# Patient Record
Sex: FEMALE | Race: WHITE | NOT HISPANIC OR LATINO | Employment: OTHER | URBAN - METROPOLITAN AREA
[De-identification: names, ages, dates, MRNs, and addresses within clinical notes are randomized per-mention and may not be internally consistent; named-entity substitution may affect disease eponyms.]

---

## 2018-08-24 ENCOUNTER — TRANSCRIBE ORDERS (OUTPATIENT)
Dept: ADMINISTRATIVE | Facility: HOSPITAL | Age: 83
End: 2018-08-24

## 2018-08-24 DIAGNOSIS — Z13.9 ENCOUNTER FOR SCREENING: Primary | ICD-10-CM

## 2018-08-30 ENCOUNTER — HOSPITAL ENCOUNTER (OUTPATIENT)
Dept: CARDIOLOGY | Facility: HOSPITAL | Age: 83
Discharge: HOME | End: 2018-08-30
Attending: INTERNAL MEDICINE | Admitting: INTERNAL MEDICINE

## 2018-08-30 VITALS
DIASTOLIC BLOOD PRESSURE: 68 MMHG | HEART RATE: 70 BPM | SYSTOLIC BLOOD PRESSURE: 137 MMHG | WEIGHT: 178 LBS | BODY MASS INDEX: 31.54 KG/M2 | HEIGHT: 63 IN

## 2018-08-30 DIAGNOSIS — Z13.9 ENCOUNTER FOR SCREENING: ICD-10-CM

## 2018-08-30 PROCEDURE — 93799 UNLISTED CV SVC/PROCEDURE: CPT

## 2021-03-16 ENCOUNTER — OFFICE VISIT (OUTPATIENT)
Dept: FAMILY MEDICINE CLINIC | Facility: CLINIC | Age: 86
End: 2021-03-16

## 2021-03-16 VITALS
TEMPERATURE: 97.8 F | SYSTOLIC BLOOD PRESSURE: 130 MMHG | WEIGHT: 181.6 LBS | HEART RATE: 74 BPM | DIASTOLIC BLOOD PRESSURE: 64 MMHG | OXYGEN SATURATION: 97 % | BODY MASS INDEX: 31 KG/M2 | HEIGHT: 64 IN

## 2021-03-16 DIAGNOSIS — Z00.00 MEDICARE ANNUAL WELLNESS VISIT, SUBSEQUENT: Primary | ICD-10-CM

## 2021-03-16 DIAGNOSIS — M48.061 SPINAL STENOSIS OF LUMBAR REGION, UNSPECIFIED WHETHER NEUROGENIC CLAUDICATION PRESENT: ICD-10-CM

## 2021-03-16 DIAGNOSIS — I82.5Z3 CHRONIC DEEP VEIN THROMBOSIS (DVT) OF DISTAL VEIN OF BOTH LOWER EXTREMITIES (HCC): ICD-10-CM

## 2021-03-16 DIAGNOSIS — B37.2 CANDIDAL INTERTRIGO: ICD-10-CM

## 2021-03-16 PROBLEM — Z72.0 TOBACCO ABUSE: Status: ACTIVE | Noted: 2021-03-16

## 2021-03-16 PROBLEM — I82.4Z2 ACUTE DEEP VEIN THROMBOSIS (DVT) OF DISTAL VEIN OF LEFT LOWER EXTREMITY: Status: ACTIVE | Noted: 2021-02-14

## 2021-03-16 PROBLEM — I87.2 VENOUS STASIS DERMATITIS OF BOTH LOWER EXTREMITIES: Status: ACTIVE | Noted: 2021-02-16

## 2021-03-16 PROBLEM — I89.0 LYMPHEDEMA: Status: ACTIVE | Noted: 2018-10-04

## 2021-03-16 PROBLEM — I35.0 MILD AORTIC STENOSIS: Status: ACTIVE | Noted: 2018-10-04

## 2021-03-16 PROCEDURE — 1170F FXNL STATUS ASSESSED: CPT | Performed by: NURSE PRACTITIONER

## 2021-03-16 PROCEDURE — 1125F AMNT PAIN NOTED PAIN PRSNT: CPT | Performed by: NURSE PRACTITIONER

## 2021-03-16 PROCEDURE — 1159F MED LIST DOCD IN RCRD: CPT | Performed by: NURSE PRACTITIONER

## 2021-03-16 PROCEDURE — G0439 PPPS, SUBSEQ VISIT: HCPCS | Performed by: NURSE PRACTITIONER

## 2021-03-16 RX ORDER — POTASSIUM CHLORIDE 750 MG/1
10 TABLET, FILM COATED, EXTENDED RELEASE ORAL DAILY
COMMUNITY
Start: 2020-12-08 | End: 2021-03-25 | Stop reason: SDUPTHER

## 2021-03-16 RX ORDER — APIXABAN 5 MG/1
5 TABLET, FILM COATED ORAL 2 TIMES DAILY
COMMUNITY
Start: 2021-03-15 | End: 2021-07-06 | Stop reason: SDUPTHER

## 2021-03-16 RX ORDER — FUROSEMIDE 20 MG/1
20 TABLET ORAL DAILY
COMMUNITY
Start: 2021-02-21 | End: 2021-03-25 | Stop reason: SDUPTHER

## 2021-03-16 RX ORDER — CLOTRIMAZOLE AND BETAMETHASONE DIPROPIONATE 10; .64 MG/G; MG/G
1 CREAM TOPICAL AS NEEDED
COMMUNITY
End: 2021-03-16 | Stop reason: SDUPTHER

## 2021-03-16 RX ORDER — TRAMADOL HYDROCHLORIDE 50 MG/1
50 TABLET ORAL EVERY 6 HOURS PRN
COMMUNITY
Start: 2021-02-16 | End: 2021-10-21 | Stop reason: ALTCHOICE

## 2021-03-16 RX ORDER — LOPERAMIDE HYDROCHLORIDE 2 MG/1
2 CAPSULE ORAL AS NEEDED
COMMUNITY
Start: 2020-11-12 | End: 2021-03-16 | Stop reason: SDUPTHER

## 2021-03-16 RX ORDER — LOPERAMIDE HYDROCHLORIDE 2 MG/1
2 CAPSULE ORAL DAILY PRN
Qty: 90 CAPSULE | Refills: 1 | Status: SHIPPED | OUTPATIENT
Start: 2021-03-16 | End: 2021-09-08 | Stop reason: SDUPTHER

## 2021-03-16 RX ORDER — CLOTRIMAZOLE AND BETAMETHASONE DIPROPIONATE 10; .64 MG/G; MG/G
1 CREAM TOPICAL 2 TIMES DAILY PRN
Qty: 45 G | Refills: 1 | Status: SHIPPED | OUTPATIENT
Start: 2021-03-16 | End: 2021-06-15

## 2021-03-16 NOTE — PROGRESS NOTES
The ABCs of the Annual Wellness Visit  Subsequent Medicare Wellness Visit    Chief Complaint   Patient presents with   • Medicare Wellness-subsequent       Subjective   History of Present Illness:  Noemi Forbes is a 85 y.o. female who presents for a Subsequent Medicare Wellness Visit.    HEALTH RISK ASSESSMENT    Recent Hospitalizations:  Recently treated at the following:  Other: [facility]     Current Medical Providers:  Patient Care Team:  Charity Calhoun APRN as PCP - General (Family Medicine)    Smoking Status:  Social History     Tobacco Use   Smoking Status Current Every Day Smoker   • Packs/day: 0.50   • Years: 68.00   • Pack years: 34.00   • Start date: 1950   Smokeless Tobacco Never Used       Alcohol Consumption:  Social History     Substance and Sexual Activity   Alcohol Use Yes    Comment: socailly       Depression Screen:   PHQ-2/PHQ-9 Depression Screening 3/16/2021   Little interest or pleasure in doing things 0   Feeling down, depressed, or hopeless 0   Total Score 0       Fall Risk Screen:  ANGELINE Fall Risk Assessment was completed, and patient is at HIGH risk for falls. Assessment completed on:3/16/2021    Health Habits and Functional and Cognitive Screening:  Functional & Cognitive Status 3/16/2021   Do you have difficulty preparing food and eating? No   Do you have difficulty bathing yourself, getting dressed or grooming yourself? No   Do you have difficulty using the toilet? No   Do you have difficulty moving around from place to place? Yes   Do you have trouble with steps or getting out of a bed or a chair? Yes   Current Diet Unhealthy Diet   Dental Exam Up to date   Eye Exam Up to date   Exercise (times per week) 0 times per week   Current Exercises Include No Regular Exercise   Do you need help using the phone?  No   Are you deaf or do you have serious difficulty hearing?  No   Do you need help with transportation? No   Do you need help shopping? Yes   Do you need help preparing meals?  No    Do you need help with housework?  No   Do you need help with laundry? No   Do you need help taking your medications? No   Do you need help managing money? No   Do you ever drive or ride in a car without wearing a seat belt? No   Have you felt unusual stress, anger or loneliness in the last month? No   Who do you live with? Alone   If you need help, do you have trouble finding someone available to you? No   Have you been bothered in the last four weeks by sexual problems? No   Do you have difficulty concentrating, remembering or making decisions? Yes         Does the patient have evidence of cognitive impairment? No    Asprin use counseling:Does not need ASA (and currently is not on it)    Age-appropriate Screening Schedule:  Refer to the list below for future screening recommendations based on patient's age, sex and/or medical conditions. Orders for these recommended tests are listed in the plan section. The patient has been provided with a written plan.    Health Maintenance   Topic Date Due   • DXA SCAN  Never done   • TDAP/TD VACCINES (4 - Td) 05/07/2030   • INFLUENZA VACCINE  Completed   • ZOSTER VACCINE  Completed          The following portions of the patient's history were reviewed and updated as appropriate: allergies, current medications, past family history, past medical history, past social history, past surgical history and problem list.    Outpatient Medications Prior to Visit   Medication Sig Dispense Refill   • Calcium Carb-Cholecalciferol (Calcium-Vitamin D3) 250-125 MG-UNIT tablet tablet Take 1 tablet by mouth Daily.     • Diclofenac Sodium (VOLTAREN) 1 % gel gel diclofenac 1 % topical gel   APPLY 2 GRAM TO THE AFFECTED AREA(S) BY TOPICAL ROUTE 4 TIMES PER DAY     • Eliquis 5 MG tablet tablet Take 5 mg by mouth 2 (two) times a day.     • furosemide (LASIX) 20 MG tablet Take 20 mg by mouth Daily.     • potassium chloride 10 MEQ CR tablet Take 10 mEq by mouth Daily.     • traMADol (ULTRAM) 50 MG  tablet Take 50 mg by mouth Every 6 (Six) Hours As Needed.     • clotrimazole-betamethasone (LOTRISONE) 1-0.05 % cream Apply 1 application topically to the appropriate area as directed As Needed.     • loperamide (IMODIUM) 2 MG capsule Take 2 mg by mouth As Needed.       No facility-administered medications prior to visit.       Patient Active Problem List   Diagnosis   • Acute deep vein thrombosis (DVT) of distal vein of left lower extremity (CMS/HCC)   • Back pain, chronic   • Arthritis   • Chronic deep vein thrombosis (DVT) of distal vein of both lower extremities (CMS/HCC)   • Chronic urinary tract infection   • Chronic venous insufficiency   • Facet arthritis of lumbar region   • Lymphedema   • Mild aortic stenosis   • Osteopenia   • Spinal stenosis of lumbar region   • Spondylolisthesis of lumbar region   • Tobacco abuse   • Venous stasis dermatitis of both lower extremities       Advanced Care Planning:  ACP discussion was held with the patient during this visit. Patient has an advance directive (not in EMR), copy requested.    Review of Systems   Constitutional: Negative for fever.   HENT: Negative for ear pain, rhinorrhea and sore throat.    Eyes: Negative for visual disturbance.   Respiratory: Negative for cough and shortness of breath.    Cardiovascular: Negative for chest pain.   Gastrointestinal: Negative for abdominal pain, diarrhea, nausea and vomiting.   Genitourinary: Negative.    Musculoskeletal: Positive for back pain.   Skin: Positive for rash.   Neurological: Negative for dizziness and headaches.   Psychiatric/Behavioral: Negative for confusion.       Compared to one year ago, the patient feels her physical health is worse.  Compared to one year ago, the patient feels her mental health is the same.    Reviewed chart for potential of high risk medication in the elderly: yes  Reviewed chart for potential of harmful drug interactions in the elderly:yes    Objective         Vitals:    03/16/21 1515  "  BP: 130/64   Pulse: 74   Temp: 97.8 °F (36.6 °C)   TempSrc: Temporal   SpO2: 97%   Weight: 82.4 kg (181 lb 9.6 oz)   Height: 162.6 cm (64\")       Body mass index is 31.17 kg/m².  Discussed the patient's BMI with her. The BMI is above average; BMI management plan is completed.    Physical Exam  Vitals and nursing note reviewed.   Constitutional:       Appearance: Normal appearance.   HENT:      Head: Normocephalic and atraumatic.      Right Ear: Tympanic membrane and ear canal normal.      Left Ear: Tympanic membrane and ear canal normal.   Eyes:      Conjunctiva/sclera: Conjunctivae normal.      Pupils: Pupils are equal, round, and reactive to light.   Cardiovascular:      Rate and Rhythm: Normal rate and regular rhythm.      Heart sounds: Normal heart sounds.   Pulmonary:      Effort: Pulmonary effort is normal.      Breath sounds: Normal breath sounds.   Abdominal:      General: Bowel sounds are normal.      Palpations: Abdomen is soft.   Genitourinary:     Comments: Declined   Musculoskeletal:         General: Normal range of motion.      Cervical back: Neck supple.   Skin:     General: Skin is warm and dry.   Neurological:      Mental Status: She is alert and oriented to person, place, and time.   Psychiatric:         Mood and Affect: Mood normal.               Assessment/Plan   Medicare Risks and Personalized Health Plan  CMS Preventative Services Quick Reference  Advance Directive Discussion  Immunizations Discussed/Encouraged (specific immunizations; Pneumococcal 23 )  Obesity/Overweight   Osteoprorosis Risk    The above risks/problems have been discussed with the patient.  Pertinent information has been shared with the patient in the After Visit Summary.  Follow up plans and orders are seen below in the Assessment/Plan Section.    Diagnoses and all orders for this visit:    1. Medicare annual wellness visit, subsequent (Primary)    2. Chronic deep vein thrombosis (DVT) of distal vein of both lower " extremities (CMS/HCC)    3. Spinal stenosis of lumbar region, unspecified whether neurogenic claudication present  -     Ambulatory Referral to Orthopedic Surgery    4. Candidal intertrigo  -     clotrimazole-betamethasone (LOTRISONE) 1-0.05 % cream; Apply 1 application topically to the appropriate area as directed 2 (Two) Times a Day As Needed (rash).  Dispense: 45 g; Refill: 1    Other orders  -     loperamide (IMODIUM) 2 MG capsule; Take 1 capsule by mouth Daily As Needed for Diarrhea.  Dispense: 90 capsule; Refill: 1      Follow Up:  Return in about 6 months (around 9/16/2021) for Recheck.     An After Visit Summary and PPPS were given to the patient.

## 2021-03-25 RX ORDER — POTASSIUM CHLORIDE 750 MG/1
10 TABLET, FILM COATED, EXTENDED RELEASE ORAL DAILY
Qty: 90 TABLET | Refills: 1 | Status: SHIPPED | OUTPATIENT
Start: 2021-03-25 | End: 2021-11-10 | Stop reason: SDUPTHER

## 2021-03-25 RX ORDER — FUROSEMIDE 20 MG/1
20 TABLET ORAL DAILY
Qty: 90 TABLET | Refills: 1 | Status: SHIPPED | OUTPATIENT
Start: 2021-03-25 | End: 2021-11-10 | Stop reason: SDUPTHER

## 2021-03-25 NOTE — TELEPHONE ENCOUNTER
LOV - 03/16/21    Last filled - potassium 12/08/20    Last filled - furosemide - 02/21/21    Last filled calcium - 02/17/21

## 2021-04-21 ENCOUNTER — OFFICE VISIT (OUTPATIENT)
Dept: FAMILY MEDICINE CLINIC | Facility: CLINIC | Age: 86
End: 2021-04-21

## 2021-04-21 VITALS
BODY MASS INDEX: 30.83 KG/M2 | WEIGHT: 180.6 LBS | OXYGEN SATURATION: 95 % | TEMPERATURE: 97.7 F | HEART RATE: 82 BPM | HEIGHT: 64 IN | DIASTOLIC BLOOD PRESSURE: 79 MMHG | SYSTOLIC BLOOD PRESSURE: 127 MMHG

## 2021-04-21 DIAGNOSIS — S16.1XXA ACUTE STRAIN OF NECK MUSCLE, INITIAL ENCOUNTER: Primary | ICD-10-CM

## 2021-04-21 PROCEDURE — 99213 OFFICE O/P EST LOW 20 MIN: CPT | Performed by: NURSE PRACTITIONER

## 2021-04-21 RX ORDER — METHYLPREDNISOLONE 4 MG/1
TABLET ORAL
Qty: 21 TABLET | Refills: 0 | Status: SHIPPED | OUTPATIENT
Start: 2021-04-21 | End: 2021-04-28 | Stop reason: SDUPTHER

## 2021-04-21 NOTE — PROGRESS NOTES
"Chief Complaint  Pain (pain in neck for past week after a fall at home- seen by ER 04/20/21)    Subjective          Noemi Forbes presents to [facility]  Neck Pain   This is a new problem. The current episode started in the past 7 days. The pain is associated with a fall. The pain is present in the midline. The quality of the pain is described as aching. The pain is at a severity of 8/10. The symptoms are aggravated by bending and twisting. Pertinent negatives include no headaches, numbness or weakness. Treatments tried: Tramadol.  The treatment provided no relief.       Objective   Vital Signs:   /79   Pulse 82   Temp 97.7 °F (36.5 °C) (Temporal)   Ht 162.6 cm (64\")   Wt 81.9 kg (180 lb 9.6 oz)   SpO2 95%   BMI 31.00 kg/m²     Physical Exam  Vitals and nursing note reviewed.   Constitutional:       Appearance: Normal appearance.   Cardiovascular:      Rate and Rhythm: Normal rate and regular rhythm.   Pulmonary:      Effort: Pulmonary effort is normal.      Breath sounds: Normal breath sounds.   Musculoskeletal:      Cervical back: Pain with movement present. Decreased range of motion.   Neurological:      Mental Status: She is alert and oriented to person, place, and time.   Psychiatric:         Mood and Affect: Mood normal.        Result Review :   The following data was reviewed by: TERRELL Miller on 04/21/2021:    Data reviewed: ED notes from 4/21/2021          Assessment and Plan    Diagnoses and all orders for this visit:    1. Acute strain of neck muscle, initial encounter (Primary)  -     methylPREDNISolone (MEDROL) 4 MG dose pack; Take as directed on package instructions.  Dispense: 21 tablet; Refill: 0      I spent 20 minutes caring for Noemi on this date of service. This time includes time spent by me in the following activities:reviewing tests, performing a medically appropriate examination and/or evaluation , counseling and educating the " patient/family/caregiver, ordering medications, tests, or procedures and documenting information in the medical record  Follow Up   Return if symptoms worsen or fail to improve.  Patient was given instructions and counseling regarding her condition or for health maintenance advice. Please see specific information pulled into the AVS if appropriate.

## 2021-04-26 ENCOUNTER — TELEPHONE (OUTPATIENT)
Dept: FAMILY MEDICINE CLINIC | Facility: CLINIC | Age: 86
End: 2021-04-26

## 2021-04-26 DIAGNOSIS — S16.1XXA ACUTE STRAIN OF NECK MUSCLE, INITIAL ENCOUNTER: ICD-10-CM

## 2021-04-26 NOTE — TELEPHONE ENCOUNTER
Caller: Leidy Noemi    Relationship: Self    Best call back number: [phone]    What medication are you requesting: STEROID    What are your current symptoms: PAIN IN HER NECK    Have you had these symptoms before:    [x] Yes  [] No    Have you been treated for these symptoms before:   [x] Yes  [] No    If a prescription is needed, what is your preferred pharmacy and phone number: [facility]/PHARMACY #[id number] - [place], [place] - [address] AT [facility] [phone] [facility] [phone]      Additional notes: PATIENT STATES THAT THE STEROID SHE HAS BEEN ON HAS HELPED ABOUT 40% AND SHE WANTS TO KNOW IF SHE COULD GET ANOTHER ONE SINCE SHE IS STILL HAVING PAIN.     PLEASE CALL AND ADVISE

## 2021-04-27 ENCOUNTER — OFFICE VISIT (OUTPATIENT)
Dept: ORTHOPEDIC SURGERY | Facility: CLINIC | Age: 86
End: 2021-04-27

## 2021-04-27 VITALS — BODY MASS INDEX: 29.02 KG/M2 | WEIGHT: 170 LBS | HEIGHT: 64 IN | TEMPERATURE: 97.1 F

## 2021-04-27 DIAGNOSIS — M54.50 CHRONIC LOW BACK PAIN, UNSPECIFIED BACK PAIN LATERALITY, UNSPECIFIED WHETHER SCIATICA PRESENT: Primary | ICD-10-CM

## 2021-04-27 DIAGNOSIS — G89.29 CHRONIC LOW BACK PAIN, UNSPECIFIED BACK PAIN LATERALITY, UNSPECIFIED WHETHER SCIATICA PRESENT: Primary | ICD-10-CM

## 2021-04-27 DIAGNOSIS — M48.061 SPINAL STENOSIS OF LUMBAR REGION, UNSPECIFIED WHETHER NEUROGENIC CLAUDICATION PRESENT: ICD-10-CM

## 2021-04-27 DIAGNOSIS — M54.50 LUMBAR BACK PAIN: ICD-10-CM

## 2021-04-27 PROCEDURE — 99204 OFFICE O/P NEW MOD 45 MIN: CPT | Performed by: ORTHOPAEDIC SURGERY

## 2021-04-27 PROCEDURE — 72100 X-RAY EXAM L-S SPINE 2/3 VWS: CPT | Performed by: ORTHOPAEDIC SURGERY

## 2021-04-27 NOTE — TELEPHONE ENCOUNTER
Please ask patient to discuss this with Dr. Combs (ortho) today.          Documentation    ** HUB MAY RELAY MESSAGE TO PATIENT **    TCB

## 2021-04-27 NOTE — TELEPHONE ENCOUNTER
PATIENT CALLING IN I RELAYED MESSAGE ABOUT ASKING DOCTOR WERENER ABOUT THIS AND SHE STATES SHE WENT TO [facility] AND WAS TOLD HE IS THE WRONG DOCTOR.     SHE IS ASKING TO HAVE [facility] SEND SOMETHING IN FOR HER NECK.      PLEASE ADVISE    CALLBACK NUMBER  [phone]

## 2021-04-28 RX ORDER — METHYLPREDNISOLONE 4 MG/1
TABLET ORAL
Qty: 21 TABLET | Refills: 0 | Status: SHIPPED | OUTPATIENT
Start: 2021-04-28 | End: 2021-07-21

## 2021-04-28 NOTE — TELEPHONE ENCOUNTER
Please let patient know I will refill Medrol Dosepak one more time but she will need follow-up if symptoms persist or worsen.

## 2021-04-28 NOTE — TELEPHONE ENCOUNTER
Caller: Noemi Forbes    Relationship to patient: Self    Best call back number: [phone]    Patient is needing: PATIENT STATES THAT SHE HAS NECK PAIN AND WOULD LIKE A PRESCRIPTION FOR PAIN. PLEASE CALL PATIENT AND ADVISE. POSSIBLY WOULD LIKE THIS MEDICATION:    methylPREDNISolone (MEDROL) 4 MG dose pack    [facility]/pharmacy #[id number] - [place], [place] - [address] AT [facility] - [phone]  - [phone]   [phone]

## 2021-04-29 NOTE — PROGRESS NOTES
New patient or new problem visit    CC: She complains of neck pain and arm pain which is chronic as well as low back pain.      HPI: Her history is of chronic neck and back pain.  In 2010 Dr. Willis Storm performed lumbar procedure.  Her hip pain is 9 out of 10 and she is considering a spinal cord stimulator.  She was disappointed to find out that I do not implant spinal cord stimulators.  No bowel or bladder complaints fever chills or weight loss    PFSH: See attached    ROS: As above    PE: On exam positive patellar reflexes negative Achilles reflexes good strength in the legs bilaterally.  Mild tenderness to palpation of the lumbar spine.    XRAY: Plain film x-rays lumbar spine show multilevel spondylosis and loss of lordosis.  Probable congenital if not developmental narrowing of the spinal canal.  AP view shows a slight well-balanced scoliosis and hip joints are fairly well-preserved.  No comparisons are available.  She is apparently had other studies done but none available today.    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  Probably has some asymptomatic spinal stenosis as well.  She does not want major surgical intervention.  She cannot undergo MRI scanning as I believe she told me she has a pacemaker    Plan: We will get a CT scan of the lumbar spine to get a general idea of what is going on in the spinal canal.  I will refer her to Dr. Hill for evaluation for pain management and possible placement of a pain pump and/or stimulator if he thinks that is practical.  She apologized for wasting my time and I told her I do not think it is a waste as Dr. Hill will appreciate the confirmation that she may indeed be a candidate for major, but minimally invasive pain management procedure

## 2021-05-27 ENCOUNTER — TELEPHONE (OUTPATIENT)
Dept: FAMILY MEDICINE CLINIC | Facility: CLINIC | Age: 86
End: 2021-05-27

## 2021-05-27 NOTE — TELEPHONE ENCOUNTER
MAYTEI:  Pt called and stated the she believes she has blood clots in her legs, because she has had theM before. Pt was instructed to go directly to the ER for high level of care.

## 2021-06-04 ENCOUNTER — HOSPITAL ENCOUNTER (OUTPATIENT)
Dept: CT IMAGING | Facility: HOSPITAL | Age: 86
Discharge: HOME | End: 2021-06-04
Admitting: ORTHOPAEDIC SURGERY

## 2021-06-04 DIAGNOSIS — M54.50 CHRONIC LOW BACK PAIN, UNSPECIFIED BACK PAIN LATERALITY, UNSPECIFIED WHETHER SCIATICA PRESENT: ICD-10-CM

## 2021-06-04 DIAGNOSIS — G89.29 CHRONIC LOW BACK PAIN, UNSPECIFIED BACK PAIN LATERALITY, UNSPECIFIED WHETHER SCIATICA PRESENT: ICD-10-CM

## 2021-06-04 PROCEDURE — 72131 CT LUMBAR SPINE W/O DYE: CPT

## 2021-06-12 DIAGNOSIS — B37.2 CANDIDAL INTERTRIGO: ICD-10-CM

## 2021-06-15 RX ORDER — CLOTRIMAZOLE AND BETAMETHASONE DIPROPIONATE 10; .64 MG/G; MG/G
CREAM TOPICAL
Qty: 45 G | Refills: 0 | Status: SHIPPED | OUTPATIENT
Start: 2021-06-15 | End: 2021-06-16 | Stop reason: SDUPTHER

## 2021-06-16 DIAGNOSIS — B37.2 CANDIDAL INTERTRIGO: ICD-10-CM

## 2021-06-16 RX ORDER — CLOTRIMAZOLE AND BETAMETHASONE DIPROPIONATE 10; .64 MG/G; MG/G
CREAM TOPICAL 2 TIMES DAILY
Qty: 45 G | Refills: 0 | Status: SHIPPED | OUTPATIENT
Start: 2021-06-16 | End: 2021-10-24

## 2021-07-06 ENCOUNTER — TELEPHONE (OUTPATIENT)
Dept: FAMILY MEDICINE CLINIC | Facility: CLINIC | Age: 86
End: 2021-07-06

## 2021-07-06 RX ORDER — APIXABAN 5 MG/1
5 TABLET, FILM COATED ORAL 2 TIMES DAILY
Qty: 180 TABLET | Refills: 0 | Status: SHIPPED | OUTPATIENT
Start: 2021-07-06 | End: 2021-10-18

## 2021-07-06 NOTE — TELEPHONE ENCOUNTER
Caller: Noemi Forbes    Relationship: Self    Best call back number: [phone]    Medication needed:   Requested Prescriptions     Pending Prescriptions Disp Refills   • Eliquis 5 MG tablet tablet 60 tablet      Sig: Take 1 tablet by mouth 2 (two) times a day.       When do you need the refill by:ASAP     What additional details did the patient provide when requesting the medication: OUT OF MEDICATION. PATIENT IS ASKING FOR A 90 DAY SUPPLY AND IF THE DOSAGE CAN BE 10 MG SO SHE WILL ONLY HAVE TO TAKE 1 PILL PER DAY     Does the patient have less than a 3 day supply:  [x] Yes  [] No    What is the patient's preferred pharmacy: [facility]/PHARMACY #[id number] - [place], [place] - [address] AT [facility] [phone] [facility] [phone] FX

## 2021-07-21 ENCOUNTER — OFFICE VISIT (OUTPATIENT)
Dept: FAMILY MEDICINE CLINIC | Facility: CLINIC | Age: 86
End: 2021-07-21

## 2021-07-21 VITALS
HEIGHT: 64 IN | TEMPERATURE: 97.9 F | OXYGEN SATURATION: 94 % | SYSTOLIC BLOOD PRESSURE: 128 MMHG | DIASTOLIC BLOOD PRESSURE: 74 MMHG | BODY MASS INDEX: 30.77 KG/M2 | HEART RATE: 79 BPM | WEIGHT: 180.2 LBS

## 2021-07-21 DIAGNOSIS — H00.015 HORDEOLUM EXTERNUM OF LEFT LOWER EYELID: Primary | ICD-10-CM

## 2021-07-21 PROCEDURE — 99213 OFFICE O/P EST LOW 20 MIN: CPT | Performed by: NURSE PRACTITIONER

## 2021-07-21 NOTE — PROGRESS NOTES
"Chief Complaint  painful left eye with itching and discuss her medications    Subjective          Noemi Forbes presents to [facility]  Eye Problem   The left eye is affected. This is a new problem. The current episode started in the past 7 days. There was no injury mechanism. The pain is moderate. There is no known exposure to pink eye. She does not wear contacts. Pertinent negatives include no blurred vision, eye discharge or fever. She has tried nothing for the symptoms.     Objective   Vital Signs:   /74   Pulse 79   Temp 97.9 °F (36.6 °C) (Temporal)   Ht 162.6 cm (64\")   Wt 81.7 kg (180 lb 3.2 oz)   SpO2 94%   BMI 30.93 kg/m²     Physical Exam  Vitals and nursing note reviewed.   Eyes:      General:         Left eye: Hordeolum present.  Cardiovascular:      Rate and Rhythm: Normal rate and regular rhythm.   Pulmonary:      Effort: Pulmonary effort is normal.      Breath sounds: Normal breath sounds.   Neurological:      Mental Status: She is oriented to person, place, and time.   Psychiatric:         Mood and Affect: Mood normal.        Result Review :            Assessment and Plan    Diagnoses and all orders for this visit:    1. Hordeolum externum of left lower eyelid (Primary)  Comments:  - Patient to use warm compresses or purchase Misa mask.   Orders:  -     Ambulatory Referral to Ophthalmology      I spent 20 minutes caring for Noemi on this date of service. This time includes time spent by me in the following activities:performing a medically appropriate examination and/or evaluation , counseling and educating the patient/family/caregiver, referring and communicating with other health care professionals  and documenting information in the medical record  Follow Up   Return if symptoms worsen or fail to improve.  Patient was given instructions and counseling regarding her condition or for health maintenance advice. Please see specific information pulled " into the AVS if appropriate.

## 2021-08-09 ENCOUNTER — OFFICE VISIT (OUTPATIENT)
Dept: PAIN MEDICINE | Facility: CLINIC | Age: 86
End: 2021-08-09

## 2021-08-09 VITALS
TEMPERATURE: 96.2 F | WEIGHT: 175 LBS | DIASTOLIC BLOOD PRESSURE: 83 MMHG | RESPIRATION RATE: 18 BRPM | SYSTOLIC BLOOD PRESSURE: 124 MMHG | OXYGEN SATURATION: 94 % | BODY MASS INDEX: 29.88 KG/M2 | HEART RATE: 62 BPM | HEIGHT: 64 IN

## 2021-08-09 DIAGNOSIS — M96.1 POSTLAMINECTOMY SYNDROME: ICD-10-CM

## 2021-08-09 DIAGNOSIS — G89.4 CHRONIC PAIN SYNDROME: Primary | ICD-10-CM

## 2021-08-09 PROCEDURE — 99204 OFFICE O/P NEW MOD 45 MIN: CPT | Performed by: ANESTHESIOLOGY

## 2021-08-09 NOTE — PATIENT INSTRUCTIONS
----------  Education about SCS therapy:    -  This was an extended office visit in which we entered into discussion about advanced pain relieving techniques, and discussed implantable pain therapies.  We discussed advanced neuromodulation in the form of Spinal Cord Stimulation.  This is a reasonable therapy for patients who have exhausted basic nonnarcotic options, basic modalities and physical therapies, and do not have any other reasonable surgical options.  This therapy as an alternative to long term high dose opioid therapy.    -  Risks include but are not limited to bleeding, infection, injury, paralysis, nerve injury, dural puncture, and risk for postprocedural pain.  Implanted equipment risks include but are not limited to lead migration, lead fracture, risk of loss of pain relieving stimulation, risk of electrical shock, and risk of system failure.    - We discussed the theory and basic science behind SCS therapy including but not limited to energy delivery and relevant anatomy, in terms that are easy to understand and also with use of illustrative devices.  Spinal Cord Stimulation therapies apply an electromagnetic field to a specific area on the spinal cord (Dorsal Column) to attempt to block transmission of painful signals from the peripheral nerves to the brain.    -  We discussed that prior to trialing, I request that patients review relevant materials and perform some research, and also have a follow up education session with a device specialist from the .  Also, insurance requires a presurgical psychological evaluation.  When these have been completed, and all the patient's questions have been answered to their satisfaction, then we will plan to request authorization for trialing.   - We discussed the trialing process (aka Phase 1)  that usually lasts a week, and the temporary nature of this trial.  Trial success will determine whether or not we proceed to implant.  We discussed  reasonable expectations, and that I feel that consistent 50% pain relief is medically successful and is a reasonable expectation to justify moving forward to permanent implant.    -  Additional risks of Phase 1 include but are not limited to bleeding on insertion, bleeding on lead removal, and procedural site soreness.  - We discussed the percutaneous surgical implant, including postsurgical restrictions, risks, and alternatives.   For spinal cord stimulation implanted device (aka SCS Phase 2) there is usually a midline vertical incision for the spinally implanted leads, and also a horizontal incision in the posterior lumbar flank for implantation of the battery & computer (aka IPG).  The leads are tunneled from the midline incision to the medial aspect of the battery pocket incision.    -  Postoperative restrictions include limiting the following activity as much as possible for 90 days:  Lifting >10 lbs, bending at the waist, stretching/reaching overhead, and twisting.  ----------      Spinal Cord Stimulation Trial Information  A spinal cord stimulation trial is a test to see whether a spinal cord stimulator reduces your pain. A spinal cord stimulator is a small device that is inserted (implanted) in your back. The stimulator has small wires (leads) that connect it to your spinal cord. The stimulator sends electrical pulses through the leads to the spinal cord. This can relieve pain. Settings for the stimulator can be adjusted with a remote device to find the best pain control.  Your health care provider may suggest a spinal cord stimulation trial if other treatments for long-lasting (chronic) pain have not worked for you. Spinal cord stimulation may be used to manage pain that is caused by:  · Coronary artery disease or peripheral vascular disease.  · Failed back surgery.  · Phantom limb sensation.  · Peripheral neuropathy.  · Complex regional pain syndrome.  · Other syndromes that involve chronic pain.  For the  trial, the stimulator is attached to your back instead of inserted under the skin. A trial period is usually 3-5 days, but this can vary among health care providers. After your trial period, you and your health care provider will discuss whether a permanent spinal cord stimulator is an option for you. The permanent stimulator may be an option depending on:  · Whether the stimulator reduces your pain during the trial.  · Whether the stimulator fits into your lifestyle.  · Whether the cost of the stimulator is covered by your insurance.  What are the risks?  Generally, a spinal cord stimulation trial is safe. However, problems can occur, including:  · Bleeding or pain at the insertion site of the leads.  · Infection at the insertion site or around the leads.  · Allergic reactions to medicines, devices, or dyes.  · Damage to the skin, nerves, back muscles, or spinal cord where the leads are placed.  · Inability to move the legs (paralysis).  · Numbness in the legs.  · Inability to control when you urinate or have a bowel movement (incontinence).  · Spinal fluid leakage.  How is a spinal cord stimulator placed for a trial?    For a trial period, the stimulator is placed on your skin, not under it. Only the leads that connect the stimulator to the spinal cord are implanted under your skin. The exact location of the stimulator depends on where you have pain.  There are two types of surgery for implanting the leads:  · Noninvasive surgery. In this type of surgery, a small incision is made and needles are used to place the leads under your skin.  · Open surgery. In this type of surgery, a larger incision is made, and the leads are implanted directly into your back.  How should I care for myself during the trial period?  Activity  · Return to your normal activities as told by your health care provider. Ask your health care provider what activities are safe for you.  · Do not lift anything that is heavier than 10 lb (4.5 kg),  or the limit that you are told.  General instructions  · Follow your health care provider's specific instructions about how to take care of your spinal cord stimulator and your incision.  · Make sure you write down the following information so that you can share this information with your health care provider:  ? Your responses to the stimulator. Describe these as told by your health care provider.  ? Your pain level throughout the day.  ? The amount and kind of pain medicine that you take.  · Take over-the-counter and prescription medicines only as told by your health care provider.  · Do not take baths, swim, or use a hot tub until your health care provider approves.  · Tell all health care providers who provide care for you that you have a spinal cord stimulator. This is important information that could affect the medical treatment that you receive.  · Keep all follow-up visits as told by your health care provider. This is important.  When should I seek medical care?  During the trial, seek medical care if:  · You have redness, swelling, or pain around your incision.  · You have fluid or blood coming from your incision.  · Your incision feels warm to the touch.  · You have pus or a bad smell coming from your incision.  · The bandage (dressing) that covers your incision comes off.  Get help right away if:  · Your pain gets worse.  · The stimulator leads come out.  · You develop numbness or weakness in your legs, or you have difficulty walking.  · You have problems urinating or having a bowel movement.  · You have a fever.  · You have symptoms that last for more than 2-3 days.  · Your symptoms suddenly get worse.  Summary  · A spinal cord stimulator is a small device that sends electrical pulses to your spinal cord. This can relieve pain caused by many different health conditions.  · Before a permanent stimulator is placed, you will have a trial using a temporary stimulator that is not implanted under your skin.  This helps determine if a stimulator will reduce your pain.  · For the trial, only the leads that connect the stimulator to the spinal cord are implanted under your skin.  · During the trial period, make sure you write down information about your pain and your responses to the stimulator so that you can share this information with your health care provider.  · Keep all follow-up visits as told by your health care provider. This is important. Contact your health care provider if you have symptoms that indicate a problem.  This information is not intended to replace advice given to you by your health care provider. Make sure you discuss any questions you have with your health care provider.  Document Revised: 09/11/2020 Document Reviewed: 01/22/2020  Elsevier Patient Education © 2021 [employer]

## 2021-08-09 NOTE — PROGRESS NOTES
CHIEF COMPLAINT  Ms. Forbes states that she has had low back pain for the last 10 years. She states that she has tried PT previously for her back pain on 3 different occasions. She states that she was previously in pain management at [facility] a few years ago.    Subjective   Noemi Forbes is a 86 y.o. female.   She presents to the office for evaluation of back pain. She was referred here by Charity TEJADA and she also follows with my colleague Dr. Combs.         Back Pain  This is a chronic problem. The current episode started more than 1 year ago. The problem occurs intermittently (Significant incident pain). The problem has been gradually worsening since onset. The pain is present in the lumbar spine. The quality of the pain is described as aching and shooting. The pain radiates to the left thigh and right thigh. The pain is severe. The symptoms are aggravated by standing and bending (Walking). Associated symptoms include numbness (bilateral feet). Pertinent negatives include no weakness.        PEG Assessment   What number best describes your pain on average in the past week?8  What number best describes how, during the past week, pain has interfered with your enjoyment of life?8  What number best describes how, during the past week, pain has interfered with your general activity?  8    --  The aforementioned information the Chief Complaint section and above subjective data including any HPI data, and also the Review of Systems data, has been personally reviewed and affirmed.  --        Current Outpatient Medications:   •  Calcium Carb-Cholecalciferol (Calcium-Vitamin D3) 250-125 MG-UNIT tablet tablet, Take 1 tablet by mouth Daily., Disp: 90 tablet, Rfl: 1  •  clotrimazole-betamethasone (LOTRISONE) 1-0.05 % cream, Apply  topically to the appropriate area as directed 2 (Two) Times a Day., Disp: 45 g, Rfl: 0  •  Eliquis 5 MG tablet tablet, Take 1 tablet by mouth 2 (two) times a day., Disp: 180 tablet,  "Rfl: 0  •  furosemide (LASIX) 20 MG tablet, Take 1 tablet by mouth Daily., Disp: 90 tablet, Rfl: 1  •  loperamide (IMODIUM) 2 MG capsule, Take 1 capsule by mouth Daily As Needed for Diarrhea., Disp: 90 capsule, Rfl: 1  •  potassium chloride 10 MEQ CR tablet, Take 1 tablet by mouth Daily., Disp: 90 tablet, Rfl: 1  •  traMADol (ULTRAM) 50 MG tablet, Take 50 mg by mouth Every 6 (Six) Hours As Needed., Disp: , Rfl:     The following portions of the patient's history were reviewed and updated as appropriate: allergies, current medications, past family history, past medical history, past social history, past surgical history and problem list.      REVIEW OF PERTINENT MEDICAL DATA    E-darin report is reviewed:  I reviewed the document in the electronic form under the PDMP tab in the Epic EMR...  - In this function, the report is a current report in as close to real-time as possible.  - The report was available for immediate review.    - I did sampson the report as reviewed.  - There is not concern for aberrant behavior based on this ekasper review.      Review of Systems   Constitutional: Positive for fatigue.   HENT: Negative for congestion.    Eyes: Negative for visual disturbance.   Respiratory: Negative for shortness of breath.    Cardiovascular: Negative.    Gastrointestinal: Negative for constipation.   Genitourinary: Negative for difficulty urinating.   Musculoskeletal: Positive for back pain.   Neurological: Positive for numbness (bilateral feet). Negative for weakness.   Psychiatric/Behavioral: Negative for sleep disturbance and suicidal ideas. The patient is not nervous/anxious.          Vitals:    08/09/21 1444   BP: 124/83   Pulse: 62   Resp: 18   Temp: 96.2 °F (35.7 °C)   SpO2: 94%   Weight: 79.4 kg (175 lb)   Height: 162.6 cm (64\")   PainSc: 0-No pain         Objective   Physical Exam    Assessment/Plan   Diagnoses and all orders for this visit:    1. Chronic pain syndrome (Primary)    2. Postlaminectomy " syndrome  -     Ambulatory Referral to Psychology    This 86-year-old woman has little to no pain at rest.  She has severe painful flareups which cause her to limit activity severely.  Previous history of back surgery.  She presents today asking specific question, that is whether or not she would be a candidate to have a spinal stimulator device.  She expresses concern about further open decompressive or fusion procedures and recovery time from those and is interested if she can have some other pain relieving option.    We talked about anticoagulant management.  We talked about that she would need to hold her Eliquis for 3 days prior to initiation of the spinal cord stimulator trial.  She would need to be off Eliquis during the trial.  She will need to discuss with the prescribing physician whether or not that would be allowed.    I initiated discussion that was an introduction to spinal cord stimulation therapy that included discussions some about the theory and the sides of the therapy as well as an introduction to trialing and also a brief introduction to implantation.  She had not studied about the therapy only knowing that she had heard about it and she knows people who have succeeded with it.  She was given some information to review and also was set up for more formalized education, as a session with the device representative.      --- Follow-up for education with Denia from [employer]  -- presurgical SCS evaluation    --The plan is to proceed with a spinal cord stimulator phase 1 if there are no contraindications                Dictated utilizing Dragon dictation.     ---    Vitals:    08/09/21 1444   PainSc: 0-No pain          Noemi Forbes reports a pain score of 0.  Given her pain assessment as noted, treatment options were discussed and the following options were decided upon as a follow-up plan to address the patient's pain: educational materials on pain management.

## 2021-08-12 ENCOUNTER — TELEPHONE (OUTPATIENT)
Dept: PAIN MEDICINE | Facility: CLINIC | Age: 86
End: 2021-08-12

## 2021-08-12 NOTE — TELEPHONE ENCOUNTER
Provider: DR SWAIN  Caller: LEVON CALERO  Relationship to Patient: SELF    Phone Number: [phone]  Reason for Call: PATIENT REQ CALL BACK - PER ATIENT SHE NEVER ANSWERES CALL ANYWAY - AND DR SWAIN TOLD HER [employer] WOULD BE CALLING HER AND SHE'S STAYED BY HER PHONE FOR 2 DAYS AND STILL HASN'T RCVD THE CALL.  REQUESTS PRISCILA TO CALL HER - HAS QUESTIONS.

## 2021-09-02 ENCOUNTER — OFFICE VISIT (OUTPATIENT)
Dept: FAMILY MEDICINE CLINIC | Facility: CLINIC | Age: 86
End: 2021-09-02

## 2021-09-02 ENCOUNTER — HOSPITAL ENCOUNTER (OUTPATIENT)
Dept: CARDIOLOGY | Facility: HOSPITAL | Age: 86
Discharge: HOME | End: 2021-09-02
Admitting: NURSE PRACTITIONER

## 2021-09-02 VITALS
BODY MASS INDEX: 30.04 KG/M2 | HEART RATE: 76 BPM | TEMPERATURE: 97.8 F | OXYGEN SATURATION: 96 % | DIASTOLIC BLOOD PRESSURE: 75 MMHG | HEIGHT: 64 IN | SYSTOLIC BLOOD PRESSURE: 144 MMHG

## 2021-09-02 DIAGNOSIS — I82.5Z3 CHRONIC DEEP VEIN THROMBOSIS (DVT) OF DISTAL VEIN OF BOTH LOWER EXTREMITIES (HCC): ICD-10-CM

## 2021-09-02 DIAGNOSIS — L03.116 BILATERAL CELLULITIS OF LOWER LEG: Primary | ICD-10-CM

## 2021-09-02 DIAGNOSIS — L03.115 BILATERAL CELLULITIS OF LOWER LEG: Primary | ICD-10-CM

## 2021-09-02 LAB
BH CV LOW VAS LEFT POPLITEAL SPONT: 1
BH CV LOWER VASCULAR LEFT COMMON FEMORAL AUGMENT: NORMAL
BH CV LOWER VASCULAR LEFT COMMON FEMORAL COMPETENT: NORMAL
BH CV LOWER VASCULAR LEFT COMMON FEMORAL COMPRESS: NORMAL
BH CV LOWER VASCULAR LEFT COMMON FEMORAL PHASIC: NORMAL
BH CV LOWER VASCULAR LEFT COMMON FEMORAL SPONT: NORMAL
BH CV LOWER VASCULAR LEFT DISTAL FEMORAL COMPRESS: NORMAL
BH CV LOWER VASCULAR LEFT GASTRONEMIUS COMPRESS: NORMAL
BH CV LOWER VASCULAR LEFT GREATER SAPH AK COMPRESS: NORMAL
BH CV LOWER VASCULAR LEFT GREATER SAPH BK COMPRESS: NORMAL
BH CV LOWER VASCULAR LEFT LESSER SAPH COMPRESS: NORMAL
BH CV LOWER VASCULAR LEFT MID FEMORAL AUGMENT: NORMAL
BH CV LOWER VASCULAR LEFT MID FEMORAL COMPETENT: NORMAL
BH CV LOWER VASCULAR LEFT MID FEMORAL COMPRESS: NORMAL
BH CV LOWER VASCULAR LEFT MID FEMORAL PHASIC: NORMAL
BH CV LOWER VASCULAR LEFT MID FEMORAL SPONT: NORMAL
BH CV LOWER VASCULAR LEFT PERONEAL COMPRESS: NORMAL
BH CV LOWER VASCULAR LEFT POPLITEAL AUGMENT: NORMAL
BH CV LOWER VASCULAR LEFT POPLITEAL COMPETENT: NORMAL
BH CV LOWER VASCULAR LEFT POPLITEAL COMPRESS: NORMAL
BH CV LOWER VASCULAR LEFT POPLITEAL PHASIC: NORMAL
BH CV LOWER VASCULAR LEFT POPLITEAL SPONT: NORMAL
BH CV LOWER VASCULAR LEFT POPLITEAL THROMBUS: NORMAL
BH CV LOWER VASCULAR LEFT POSTERIOR TIBIAL COMPRESS: NORMAL
BH CV LOWER VASCULAR LEFT PROFUNDA FEMORAL COMPRESS: NORMAL
BH CV LOWER VASCULAR LEFT PROXIMAL FEMORAL COMPRESS: NORMAL
BH CV LOWER VASCULAR LEFT SAPHENOFEMORAL JUNCTION COMPRESS: NORMAL
BH CV LOWER VASCULAR RIGHT COMMON FEMORAL AUGMENT: NORMAL
BH CV LOWER VASCULAR RIGHT COMMON FEMORAL COMPETENT: NORMAL
BH CV LOWER VASCULAR RIGHT COMMON FEMORAL COMPRESS: NORMAL
BH CV LOWER VASCULAR RIGHT COMMON FEMORAL PHASIC: NORMAL
BH CV LOWER VASCULAR RIGHT COMMON FEMORAL SPONT: NORMAL
BH CV LOWER VASCULAR RIGHT DISTAL FEMORAL COMPRESS: NORMAL
BH CV LOWER VASCULAR RIGHT GASTRONEMIUS COMPRESS: NORMAL
BH CV LOWER VASCULAR RIGHT GREATER SAPH AK COMPRESS: NORMAL
BH CV LOWER VASCULAR RIGHT GREATER SAPH BK COMPRESS: NORMAL
BH CV LOWER VASCULAR RIGHT LESSER SAPH COMPRESS: NORMAL
BH CV LOWER VASCULAR RIGHT MID FEMORAL AUGMENT: NORMAL
BH CV LOWER VASCULAR RIGHT MID FEMORAL COMPETENT: NORMAL
BH CV LOWER VASCULAR RIGHT MID FEMORAL COMPRESS: NORMAL
BH CV LOWER VASCULAR RIGHT MID FEMORAL PHASIC: NORMAL
BH CV LOWER VASCULAR RIGHT MID FEMORAL SPONT: NORMAL
BH CV LOWER VASCULAR RIGHT PERONEAL COMPRESS: NORMAL
BH CV LOWER VASCULAR RIGHT POPLITEAL AUGMENT: NORMAL
BH CV LOWER VASCULAR RIGHT POPLITEAL COMPETENT: NORMAL
BH CV LOWER VASCULAR RIGHT POPLITEAL COMPRESS: NORMAL
BH CV LOWER VASCULAR RIGHT POPLITEAL PHASIC: NORMAL
BH CV LOWER VASCULAR RIGHT POPLITEAL SPONT: NORMAL
BH CV LOWER VASCULAR RIGHT POSTERIOR TIBIAL COMPRESS: NORMAL
BH CV LOWER VASCULAR RIGHT PROFUNDA FEMORAL COMPRESS: NORMAL
BH CV LOWER VASCULAR RIGHT PROXIMAL FEMORAL COMPRESS: NORMAL
BH CV LOWER VASCULAR RIGHT SAPHENOFEMORAL JUNCTION COMPRESS: NORMAL

## 2021-09-02 PROCEDURE — 93970 EXTREMITY STUDY: CPT

## 2021-09-02 PROCEDURE — 99214 OFFICE O/P EST MOD 30 MIN: CPT | Performed by: NURSE PRACTITIONER

## 2021-09-02 RX ORDER — CEPHALEXIN 500 MG/1
500 CAPSULE ORAL 3 TIMES DAILY
Qty: 30 CAPSULE | Refills: 0 | Status: SHIPPED | OUTPATIENT
Start: 2021-09-02 | End: 2021-09-12

## 2021-09-02 NOTE — PROGRESS NOTES
Vascular lab preliminary    Bilateral lower extremity venous duplex exam is complete     Negative for acute DVT/SVT bilaterally      Spoke with surya Wilde to send patient home    Final report to follow

## 2021-09-02 NOTE — PROGRESS NOTES
"Chief Complaint  Leg Pain (pain both lower legs)    Subjective          Noemi Forbes presents to [facility]  Leg Pain   There was no injury mechanism. The pain is present in the left leg and right leg. The pain is mild. Pertinent negatives include no inability to bear weight, muscle weakness or numbness. The symptoms are aggravated by palpation. She has tried nothing for the symptoms.   Past medical history is significant for cellulitis and chronic DVT.  Patient has been noncompliant with Eliquis.    Objective   Vital Signs:   /75 (BP Location: Right arm, Patient Position: Sitting, Cuff Size: Adult)   Pulse 76   Temp 97.8 °F (36.6 °C) (Temporal)   Ht 162.6 cm (64\")   SpO2 96%   BMI 30.04 kg/m²     Physical Exam  Vitals and nursing note reviewed.   Cardiovascular:      Rate and Rhythm: Normal rate and regular rhythm.      Heart sounds: Normal heart sounds.   Pulmonary:      Effort: Pulmonary effort is normal.      Breath sounds: Normal breath sounds.   Musculoskeletal:      Right lower leg: No edema.      Left lower leg: No edema.      Comments: No calf tenderness.   Skin:     Comments: Erythema and warmth to bilateral lower extremities.   Neurological:      Mental Status: She is oriented to person, place, and time.   Psychiatric:         Mood and Affect: Mood normal.        Result Review :   The following data was reviewed by: TERRELL Miller on 09/02/2021:      Data reviewed: ED note on 2/14/2021.           Assessment and Plan    Diagnoses and all orders for this visit:    1. Bilateral cellulitis of lower leg (Primary)  -     cephalexin (Keflex) 500 MG capsule; Take 1 capsule by mouth 3 (Three) Times a Day for 10 days.  Dispense: 30 capsule; Refill: 0    2. 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  Orders:  -     Duplex Venous Lower " Extremity - Bilateral CAR; Future      I spent 30 minutes caring for Noemi on this date of service. This time includes time spent by me in the following activities:reviewing tests, performing a medically appropriate examination and/or evaluation , counseling and educating the patient/family/caregiver, ordering medications, tests, or procedures and documenting information in the medical record  Follow Up   Return if symptoms worsen or fail to improve.  Patient was given instructions and counseling regarding her condition or for health maintenance advice. Please see specific information pulled into the AVS if appropriate.

## 2021-09-03 ENCOUNTER — TELEPHONE (OUTPATIENT)
Dept: FAMILY MEDICINE CLINIC | Facility: CLINIC | Age: 86
End: 2021-09-03

## 2021-09-03 NOTE — TELEPHONE ENCOUNTER
Venous ultrasound shows chronic left lower extremity DVT. Will need to be more compliant with Eliquis and take antibiotic for cellulitis as directed.    Patient aware of results and recommendations.

## 2021-09-08 RX ORDER — LOPERAMIDE HYDROCHLORIDE 2 MG/1
2 CAPSULE ORAL DAILY PRN
Qty: 90 CAPSULE | Refills: 1 | Status: ON HOLD | OUTPATIENT
Start: 2021-09-08 | End: 2022-06-18

## 2021-10-07 ENCOUNTER — PREP FOR SURGERY (OUTPATIENT)
Dept: SURGERY | Facility: SURGERY CENTER | Age: 86
End: 2021-10-07

## 2021-10-07 DIAGNOSIS — M96.1 POSTLAMINECTOMY SYNDROME: Primary | ICD-10-CM

## 2021-10-07 RX ORDER — SODIUM CHLORIDE 0.9 % (FLUSH) 0.9 %
10 SYRINGE (ML) INJECTION EVERY 12 HOURS SCHEDULED
Status: CANCELLED | OUTPATIENT
Start: 2021-10-07

## 2021-10-07 RX ORDER — SODIUM CHLORIDE, SODIUM LACTATE, POTASSIUM CHLORIDE, CALCIUM CHLORIDE 600; 310; 30; 20 MG/100ML; MG/100ML; MG/100ML; MG/100ML
9 INJECTION, SOLUTION INTRAVENOUS CONTINUOUS PRN
Status: CANCELLED | OUTPATIENT
Start: 2021-10-07

## 2021-10-07 RX ORDER — SODIUM CHLORIDE 0.9 % (FLUSH) 0.9 %
10 SYRINGE (ML) INJECTION AS NEEDED
Status: CANCELLED | OUTPATIENT
Start: 2021-10-07

## 2021-10-17 RX ORDER — APIXABAN 5 MG/1
TABLET, FILM COATED ORAL
Qty: 180 TABLET | Refills: 0 | Status: CANCELLED | OUTPATIENT
Start: 2021-10-17

## 2021-10-18 RX ORDER — APIXABAN 5 MG/1
TABLET, FILM COATED ORAL
Qty: 60 TABLET | Refills: 2 | Status: SHIPPED | OUTPATIENT
Start: 2021-10-18 | End: 2021-10-20

## 2021-10-18 NOTE — TELEPHONE ENCOUNTER
Rx Refill Note  Requested Prescriptions     Pending Prescriptions Disp Refills   • Eliquis 5 MG tablet tablet [Pharmacy Med Name: ELIQUIS 5 MG TABLET] 60 tablet 2     Sig: TAKE 1 TABLET BY MOUTH TWICE A DAY      Last office visit with prescribing clinician: 9/2/2021      Next office visit with prescribing clinician: Visit date not found            Taty Lawrence MA  10/18/21, 18:03 EDT

## 2021-10-18 NOTE — TELEPHONE ENCOUNTER
Caller: Noemi Forbes    Relationship: Self      Medication requested (name and dosage):   Eliquis 5 MG tablet tablet       Pharmacy where request should be sent: [facility]/pharmacy #[id number] - [place], [place] - [address] - [phone] PH - [phone] FX  [phone]    Additional details provided by patient: PATIENT HAS ONE TABLET LEFT, WILL BE COMPLETELY OUT AND NOT HAVE ENOUGH TO HAVE FOR TODAY.    Best call back number:[phone]    Does the patient have less than a 3 day supply:  [x] Yes  [] No    Cat Rodriguez Rep   10/18/21 10:46 EDT

## 2021-10-20 ENCOUNTER — TELEPHONE (OUTPATIENT)
Dept: FAMILY MEDICINE CLINIC | Facility: CLINIC | Age: 86
End: 2021-10-20

## 2021-10-20 RX ORDER — APIXABAN 5 MG/1
TABLET, FILM COATED ORAL
Qty: 180 TABLET | Refills: 0 | Status: SHIPPED | OUTPATIENT
Start: 2021-10-20 | End: 2021-11-09 | Stop reason: SDUPTHER

## 2021-10-20 NOTE — TELEPHONE ENCOUNTER
-Pt has called and is stating pharmacy has not received Eliquis 5 MG tablet tablet    -Advised pt it has been sent over several times    -Confirmed Pharmacy [facility] address and phone from previous encounter    -[id number] - [place], [place] - [address] [facility] [address] - [phone]  - [phone] FX    -Pt is OUT OF MEDICATION completely

## 2021-10-20 NOTE — TELEPHONE ENCOUNTER
PATIENT CALLED STATING THAT SHE STILL HAS NOT RECEIVED THIS MEDICATION AT THE PHARMACY. PATIENT IS OUT OF MEDICATION, PLEASE SEND REFILL IN AGAIN ASA.     PLEASE CALL PATIENT TO LET HER KNOW WHEN IT HAS BEEN SENT, [facility] IS TELLING HER THEY HAVE NEVER RECEIVED ANYTHING FROM US FOR THIS MEDICATION.     [phone]

## 2021-10-21 ENCOUNTER — OFFICE VISIT (OUTPATIENT)
Dept: PAIN MEDICINE | Facility: CLINIC | Age: 86
End: 2021-10-21

## 2021-10-21 VITALS
HEIGHT: 64 IN | WEIGHT: 169.8 LBS | TEMPERATURE: 96 F | OXYGEN SATURATION: 98 % | HEART RATE: 73 BPM | DIASTOLIC BLOOD PRESSURE: 73 MMHG | BODY MASS INDEX: 28.99 KG/M2 | RESPIRATION RATE: 16 BRPM | SYSTOLIC BLOOD PRESSURE: 119 MMHG

## 2021-10-21 DIAGNOSIS — M96.1 POSTLAMINECTOMY SYNDROME: ICD-10-CM

## 2021-10-21 DIAGNOSIS — G89.4 CHRONIC PAIN SYNDROME: Primary | ICD-10-CM

## 2021-10-21 PROCEDURE — 99213 OFFICE O/P EST LOW 20 MIN: CPT | Performed by: NURSE PRACTITIONER

## 2021-10-21 NOTE — PROGRESS NOTES
CHIEF COMPLAINT  F/U for back pain. Pt states he rpain level has gotten worse.    Subjective   Noemi Forbes is a 86 y.o. female  who presents for follow-up.  She has a history of chronic back pain, which has gotten worsen since last evaluation..    Has been having issues with getting her Eliquis filled. Her PCP has sent this in multiple times, but she states the pharmacy keeps saying there aren't any refills.     Complains of pain in  Back. Today her pain is 8/10VAS (non-tearful, normal respiratory rate, easily distractable).  Describes her pain as continuous throbbing. Pain increases with walking, standing, activity, household chores; pain decreases with rest, sitting. Reports she cannot lay flat for sleeping, only sleeps in recliner.  ADL's by self.     Patient was initially evaluated as a new patient by Dr. Ishmael Hill on 8/9/2021.  Referred by Dr. Combs for chronic low back pain.  Has previously been in PT and pain management with Dr. Abdoulaye Arroyo few years ago.  Patient has severe pain with activity which severely limits her activity.  Has a history of back surgery previously.  Is interested in spinal cord stimulator.  Expresses concern about further decompressive or fusion surgery.  She is also on Eliquis.  Discussed if we proceed with SCS trial she would need to be off Eliquis for 3 days prior to initiation of the trial and need to remain off it during the trial.  Plan to refer for psychological evaluation as required for stimulator.  Also set up education session with Denia from [employer].  Plan to proceed with stem spinal cord stimulator phase 1 if there are no contraindications.    Completed psychological evaluation with Dr Olsen 10-4-21-- had a positive a result  Has completed SCS education with Denia.     Patient remained masked during entire encounter. No cough present. I donned a mask and eye protection throughout entire visit. Prior to donning mask and eye protection, hand hygiene was performed,  as well as when it was doffed.  I was closer than 6 feet, but not for an extended period of time. No obvious exposure to any bodily fluids.    Back Pain  This is a chronic problem. The current episode started more than 1 year ago. The problem occurs constantly. The problem has been gradually worsening since onset. The pain is present in the lumbar spine. The quality of the pain is described as aching. The pain is at a severity of 8/10. The pain is moderate. The pain is worse during the day. The symptoms are aggravated by bending, position, standing and twisting. Pertinent negatives include no abdominal pain, chest pain, dysuria, fever, headaches, numbness or weakness.      Narrative & Impression   CT LUMBAR SPINE WITHOUT CONTRAST     CLINICAL HISTORY: Spinal stenosis.     TECHNIQUE: CT scan of the lumbar spine was obtained with 3 mm axial soft  tissue algorithm and 1 mm axial bone algorithm images. Sagittal and  coronal reconstructed images were obtained.     FINDINGS:     There are advanced degenerative disc changes noted throughout the lumbar  spine. Vacuum disc phenomena are seen from L1-L2 down to L5-S1. There is  marked loss of disc space height at the L3-L4, L4-L5, L5-S1 levels.  Degenerative endplate sclerotic changes are appreciated from L3-L4 down  to L5-S1. There is degenerative retrolisthesis of L5 on S1 by  approximately 4-5 mm, degenerative anterior spondylolisthesis of L4 on  L5 by approximately 3 mm and degenerative anterior spondylolisthesis of  L3 on L4 by approximately 2-3 mm. There is a mild degree of dextroconvex  scoliotic curvature of the lumbar spine with its apex centered at L3.     At L1-L2, there is a minimal disc bulge minimally indenting the ventral  subarachnoid space. There is mild left foraminal narrowing secondary to  a disc osteophyte complex.     At L2-L3, there is a disc bulge resulting in a mild degree of canal  narrowing. There is mild-to-moderate left foraminal narrowing  secondary  to bulging disc material and facet hypertrophic change.     At L3-L4, the patient is status post a left laminectomy procedure. There  is a mild-to-moderate degree of left foraminal narrowing secondary to a  disc osteophyte complex. There is a minimal degree of right foraminal  narrowing at the L3-L4 level.     At L4-L5, there is a mild-to-moderate degree of left and a moderate  degree of right foraminal narrowing secondary to a disc osteophyte  complex. There is some vacuum disc phenomena which extends into the  right L4-L5 neural foramen implying the presence of disc material at  this site. There is mild left foraminal narrowing secondary to a disc  osteophyte complex. Minimal canal narrowing is seen. Again, the patient  is status post a left laminectomy procedure at the L4-L5 level.     At L5-S1, there is a disc osteophyte complex eccentric to the left and  there is a moderate degree of bilateral foraminal narrowing secondary to  the disc osteophyte complex. Additionally, there is a small amount of  vacuum disc phenomenon noted within the left L5-S1 neural foramen  implying the presence of disc material at this site. Again, the patient  is status post a left laminectomy procedure at L5-S1.     Incidental note is made of a small central disc protrusion at the  T11-T12 level minimally indenting the ventral subarachnoid space.  Prominent vacuum disc phenomena and degenerative disc changes are noted  at the T9-T10, T10-T11, and T11-T12 levels. Also, there is moderate left  T9-T10 and right T10-T11 foraminal stenosis primarily secondary to loss  of foraminal height.     IMPRESSION:     The patient is status post left laminectomy procedures from L3-L4 down  to L5-S1.     There are advanced degenerative disc changes noted throughout the lumbar  spine with vacuum disc phenomena seen from L1-L2 down to L5-S1. Marked  loss of disc space height is seen from L3-L4 down to L5-S1. Degenerative  endplate sclerotic  changes are also seen from L3-L4 down to L5-S1.     There is mild dextroconvex scoliotic curvature of the lumbar spine with  its apex centered at L3-L4. There is grade 1 degenerative retrolisthesis  of L5 on S1, as well as grade 1 degenerative anterior spondylolisthesis  of L4 on L5 and L3 on L4.     Multilevel foraminal compromise is appreciated and this includes a  mild-to-moderate degree of left L2-3, mild-to-moderate degree of left  L3-L4, moderate degree of right L4-L5, mild-to-moderate degree of left  L4-L5, and a moderate degree of bilateral L5-S1 foraminal stenosis.     Relatively mild multilevel canal narrowing, as well as remaining  degenerative phenomena within the lumbar spine are as discussed in  detail above.     Additional degenerative phenomena within the visualized lower thoracic  spine are incidentally noted and also discussed in detail above.        Radiation dose reduction techniques were utilized, including automated  exposure control and exposure modulation based on body size.     This report was finalized on 6/7/2021 1:11 PM by Dr. Sánchez Bustillos M.D.            PEG Assessment   What number best describes your pain on average in the past week?8  What number best describes how, during the past week, pain has interfered with your enjoyment of life?8  What number best describes how, during the past week, pain has interfered with your general activity?  8    The following portions of the patient's history were reviewed and updated as appropriate: allergies, current medications, past family history, past medical history, past social history, past surgical history and problem list.    Review of Systems   Constitutional: Negative for activity change, fatigue and fever.   HENT: Negative for congestion.    Eyes: Negative for visual disturbance.   Respiratory: Negative for cough and chest tightness.    Cardiovascular: Negative for chest pain.   Gastrointestinal: Negative for abdominal pain, constipation  "and diarrhea.   Genitourinary: Negative for difficulty urinating and dysuria.   Musculoskeletal: Positive for back pain.   Neurological: Negative for dizziness, weakness, light-headedness, numbness and headaches.   Psychiatric/Behavioral: Positive for agitation. Negative for sleep disturbance and suicidal ideas. The patient is nervous/anxious.      I have reviewed and confirmed the accuracy of the ROS as documented by the MA/LPN/RN TERRELL Ferrer      Vitals:    10/21/21 0830   BP: 119/73   Pulse: 73   Resp: 16   Temp: 96 °F (35.6 °C)   SpO2: 98%   Weight: 77 kg (169 lb 12.8 oz)   Height: 162.6 cm (64\")   PainSc:   8   PainLoc: Back     Objective   Physical Exam  Vitals and nursing note reviewed.   Constitutional:       Appearance: She is well-developed.   HENT:      Head: Normocephalic and atraumatic.   Musculoskeletal:      Lumbar back: Tenderness present. Decreased range of motion.      Comments: Difficulty arising from a seated position   Neurological:      Mental Status: She is alert.      Gait: Gait abnormal.   Psychiatric:         Speech: Speech normal.         Behavior: Behavior normal.         Thought Content: Thought content normal.         Judgment: Judgment normal.         Assessment/Plan   Diagnoses and all orders for this visit:    1. Chronic pain syndrome (Primary)    2. Postlaminectomy syndrome      --- proceed with Abbott SCS trial. Reviewed she would need to temporarily hold Eliquis for 3 days prior to procedure and throughout trial. Patient verbalized understanding and wishes to proceed  --- Follow-up for SCS trial phase 1    The patient has a history of progressively worsening BACK pain.  She has been evaluated by ortho spine and is felt surgery was not indicated that this time and who also supported the potential for spinal cord stimulator.   She has previously been given information about ABBOTT spinal cord stimulator.  The patient has completed a psychological evaluation and also a " ABBOTT spinal cord stimulator education session.  She has also failed previous interventions, including but not limited to physical therapy, epidurals, as well as medial branch blocks and/or Radiofrequency ablation.  She continues to present to the office with constant complaints of increased pain in her LOW BACK.  The pain is affecting quality of life.  She has been on medication, which has had to be increased over the past due to her increased complaints of pain.   As it stands, the patient continues to deteriorate. There are no contraindications for proceeding with the trial. It is of more benefit to look for another option for pain control, rather than relying on pain medication alone. There are multiple risks that we contend with when continuing with chronic opioid therapy and the patient expresses a desire to stop taking oral pain medication and is interested in going forward with spinal cord stimulator trial and possible implant rather than being reliant on pain medication chronically.     Education about SCS therapy:   - This was an extended office visit in which we entered into discussion about advanced pain relieving techniques, and discussed implantable pain therapies. We discussed advanced neuromodulation in the form of Spinal Cord Stimulation. This is a reasonable therapy for patients who have exhausted basic nonnarcotic options, basic modalities and physical therapies, and do not have any other reasonable surgical options. This therapy as an alternative to long term high dose opioid therapy.   - Risks include but are not limited to bleeding, infection, injury, paralysis, nerve injury, dural puncture, and risk for postprocedural pain. Implanted equipment risks include but are not limited to lead migration, lead fracture, risk of loss of pain relieving stimulation, risk of electrical shock, and risk of system failure.   - We discussed the theory and basic science behind SCS therapy including but not  limited to energy delivery and relevant anatomy, in terms that are easy to understand and also with use of illustrative devices. Spinal Cord Stimulation therapies apply an electromagnetic field to a specific area on the spinal cord (Dorsal Column) to attempt to block transmission of painful signals from the peripheral nerves to the brain.   - We discussed that prior to trialing, I request that patients review relevant materials and perform some research, and also have a follow up education session with a device specialist from the . Also, insurance requires a presurgical psychological evaluation. When these have been completed, and all the patient's questions have been answered to their satisfaction, then we will plan to request authorization for trialing.   - We discussed the trialing process (aka Phase 1) that usually lasts a week, and the temporary nature of this trial. Trial success will determine whether or not we proceed to implant. We discussed reasonable expectations, and that I feel that consistent 50% pain relief if medically successful and is a reasonable expectation to justify moving forward to permanent implant.   - Additional risks of Phase 1 include but are not limited to bleeding on insertion, bleeding on lead removal, and procedural site soreness.  - We discussed the percutaneous surgical implant, including postsurgical restrictions, risks, and alternatives. For spinal cord stimulation implanted device (aka SCS Phase 2) there is usually a midline vertical incision for the spinally implanted leads, and also a horizontal incision in the posterior lumbar flank for implantation of the battery & computer (aka IPG). The leads are tunneled from the midline incision to the medial aspect of the battery pocket incision.   - Postoperative restrictions include limiting the following activity as much as possible for 90 days: Lifting >10 lbs, bending at the waist, stretching/reaching overhead, and  twisting       --------    Anticoagulation risks for procedures....   - there are risks to discontinuation of anticoagulants for neuraxial procedures and surgery.  Risks include but are not limited to deep vein thromboses, pulmonary emboli, myocardial infarction, and stroke    - Neuraxial access with a needle is relatively contraindicated while anticoagulated because of the risk of hemorrhage and/or epidural hematoma, which can be a neurosurgical emergency to evacuate bleeding.  Left unchecked, bleeding can cause nerve damage leading to paralysis and/or death.  --------      I spent 23 minutes caring for patient on this date of service. This time includes time spent by me in the following activities:reviewing tests, obtaining and/or reviewing a separately obtained history, performing a medically appropriate examination and/or evaluation , counseling and educating the patient/family/caregiver, ordering medications, tests, or procedures and documenting information in the medical record      NANCY REPORT  As the clinician, I personally reviewed the NANCY from 10-21-21 while the patient was in the office today.           Dictated utilizing Dragon dictation.

## 2021-10-23 DIAGNOSIS — B37.2 CANDIDAL INTERTRIGO: ICD-10-CM

## 2021-10-24 RX ORDER — CLOTRIMAZOLE AND BETAMETHASONE DIPROPIONATE 10; .64 MG/G; MG/G
CREAM TOPICAL 2 TIMES DAILY
Qty: 45 G | Refills: 0 | Status: SHIPPED | OUTPATIENT
Start: 2021-10-24 | End: 2022-06-29 | Stop reason: HOSPADM

## 2021-10-24 NOTE — TELEPHONE ENCOUNTER
Rx Refill Note  Requested Prescriptions     Pending Prescriptions Disp Refills   • clotrimazole-betamethasone (LOTRISONE) 1-0.05 % cream [Pharmacy Med Name: CLOTRIMAZOLE/BETAMETH CREAM] 45 g 0     Sig: APPLY TOPICALLY TO THE APPROPRIATE AREA AS DIRECTED 2 (TWO) TIMES A DAY.      Last office visit with prescribing clinician: 9/2/2021      Next office visit with prescribing clinician: due 3/2022          Last filled 6/16/2021           Adeline Valencia MA  10/24/21, 15:43 EDT

## 2021-10-26 ENCOUNTER — TELEPHONE (OUTPATIENT)
Dept: FAMILY MEDICINE CLINIC | Facility: CLINIC | Age: 86
End: 2021-10-26

## 2021-10-26 NOTE — TELEPHONE ENCOUNTER
Patient informed to call around different pharmacies to locate the covid booster she needs or if she can wait, to call us back in November to check to see if the office has the boosters for us to give to patients.

## 2021-10-26 NOTE — TELEPHONE ENCOUNTER
Caller: Noemi Forbes    Relationship: Self    Best call back number:  [phone]     What is the best time to reach you: ANY     Who are you requesting to speak with (clinical staff, provider,  specific staff member): CLINICAL STAFF     Do you know the name of the person who called: N/A     What was the call regarding: PATIENT CALLED [facility] IN ATTEMPT TO GET THE COVID BOOSTER . PATIENT PFIZER AND WANTED TO KNOW WERE SHE COULD GO TO GET ONE . PATIENT WANTS TO KNOW IS IT OKAY TO GET A [facility]  SHOT OR [facility] .  HUB GAVE PATIENT NUMBER FOR COVID VACCINE     Do you require a callback: YES

## 2021-10-27 ENCOUNTER — TELEPHONE (OUTPATIENT)
Dept: PAIN MEDICINE | Facility: CLINIC | Age: 86
End: 2021-10-27

## 2021-10-27 NOTE — TELEPHONE ENCOUNTER
Caller: LEVON CALERO    Relationship to patient: SELF    Best call back number: [phone]    Patient is needing: PATIENT HAS A FEW QUESTIONS ABOUT HER CARE.  PLEASE CONTACT HER.

## 2021-10-27 NOTE — TELEPHONE ENCOUNTER
"The part about \"1 year\" says \"GREATER THAN ONE YEAR.\"  I asked her specifically what time of day her pain was WORST. Not specifically that it doesn't hurt all the time but what time of day is WORST. It's not saying it is not bad at other times, just worst. I don't know how this is relevant to her needing the SCS. What is it she wants done? Thanks. BB"

## 2021-10-27 NOTE — TELEPHONE ENCOUNTER
Patient states that she reviewed her my chart notes and she stated that it says she had this pain for one year but she has had the pain for 11 years. She states that her pain is not worse in the day time it is bad all the time.

## 2021-11-09 RX ORDER — APIXABAN 5 MG/1
5 TABLET, FILM COATED ORAL 2 TIMES DAILY
Qty: 180 TABLET | Refills: 0 | Status: SHIPPED | OUTPATIENT
Start: 2021-11-09 | End: 2021-11-11 | Stop reason: SDUPTHER

## 2021-11-10 DIAGNOSIS — I89.0 LYMPHEDEMA: Primary | ICD-10-CM

## 2021-11-10 RX ORDER — POTASSIUM CHLORIDE 750 MG/1
10 TABLET, FILM COATED, EXTENDED RELEASE ORAL DAILY
Qty: 90 TABLET | Refills: 1 | Status: SHIPPED | OUTPATIENT
Start: 2021-11-10 | End: 2021-11-11 | Stop reason: SDUPTHER

## 2021-11-10 RX ORDER — FUROSEMIDE 20 MG/1
20 TABLET ORAL DAILY
Qty: 90 TABLET | Refills: 1 | Status: SHIPPED | OUTPATIENT
Start: 2021-11-10 | End: 2021-11-11 | Stop reason: SDUPTHER

## 2021-11-10 NOTE — TELEPHONE ENCOUNTER
Caller: Noemi Forbes    Relationship: Self    Best call back number: [phone]    Requested Prescriptions:   Requested Prescriptions     Pending Prescriptions Disp Refills   • furosemide (LASIX) 20 MG tablet 90 tablet 1     Sig: Take 1 tablet by mouth Daily.   • potassium chloride 10 MEQ CR tablet 90 tablet 1     Sig: Take 1 tablet by mouth Daily.        Pharmacy where request should be sent: [facility] - [address], [address] - [phone]  - [phone]     Additional details provided by patient: PATIENT IS OUT OF FUROSEMIDE    Does the patient have less than a 3 day supply:  [x] Yes  [] No

## 2021-11-11 ENCOUNTER — TELEPHONE (OUTPATIENT)
Dept: FAMILY MEDICINE CLINIC | Facility: CLINIC | Age: 86
End: 2021-11-11

## 2021-11-11 DIAGNOSIS — I89.0 LYMPHEDEMA: ICD-10-CM

## 2021-11-11 RX ORDER — APIXABAN 5 MG/1
5 TABLET, FILM COATED ORAL 2 TIMES DAILY
Qty: 180 TABLET | Refills: 0 | Status: SHIPPED | OUTPATIENT
Start: 2021-11-11 | End: 2022-01-17

## 2021-11-11 RX ORDER — POTASSIUM CHLORIDE 750 MG/1
10 TABLET, FILM COATED, EXTENDED RELEASE ORAL DAILY
Qty: 90 TABLET | Refills: 1 | Status: ON HOLD | OUTPATIENT
Start: 2021-11-11 | End: 2022-06-18

## 2021-11-11 RX ORDER — FUROSEMIDE 20 MG/1
20 TABLET ORAL DAILY
Qty: 90 TABLET | Refills: 1 | Status: SHIPPED | OUTPATIENT
Start: 2021-11-11 | End: 2022-02-16

## 2021-12-02 ENCOUNTER — TELEPHONE (OUTPATIENT)
Dept: PAIN MEDICINE | Facility: CLINIC | Age: 86
End: 2021-12-02

## 2021-12-02 NOTE — TELEPHONE ENCOUNTER
Caller: DANN     Relationship to patient: [facility] PRIOR AUTHORIZATION TEAM     Best call back number:      Patient is needing: DANN WORKS FOR [employer] AND WANTS TO REVIEW AUTHS FOR PATIENT

## 2021-12-10 ENCOUNTER — TELEPHONE (OUTPATIENT)
Dept: FAMILY MEDICINE CLINIC | Facility: CLINIC | Age: 86
End: 2021-12-10

## 2021-12-13 ENCOUNTER — TRANSCRIBE ORDERS (OUTPATIENT)
Dept: SURGERY | Facility: SURGERY CENTER | Age: 86
End: 2021-12-13

## 2021-12-13 DIAGNOSIS — Z41.9 SURGERY, ELECTIVE: Primary | ICD-10-CM

## 2021-12-13 DIAGNOSIS — I82.409 DEEP VEIN THROMBOSIS (DVT) OF LOWER EXTREMITY, UNSPECIFIED CHRONICITY, UNSPECIFIED LATERALITY, UNSPECIFIED VEIN (HCC): ICD-10-CM

## 2021-12-13 DIAGNOSIS — M96.1 POSTLAMINECTOMY SYNDROME: Primary | ICD-10-CM

## 2021-12-13 NOTE — TELEPHONE ENCOUNTER
The following patient called stated she is having surgery on Friday 12/17/2021. Dr. Hill's states she needs to be off her Eliquis 3 days prior to surgery.  Is this ok?

## 2021-12-14 ENCOUNTER — TELEPHONE (OUTPATIENT)
Dept: PAIN MEDICINE | Facility: CLINIC | Age: 86
End: 2021-12-14

## 2021-12-14 NOTE — TELEPHONE ENCOUNTER
Please let patient know that surgery was canceled because she is unable to hold Eliquis for 3 days.

## 2021-12-14 NOTE — TELEPHONE ENCOUNTER
Called [facility] and spoke with Janneth re: referral. Patient has never been seen by Dr. Saenz, but he did review her chart and states that she can hold her Eliquis for two days.       **We were considering a spinal cord stimulator trial.  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  Her primary care clinician was requesting an opinion from a vascular specialist as to whether she could hold Eliquis for interventional pain procedure**

## 2021-12-15 ENCOUNTER — APPOINTMENT (OUTPATIENT)
Dept: LAB | Facility: SURGERY CENTER | Age: 86
End: 2021-12-15

## 2022-01-04 ENCOUNTER — TELEPHONE (OUTPATIENT)
Dept: FAMILY MEDICINE CLINIC | Facility: CLINIC | Age: 87
End: 2022-01-04

## 2022-01-04 NOTE — TELEPHONE ENCOUNTER
Spoke to patient informed her that she would need to go to the ER if no relief. Patient verbalizing and understanding.

## 2022-01-04 NOTE — TELEPHONE ENCOUNTER
Caller: LeidyKaleigh [email or web address]    Relationship: Self    Best call back number: [phone] (H)    What is the best time to reach you: ANYTIME, ASAP    Who are you requesting to speak with (clinical staff, provider,  specific staff member): CLINICAL STAFF/ HARRISON PATE    Do you know the name of the person who called: NOEMI CALERO    What was the call regarding: PATIENT HAS FALLEN ON LEFT WRIST BY LOSING HER BALANCE AND NOW CANNOT EVEN HOLD A CELL PHONE IN HER HAND, NOT SWOLLEN AND NOT BRUISED LOOKING EITHER, PATIENT IS ASKING WHAT SHE NEEDS TO DO, PLEASE ADVISE ASAP    Do you require a callback: YES, ASAP

## 2022-01-17 RX ORDER — APIXABAN 5 MG/1
TABLET, FILM COATED ORAL
Qty: 180 TABLET | Refills: 0 | Status: SHIPPED | OUTPATIENT
Start: 2022-01-17 | End: 2022-02-16

## 2022-02-04 ENCOUNTER — TELEPHONE (OUTPATIENT)
Dept: FAMILY MEDICINE CLINIC | Facility: CLINIC | Age: 87
End: 2022-02-04

## 2022-02-04 DIAGNOSIS — I89.0 LYMPHEDEMA: ICD-10-CM

## 2022-02-04 NOTE — TELEPHONE ENCOUNTER
PHARMACY IS TELLING PATIENT THEY DID NOT GET PRESCRIPTION FOR Calcium Carb-Cholecalciferol (Calcium-Vitamin D3) 250-125 MG-UNIT tablet tablet    PLEASE RESEND  [facility] - [place] - [address] [facility], [address] - [phone]  - [phone]   [phone]

## 2022-02-08 ENCOUNTER — OFFICE VISIT (OUTPATIENT)
Dept: FAMILY MEDICINE CLINIC | Facility: CLINIC | Age: 87
End: 2022-02-08

## 2022-02-08 VITALS
SYSTOLIC BLOOD PRESSURE: 110 MMHG | WEIGHT: 169.4 LBS | OXYGEN SATURATION: 97 % | DIASTOLIC BLOOD PRESSURE: 70 MMHG | BODY MASS INDEX: 29.08 KG/M2 | HEART RATE: 74 BPM | TEMPERATURE: 97.7 F

## 2022-02-08 DIAGNOSIS — M48.061 SPINAL STENOSIS OF LUMBAR REGION, UNSPECIFIED WHETHER NEUROGENIC CLAUDICATION PRESENT: ICD-10-CM

## 2022-02-08 DIAGNOSIS — M43.16 SPONDYLOLISTHESIS OF LUMBAR REGION: Primary | ICD-10-CM

## 2022-02-08 DIAGNOSIS — M47.816 FACET ARTHRITIS OF LUMBAR REGION: ICD-10-CM

## 2022-02-08 PROCEDURE — 99213 OFFICE O/P EST LOW 20 MIN: CPT | Performed by: NURSE PRACTITIONER

## 2022-02-08 RX ORDER — LATANOPROST 50 UG/ML
1 SOLUTION/ DROPS OPHTHALMIC DAILY
COMMUNITY
Start: 2021-12-10

## 2022-02-08 NOTE — PROGRESS NOTES
Chief Complaint  discuss neuro stimulator therapy    Subjective          Noemi Forbes presents to [facility]  History of Present Illness  Spondylolisthesis of lumbar region: Patient was seen by Dr. Tip Hill who wanted to treat pain with a spinal cord stimulator. Patient is unable to stop Eliquis for 3 days so surgery was cancelled. Patient wishes to see another pain management doctor to see if there are other treatment options.   Objective   Vital Signs:   /70 (BP Location: Left arm, Patient Position: Sitting, Cuff Size: Adult)   Pulse 74   Temp 97.7 °F (36.5 °C) (Temporal)   Wt 76.8 kg (169 lb 6.4 oz)   SpO2 97%   BMI 29.08 kg/m²     Physical Exam  Vitals and nursing note reviewed.   Cardiovascular:      Rate and Rhythm: Normal rate and regular rhythm.   Pulmonary:      Effort: Pulmonary effort is normal.      Breath sounds: Normal breath sounds.   Neurological:      Mental Status: She is oriented to person, place, and time.   Psychiatric:         Mood and Affect: Mood normal.        Result Review :            Assessment and Plan    Diagnoses and all orders for this visit:    1. Spondylolisthesis of lumbar region (Primary)  -     Ambulatory Referral to Pain Management    2. Facet arthritis of lumbar region  -     Ambulatory Referral to Pain Management    3. Spinal stenosis of lumbar region, unspecified whether neurogenic claudication present  -     Ambulatory Referral to Pain Management    Other orders  -     Calcium Carb-Cholecalciferol (Calcium-Vitamin D3) 250-125 MG-UNIT tablet tablet; Take 1 tablet by mouth Daily.  Dispense: 90 tablet; Refill: 1      I spent 20 minutes caring for Noemi on this date of service. This time includes time spent by me in the following activities:performing a medically appropriate examination and/or evaluation , counseling and educating the patient/family/caregiver, ordering medications, tests, or procedures and documenting  information in the medical record  Follow Up   Return in about 5 weeks (around 3/16/2022) for Medicare Wellness.  Patient was given instructions and counseling regarding her condition or for health maintenance advice. Please see specific information pulled into the AVS if appropriate.

## 2022-02-15 DIAGNOSIS — I89.0 LYMPHEDEMA: ICD-10-CM

## 2022-02-16 RX ORDER — FUROSEMIDE 20 MG/1
20 TABLET ORAL DAILY
Qty: 90 TABLET | Refills: 3 | Status: SHIPPED | OUTPATIENT
Start: 2022-02-16 | End: 2022-09-08 | Stop reason: SDUPTHER

## 2022-02-16 RX ORDER — APIXABAN 5 MG/1
TABLET, FILM COATED ORAL
Qty: 180 TABLET | Refills: 3 | Status: SHIPPED | OUTPATIENT
Start: 2022-02-16 | End: 2022-03-01 | Stop reason: SDUPTHER

## 2022-03-01 RX ORDER — APIXABAN 5 MG/1
5 TABLET, FILM COATED ORAL 2 TIMES DAILY
Qty: 180 TABLET | Refills: 3 | Status: SHIPPED | OUTPATIENT
Start: 2022-03-01 | End: 2022-12-09

## 2022-03-03 ENCOUNTER — OFFICE VISIT (OUTPATIENT)
Dept: FAMILY MEDICINE CLINIC | Facility: CLINIC | Age: 87
End: 2022-03-03

## 2022-03-03 VITALS
HEART RATE: 78 BPM | WEIGHT: 166 LBS | BODY MASS INDEX: 28.34 KG/M2 | OXYGEN SATURATION: 99 % | HEIGHT: 64 IN | DIASTOLIC BLOOD PRESSURE: 76 MMHG | SYSTOLIC BLOOD PRESSURE: 132 MMHG | TEMPERATURE: 98.6 F

## 2022-03-03 DIAGNOSIS — M43.16 SPONDYLOLISTHESIS OF LUMBAR REGION: Primary | ICD-10-CM

## 2022-03-03 PROCEDURE — 99213 OFFICE O/P EST LOW 20 MIN: CPT | Performed by: NURSE PRACTITIONER

## 2022-03-03 RX ORDER — HYDROCODONE BITARTRATE AND ACETAMINOPHEN 5; 325 MG/1; MG/1
TABLET ORAL
Status: ON HOLD | COMMUNITY
Start: 2022-03-01 | End: 2022-06-18

## 2022-03-03 NOTE — PROGRESS NOTES
"Chief Complaint  Back Pain    Subjective          Noemi Forbes presents to [facility] PRIMARY [facility]  History of Present Illness  Spondylolisthesis of lumbar region: Patient was seen by Dr. Quintana.  Treatment includes Norco.  Patient has not started Norco due to questions regarding medication.  Objective   Vital Signs:   /76 (BP Location: Right arm, Patient Position: Sitting)   Pulse 78   Temp 98.6 °F (37 °C)   Ht 162.6 cm (64.02\")   Wt 75.3 kg (166 lb)   SpO2 99%   BMI 28.48 kg/m²     Physical Exam  Vitals and nursing note reviewed.   Constitutional:       Appearance: Normal appearance.   Cardiovascular:      Rate and Rhythm: Normal rate and regular rhythm.      Heart sounds: Normal heart sounds.   Pulmonary:      Effort: Pulmonary effort is normal.      Breath sounds: Normal breath sounds.   Neurological:      Mental Status: She is alert and oriented to person, place, and time.   Psychiatric:         Mood and Affect: Mood normal.        Result Review :            Assessment and Plan    Diagnoses and all orders for this visit:    1. Spondylolisthesis of lumbar region (Primary)  Comments:  - All questions regarding Norco were answered.  - Patient agrees to try Norco and follow-up with Dr. Quintana as directed.      I spent 20 minutes caring for Noemi on this date of service. This time includes time spent by me in the following activities:performing a medically appropriate examination and/or evaluation , counseling and educating the patient/family/caregiver and referring and communicating with other health care professionals   Follow Up   Return in about 13 days (around 3/16/2022) for Medicare Wellness.  Patient was given instructions and counseling regarding her condition or for health maintenance advice. Please see specific information pulled into the AVS if appropriate.       "

## 2022-03-08 ENCOUNTER — TELEPHONE (OUTPATIENT)
Dept: FAMILY MEDICINE CLINIC | Facility: CLINIC | Age: 87
End: 2022-03-08

## 2022-03-08 NOTE — TELEPHONE ENCOUNTER
Caller: Noemi Forbes    Relationship: Self    Best call back number: [phone]    What was the call regarding: PATIENT RECEIVED A MESSAGE THAT SHE NEEDS TO SCHEDULE AN APPOINTMENT.  HOWEVER PATIENT WAS IN THE OFFICE ON 3/03/22.  WHEN IS HER NEXT APPOINTMENT?  PLEASE ADVISE    Do you require a callback: YES

## 2022-03-08 NOTE — TELEPHONE ENCOUNTER
OK for HUB to read and schedule:    LMTCB-  Your provider recommended a 11 day follow up during your last visit.  Please call our office to schedule.

## 2022-03-29 ENCOUNTER — TELEPHONE (OUTPATIENT)
Dept: FAMILY MEDICINE CLINIC | Facility: CLINIC | Age: 87
End: 2022-03-29

## 2022-03-29 NOTE — TELEPHONE ENCOUNTER
Caller: Noemi Forbes    Relationship: Self    Best call back number: [phone]    What was the call regarding: PATIENT FELL AND WENT TO [facility] 3/24 AND WAS INSTRUCTED TO FOLLOW UP WITH DOCTOR. SHE IS VERY BADLY BRUISED AND IS IN TOO MUCH PAIN TO COME IN. SHE WOULD LIKE TO SPEAK TO A NURSE. PLEASE CALL.     Do you require a callback: YES

## 2022-03-30 RX ORDER — ALBUTEROL SULFATE 90 UG/1
AEROSOL, METERED RESPIRATORY (INHALATION)
COMMUNITY
Start: 2022-03-25 | End: 2022-04-28

## 2022-03-30 NOTE — TELEPHONE ENCOUNTER
Spoke with patient and she said she fell at home 03/24/22, went to [facility], records in chart under care everywhere, she's in extreme pain and badly bruised, asked if there is anything you can do for her for the pain, she's unable to come into the office because she can't drive.

## 2022-03-30 NOTE — TELEPHONE ENCOUNTER
Explained to patient that bruising would be more evident because of Eliquis. Unfortunately because she is taking Eliquis she cannot take any NSAIDs. Advised patient to continue using Norco and may use OTC lidocaine patches as needed.

## 2022-04-04 ENCOUNTER — TELEPHONE (OUTPATIENT)
Dept: FAMILY MEDICINE CLINIC | Facility: CLINIC | Age: 87
End: 2022-04-04

## 2022-04-04 NOTE — TELEPHONE ENCOUNTER
PATIENT CALLING IN REGARDS TO REQUEST A CALLBACK ABOUT HER Calcium Carb-Cholecalciferol (Calcium-Vitamin D3) 250-125 MG-UNIT tablet tablet THIS RX IS NOT COVERED THROUGH HER INSURANCE AND SHE IS REQUESTING NEXT STEPS. PLEASE ADVISE THANK YOU!        [facility] - [place] - [address] [facility], [address] - [phone] ph - [phone] FX

## 2022-04-05 NOTE — TELEPHONE ENCOUNTER
Please let patient know that unfortunately she may have to purchase this over-the-counter in bulk for a cheaper cost at [employer] or [employer].

## 2022-04-12 DIAGNOSIS — I89.0 LYMPHEDEMA: ICD-10-CM

## 2022-04-13 ENCOUNTER — TELEPHONE (OUTPATIENT)
Dept: FAMILY MEDICINE CLINIC | Facility: CLINIC | Age: 87
End: 2022-04-13

## 2022-04-13 RX ORDER — POTASSIUM CHLORIDE 750 MG/1
TABLET, EXTENDED RELEASE ORAL
Qty: 90 TABLET | Refills: 3 | Status: SHIPPED | OUTPATIENT
Start: 2022-04-13 | End: 2023-03-20 | Stop reason: SDUPTHER

## 2022-04-13 NOTE — TELEPHONE ENCOUNTER
Spoke with patient and she's been out of her vitamin D/calcium combo for six weeks and [facility] will fill and send to patient if they receive a new prescription for the medication, patients insurance will no longer pay for it but patient said she'll pay for it.    If you want patient to continue taking this medication please send in a new prescription to [facility].

## 2022-04-13 NOTE — TELEPHONE ENCOUNTER
Caller: Noemi Forbes    Relationship: Self    Best call back number: [phone]      What is the best time to reach you: ANYTIME    Who are you requesting to speak with (clinical staff, provider,  specific staff member): BETH        What was the call regarding: WANTED TO TALK TO BETH ABOUT A MEDICATION.    Do you require a callback: YES

## 2022-04-28 ENCOUNTER — OFFICE VISIT (OUTPATIENT)
Dept: FAMILY MEDICINE CLINIC | Facility: CLINIC | Age: 87
End: 2022-04-28

## 2022-04-28 VITALS
HEART RATE: 78 BPM | WEIGHT: 160.8 LBS | BODY MASS INDEX: 27.59 KG/M2 | TEMPERATURE: 97.7 F | OXYGEN SATURATION: 98 % | SYSTOLIC BLOOD PRESSURE: 130 MMHG | DIASTOLIC BLOOD PRESSURE: 70 MMHG

## 2022-04-28 DIAGNOSIS — H61.23 BILATERAL IMPACTED CERUMEN: ICD-10-CM

## 2022-04-28 DIAGNOSIS — Z00.00 MEDICARE ANNUAL WELLNESS VISIT, SUBSEQUENT: Primary | ICD-10-CM

## 2022-04-28 PROCEDURE — G0439 PPPS, SUBSEQ VISIT: HCPCS | Performed by: NURSE PRACTITIONER

## 2022-04-28 PROCEDURE — 1159F MED LIST DOCD IN RCRD: CPT | Performed by: NURSE PRACTITIONER

## 2022-04-28 PROCEDURE — 1170F FXNL STATUS ASSESSED: CPT | Performed by: NURSE PRACTITIONER

## 2022-04-28 PROCEDURE — 69210 REMOVE IMPACTED EAR WAX UNI: CPT | Performed by: NURSE PRACTITIONER

## 2022-04-28 NOTE — PROGRESS NOTES
The ABCs of the Annual Wellness Visit  Subsequent Medicare Wellness Visit    Chief Complaint   Patient presents with   • Medicare Wellness-subsequent      Subjective    History of Present Illness:  Noemi Forbes is a 87 y.o. female who presents for a Subsequent Medicare Wellness Visit.    The following portions of the patient's history were reviewed and   updated as appropriate: allergies, current medications, past family history, past medical history, past social history, past surgical history and problem list.    Compared to one year ago, the patient feels her physical   health is the same.    Compared to one year ago, the patient feels her mental   health is the same.    Recent Hospitalizations:  She was not admitted to the hospital during the last year.       Current Medical Providers:  Patient Care Team:  Charity Calhoun APRN as PCP - General (Family Medicine)    Outpatient Medications Prior to Visit   Medication Sig Dispense Refill   • Calcium Carb-Cholecalciferol (Calcium-Vitamin D3) 250-125 MG-UNIT tablet tablet Take 1 tablet by mouth Daily. 90 tablet 3   • clotrimazole-betamethasone (LOTRISONE) 1-0.05 % cream APPLY TOPICALLY TO THE APPROPRIATE AREA AS DIRECTED 2 (TWO) TIMES A DAY. 45 g 0   • Eliquis 5 MG tablet tablet Take 1 tablet by mouth 2 (Two) Times a Day. 180 tablet 3   • furosemide (LASIX) 20 MG tablet TAKE 1 TABLET BY MOUTH  DAILY 90 tablet 3   • latanoprost (XALATAN) 0.005 % ophthalmic solution      • loperamide (IMODIUM) 2 MG capsule Take 1 capsule by mouth Daily As Needed for Diarrhea. 90 capsule 1   • potassium chloride (K-DUR,KLOR-CON) 10 MEQ CR tablet TAKE 1 TABLET BY MOUTH  DAILY 90 tablet 3   • potassium chloride 10 MEQ CR tablet Take 1 tablet by mouth Daily. 90 tablet 1   • HYDROcodone-acetaminophen (NORCO) 5-325 MG per tablet      • albuterol sulfate  (90 Base) MCG/ACT inhaler INHALE 2 PUFFS INTO THE LUNGS EVERY 4 (FOUR) HOURS AS NEEDED FOR SHORTNESS OF AIR.       No  facility-administered medications prior to visit.       Opioid medication/s are on active medication list.  and I have evaluated her active treatment plan and pain score trends (see table).  There were no vitals filed for this visit.  I have reviewed the chart for potential of high risk medication and harmful drug interactions in the elderly.            Aspirin is not on active medication list.  Aspirin use is not indicated based on review of current medical condition/s. Risk of harm outweighs potential benefits.  .    Patient Active Problem List   Diagnosis   • Acute deep vein thrombosis (DVT) of distal vein of left lower extremity (HCC)   • Back pain, chronic   • Arthritis   • Chronic deep vein thrombosis (DVT) of distal vein of both lower extremities (HCC)   • Chronic urinary tract infection   • Chronic venous insufficiency   • Facet arthritis of lumbar region   • Lymphedema   • Mild aortic stenosis   • Osteopenia   • Spinal stenosis of lumbar region   • Spondylolisthesis of lumbar region   • Tobacco abuse   • Venous stasis dermatitis of both lower extremities   • Postlaminectomy syndrome     Advance Care Planning  Advance Directive is not on file.  ACP discussion was held with the patient during this visit. Patient has an advance directive (not in EMR), copy requested.    Review of Systems   Constitutional: Negative for fever.   HENT: Negative for ear pain, rhinorrhea and sore throat.    Eyes: Negative for visual disturbance.   Respiratory: Negative for cough and shortness of breath.    Cardiovascular: Negative for chest pain.   Gastrointestinal: Negative for abdominal pain, diarrhea, nausea and vomiting.   Genitourinary: Negative.    Musculoskeletal: Positive for back pain.   Skin: Negative for rash.   Neurological: Negative for dizziness and confusion.        Objective    Vitals:    04/28/22 1426   BP: 130/70   BP Location: Left arm   Patient Position: Sitting   Cuff Size: Adult   Pulse: 78   Temp: 97.7 °F  (36.5 °C)   TempSrc: Temporal   SpO2: 98%   Weight: 72.9 kg (160 lb 12.8 oz)     BMI Readings from Last 1 Encounters:   04/28/22 27.59 kg/m²   BMI is above normal parameters. Recommendations include: exercise counseling and nutrition counseling    Does the patient have evidence of cognitive impairment? No    Physical Exam  Vitals and nursing note reviewed.   Constitutional:       Appearance: Normal appearance.   HENT:      Head: Normocephalic and atraumatic.      Right Ear: External ear normal.      Left Ear: External ear normal.   Eyes:      Conjunctiva/sclera: Conjunctivae normal.      Pupils: Pupils are equal, round, and reactive to light.   Cardiovascular:      Rate and Rhythm: Normal rate and regular rhythm.      Heart sounds: Normal heart sounds.   Pulmonary:      Effort: Pulmonary effort is normal.      Breath sounds: Normal breath sounds.   Abdominal:      General: Bowel sounds are normal.      Palpations: Abdomen is soft.      Tenderness: There is no abdominal tenderness.   Genitourinary:     Comments: Declined   Musculoskeletal:         General: Normal range of motion.      Cervical back: Neck supple.   Skin:     General: Skin is warm and dry.   Neurological:      Mental Status: She is alert and oriented to person, place, and time.   Psychiatric:         Mood and Affect: Mood normal.             Ear Cerumen Removal    Date/Time: 4/28/2022 3:10 PM  Performed by: Charity Calhoun APRN  Authorized by: Charity Calhoun APRN   Location details: left ear and right ear  Patient tolerance: patient tolerated the procedure well with no immediate complications  Procedure type: instrumentation, curette           HEALTH RISK ASSESSMENT    Smoking Status:  Social History     Tobacco Use   Smoking Status Current Every Day Smoker   • Packs/day: 0.50   • Years: 68.00   • Pack years: 34.00   • Start date: 1950   Smokeless Tobacco Never Used     Alcohol Consumption:  Social History     Substance and Sexual Activity   Alcohol  Use Yes    Comment: socially     Fall Risk Screen:    STEVENSONADI Fall Risk Assessment was completed, and patient is at HIGH risk for falls. Assessment completed on:4/28/2022    Depression Screening:  PHQ-2/PHQ-9 Depression Screening 4/28/2022   Retired Total Score -   Little Interest or Pleasure in Doing Things 0-->not at all   Feeling Down, Depressed or Hopeless 0-->not at all   PHQ-9: Brief Depression Severity Measure Score 0       Health Habits and Functional and Cognitive Screening:  Functional & Cognitive Status 4/28/2022   Do you have difficulty preparing food and eating? No   Do you have difficulty bathing yourself, getting dressed or grooming yourself? No   Do you have difficulty using the toilet? No   Do you have difficulty moving around from place to place? Yes   Do you have trouble with steps or getting out of a bed or a chair? Yes   Current Diet Limited Junk Food   Dental Exam Up to date   Eye Exam Up to date   Exercise (times per week) 0 times per week   Current Exercises Include No Regular Exercise   Do you need help using the phone?  No   Are you deaf or do you have serious difficulty hearing?  No   Do you need help with transportation? No   Do you need help shopping? No   Do you need help preparing meals?  No   Do you need help with housework?  No   Do you need help with laundry? No   Do you need help taking your medications? No   Do you need help managing money? No   Do you ever drive or ride in a car without wearing a seat belt? No   Have you felt unusual stress, anger or loneliness in the last month? Yes   Who do you live with? Alone   If you need help, do you have trouble finding someone available to you? No   Have you been bothered in the last four weeks by sexual problems? No   Do you have difficulty concentrating, remembering or making decisions? Yes       Age-appropriate Screening Schedule:  Refer to the list below for future screening recommendations based on patient's age, sex and/or medical  conditions. Orders for these recommended tests are listed in the plan section. The patient has been provided with a written plan.    Health Maintenance   Topic Date Due   • DXA SCAN  Never done   • INFLUENZA VACCINE  08/01/2022   • TDAP/TD VACCINES (4 - Td or Tdap) 05/07/2030   • ZOSTER VACCINE  Completed              Assessment/Plan   CMS Preventative Services Quick Reference  Risk Factors Identified During Encounter  Chronic Pain   Immunizations Discussed/Encouraged (specific Immunizations; Pneumococcal 23  The above risks/problems have been discussed with the patient.  Follow up actions/plans if indicated are seen below in the Assessment/Plan Section.  Pertinent information has been shared with the patient in the After Visit Summary.    Diagnoses and all orders for this visit:    1. Medicare annual wellness visit, subsequent (Primary)  -     CBC & Differential  -     Basic metabolic panel    2. Bilateral impacted cerumen  -     Cerumen Removal        Follow Up:   Return in about 1 year (around 4/28/2023) for Medicare Wellness.     An After Visit Summary and PPPS were made available to the patient.

## 2022-04-29 ENCOUNTER — TELEPHONE (OUTPATIENT)
Dept: FAMILY MEDICINE CLINIC | Facility: CLINIC | Age: 87
End: 2022-04-29

## 2022-04-29 LAB
BASOPHILS # BLD AUTO: 0.1 X10E3/UL (ref 0–0.2)
BASOPHILS NFR BLD AUTO: 1 %
BUN SERPL-MCNC: 15 MG/DL (ref 8–27)
BUN/CREAT SERPL: 14 (ref 12–28)
CALCIUM SERPL-MCNC: 9.9 MG/DL (ref 8.7–10.3)
CHLORIDE SERPL-SCNC: 105 MMOL/L (ref 96–106)
CO2 SERPL-SCNC: 25 MMOL/L (ref 20–29)
CREAT SERPL-MCNC: 1.1 MG/DL (ref 0.57–1)
EGFRCR SERPLBLD CKD-EPI 2021: 49 ML/MIN/1.73
EOSINOPHIL # BLD AUTO: 0.1 X10E3/UL (ref 0–0.4)
EOSINOPHIL NFR BLD AUTO: 2 %
ERYTHROCYTE [DISTWIDTH] IN BLOOD BY AUTOMATED COUNT: 11.9 % (ref 11.7–15.4)
GLUCOSE SERPL-MCNC: 90 MG/DL (ref 65–99)
HCT VFR BLD AUTO: 44.4 % (ref 34–46.6)
HGB BLD-MCNC: 14.5 G/DL (ref 11.1–15.9)
IMM GRANULOCYTES # BLD AUTO: 0 X10E3/UL (ref 0–0.1)
IMM GRANULOCYTES NFR BLD AUTO: 0 %
LYMPHOCYTES # BLD AUTO: 1.6 X10E3/UL (ref 0.7–3.1)
LYMPHOCYTES NFR BLD AUTO: 30 %
MCH RBC QN AUTO: 31.7 PG (ref 26.6–33)
MCHC RBC AUTO-ENTMCNC: 32.7 G/DL (ref 31.5–35.7)
MCV RBC AUTO: 97 FL (ref 79–97)
MONOCYTES # BLD AUTO: 0.4 X10E3/UL (ref 0.1–0.9)
MONOCYTES NFR BLD AUTO: 8 %
NEUTROPHILS # BLD AUTO: 3.1 X10E3/UL (ref 1.4–7)
NEUTROPHILS NFR BLD AUTO: 59 %
PLATELET # BLD AUTO: 255 X10E3/UL (ref 150–450)
POTASSIUM SERPL-SCNC: 4.1 MMOL/L (ref 3.5–5.2)
RBC # BLD AUTO: 4.58 X10E6/UL (ref 3.77–5.28)
SODIUM SERPL-SCNC: 148 MMOL/L (ref 134–144)
WBC # BLD AUTO: 5.2 X10E3/UL (ref 3.4–10.8)

## 2022-04-29 NOTE — TELEPHONE ENCOUNTER
[facility]    **HUB/FRONTDESK** MAY RELAY MESSAGE        ----- Message from TERRELL Miller sent at 4/29/2022  8:32 AM EDT -----  Please inform patient that CBC is within acceptable limits.  No anemia or infection.  Blood sugar is normal.  Because of elevation in creatinine (kidney function test) and sodium would recommend increasing hydration.

## 2022-04-29 NOTE — TELEPHONE ENCOUNTER
Caller: Noemi Forbes    Relationship: Self    Best call back number: [phone] (M)  PATIENT IS RETURNING OUR CALL.   HUB RELAYED MESSAGE TO PATIENT ABOUT LABS LOOKING GOOD AND TO INCREASE HER HYDRATION INTAKE.    PATIENT HAS NO FURTHER QUESTIONS AT THIS TIME .

## 2022-06-16 ENCOUNTER — OFFICE VISIT (OUTPATIENT)
Dept: FAMILY MEDICINE CLINIC | Facility: CLINIC | Age: 87
End: 2022-06-16

## 2022-06-16 VITALS
TEMPERATURE: 98.6 F | BODY MASS INDEX: 28.99 KG/M2 | SYSTOLIC BLOOD PRESSURE: 150 MMHG | HEART RATE: 84 BPM | WEIGHT: 169 LBS | DIASTOLIC BLOOD PRESSURE: 90 MMHG | OXYGEN SATURATION: 96 %

## 2022-06-16 DIAGNOSIS — L03.116 BILATERAL CELLULITIS OF LOWER LEG: ICD-10-CM

## 2022-06-16 DIAGNOSIS — Z74.09 LIMITED MOBILITY: Primary | ICD-10-CM

## 2022-06-16 DIAGNOSIS — L03.115 BILATERAL CELLULITIS OF LOWER LEG: ICD-10-CM

## 2022-06-16 DIAGNOSIS — Z23 NEED FOR PNEUMOCOCCAL VACCINATION: ICD-10-CM

## 2022-06-16 PROCEDURE — G0009 ADMIN PNEUMOCOCCAL VACCINE: HCPCS | Performed by: NURSE PRACTITIONER

## 2022-06-16 PROCEDURE — 99213 OFFICE O/P EST LOW 20 MIN: CPT | Performed by: NURSE PRACTITIONER

## 2022-06-16 PROCEDURE — 90677 PCV20 VACCINE IM: CPT | Performed by: NURSE PRACTITIONER

## 2022-06-16 RX ORDER — SULFAMETHOXAZOLE AND TRIMETHOPRIM 800; 160 MG/1; MG/1
1 TABLET ORAL 2 TIMES DAILY
Qty: 20 TABLET | Refills: 0 | Status: SHIPPED | OUTPATIENT
Start: 2022-06-16 | End: 2022-06-16 | Stop reason: SDUPTHER

## 2022-06-16 RX ORDER — SULFAMETHOXAZOLE AND TRIMETHOPRIM 800; 160 MG/1; MG/1
1 TABLET ORAL 2 TIMES DAILY
Qty: 20 TABLET | Refills: 0 | Status: SHIPPED | OUTPATIENT
Start: 2022-06-16 | End: 2022-06-29 | Stop reason: HOSPADM

## 2022-06-16 NOTE — PROGRESS NOTES
"Chief Complaint  Pain (Pain swelling both legs)    Subjective        Noemi Forbes presents to [facility] PRIMARY [facility]  History of Present Illness  Limited mobility: Patient's mobility limitation impairs her ability to participate in dressing. Mobility limitation cannot be resolved by use of cane or walker. She is able to use a motorized wheelchair. Patient's functional mobility deficit can be resolved by wheelchair.     Bilateral cellulitis of lower leg: Last seen for this on 9/2/2021. Treatment included cephalexin which resolved symptoms. Symptoms have recently returned. Pertinent medical history includes chronic DVT of both lower extremities. Patient is taking Eliquis for this and reports taking medication as directed.   Objective   Vital Signs:  /90 (BP Location: Left arm, Patient Position: Sitting, Cuff Size: Adult)   Pulse 84   Temp 98.6 °F (37 °C) (Temporal)   Wt 76.7 kg (169 lb)   SpO2 96%   BMI 28.99 kg/m²   Estimated body mass index is 28.99 kg/m² as calculated from the following:    Height as of 3/3/22: 162.6 cm (64.02\").    Weight as of this encounter: 76.7 kg (169 lb).          Physical Exam  Vitals and nursing note reviewed.   Constitutional:       Appearance: Normal appearance.   Cardiovascular:      Rate and Rhythm: Normal rate and regular rhythm.   Pulmonary:      Effort: Pulmonary effort is normal.      Breath sounds: Normal breath sounds.   Skin:     Comments: Erythema and warmth to bilateral lower extremities but no calf tenderness    Neurological:      Mental Status: She is alert and oriented to person, place, and time.   Psychiatric:         Mood and Affect: Mood normal.        Result Review :           Assessment and Plan   Diagnoses and all orders for this visit:    1. Limited mobility (Primary)  Comments:  - Will order motorized scooter or wheelchair.     2. Bilateral cellulitis of lower leg  -     Discontinue: sulfamethoxazole-trimethoprim (Bactrim DS) 800-160 " MG per tablet; Take 1 tablet by mouth 2 (Two) Times a Day for 10 days.  Dispense: 20 tablet; Refill: 0    3. Need for pneumococcal vaccination  -     Pneumococcal Conjugate Vaccine 20-Valent (PCV20)           I spent 20 minutes caring for Noemi on this date of service. This time includes time spent by me in the following activities:performing a medically appropriate examination and/or evaluation , counseling and educating the patient/family/caregiver, ordering medications, tests, or procedures and documenting information in the medical record  Follow Up   ER if symptoms worsen.   Patient was given instructions and counseling regarding her condition or for health maintenance advice. Please see specific information pulled into the AVS if appropriate.

## 2022-06-18 ENCOUNTER — HOSPITAL ENCOUNTER (INPATIENT)
Facility: HOSPITAL | Age: 87
LOS: 11 days | Discharge: HOME HEALTH | End: 2022-06-29
Attending: EMERGENCY MEDICINE | Admitting: INTERNAL MEDICINE

## 2022-06-18 ENCOUNTER — APPOINTMENT (OUTPATIENT)
Dept: CARDIOLOGY | Facility: HOSPITAL | Age: 87
End: 2022-06-18

## 2022-06-18 DIAGNOSIS — L03.119 CELLULITIS OF LOWER EXTREMITY, UNSPECIFIED LATERALITY: Primary | ICD-10-CM

## 2022-06-18 DIAGNOSIS — R60.0 BILATERAL LOWER EXTREMITY EDEMA: ICD-10-CM

## 2022-06-18 DIAGNOSIS — L03.116 CELLULITIS OF LEFT LOWER EXTREMITY: ICD-10-CM

## 2022-06-18 PROBLEM — M15.9 GENERALIZED OSTEOARTHRITIS: Status: ACTIVE | Noted: 2022-06-18

## 2022-06-18 PROBLEM — I89.0 LYMPHEDEMA: Status: ACTIVE | Noted: 2022-06-18

## 2022-06-18 PROBLEM — M51.369 DDD (DEGENERATIVE DISC DISEASE), LUMBAR: Status: ACTIVE | Noted: 2022-06-18

## 2022-06-18 PROBLEM — Z86.718 HISTORY OF DEEP VEIN THROMBOSIS (DVT) OF LOWER EXTREMITY: Status: ACTIVE | Noted: 2022-06-18

## 2022-06-18 PROBLEM — M51.36 DDD (DEGENERATIVE DISC DISEASE), LUMBAR: Status: ACTIVE | Noted: 2022-06-18

## 2022-06-18 PROBLEM — N17.9 ACUTE KIDNEY FAILURE: Status: ACTIVE | Noted: 2022-06-18

## 2022-06-18 LAB
ALBUMIN SERPL-MCNC: 4 G/DL (ref 3.5–5.2)
ALBUMIN/GLOB SERPL: 1.3 G/DL
ALP SERPL-CCNC: 99 U/L (ref 39–117)
ALT SERPL W P-5'-P-CCNC: 6 U/L (ref 1–33)
ANION GAP SERPL CALCULATED.3IONS-SCNC: 11.2 MMOL/L (ref 5–15)
AST SERPL-CCNC: 13 U/L (ref 1–32)
BASOPHILS # BLD AUTO: 0.01 10*3/MM3 (ref 0–0.2)
BASOPHILS NFR BLD AUTO: 0.2 % (ref 0–1.5)
BH CV LOWER VASCULAR LEFT COMMON FEMORAL AUGMENT: NORMAL
BH CV LOWER VASCULAR LEFT COMMON FEMORAL COMPETENT: NORMAL
BH CV LOWER VASCULAR LEFT COMMON FEMORAL COMPRESS: NORMAL
BH CV LOWER VASCULAR LEFT COMMON FEMORAL PHASIC: NORMAL
BH CV LOWER VASCULAR LEFT COMMON FEMORAL SPONT: NORMAL
BH CV LOWER VASCULAR LEFT DISTAL FEMORAL COMPRESS: NORMAL
BH CV LOWER VASCULAR LEFT GASTRONEMIUS COMPRESS: NORMAL
BH CV LOWER VASCULAR LEFT GREATER SAPH AK COMPRESS: NORMAL
BH CV LOWER VASCULAR LEFT GREATER SAPH BK COMPRESS: NORMAL
BH CV LOWER VASCULAR LEFT LESSER SAPH COMPRESS: NORMAL
BH CV LOWER VASCULAR LEFT MID FEMORAL AUGMENT: NORMAL
BH CV LOWER VASCULAR LEFT MID FEMORAL COMPETENT: NORMAL
BH CV LOWER VASCULAR LEFT MID FEMORAL COMPRESS: NORMAL
BH CV LOWER VASCULAR LEFT MID FEMORAL PHASIC: NORMAL
BH CV LOWER VASCULAR LEFT MID FEMORAL SPONT: NORMAL
BH CV LOWER VASCULAR LEFT PERONEAL COMPRESS: NORMAL
BH CV LOWER VASCULAR LEFT POPLITEAL AUGMENT: NORMAL
BH CV LOWER VASCULAR LEFT POPLITEAL COMPETENT: NORMAL
BH CV LOWER VASCULAR LEFT POPLITEAL COMPRESS: NORMAL
BH CV LOWER VASCULAR LEFT POPLITEAL PHASIC: NORMAL
BH CV LOWER VASCULAR LEFT POPLITEAL SPONT: NORMAL
BH CV LOWER VASCULAR LEFT POSTERIOR TIBIAL COMPRESS: NORMAL
BH CV LOWER VASCULAR LEFT PROFUNDA FEMORAL COMPRESS: NORMAL
BH CV LOWER VASCULAR LEFT PROXIMAL FEMORAL COMPRESS: NORMAL
BH CV LOWER VASCULAR LEFT SAPHENOFEMORAL JUNCTION COMPRESS: NORMAL
BH CV LOWER VASCULAR RIGHT COMMON FEMORAL AUGMENT: NORMAL
BH CV LOWER VASCULAR RIGHT COMMON FEMORAL COMPETENT: NORMAL
BH CV LOWER VASCULAR RIGHT COMMON FEMORAL COMPRESS: NORMAL
BH CV LOWER VASCULAR RIGHT COMMON FEMORAL PHASIC: NORMAL
BH CV LOWER VASCULAR RIGHT COMMON FEMORAL SPONT: NORMAL
BH CV LOWER VASCULAR RIGHT DISTAL FEMORAL COMPRESS: NORMAL
BH CV LOWER VASCULAR RIGHT GASTRONEMIUS COMPRESS: NORMAL
BH CV LOWER VASCULAR RIGHT GREATER SAPH AK COMPRESS: NORMAL
BH CV LOWER VASCULAR RIGHT GREATER SAPH BK COMPRESS: NORMAL
BH CV LOWER VASCULAR RIGHT LESSER SAPH COMPRESS: NORMAL
BH CV LOWER VASCULAR RIGHT MID FEMORAL AUGMENT: NORMAL
BH CV LOWER VASCULAR RIGHT MID FEMORAL COMPETENT: NORMAL
BH CV LOWER VASCULAR RIGHT MID FEMORAL COMPRESS: NORMAL
BH CV LOWER VASCULAR RIGHT MID FEMORAL PHASIC: NORMAL
BH CV LOWER VASCULAR RIGHT MID FEMORAL SPONT: NORMAL
BH CV LOWER VASCULAR RIGHT PERONEAL COMPRESS: NORMAL
BH CV LOWER VASCULAR RIGHT POPLITEAL AUGMENT: NORMAL
BH CV LOWER VASCULAR RIGHT POPLITEAL COMPETENT: NORMAL
BH CV LOWER VASCULAR RIGHT POPLITEAL COMPRESS: NORMAL
BH CV LOWER VASCULAR RIGHT POPLITEAL PHASIC: NORMAL
BH CV LOWER VASCULAR RIGHT POPLITEAL SPONT: NORMAL
BH CV LOWER VASCULAR RIGHT POSTERIOR TIBIAL COMPRESS: NORMAL
BH CV LOWER VASCULAR RIGHT PROFUNDA FEMORAL COMPRESS: NORMAL
BH CV LOWER VASCULAR RIGHT PROXIMAL FEMORAL COMPRESS: NORMAL
BH CV LOWER VASCULAR RIGHT SAPHENOFEMORAL JUNCTION COMPRESS: NORMAL
BILIRUB SERPL-MCNC: 1.1 MG/DL (ref 0–1.2)
BUN SERPL-MCNC: 12 MG/DL (ref 8–23)
BUN/CREAT SERPL: 9.8 (ref 7–25)
CALCIUM SPEC-SCNC: 9.4 MG/DL (ref 8.6–10.5)
CHLORIDE SERPL-SCNC: 103 MMOL/L (ref 98–107)
CO2 SERPL-SCNC: 25.8 MMOL/L (ref 22–29)
CREAT SERPL-MCNC: 1.22 MG/DL (ref 0.57–1)
D-LACTATE SERPL-SCNC: 1.4 MMOL/L (ref 0.5–2)
DEPRECATED RDW RBC AUTO: 38.8 FL (ref 37–54)
EGFRCR SERPLBLD CKD-EPI 2021: 43.3 ML/MIN/1.73
EOSINOPHIL # BLD AUTO: 0.01 10*3/MM3 (ref 0–0.4)
EOSINOPHIL NFR BLD AUTO: 0.2 % (ref 0.3–6.2)
ERYTHROCYTE [DISTWIDTH] IN BLOOD BY AUTOMATED COUNT: 11.6 % (ref 12.3–15.4)
GLOBULIN UR ELPH-MCNC: 3 GM/DL
GLUCOSE SERPL-MCNC: 99 MG/DL (ref 65–99)
HCT VFR BLD AUTO: 40 % (ref 34–46.6)
HGB BLD-MCNC: 14.1 G/DL (ref 12–15.9)
IMM GRANULOCYTES # BLD AUTO: 0.03 10*3/MM3 (ref 0–0.05)
IMM GRANULOCYTES NFR BLD AUTO: 0.5 % (ref 0–0.5)
LYMPHOCYTES # BLD AUTO: 0.56 10*3/MM3 (ref 0.7–3.1)
LYMPHOCYTES NFR BLD AUTO: 8.9 % (ref 19.6–45.3)
MAXIMAL PREDICTED HEART RATE: 134 BPM
MCH RBC QN AUTO: 32.8 PG (ref 26.6–33)
MCHC RBC AUTO-ENTMCNC: 35.3 G/DL (ref 31.5–35.7)
MCV RBC AUTO: 93 FL (ref 79–97)
MONOCYTES # BLD AUTO: 0.45 10*3/MM3 (ref 0.1–0.9)
MONOCYTES NFR BLD AUTO: 7.1 % (ref 5–12)
NEUTROPHILS NFR BLD AUTO: 5.24 10*3/MM3 (ref 1.7–7)
NEUTROPHILS NFR BLD AUTO: 83.1 % (ref 42.7–76)
NRBC BLD AUTO-RTO: 0 /100 WBC (ref 0–0.2)
PLATELET # BLD AUTO: 230 10*3/MM3 (ref 140–450)
PMV BLD AUTO: 10 FL (ref 6–12)
POTASSIUM SERPL-SCNC: 4.1 MMOL/L (ref 3.5–5.2)
PROCALCITONIN SERPL-MCNC: 0.13 NG/ML (ref 0–0.25)
PROT SERPL-MCNC: 7 G/DL (ref 6–8.5)
RBC # BLD AUTO: 4.3 10*6/MM3 (ref 3.77–5.28)
SARS-COV-2 RNA PNL SPEC NAA+PROBE: NOT DETECTED
SODIUM SERPL-SCNC: 140 MMOL/L (ref 136–145)
STRESS TARGET HR: 114 BPM
WBC NRBC COR # BLD: 6.3 10*3/MM3 (ref 3.4–10.8)

## 2022-06-18 PROCEDURE — 87040 BLOOD CULTURE FOR BACTERIA: CPT | Performed by: EMERGENCY MEDICINE

## 2022-06-18 PROCEDURE — 25010000002 AMPICILLIN-SULBACTAM PER 1.5 G: Performed by: INTERNAL MEDICINE

## 2022-06-18 PROCEDURE — 25010000002 PIPERACILLIN SOD-TAZOBACTAM PER 1 G: Performed by: EMERGENCY MEDICINE

## 2022-06-18 PROCEDURE — 93970 EXTREMITY STUDY: CPT

## 2022-06-18 PROCEDURE — 83605 ASSAY OF LACTIC ACID: CPT | Performed by: EMERGENCY MEDICINE

## 2022-06-18 PROCEDURE — 25010000002 MORPHINE PER 10 MG: Performed by: EMERGENCY MEDICINE

## 2022-06-18 PROCEDURE — 25010000002 ONDANSETRON PER 1 MG: Performed by: EMERGENCY MEDICINE

## 2022-06-18 PROCEDURE — 85025 COMPLETE CBC W/AUTO DIFF WBC: CPT | Performed by: EMERGENCY MEDICINE

## 2022-06-18 PROCEDURE — 99284 EMERGENCY DEPT VISIT MOD MDM: CPT

## 2022-06-18 PROCEDURE — 25010000002 VANCOMYCIN 10 G RECONSTITUTED SOLUTION: Performed by: EMERGENCY MEDICINE

## 2022-06-18 PROCEDURE — 84145 PROCALCITONIN (PCT): CPT | Performed by: EMERGENCY MEDICINE

## 2022-06-18 PROCEDURE — 85652 RBC SED RATE AUTOMATED: CPT | Performed by: INTERNAL MEDICINE

## 2022-06-18 PROCEDURE — 36415 COLL VENOUS BLD VENIPUNCTURE: CPT

## 2022-06-18 PROCEDURE — 87641 MR-STAPH DNA AMP PROBE: CPT | Performed by: INTERNAL MEDICINE

## 2022-06-18 PROCEDURE — 87635 SARS-COV-2 COVID-19 AMP PRB: CPT | Performed by: EMERGENCY MEDICINE

## 2022-06-18 PROCEDURE — 80053 COMPREHEN METABOLIC PANEL: CPT | Performed by: EMERGENCY MEDICINE

## 2022-06-18 RX ORDER — VANCOMYCIN HYDROCHLORIDE 1 G/200ML
1000 INJECTION, SOLUTION INTRAVENOUS EVERY 24 HOURS
Status: DISCONTINUED | OUTPATIENT
Start: 2022-06-19 | End: 2022-06-20

## 2022-06-18 RX ORDER — ACETAMINOPHEN 160 MG/5ML
650 SOLUTION ORAL EVERY 4 HOURS PRN
Status: DISCONTINUED | OUTPATIENT
Start: 2022-06-18 | End: 2022-06-29 | Stop reason: HOSPADM

## 2022-06-18 RX ORDER — LATANOPROST 50 UG/ML
1 SOLUTION/ DROPS OPHTHALMIC DAILY
Status: DISCONTINUED | OUTPATIENT
Start: 2022-06-19 | End: 2022-06-29 | Stop reason: HOSPADM

## 2022-06-18 RX ORDER — ACETAMINOPHEN 325 MG/1
650 TABLET ORAL EVERY 4 HOURS PRN
Status: DISCONTINUED | OUTPATIENT
Start: 2022-06-18 | End: 2022-06-29 | Stop reason: HOSPADM

## 2022-06-18 RX ORDER — LORAZEPAM 0.5 MG/1
0.5 TABLET ORAL EVERY 8 HOURS PRN
Status: DISPENSED | OUTPATIENT
Start: 2022-06-18 | End: 2022-06-25

## 2022-06-18 RX ORDER — SODIUM CHLORIDE 0.9 % (FLUSH) 0.9 %
10 SYRINGE (ML) INJECTION AS NEEDED
Status: DISCONTINUED | OUTPATIENT
Start: 2022-06-18 | End: 2022-06-29 | Stop reason: HOSPADM

## 2022-06-18 RX ORDER — MORPHINE SULFATE 2 MG/ML
2 INJECTION, SOLUTION INTRAMUSCULAR; INTRAVENOUS ONCE
Status: COMPLETED | OUTPATIENT
Start: 2022-06-18 | End: 2022-06-18

## 2022-06-18 RX ORDER — AMOXICILLIN 250 MG
2 CAPSULE ORAL 2 TIMES DAILY
Status: DISCONTINUED | OUTPATIENT
Start: 2022-06-18 | End: 2022-06-29 | Stop reason: HOSPADM

## 2022-06-18 RX ORDER — BISACODYL 5 MG/1
5 TABLET, DELAYED RELEASE ORAL DAILY PRN
Status: DISCONTINUED | OUTPATIENT
Start: 2022-06-18 | End: 2022-06-29 | Stop reason: HOSPADM

## 2022-06-18 RX ORDER — SODIUM CHLORIDE 0.9 % (FLUSH) 0.9 %
10 SYRINGE (ML) INJECTION EVERY 12 HOURS SCHEDULED
Status: DISCONTINUED | OUTPATIENT
Start: 2022-06-18 | End: 2022-06-29 | Stop reason: HOSPADM

## 2022-06-18 RX ORDER — ACETAMINOPHEN 650 MG/1
650 SUPPOSITORY RECTAL EVERY 4 HOURS PRN
Status: DISCONTINUED | OUTPATIENT
Start: 2022-06-18 | End: 2022-06-29 | Stop reason: HOSPADM

## 2022-06-18 RX ORDER — POLYETHYLENE GLYCOL 3350 17 G/17G
17 POWDER, FOR SOLUTION ORAL DAILY PRN
Status: DISCONTINUED | OUTPATIENT
Start: 2022-06-18 | End: 2022-06-29 | Stop reason: HOSPADM

## 2022-06-18 RX ORDER — FAMOTIDINE 20 MG/1
40 TABLET, FILM COATED ORAL DAILY
Status: DISCONTINUED | OUTPATIENT
Start: 2022-06-19 | End: 2022-06-19

## 2022-06-18 RX ORDER — BISACODYL 10 MG
10 SUPPOSITORY, RECTAL RECTAL DAILY PRN
Status: DISCONTINUED | OUTPATIENT
Start: 2022-06-18 | End: 2022-06-29 | Stop reason: HOSPADM

## 2022-06-18 RX ORDER — CALCIUM CARBONATE 500(1250)
500 TABLET ORAL DAILY
Status: DISCONTINUED | OUTPATIENT
Start: 2022-06-19 | End: 2022-06-29 | Stop reason: HOSPADM

## 2022-06-18 RX ORDER — LOPERAMIDE HYDROCHLORIDE 2 MG/1
2 CAPSULE ORAL DAILY PRN
Status: DISCONTINUED | OUTPATIENT
Start: 2022-06-18 | End: 2022-06-29 | Stop reason: HOSPADM

## 2022-06-18 RX ORDER — SODIUM CHLORIDE 9 MG/ML
100 INJECTION, SOLUTION INTRAVENOUS CONTINUOUS
Status: DISCONTINUED | OUTPATIENT
Start: 2022-06-18 | End: 2022-06-21

## 2022-06-18 RX ORDER — ONDANSETRON 2 MG/ML
4 INJECTION INTRAMUSCULAR; INTRAVENOUS ONCE
Status: COMPLETED | OUTPATIENT
Start: 2022-06-18 | End: 2022-06-18

## 2022-06-18 RX ORDER — CHOLECALCIFEROL (VITAMIN D3) 125 MCG
5 CAPSULE ORAL NIGHTLY PRN
Status: DISCONTINUED | OUTPATIENT
Start: 2022-06-18 | End: 2022-06-29 | Stop reason: HOSPADM

## 2022-06-18 RX ORDER — ONDANSETRON 2 MG/ML
4 INJECTION INTRAMUSCULAR; INTRAVENOUS EVERY 6 HOURS PRN
Status: DISCONTINUED | OUTPATIENT
Start: 2022-06-18 | End: 2022-06-29 | Stop reason: HOSPADM

## 2022-06-18 RX ADMIN — TAZOBACTAM SODIUM AND PIPERACILLIN SODIUM 3.38 G: 375; 3 INJECTION, SOLUTION INTRAVENOUS at 19:35

## 2022-06-18 RX ADMIN — Medication 10 ML: at 23:43

## 2022-06-18 RX ADMIN — VANCOMYCIN HYDROCHLORIDE 1500 MG: 10 INJECTION, POWDER, LYOPHILIZED, FOR SOLUTION INTRAVENOUS at 20:45

## 2022-06-18 RX ADMIN — APIXABAN 5 MG: 5 TABLET, FILM COATED ORAL at 23:42

## 2022-06-18 RX ADMIN — SODIUM CHLORIDE 3 G: 9 INJECTION, SOLUTION INTRAVENOUS at 23:42

## 2022-06-18 RX ADMIN — ONDANSETRON 4 MG: 2 INJECTION INTRAMUSCULAR; INTRAVENOUS at 19:51

## 2022-06-18 RX ADMIN — ACETAMINOPHEN 650 MG: 325 TABLET, FILM COATED ORAL at 23:43

## 2022-06-18 RX ADMIN — SODIUM CHLORIDE 100 ML/HR: 9 INJECTION, SOLUTION INTRAVENOUS at 23:43

## 2022-06-18 RX ADMIN — MORPHINE SULFATE 2 MG: 2 INJECTION, SOLUTION INTRAMUSCULAR; INTRAVENOUS at 19:52

## 2022-06-19 LAB
ANION GAP SERPL CALCULATED.3IONS-SCNC: 11.2 MMOL/L (ref 5–15)
BASOPHILS # BLD AUTO: 0.01 10*3/MM3 (ref 0–0.2)
BASOPHILS NFR BLD AUTO: 0.2 % (ref 0–1.5)
BUN SERPL-MCNC: 11 MG/DL (ref 8–23)
BUN/CREAT SERPL: 9.9 (ref 7–25)
CALCIUM SPEC-SCNC: 8.7 MG/DL (ref 8.6–10.5)
CHLORIDE SERPL-SCNC: 109 MMOL/L (ref 98–107)
CO2 SERPL-SCNC: 21.8 MMOL/L (ref 22–29)
CREAT SERPL-MCNC: 1.11 MG/DL (ref 0.57–1)
DEPRECATED RDW RBC AUTO: 39.7 FL (ref 37–54)
EGFRCR SERPLBLD CKD-EPI 2021: 48.5 ML/MIN/1.73
EOSINOPHIL # BLD AUTO: 0.07 10*3/MM3 (ref 0–0.4)
EOSINOPHIL NFR BLD AUTO: 1.7 % (ref 0.3–6.2)
ERYTHROCYTE [DISTWIDTH] IN BLOOD BY AUTOMATED COUNT: 11.7 % (ref 12.3–15.4)
ERYTHROCYTE [SEDIMENTATION RATE] IN BLOOD: 57 MM/HR (ref 0–30)
GLUCOSE SERPL-MCNC: 68 MG/DL (ref 65–99)
HCT VFR BLD AUTO: 35.1 % (ref 34–46.6)
HGB BLD-MCNC: 12.3 G/DL (ref 12–15.9)
IMM GRANULOCYTES # BLD AUTO: 0.06 10*3/MM3 (ref 0–0.05)
IMM GRANULOCYTES NFR BLD AUTO: 1.4 % (ref 0–0.5)
LYMPHOCYTES # BLD AUTO: 0.9 10*3/MM3 (ref 0.7–3.1)
LYMPHOCYTES NFR BLD AUTO: 21.5 % (ref 19.6–45.3)
MCH RBC QN AUTO: 32.8 PG (ref 26.6–33)
MCHC RBC AUTO-ENTMCNC: 35 G/DL (ref 31.5–35.7)
MCV RBC AUTO: 93.6 FL (ref 79–97)
MONOCYTES # BLD AUTO: 0.55 10*3/MM3 (ref 0.1–0.9)
MONOCYTES NFR BLD AUTO: 13.1 % (ref 5–12)
MRSA DNA SPEC QL NAA+PROBE: NORMAL
NEUTROPHILS NFR BLD AUTO: 2.6 10*3/MM3 (ref 1.7–7)
NEUTROPHILS NFR BLD AUTO: 62.1 % (ref 42.7–76)
NRBC BLD AUTO-RTO: 0 /100 WBC (ref 0–0.2)
PLATELET # BLD AUTO: 183 10*3/MM3 (ref 140–450)
PMV BLD AUTO: 10.8 FL (ref 6–12)
POTASSIUM SERPL-SCNC: 4.4 MMOL/L (ref 3.5–5.2)
RBC # BLD AUTO: 3.75 10*6/MM3 (ref 3.77–5.28)
SODIUM SERPL-SCNC: 142 MMOL/L (ref 136–145)
WBC NRBC COR # BLD: 4.19 10*3/MM3 (ref 3.4–10.8)

## 2022-06-19 PROCEDURE — 85025 COMPLETE CBC W/AUTO DIFF WBC: CPT | Performed by: INTERNAL MEDICINE

## 2022-06-19 PROCEDURE — 25010000002 AMPICILLIN-SULBACTAM PER 1.5 G: Performed by: INTERNAL MEDICINE

## 2022-06-19 PROCEDURE — 80048 BASIC METABOLIC PNL TOTAL CA: CPT | Performed by: INTERNAL MEDICINE

## 2022-06-19 PROCEDURE — 25010000002 VANCOMYCIN PER 500 MG: Performed by: INTERNAL MEDICINE

## 2022-06-19 RX ORDER — FAMOTIDINE 20 MG/1
20 TABLET, FILM COATED ORAL DAILY
Status: DISCONTINUED | OUTPATIENT
Start: 2022-06-20 | End: 2022-06-29 | Stop reason: HOSPADM

## 2022-06-19 RX ADMIN — Medication 500 MG: at 08:12

## 2022-06-19 RX ADMIN — Medication 10 ML: at 20:20

## 2022-06-19 RX ADMIN — APIXABAN 5 MG: 5 TABLET, FILM COATED ORAL at 20:20

## 2022-06-19 RX ADMIN — SODIUM CHLORIDE 3 G: 9 INJECTION, SOLUTION INTRAVENOUS at 18:25

## 2022-06-19 RX ADMIN — SODIUM CHLORIDE 100 ML/HR: 9 INJECTION, SOLUTION INTRAVENOUS at 09:12

## 2022-06-19 RX ADMIN — ACETAMINOPHEN 650 MG: 325 TABLET, FILM COATED ORAL at 20:20

## 2022-06-19 RX ADMIN — SODIUM CHLORIDE 3 G: 9 INJECTION, SOLUTION INTRAVENOUS at 12:19

## 2022-06-19 RX ADMIN — LATANOPROST 1 DROP: 50 SOLUTION OPHTHALMIC at 08:11

## 2022-06-19 RX ADMIN — ACETAMINOPHEN 650 MG: 325 TABLET, FILM COATED ORAL at 12:20

## 2022-06-19 RX ADMIN — SODIUM CHLORIDE 3 G: 9 INJECTION, SOLUTION INTRAVENOUS at 06:24

## 2022-06-19 RX ADMIN — APIXABAN 5 MG: 5 TABLET, FILM COATED ORAL at 08:12

## 2022-06-19 RX ADMIN — Medication 5 MG: at 20:20

## 2022-06-19 RX ADMIN — Medication 10 ML: at 08:12

## 2022-06-19 RX ADMIN — ACETAMINOPHEN 650 MG: 325 TABLET, FILM COATED ORAL at 06:24

## 2022-06-19 RX ADMIN — FAMOTIDINE 40 MG: 20 TABLET ORAL at 08:11

## 2022-06-19 RX ADMIN — SODIUM CHLORIDE 100 ML/HR: 9 INJECTION, SOLUTION INTRAVENOUS at 21:46

## 2022-06-19 RX ADMIN — VANCOMYCIN HYDROCHLORIDE 1000 MG: 1 INJECTION, SOLUTION INTRAVENOUS at 16:07

## 2022-06-20 LAB
ANION GAP SERPL CALCULATED.3IONS-SCNC: 6.3 MMOL/L (ref 5–15)
BASOPHILS # BLD AUTO: 0.02 10*3/MM3 (ref 0–0.2)
BASOPHILS NFR BLD AUTO: 0.7 % (ref 0–1.5)
BUN SERPL-MCNC: 11 MG/DL (ref 8–23)
BUN/CREAT SERPL: 12 (ref 7–25)
CALCIUM SPEC-SCNC: 8.1 MG/DL (ref 8.6–10.5)
CHLORIDE SERPL-SCNC: 111 MMOL/L (ref 98–107)
CO2 SERPL-SCNC: 23.7 MMOL/L (ref 22–29)
CREAT SERPL-MCNC: 0.92 MG/DL (ref 0.57–1)
DEPRECATED RDW RBC AUTO: 42 FL (ref 37–54)
EGFRCR SERPLBLD CKD-EPI 2021: 60.8 ML/MIN/1.73
EOSINOPHIL # BLD AUTO: 0.16 10*3/MM3 (ref 0–0.4)
EOSINOPHIL NFR BLD AUTO: 5.6 % (ref 0.3–6.2)
ERYTHROCYTE [DISTWIDTH] IN BLOOD BY AUTOMATED COUNT: 11.8 % (ref 12.3–15.4)
GLUCOSE SERPL-MCNC: 80 MG/DL (ref 65–99)
HCT VFR BLD AUTO: 35 % (ref 34–46.6)
HGB BLD-MCNC: 11.4 G/DL (ref 12–15.9)
IMM GRANULOCYTES # BLD AUTO: 0 10*3/MM3 (ref 0–0.05)
IMM GRANULOCYTES NFR BLD AUTO: 0 % (ref 0–0.5)
LYMPHOCYTES # BLD AUTO: 0.69 10*3/MM3 (ref 0.7–3.1)
LYMPHOCYTES NFR BLD AUTO: 24 % (ref 19.6–45.3)
MCH RBC QN AUTO: 31.8 PG (ref 26.6–33)
MCHC RBC AUTO-ENTMCNC: 32.6 G/DL (ref 31.5–35.7)
MCV RBC AUTO: 97.5 FL (ref 79–97)
MONOCYTES # BLD AUTO: 0.39 10*3/MM3 (ref 0.1–0.9)
MONOCYTES NFR BLD AUTO: 13.6 % (ref 5–12)
NEUTROPHILS NFR BLD AUTO: 1.61 10*3/MM3 (ref 1.7–7)
NEUTROPHILS NFR BLD AUTO: 56.1 % (ref 42.7–76)
NRBC BLD AUTO-RTO: 0 /100 WBC (ref 0–0.2)
PLATELET # BLD AUTO: 182 10*3/MM3 (ref 140–450)
PMV BLD AUTO: 10.5 FL (ref 6–12)
POTASSIUM SERPL-SCNC: 4 MMOL/L (ref 3.5–5.2)
RBC # BLD AUTO: 3.59 10*6/MM3 (ref 3.77–5.28)
SODIUM SERPL-SCNC: 141 MMOL/L (ref 136–145)
WBC NRBC COR # BLD: 2.87 10*3/MM3 (ref 3.4–10.8)

## 2022-06-20 PROCEDURE — 85025 COMPLETE CBC W/AUTO DIFF WBC: CPT | Performed by: INTERNAL MEDICINE

## 2022-06-20 PROCEDURE — 25010000002 CEFAZOLIN IN DEXTROSE 2-4 GM/100ML-% SOLUTION: Performed by: INTERNAL MEDICINE

## 2022-06-20 PROCEDURE — 97161 PT EVAL LOW COMPLEX 20 MIN: CPT

## 2022-06-20 PROCEDURE — 97110 THERAPEUTIC EXERCISES: CPT

## 2022-06-20 PROCEDURE — 80048 BASIC METABOLIC PNL TOTAL CA: CPT | Performed by: INTERNAL MEDICINE

## 2022-06-20 PROCEDURE — 25010000002 AMPICILLIN-SULBACTAM PER 1.5 G: Performed by: INTERNAL MEDICINE

## 2022-06-20 PROCEDURE — 99222 1ST HOSP IP/OBS MODERATE 55: CPT | Performed by: INTERNAL MEDICINE

## 2022-06-20 RX ORDER — CEFAZOLIN SODIUM 2 G/100ML
2 INJECTION, SOLUTION INTRAVENOUS EVERY 8 HOURS
Status: DISCONTINUED | OUTPATIENT
Start: 2022-06-20 | End: 2022-06-26

## 2022-06-20 RX ORDER — NICOTINE 21 MG/24HR
1 PATCH, TRANSDERMAL 24 HOURS TRANSDERMAL
Status: DISCONTINUED | OUTPATIENT
Start: 2022-06-20 | End: 2022-06-29 | Stop reason: HOSPADM

## 2022-06-20 RX ADMIN — Medication 500 MG: at 08:01

## 2022-06-20 RX ADMIN — FAMOTIDINE 20 MG: 20 TABLET ORAL at 08:01

## 2022-06-20 RX ADMIN — LATANOPROST 1 DROP: 50 SOLUTION OPHTHALMIC at 08:01

## 2022-06-20 RX ADMIN — APIXABAN 5 MG: 5 TABLET, FILM COATED ORAL at 21:19

## 2022-06-20 RX ADMIN — SODIUM CHLORIDE 3 G: 9 INJECTION, SOLUTION INTRAVENOUS at 06:07

## 2022-06-20 RX ADMIN — CEFAZOLIN SODIUM 2 G: 2 INJECTION, SOLUTION INTRAVENOUS at 18:10

## 2022-06-20 RX ADMIN — LORAZEPAM 0.5 MG: 0.5 TABLET ORAL at 16:23

## 2022-06-20 RX ADMIN — APIXABAN 5 MG: 5 TABLET, FILM COATED ORAL at 08:01

## 2022-06-20 RX ADMIN — SODIUM CHLORIDE 3 G: 9 INJECTION, SOLUTION INTRAVENOUS at 01:11

## 2022-06-20 RX ADMIN — Medication 1 PATCH: at 17:01

## 2022-06-20 RX ADMIN — SODIUM CHLORIDE 3 G: 9 INJECTION, SOLUTION INTRAVENOUS at 11:54

## 2022-06-20 RX ADMIN — SODIUM CHLORIDE 100 ML/HR: 9 INJECTION, SOLUTION INTRAVENOUS at 08:00

## 2022-06-20 RX ADMIN — SODIUM CHLORIDE 100 ML/HR: 9 INJECTION, SOLUTION INTRAVENOUS at 18:10

## 2022-06-21 PROBLEM — L03.116 CELLULITIS OF LEFT LOWER EXTREMITY: Status: ACTIVE | Noted: 2022-06-18

## 2022-06-21 LAB
ANION GAP SERPL CALCULATED.3IONS-SCNC: 8.2 MMOL/L (ref 5–15)
BASOPHILS # BLD AUTO: 0.01 10*3/MM3 (ref 0–0.2)
BASOPHILS NFR BLD AUTO: 0.3 % (ref 0–1.5)
BUN SERPL-MCNC: 8 MG/DL (ref 8–23)
BUN/CREAT SERPL: 10.4 (ref 7–25)
CALCIUM SPEC-SCNC: 8.3 MG/DL (ref 8.6–10.5)
CHLORIDE SERPL-SCNC: 112 MMOL/L (ref 98–107)
CO2 SERPL-SCNC: 22.8 MMOL/L (ref 22–29)
CREAT SERPL-MCNC: 0.77 MG/DL (ref 0.57–1)
DEPRECATED RDW RBC AUTO: 40.4 FL (ref 37–54)
EGFRCR SERPLBLD CKD-EPI 2021: 75.2 ML/MIN/1.73
EOSINOPHIL # BLD AUTO: 0.15 10*3/MM3 (ref 0–0.4)
EOSINOPHIL NFR BLD AUTO: 4.9 % (ref 0.3–6.2)
ERYTHROCYTE [DISTWIDTH] IN BLOOD BY AUTOMATED COUNT: 11.7 % (ref 12.3–15.4)
GLUCOSE SERPL-MCNC: 79 MG/DL (ref 65–99)
HCT VFR BLD AUTO: 35.6 % (ref 34–46.6)
HGB BLD-MCNC: 11.6 G/DL (ref 12–15.9)
IMM GRANULOCYTES # BLD AUTO: 0.01 10*3/MM3 (ref 0–0.05)
IMM GRANULOCYTES NFR BLD AUTO: 0.3 % (ref 0–0.5)
LYMPHOCYTES # BLD AUTO: 0.82 10*3/MM3 (ref 0.7–3.1)
LYMPHOCYTES NFR BLD AUTO: 26.8 % (ref 19.6–45.3)
MCH RBC QN AUTO: 31.2 PG (ref 26.6–33)
MCHC RBC AUTO-ENTMCNC: 32.6 G/DL (ref 31.5–35.7)
MCV RBC AUTO: 95.7 FL (ref 79–97)
MONOCYTES # BLD AUTO: 0.31 10*3/MM3 (ref 0.1–0.9)
MONOCYTES NFR BLD AUTO: 10.1 % (ref 5–12)
NEUTROPHILS NFR BLD AUTO: 1.76 10*3/MM3 (ref 1.7–7)
NEUTROPHILS NFR BLD AUTO: 57.6 % (ref 42.7–76)
NRBC BLD AUTO-RTO: 0 /100 WBC (ref 0–0.2)
PLATELET # BLD AUTO: 206 10*3/MM3 (ref 140–450)
PMV BLD AUTO: 10.4 FL (ref 6–12)
POTASSIUM SERPL-SCNC: 3.6 MMOL/L (ref 3.5–5.2)
RBC # BLD AUTO: 3.72 10*6/MM3 (ref 3.77–5.28)
SODIUM SERPL-SCNC: 143 MMOL/L (ref 136–145)
WBC NRBC COR # BLD: 3.06 10*3/MM3 (ref 3.4–10.8)

## 2022-06-21 PROCEDURE — 80048 BASIC METABOLIC PNL TOTAL CA: CPT | Performed by: INTERNAL MEDICINE

## 2022-06-21 PROCEDURE — 25010000002 CEFAZOLIN IN DEXTROSE 2-4 GM/100ML-% SOLUTION: Performed by: INTERNAL MEDICINE

## 2022-06-21 PROCEDURE — 85025 COMPLETE CBC W/AUTO DIFF WBC: CPT | Performed by: INTERNAL MEDICINE

## 2022-06-21 PROCEDURE — 99232 SBSQ HOSP IP/OBS MODERATE 35: CPT | Performed by: INTERNAL MEDICINE

## 2022-06-21 RX ORDER — LIDOCAINE 50 MG/G
1 PATCH TOPICAL
Status: DISCONTINUED | OUTPATIENT
Start: 2022-06-21 | End: 2022-06-29 | Stop reason: HOSPADM

## 2022-06-21 RX ORDER — TRAMADOL HYDROCHLORIDE 50 MG/1
50 TABLET ORAL EVERY 6 HOURS PRN
Status: DISPENSED | OUTPATIENT
Start: 2022-06-21 | End: 2022-06-28

## 2022-06-21 RX ADMIN — LIDOCAINE 1 PATCH: 50 PATCH TOPICAL at 15:49

## 2022-06-21 RX ADMIN — FAMOTIDINE 20 MG: 20 TABLET ORAL at 08:02

## 2022-06-21 RX ADMIN — SODIUM CHLORIDE 100 ML/HR: 9 INJECTION, SOLUTION INTRAVENOUS at 04:49

## 2022-06-21 RX ADMIN — TRAMADOL HYDROCHLORIDE 50 MG: 50 TABLET, COATED ORAL at 11:55

## 2022-06-21 RX ADMIN — CEFAZOLIN SODIUM 2 G: 2 INJECTION, SOLUTION INTRAVENOUS at 18:28

## 2022-06-21 RX ADMIN — LATANOPROST 1 DROP: 50 SOLUTION OPHTHALMIC at 08:02

## 2022-06-21 RX ADMIN — APIXABAN 5 MG: 5 TABLET, FILM COATED ORAL at 21:24

## 2022-06-21 RX ADMIN — Medication 10 ML: at 08:02

## 2022-06-21 RX ADMIN — APIXABAN 5 MG: 5 TABLET, FILM COATED ORAL at 08:01

## 2022-06-21 RX ADMIN — Medication 10 ML: at 21:26

## 2022-06-21 RX ADMIN — Medication 500 MG: at 08:01

## 2022-06-21 RX ADMIN — CEFAZOLIN SODIUM 2 G: 2 INJECTION, SOLUTION INTRAVENOUS at 10:06

## 2022-06-21 RX ADMIN — CEFAZOLIN SODIUM 2 G: 2 INJECTION, SOLUTION INTRAVENOUS at 02:04

## 2022-06-21 RX ADMIN — Medication 1 PATCH: at 17:12

## 2022-06-22 ENCOUNTER — TELEPHONE (OUTPATIENT)
Dept: FAMILY MEDICINE CLINIC | Facility: CLINIC | Age: 87
End: 2022-06-22

## 2022-06-22 LAB
ANION GAP SERPL CALCULATED.3IONS-SCNC: 8.1 MMOL/L (ref 5–15)
BASOPHILS # BLD AUTO: 0.02 10*3/MM3 (ref 0–0.2)
BASOPHILS NFR BLD AUTO: 0.5 % (ref 0–1.5)
BUN SERPL-MCNC: 8 MG/DL (ref 8–23)
BUN/CREAT SERPL: 10.5 (ref 7–25)
CALCIUM SPEC-SCNC: 8.2 MG/DL (ref 8.6–10.5)
CHLORIDE SERPL-SCNC: 109 MMOL/L (ref 98–107)
CO2 SERPL-SCNC: 24.9 MMOL/L (ref 22–29)
CREAT SERPL-MCNC: 0.76 MG/DL (ref 0.57–1)
DEPRECATED RDW RBC AUTO: 40.7 FL (ref 37–54)
EGFRCR SERPLBLD CKD-EPI 2021: 76.4 ML/MIN/1.73
EOSINOPHIL # BLD AUTO: 0.19 10*3/MM3 (ref 0–0.4)
EOSINOPHIL NFR BLD AUTO: 4.9 % (ref 0.3–6.2)
ERYTHROCYTE [DISTWIDTH] IN BLOOD BY AUTOMATED COUNT: 11.8 % (ref 12.3–15.4)
GLUCOSE SERPL-MCNC: 84 MG/DL (ref 65–99)
HCT VFR BLD AUTO: 36.8 % (ref 34–46.6)
HGB BLD-MCNC: 12 G/DL (ref 12–15.9)
IMM GRANULOCYTES # BLD AUTO: 0.02 10*3/MM3 (ref 0–0.05)
IMM GRANULOCYTES NFR BLD AUTO: 0.5 % (ref 0–0.5)
LYMPHOCYTES # BLD AUTO: 1.13 10*3/MM3 (ref 0.7–3.1)
LYMPHOCYTES NFR BLD AUTO: 28.9 % (ref 19.6–45.3)
MCH RBC QN AUTO: 31.3 PG (ref 26.6–33)
MCHC RBC AUTO-ENTMCNC: 32.6 G/DL (ref 31.5–35.7)
MCV RBC AUTO: 96.1 FL (ref 79–97)
MONOCYTES # BLD AUTO: 0.4 10*3/MM3 (ref 0.1–0.9)
MONOCYTES NFR BLD AUTO: 10.2 % (ref 5–12)
NEUTROPHILS NFR BLD AUTO: 2.15 10*3/MM3 (ref 1.7–7)
NEUTROPHILS NFR BLD AUTO: 55 % (ref 42.7–76)
NRBC BLD AUTO-RTO: 0 /100 WBC (ref 0–0.2)
PLATELET # BLD AUTO: 229 10*3/MM3 (ref 140–450)
PMV BLD AUTO: 10.4 FL (ref 6–12)
POTASSIUM SERPL-SCNC: 3.9 MMOL/L (ref 3.5–5.2)
RBC # BLD AUTO: 3.83 10*6/MM3 (ref 3.77–5.28)
SODIUM SERPL-SCNC: 142 MMOL/L (ref 136–145)
WBC NRBC COR # BLD: 3.91 10*3/MM3 (ref 3.4–10.8)

## 2022-06-22 PROCEDURE — 85025 COMPLETE CBC W/AUTO DIFF WBC: CPT | Performed by: INTERNAL MEDICINE

## 2022-06-22 PROCEDURE — 99232 SBSQ HOSP IP/OBS MODERATE 35: CPT | Performed by: INTERNAL MEDICINE

## 2022-06-22 PROCEDURE — 25010000002 CEFAZOLIN IN DEXTROSE 2-4 GM/100ML-% SOLUTION: Performed by: INTERNAL MEDICINE

## 2022-06-22 PROCEDURE — 80048 BASIC METABOLIC PNL TOTAL CA: CPT | Performed by: INTERNAL MEDICINE

## 2022-06-22 RX ADMIN — Medication 10 ML: at 21:44

## 2022-06-22 RX ADMIN — Medication 1 PATCH: at 17:46

## 2022-06-22 RX ADMIN — CEFAZOLIN SODIUM 2 G: 2 INJECTION, SOLUTION INTRAVENOUS at 17:46

## 2022-06-22 RX ADMIN — FAMOTIDINE 20 MG: 20 TABLET ORAL at 08:51

## 2022-06-22 RX ADMIN — APIXABAN 5 MG: 5 TABLET, FILM COATED ORAL at 21:44

## 2022-06-22 RX ADMIN — DOCUSATE SODIUM 50MG AND SENNOSIDES 8.6MG 2 TABLET: 8.6; 5 TABLET, FILM COATED ORAL at 21:44

## 2022-06-22 RX ADMIN — TRAMADOL HYDROCHLORIDE 50 MG: 50 TABLET, COATED ORAL at 13:36

## 2022-06-22 RX ADMIN — Medication 500 MG: at 08:51

## 2022-06-22 RX ADMIN — CEFAZOLIN SODIUM 2 G: 2 INJECTION, SOLUTION INTRAVENOUS at 10:30

## 2022-06-22 RX ADMIN — ACETAMINOPHEN 650 MG: 325 TABLET, FILM COATED ORAL at 21:47

## 2022-06-22 RX ADMIN — Medication 10 ML: at 08:51

## 2022-06-22 RX ADMIN — LATANOPROST 1 DROP: 50 SOLUTION OPHTHALMIC at 08:51

## 2022-06-22 RX ADMIN — APIXABAN 5 MG: 5 TABLET, FILM COATED ORAL at 08:51

## 2022-06-22 RX ADMIN — CEFAZOLIN SODIUM 2 G: 2 INJECTION, SOLUTION INTRAVENOUS at 02:30

## 2022-06-22 NOTE — TELEPHONE ENCOUNTER
Please call and give my verbal approval.     LMTCB    Left messsage for [facility] to call back.    **HUB/** CAN RELAY MESSAGE.

## 2022-06-22 NOTE — TELEPHONE ENCOUNTER
Caller: [facility]    Relationship: Home Health    Best call back number: [phone]    What orders are you requesting (i.e. lab or imaging): EVAL PHYSICAL THERAPY    In what timeframe would the patient need to come in: END OF THIS WEE. PATIENT IN HOSPITAL FOR ANOTHER DAY OR TWO    Where will you receive your lab/imaging services: IN HOME    Additional notes:

## 2022-06-23 LAB
ANION GAP SERPL CALCULATED.3IONS-SCNC: 9 MMOL/L (ref 5–15)
BACTERIA SPEC AEROBE CULT: NORMAL
BACTERIA SPEC AEROBE CULT: NORMAL
BASOPHILS # BLD MANUAL: 0.04 10*3/MM3 (ref 0–0.2)
BASOPHILS NFR BLD MANUAL: 1 % (ref 0–1.5)
BUN SERPL-MCNC: 11 MG/DL (ref 8–23)
BUN/CREAT SERPL: 14.5 (ref 7–25)
CALCIUM SPEC-SCNC: 8.3 MG/DL (ref 8.6–10.5)
CHLORIDE SERPL-SCNC: 109 MMOL/L (ref 98–107)
CO2 SERPL-SCNC: 27 MMOL/L (ref 22–29)
CREAT SERPL-MCNC: 0.76 MG/DL (ref 0.57–1)
DEPRECATED RDW RBC AUTO: 38.8 FL (ref 37–54)
EGFRCR SERPLBLD CKD-EPI 2021: 76.4 ML/MIN/1.73
EOSINOPHIL # BLD MANUAL: 0.16 10*3/MM3 (ref 0–0.4)
EOSINOPHIL NFR BLD MANUAL: 4.2 % (ref 0.3–6.2)
ERYTHROCYTE [DISTWIDTH] IN BLOOD BY AUTOMATED COUNT: 11.7 % (ref 12.3–15.4)
GLUCOSE SERPL-MCNC: 79 MG/DL (ref 65–99)
HCT VFR BLD AUTO: 34.8 % (ref 34–46.6)
HGB BLD-MCNC: 11.9 G/DL (ref 12–15.9)
LYMPHOCYTES # BLD MANUAL: 0.96 10*3/MM3 (ref 0.7–3.1)
LYMPHOCYTES NFR BLD MANUAL: 3.1 % (ref 5–12)
MCH RBC QN AUTO: 31.9 PG (ref 26.6–33)
MCHC RBC AUTO-ENTMCNC: 34.2 G/DL (ref 31.5–35.7)
MCV RBC AUTO: 93.3 FL (ref 79–97)
MONOCYTES # BLD: 0.12 10*3/MM3 (ref 0.1–0.9)
NEUTROPHILS # BLD AUTO: 2.57 10*3/MM3 (ref 1.7–7)
NEUTROPHILS NFR BLD MANUAL: 66.7 % (ref 42.7–76)
PLAT MORPH BLD: NORMAL
PLATELET # BLD AUTO: 238 10*3/MM3 (ref 140–450)
PMV BLD AUTO: 10.3 FL (ref 6–12)
POTASSIUM SERPL-SCNC: 3.6 MMOL/L (ref 3.5–5.2)
RBC # BLD AUTO: 3.73 10*6/MM3 (ref 3.77–5.28)
RBC MORPH BLD: NORMAL
SODIUM SERPL-SCNC: 145 MMOL/L (ref 136–145)
VARIANT LYMPHS NFR BLD MANUAL: 1 % (ref 0–5)
VARIANT LYMPHS NFR BLD MANUAL: 24 % (ref 19.6–45.3)
WBC MORPH BLD: NORMAL
WBC NRBC COR # BLD: 3.85 10*3/MM3 (ref 3.4–10.8)

## 2022-06-23 PROCEDURE — 97165 OT EVAL LOW COMPLEX 30 MIN: CPT

## 2022-06-23 PROCEDURE — 25010000002 CEFAZOLIN IN DEXTROSE 2-4 GM/100ML-% SOLUTION: Performed by: INTERNAL MEDICINE

## 2022-06-23 PROCEDURE — 85025 COMPLETE CBC W/AUTO DIFF WBC: CPT | Performed by: INTERNAL MEDICINE

## 2022-06-23 PROCEDURE — 80048 BASIC METABOLIC PNL TOTAL CA: CPT | Performed by: INTERNAL MEDICINE

## 2022-06-23 PROCEDURE — 85007 BL SMEAR W/DIFF WBC COUNT: CPT | Performed by: INTERNAL MEDICINE

## 2022-06-23 PROCEDURE — 97110 THERAPEUTIC EXERCISES: CPT

## 2022-06-23 PROCEDURE — 97140 MANUAL THERAPY 1/> REGIONS: CPT

## 2022-06-23 RX ADMIN — Medication 10 ML: at 21:23

## 2022-06-23 RX ADMIN — Medication 10 ML: at 08:32

## 2022-06-23 RX ADMIN — CEFAZOLIN SODIUM 2 G: 2 INJECTION, SOLUTION INTRAVENOUS at 02:45

## 2022-06-23 RX ADMIN — TRAMADOL HYDROCHLORIDE 50 MG: 50 TABLET, COATED ORAL at 21:30

## 2022-06-23 RX ADMIN — FAMOTIDINE 20 MG: 20 TABLET ORAL at 08:32

## 2022-06-23 RX ADMIN — LIDOCAINE 1 PATCH: 50 PATCH TOPICAL at 08:31

## 2022-06-23 RX ADMIN — APIXABAN 5 MG: 5 TABLET, FILM COATED ORAL at 08:31

## 2022-06-23 RX ADMIN — Medication 500 MG: at 08:31

## 2022-06-23 RX ADMIN — APIXABAN 5 MG: 5 TABLET, FILM COATED ORAL at 21:23

## 2022-06-23 RX ADMIN — ACETAMINOPHEN 650 MG: 325 TABLET, FILM COATED ORAL at 08:32

## 2022-06-23 RX ADMIN — CEFAZOLIN SODIUM 2 G: 2 INJECTION, SOLUTION INTRAVENOUS at 10:00

## 2022-06-23 RX ADMIN — DOCUSATE SODIUM 50MG AND SENNOSIDES 8.6MG 2 TABLET: 8.6; 5 TABLET, FILM COATED ORAL at 21:23

## 2022-06-23 RX ADMIN — LATANOPROST 1 DROP: 50 SOLUTION OPHTHALMIC at 08:32

## 2022-06-23 RX ADMIN — TRAMADOL HYDROCHLORIDE 50 MG: 50 TABLET, COATED ORAL at 08:32

## 2022-06-23 RX ADMIN — CEFAZOLIN SODIUM 2 G: 2 INJECTION, SOLUTION INTRAVENOUS at 17:00

## 2022-06-23 NOTE — TELEPHONE ENCOUNTER
Please call and give my verbal approval.      LMTCB     Left messsage for [facility] to call back.     **HUB/** CAN RELAY MESSAGE.

## 2022-06-24 LAB
ANION GAP SERPL CALCULATED.3IONS-SCNC: 8.7 MMOL/L (ref 5–15)
BASOPHILS # BLD AUTO: 0.03 10*3/MM3 (ref 0–0.2)
BASOPHILS NFR BLD AUTO: 0.6 % (ref 0–1.5)
BUN SERPL-MCNC: 7 MG/DL (ref 8–23)
BUN/CREAT SERPL: 9.2 (ref 7–25)
CALCIUM SPEC-SCNC: 9.2 MG/DL (ref 8.6–10.5)
CHLORIDE SERPL-SCNC: 107 MMOL/L (ref 98–107)
CO2 SERPL-SCNC: 29.3 MMOL/L (ref 22–29)
CREAT SERPL-MCNC: 0.76 MG/DL (ref 0.57–1)
DEPRECATED RDW RBC AUTO: 39.9 FL (ref 37–54)
EGFRCR SERPLBLD CKD-EPI 2021: 76.4 ML/MIN/1.73
EOSINOPHIL # BLD AUTO: 0.15 10*3/MM3 (ref 0–0.4)
EOSINOPHIL NFR BLD AUTO: 2.9 % (ref 0.3–6.2)
ERYTHROCYTE [DISTWIDTH] IN BLOOD BY AUTOMATED COUNT: 11.7 % (ref 12.3–15.4)
GLUCOSE SERPL-MCNC: 108 MG/DL (ref 65–99)
HCT VFR BLD AUTO: 39.8 % (ref 34–46.6)
HGB BLD-MCNC: 13.4 G/DL (ref 12–15.9)
LYMPHOCYTES # BLD AUTO: 1.1 10*3/MM3 (ref 0.7–3.1)
LYMPHOCYTES NFR BLD AUTO: 21.3 % (ref 19.6–45.3)
MCH RBC QN AUTO: 31.5 PG (ref 26.6–33)
MCHC RBC AUTO-ENTMCNC: 33.7 G/DL (ref 31.5–35.7)
MCV RBC AUTO: 93.4 FL (ref 79–97)
MONOCYTES # BLD AUTO: 0.34 10*3/MM3 (ref 0.1–0.9)
MONOCYTES NFR BLD AUTO: 6.6 % (ref 5–12)
NEUTROPHILS NFR BLD AUTO: 3.52 10*3/MM3 (ref 1.7–7)
NEUTROPHILS NFR BLD AUTO: 68.2 % (ref 42.7–76)
PLATELET # BLD AUTO: 286 10*3/MM3 (ref 140–450)
PMV BLD AUTO: 10.1 FL (ref 6–12)
POTASSIUM SERPL-SCNC: 4 MMOL/L (ref 3.5–5.2)
RBC # BLD AUTO: 4.26 10*6/MM3 (ref 3.77–5.28)
SODIUM SERPL-SCNC: 145 MMOL/L (ref 136–145)
WBC NRBC COR # BLD: 5.16 10*3/MM3 (ref 3.4–10.8)

## 2022-06-24 PROCEDURE — 25010000002 CEFAZOLIN IN DEXTROSE 2-4 GM/100ML-% SOLUTION: Performed by: INTERNAL MEDICINE

## 2022-06-24 PROCEDURE — 85025 COMPLETE CBC W/AUTO DIFF WBC: CPT | Performed by: INTERNAL MEDICINE

## 2022-06-24 PROCEDURE — 80048 BASIC METABOLIC PNL TOTAL CA: CPT | Performed by: INTERNAL MEDICINE

## 2022-06-24 PROCEDURE — 97140 MANUAL THERAPY 1/> REGIONS: CPT

## 2022-06-24 RX ADMIN — Medication 500 MG: at 09:59

## 2022-06-24 RX ADMIN — CEFAZOLIN SODIUM 2 G: 2 INJECTION, SOLUTION INTRAVENOUS at 09:59

## 2022-06-24 RX ADMIN — CEFAZOLIN SODIUM 2 G: 2 INJECTION, SOLUTION INTRAVENOUS at 18:53

## 2022-06-24 RX ADMIN — CEFAZOLIN SODIUM 2 G: 2 INJECTION, SOLUTION INTRAVENOUS at 01:01

## 2022-06-24 RX ADMIN — TRAMADOL HYDROCHLORIDE 50 MG: 50 TABLET, COATED ORAL at 09:59

## 2022-06-24 RX ADMIN — APIXABAN 5 MG: 5 TABLET, FILM COATED ORAL at 21:33

## 2022-06-24 RX ADMIN — FAMOTIDINE 20 MG: 20 TABLET ORAL at 09:59

## 2022-06-24 RX ADMIN — Medication 10 ML: at 10:00

## 2022-06-24 RX ADMIN — Medication 10 ML: at 21:34

## 2022-06-24 RX ADMIN — APIXABAN 5 MG: 5 TABLET, FILM COATED ORAL at 09:59

## 2022-06-24 RX ADMIN — TRAMADOL HYDROCHLORIDE 50 MG: 50 TABLET, COATED ORAL at 21:33

## 2022-06-24 RX ADMIN — LIDOCAINE 1 PATCH: 50 PATCH TOPICAL at 10:00

## 2022-06-24 RX ADMIN — LATANOPROST 1 DROP: 50 SOLUTION OPHTHALMIC at 10:01

## 2022-06-24 RX ADMIN — DOCUSATE SODIUM 50MG AND SENNOSIDES 8.6MG 2 TABLET: 8.6; 5 TABLET, FILM COATED ORAL at 09:59

## 2022-06-25 LAB
ANION GAP SERPL CALCULATED.3IONS-SCNC: 8 MMOL/L (ref 5–15)
BASOPHILS # BLD AUTO: 0.03 10*3/MM3 (ref 0–0.2)
BASOPHILS NFR BLD AUTO: 0.6 % (ref 0–1.5)
BUN SERPL-MCNC: 8 MG/DL (ref 8–23)
BUN/CREAT SERPL: 10.1 (ref 7–25)
CALCIUM SPEC-SCNC: 8.4 MG/DL (ref 8.6–10.5)
CHLORIDE SERPL-SCNC: 107 MMOL/L (ref 98–107)
CO2 SERPL-SCNC: 28 MMOL/L (ref 22–29)
CREAT SERPL-MCNC: 0.79 MG/DL (ref 0.57–1)
DEPRECATED RDW RBC AUTO: 40.8 FL (ref 37–54)
EGFRCR SERPLBLD CKD-EPI 2021: 73 ML/MIN/1.73
EOSINOPHIL # BLD AUTO: 0.15 10*3/MM3 (ref 0–0.4)
EOSINOPHIL NFR BLD AUTO: 3 % (ref 0.3–6.2)
ERYTHROCYTE [DISTWIDTH] IN BLOOD BY AUTOMATED COUNT: 11.8 % (ref 12.3–15.4)
GLUCOSE SERPL-MCNC: 74 MG/DL (ref 65–99)
HCT VFR BLD AUTO: 38.7 % (ref 34–46.6)
HGB BLD-MCNC: 12.7 G/DL (ref 12–15.9)
LYMPHOCYTES # BLD AUTO: 1.3 10*3/MM3 (ref 0.7–3.1)
LYMPHOCYTES NFR BLD AUTO: 26.4 % (ref 19.6–45.3)
MCH RBC QN AUTO: 31.1 PG (ref 26.6–33)
MCHC RBC AUTO-ENTMCNC: 32.8 G/DL (ref 31.5–35.7)
MCV RBC AUTO: 94.9 FL (ref 79–97)
MONOCYTES # BLD AUTO: 0.57 10*3/MM3 (ref 0.1–0.9)
MONOCYTES NFR BLD AUTO: 11.6 % (ref 5–12)
NEUTROPHILS NFR BLD AUTO: 2.86 10*3/MM3 (ref 1.7–7)
NEUTROPHILS NFR BLD AUTO: 58 % (ref 42.7–76)
PLATELET # BLD AUTO: 323 10*3/MM3 (ref 140–450)
PMV BLD AUTO: 9.9 FL (ref 6–12)
POTASSIUM SERPL-SCNC: 3.4 MMOL/L (ref 3.5–5.2)
RBC # BLD AUTO: 4.08 10*6/MM3 (ref 3.77–5.28)
SODIUM SERPL-SCNC: 143 MMOL/L (ref 136–145)
WBC NRBC COR # BLD: 4.93 10*3/MM3 (ref 3.4–10.8)

## 2022-06-25 PROCEDURE — 85007 BL SMEAR W/DIFF WBC COUNT: CPT | Performed by: INTERNAL MEDICINE

## 2022-06-25 PROCEDURE — 25010000002 CEFAZOLIN IN DEXTROSE 2-4 GM/100ML-% SOLUTION: Performed by: INTERNAL MEDICINE

## 2022-06-25 PROCEDURE — 85025 COMPLETE CBC W/AUTO DIFF WBC: CPT | Performed by: INTERNAL MEDICINE

## 2022-06-25 PROCEDURE — 80048 BASIC METABOLIC PNL TOTAL CA: CPT | Performed by: INTERNAL MEDICINE

## 2022-06-25 RX ADMIN — TRAMADOL HYDROCHLORIDE 50 MG: 50 TABLET, COATED ORAL at 14:35

## 2022-06-25 RX ADMIN — CEFAZOLIN SODIUM 2 G: 2 INJECTION, SOLUTION INTRAVENOUS at 18:14

## 2022-06-25 RX ADMIN — Medication 500 MG: at 09:54

## 2022-06-25 RX ADMIN — FAMOTIDINE 20 MG: 20 TABLET ORAL at 09:54

## 2022-06-25 RX ADMIN — Medication 10 ML: at 09:54

## 2022-06-25 RX ADMIN — LIDOCAINE 1 PATCH: 50 PATCH TOPICAL at 09:53

## 2022-06-25 RX ADMIN — APIXABAN 5 MG: 5 TABLET, FILM COATED ORAL at 09:54

## 2022-06-25 RX ADMIN — Medication 10 ML: at 20:27

## 2022-06-25 RX ADMIN — DOCUSATE SODIUM 50MG AND SENNOSIDES 8.6MG 2 TABLET: 8.6; 5 TABLET, FILM COATED ORAL at 20:26

## 2022-06-25 RX ADMIN — CEFAZOLIN SODIUM 2 G: 2 INJECTION, SOLUTION INTRAVENOUS at 09:55

## 2022-06-25 RX ADMIN — APIXABAN 5 MG: 5 TABLET, FILM COATED ORAL at 20:26

## 2022-06-25 RX ADMIN — LATANOPROST 1 DROP: 50 SOLUTION OPHTHALMIC at 09:53

## 2022-06-25 RX ADMIN — CEFAZOLIN SODIUM 2 G: 2 INJECTION, SOLUTION INTRAVENOUS at 02:51

## 2022-06-26 LAB
ANION GAP SERPL CALCULATED.3IONS-SCNC: 9 MMOL/L (ref 5–15)
BASOPHILS # BLD AUTO: 0.03 10*3/MM3 (ref 0–0.2)
BASOPHILS NFR BLD AUTO: 0.6 % (ref 0–1.5)
BUN SERPL-MCNC: 10 MG/DL (ref 8–23)
BUN/CREAT SERPL: 13.5 (ref 7–25)
CALCIUM SPEC-SCNC: 8.6 MG/DL (ref 8.6–10.5)
CHLORIDE SERPL-SCNC: 106 MMOL/L (ref 98–107)
CO2 SERPL-SCNC: 26 MMOL/L (ref 22–29)
CREAT SERPL-MCNC: 0.74 MG/DL (ref 0.57–1)
DEPRECATED RDW RBC AUTO: 40.7 FL (ref 37–54)
EGFRCR SERPLBLD CKD-EPI 2021: 78.9 ML/MIN/1.73
EOSINOPHIL # BLD AUTO: 0.09 10*3/MM3 (ref 0–0.4)
EOSINOPHIL NFR BLD AUTO: 1.8 % (ref 0.3–6.2)
ERYTHROCYTE [DISTWIDTH] IN BLOOD BY AUTOMATED COUNT: 11.8 % (ref 12.3–15.4)
GLUCOSE SERPL-MCNC: 86 MG/DL (ref 65–99)
HCT VFR BLD AUTO: 37.6 % (ref 34–46.6)
HGB BLD-MCNC: 12.1 G/DL (ref 12–15.9)
IMM GRANULOCYTES # BLD AUTO: 0.02 10*3/MM3 (ref 0–0.05)
IMM GRANULOCYTES NFR BLD AUTO: 0.4 % (ref 0–0.5)
LYMPHOCYTES # BLD AUTO: 1.24 10*3/MM3 (ref 0.7–3.1)
LYMPHOCYTES NFR BLD AUTO: 24.8 % (ref 19.6–45.3)
MCH RBC QN AUTO: 30.4 PG (ref 26.6–33)
MCHC RBC AUTO-ENTMCNC: 32.2 G/DL (ref 31.5–35.7)
MCV RBC AUTO: 94.5 FL (ref 79–97)
MONOCYTES # BLD AUTO: 0.57 10*3/MM3 (ref 0.1–0.9)
MONOCYTES NFR BLD AUTO: 11.4 % (ref 5–12)
NEUTROPHILS NFR BLD AUTO: 3.05 10*3/MM3 (ref 1.7–7)
NEUTROPHILS NFR BLD AUTO: 61 % (ref 42.7–76)
NRBC BLD AUTO-RTO: 0 /100 WBC (ref 0–0.2)
PLATELET # BLD AUTO: 341 10*3/MM3 (ref 140–450)
PMV BLD AUTO: 9.5 FL (ref 6–12)
POTASSIUM SERPL-SCNC: 3.4 MMOL/L (ref 3.5–5.2)
RBC # BLD AUTO: 3.98 10*6/MM3 (ref 3.77–5.28)
SODIUM SERPL-SCNC: 141 MMOL/L (ref 136–145)
WBC NRBC COR # BLD: 5 10*3/MM3 (ref 3.4–10.8)

## 2022-06-26 PROCEDURE — 80048 BASIC METABOLIC PNL TOTAL CA: CPT | Performed by: INTERNAL MEDICINE

## 2022-06-26 PROCEDURE — 85025 COMPLETE CBC W/AUTO DIFF WBC: CPT | Performed by: INTERNAL MEDICINE

## 2022-06-26 PROCEDURE — 97116 GAIT TRAINING THERAPY: CPT

## 2022-06-26 PROCEDURE — 25010000002 CEFAZOLIN IN DEXTROSE 2-4 GM/100ML-% SOLUTION: Performed by: INTERNAL MEDICINE

## 2022-06-26 RX ORDER — CEPHALEXIN 500 MG/1
500 CAPSULE ORAL EVERY 6 HOURS SCHEDULED
Status: COMPLETED | OUTPATIENT
Start: 2022-06-26 | End: 2022-06-29

## 2022-06-26 RX ORDER — POTASSIUM CHLORIDE 7.45 MG/ML
10 INJECTION INTRAVENOUS
Status: DISCONTINUED | OUTPATIENT
Start: 2022-06-26 | End: 2022-06-29 | Stop reason: HOSPADM

## 2022-06-26 RX ORDER — POTASSIUM CHLORIDE 1.5 G/1.77G
40 POWDER, FOR SOLUTION ORAL AS NEEDED
Status: DISCONTINUED | OUTPATIENT
Start: 2022-06-26 | End: 2022-06-29 | Stop reason: HOSPADM

## 2022-06-26 RX ORDER — POTASSIUM CHLORIDE 750 MG/1
40 TABLET, FILM COATED, EXTENDED RELEASE ORAL AS NEEDED
Status: DISCONTINUED | OUTPATIENT
Start: 2022-06-26 | End: 2022-06-29 | Stop reason: HOSPADM

## 2022-06-26 RX ADMIN — FAMOTIDINE 20 MG: 20 TABLET ORAL at 09:48

## 2022-06-26 RX ADMIN — Medication 10 ML: at 09:48

## 2022-06-26 RX ADMIN — Medication 5 MG: at 23:56

## 2022-06-26 RX ADMIN — Medication 500 MG: at 09:48

## 2022-06-26 RX ADMIN — CEFAZOLIN SODIUM 2 G: 2 INJECTION, SOLUTION INTRAVENOUS at 02:17

## 2022-06-26 RX ADMIN — DOCUSATE SODIUM 50MG AND SENNOSIDES 8.6MG 2 TABLET: 8.6; 5 TABLET, FILM COATED ORAL at 09:48

## 2022-06-26 RX ADMIN — Medication 10 ML: at 20:54

## 2022-06-26 RX ADMIN — ACETAMINOPHEN 650 MG: 325 TABLET, FILM COATED ORAL at 03:41

## 2022-06-26 RX ADMIN — CEFAZOLIN SODIUM 2 G: 2 INJECTION, SOLUTION INTRAVENOUS at 09:48

## 2022-06-26 RX ADMIN — POTASSIUM CHLORIDE 40 MEQ: 10 TABLET, EXTENDED RELEASE ORAL at 16:52

## 2022-06-26 RX ADMIN — DOCUSATE SODIUM 50MG AND SENNOSIDES 8.6MG 2 TABLET: 8.6; 5 TABLET, FILM COATED ORAL at 20:56

## 2022-06-26 RX ADMIN — POTASSIUM CHLORIDE 40 MEQ: 10 TABLET, EXTENDED RELEASE ORAL at 10:58

## 2022-06-26 RX ADMIN — APIXABAN 5 MG: 5 TABLET, FILM COATED ORAL at 09:48

## 2022-06-26 RX ADMIN — APIXABAN 5 MG: 5 TABLET, FILM COATED ORAL at 20:54

## 2022-06-26 RX ADMIN — CEPHALEXIN 500 MG: 500 CAPSULE ORAL at 23:55

## 2022-06-26 RX ADMIN — CEPHALEXIN 500 MG: 500 CAPSULE ORAL at 20:53

## 2022-06-26 RX ADMIN — LIDOCAINE 1 PATCH: 50 PATCH TOPICAL at 09:48

## 2022-06-26 RX ADMIN — LATANOPROST 1 DROP: 50 SOLUTION OPHTHALMIC at 09:48

## 2022-06-27 ENCOUNTER — TRANSCRIBE ORDERS (OUTPATIENT)
Dept: HOME HEALTH SERVICES | Facility: HOME HEALTHCARE | Age: 87
End: 2022-06-27

## 2022-06-27 ENCOUNTER — APPOINTMENT (OUTPATIENT)
Dept: GENERAL RADIOLOGY | Facility: HOSPITAL | Age: 87
End: 2022-06-27

## 2022-06-27 DIAGNOSIS — I82.5Y3 CHRONIC DEEP VEIN THROMBOSIS (DVT) OF PROXIMAL VEIN OF BOTH LOWER EXTREMITIES: Primary | ICD-10-CM

## 2022-06-27 PROBLEM — M25.552 LEFT HIP PAIN: Status: ACTIVE | Noted: 2022-06-27

## 2022-06-27 LAB — POTASSIUM SERPL-SCNC: 4.2 MMOL/L (ref 3.5–5.2)

## 2022-06-27 PROCEDURE — 97140 MANUAL THERAPY 1/> REGIONS: CPT

## 2022-06-27 PROCEDURE — 84132 ASSAY OF SERUM POTASSIUM: CPT | Performed by: HOSPITALIST

## 2022-06-27 PROCEDURE — 73502 X-RAY EXAM HIP UNI 2-3 VIEWS: CPT

## 2022-06-27 RX ORDER — HYDROCODONE BITARTRATE AND ACETAMINOPHEN 5; 325 MG/1; MG/1
1 TABLET ORAL EVERY 6 HOURS PRN
Status: DISCONTINUED | OUTPATIENT
Start: 2022-06-27 | End: 2022-06-29 | Stop reason: HOSPADM

## 2022-06-27 RX ADMIN — HYDROCODONE BITARTRATE AND ACETAMINOPHEN 1 TABLET: 5; 325 TABLET ORAL at 12:10

## 2022-06-27 RX ADMIN — FAMOTIDINE 20 MG: 20 TABLET ORAL at 08:09

## 2022-06-27 RX ADMIN — TRAMADOL HYDROCHLORIDE 50 MG: 50 TABLET, COATED ORAL at 08:15

## 2022-06-27 RX ADMIN — Medication 500 MG: at 08:09

## 2022-06-27 RX ADMIN — CEPHALEXIN 500 MG: 500 CAPSULE ORAL at 05:57

## 2022-06-27 RX ADMIN — LATANOPROST 1 DROP: 50 SOLUTION OPHTHALMIC at 08:09

## 2022-06-27 RX ADMIN — CEPHALEXIN 500 MG: 500 CAPSULE ORAL at 18:16

## 2022-06-27 RX ADMIN — APIXABAN 5 MG: 5 TABLET, FILM COATED ORAL at 22:23

## 2022-06-27 RX ADMIN — HYDROCODONE BITARTRATE AND ACETAMINOPHEN 1 TABLET: 5; 325 TABLET ORAL at 18:18

## 2022-06-27 RX ADMIN — APIXABAN 5 MG: 5 TABLET, FILM COATED ORAL at 08:09

## 2022-06-27 RX ADMIN — Medication 10 ML: at 08:09

## 2022-06-27 RX ADMIN — Medication 10 ML: at 22:23

## 2022-06-27 RX ADMIN — CEPHALEXIN 500 MG: 500 CAPSULE ORAL at 12:10

## 2022-06-28 PROCEDURE — 97140 MANUAL THERAPY 1/> REGIONS: CPT

## 2022-06-28 PROCEDURE — 97110 THERAPEUTIC EXERCISES: CPT

## 2022-06-28 RX ADMIN — Medication 10 ML: at 21:21

## 2022-06-28 RX ADMIN — APIXABAN 5 MG: 5 TABLET, FILM COATED ORAL at 21:21

## 2022-06-28 RX ADMIN — LATANOPROST 1 DROP: 50 SOLUTION OPHTHALMIC at 08:55

## 2022-06-28 RX ADMIN — HYDROCODONE BITARTRATE AND ACETAMINOPHEN 1 TABLET: 5; 325 TABLET ORAL at 09:33

## 2022-06-28 RX ADMIN — HYDROCODONE BITARTRATE AND ACETAMINOPHEN 1 TABLET: 5; 325 TABLET ORAL at 00:22

## 2022-06-28 RX ADMIN — CEPHALEXIN 500 MG: 500 CAPSULE ORAL at 06:54

## 2022-06-28 RX ADMIN — Medication 10 ML: at 08:55

## 2022-06-28 RX ADMIN — CEPHALEXIN 500 MG: 500 CAPSULE ORAL at 12:19

## 2022-06-28 RX ADMIN — CEPHALEXIN 500 MG: 500 CAPSULE ORAL at 18:18

## 2022-06-28 RX ADMIN — CEPHALEXIN 500 MG: 500 CAPSULE ORAL at 00:18

## 2022-06-28 RX ADMIN — Medication 500 MG: at 08:55

## 2022-06-28 RX ADMIN — APIXABAN 5 MG: 5 TABLET, FILM COATED ORAL at 08:55

## 2022-06-28 RX ADMIN — FAMOTIDINE 20 MG: 20 TABLET ORAL at 08:55

## 2022-06-28 RX ADMIN — HYDROCODONE BITARTRATE AND ACETAMINOPHEN 1 TABLET: 5; 325 TABLET ORAL at 18:18

## 2022-06-29 ENCOUNTER — READMISSION MANAGEMENT (OUTPATIENT)
Dept: CALL CENTER | Facility: HOSPITAL | Age: 87
End: 2022-06-29

## 2022-06-29 ENCOUNTER — HOME HEALTH ADMISSION (OUTPATIENT)
Dept: HOME HEALTH SERVICES | Facility: HOME HEALTHCARE | Age: 87
End: 2022-06-29

## 2022-06-29 ENCOUNTER — APPOINTMENT (OUTPATIENT)
Dept: CT IMAGING | Facility: HOSPITAL | Age: 87
End: 2022-06-29

## 2022-06-29 VITALS
HEIGHT: 64 IN | TEMPERATURE: 97.7 F | OXYGEN SATURATION: 92 % | SYSTOLIC BLOOD PRESSURE: 136 MMHG | DIASTOLIC BLOOD PRESSURE: 67 MMHG | RESPIRATION RATE: 16 BRPM | BODY MASS INDEX: 28.3 KG/M2 | HEART RATE: 65 BPM | WEIGHT: 165.79 LBS

## 2022-06-29 PROCEDURE — 97140 MANUAL THERAPY 1/> REGIONS: CPT

## 2022-06-29 PROCEDURE — 73700 CT LOWER EXTREMITY W/O DYE: CPT

## 2022-06-29 RX ORDER — CEPHALEXIN 500 MG/1
500 CAPSULE ORAL EVERY 6 HOURS SCHEDULED
Qty: 6 CAPSULE | Refills: 0 | Status: SHIPPED | OUTPATIENT
Start: 2022-06-29 | End: 2022-07-01

## 2022-06-29 RX ADMIN — CEPHALEXIN 500 MG: 500 CAPSULE ORAL at 06:41

## 2022-06-29 RX ADMIN — APIXABAN 5 MG: 5 TABLET, FILM COATED ORAL at 10:00

## 2022-06-29 RX ADMIN — HYDROCODONE BITARTRATE AND ACETAMINOPHEN 1 TABLET: 5; 325 TABLET ORAL at 10:00

## 2022-06-29 RX ADMIN — FAMOTIDINE 20 MG: 20 TABLET ORAL at 10:00

## 2022-06-29 RX ADMIN — Medication 500 MG: at 10:00

## 2022-06-29 RX ADMIN — CEPHALEXIN 500 MG: 500 CAPSULE ORAL at 00:20

## 2022-06-29 RX ADMIN — LATANOPROST 1 DROP: 50 SOLUTION OPHTHALMIC at 10:05

## 2022-06-29 RX ADMIN — CEPHALEXIN 500 MG: 500 CAPSULE ORAL at 13:56

## 2022-06-29 RX ADMIN — LIDOCAINE 1 PATCH: 50 PATCH TOPICAL at 10:00

## 2022-06-29 NOTE — OUTREACH NOTE
Prep Survey    Flowsheet Row Responses   [facility] facility patient discharged from? [place]   Is LACE score < 7 ? No   Emergency Room discharge w/ pulse ox? No   Eligibility [facility]   Date of Admission 06/18/22   Date of Discharge 06/29/22   Discharge Disposition Home or Self Care   Discharge diagnosis Cellulitis of left lower extremity Acute kidney failure    Does the patient have one of the following disease processes/diagnoses(primary or secondary)? Other   Does the patient have Home health ordered? Yes   What is the Home health agency?  [facility]   Is there a DME ordered? No   Prep survey completed? Yes          CARLO GONZALEZ - Registered Nurse

## 2022-06-30 ENCOUNTER — TRANSITIONAL CARE MANAGEMENT TELEPHONE ENCOUNTER (OUTPATIENT)
Dept: CALL CENTER | Facility: HOSPITAL | Age: 87
End: 2022-06-30

## 2022-06-30 ENCOUNTER — HOME CARE VISIT (OUTPATIENT)
Dept: HOME HEALTH SERVICES | Facility: HOME HEALTHCARE | Age: 87
End: 2022-06-30

## 2022-06-30 ENCOUNTER — TELEPHONE (OUTPATIENT)
Dept: FAMILY MEDICINE CLINIC | Facility: CLINIC | Age: 87
End: 2022-06-30

## 2022-06-30 VITALS
WEIGHT: 170 LBS | SYSTOLIC BLOOD PRESSURE: 152 MMHG | DIASTOLIC BLOOD PRESSURE: 76 MMHG | HEIGHT: 64 IN | RESPIRATION RATE: 20 BRPM | BODY MASS INDEX: 29.02 KG/M2

## 2022-06-30 PROCEDURE — G0299 HHS/HOSPICE OF RN EA 15 MIN: HCPCS

## 2022-06-30 NOTE — TELEPHONE ENCOUNTER
That would be up to [place] once she evaluates the patient to determine if additional antibiotic is necessary.

## 2022-06-30 NOTE — OUTREACH NOTE
Call Center TCM Note    Flowsheet Row Responses   [facility] patient discharged from? [place]   Does the patient have one of the following disease processes/diagnoses(primary or secondary)? Other   TCM attempt successful? Yes   Call start time [phone]   Call end time [phone]   Discharge diagnosis Cellulitis of left lower extremity Acute kidney failure    Person spoke with today (if not patient) and relationship Patient   Meds reviewed with patient/caregiver? Yes   Is the patient having any side effects they believe may be caused by any medication additions or changes? No   Does the patient have all medications ordered at discharge? Yes   Is the patient taking all medications as directed (includes completed medication regime)? Yes   Does the patient have a primary care provider?  Yes   Does the patient have an appointment with their PCP within 7 days of discharge? No   Nursing Interventions Educated patient on importance of making appointment, Advised patient to make appointment   Has the patient kept scheduled appointments due by today? N/A   What is the Home health agency?  [facility]   Has home health visited the patient within 72 hours of discharge? Call prior to 72 hours   Psychosocial issues? No   Did the patient receive a copy of their discharge instructions? Yes   Nursing interventions Reviewed instructions with patient   What is the patient's perception of their health status since discharge? Improving   Is the patient/caregiver able to teach back signs and symptoms related to disease process for when to call PCP? Yes   Is the patient/caregiver able to teach back signs and symptoms related to disease process for when to call 911? Yes   Is the patient/caregiver able to teach back the hierarchy of who to call/visit for symptoms/problems? PCP, Specialist, Home health nurse, Urgent Care, ED, 911 Yes   If the patient is a current smoker, are they able to teach back resources for cessation? [id number]   TCM call  completed? Yes   Wrap up additional comments Patient states left leg is doing better. Paul, son states pt was confused yesterday possibly r/t fever, but pt does not have a fever today. Son advised to call PCP office to make a PCP [facility] fu appt for asap for pt, and to report pt having fever/confusion on 6/29/22.           Eri Aly RN    6/30/2022, 08:57 EDT

## 2022-06-30 NOTE — HOME HEALTH
Patient referred to [facility] following hospitalization from 6/18-6/29 for cellulitis of LLE; lymphedema; acute kidney failure; osteoarthritis; DDD. She is staying with her son and daughter in law for assist due to weakness and needs for caregiver for leg wrapping. She's up with a walker and assist. On SOC her referral orders were to teach caregiver to wrap legs, however there were no orders specific to leg wrapping and frequency. Unable to find any orders in [facility] so I called Gustavo, manager, and he was also unable to find orders. I called patient;s PCP to make aware of situation and request orders, no return call received at end of day. It appeared to me that patient has on 4-layer compression wraps, which I told PCP office when requesting orders. As well reported that last night after patient came home she ran a 101.2 fever, had cold chills and got very confused and weak causing a fall in her bathroom. After waking up this morning patient was back to normal and has continued to be wnl today. She is on a regular diet and her antibiotic, Keflex, is her only new med. Back pain 0-10/10 relieved by rest and positioning. SN FOC: teaching and monitoring/management r/t cellulitis of LLE.

## 2022-06-30 NOTE — TELEPHONE ENCOUNTER
Patient is scheduled with adriel on 07/07/22    Patients son wants to know should patient be on antibiotic longer thant what was given to her and if yes send to [facility]

## 2022-06-30 NOTE — TELEPHONE ENCOUNTER
Patient was in [facility] for cellulitis and discharged 6-29-22. Paul (son) says when she got home she was very weak and ran a fever of just under 102 last night. Also says she has little or no memory of the day. She was prescribed Cephalexin 500mg 3x daily and only has 3 pills left. She needs hospital follow up within 1 week and no appointments are available. Please call [phone]

## 2022-07-01 ENCOUNTER — TELEPHONE (OUTPATIENT)
Dept: FAMILY MEDICINE CLINIC | Facility: CLINIC | Age: 87
End: 2022-07-01

## 2022-07-01 NOTE — TELEPHONE ENCOUNTER
Caller: LeidyPaul     Relationship: [unfilled]     Best call back number:[phone]    What is your medical concern? PATIENT'S SON IS WORRIED AS PATIENT FINISHED ANTIBIOTICS AT 1 AM THIS MORNING. PATIENT HAD FEVER (JUST UNDER 102) ON Wednesday, PATIENT DOES NOT REMEMBER ANYTHING FROM THAT DAY EXCEPT THAT SHE WAS RELEASED FROM THE HOSPITAL. PATIENT'S FEVER WENT AWAY WITH TYLENOL.    How long has this issue been going on? SEE ABOVE     Is your provider already aware of this issue? YES    Have you been treated for this issue? YES, PATIENT'S SON IS STILL WORRIED ABOUT HER NOT BEING GIVEN ANTIBIOTICS FOR BETWEEN NOW AND NEXT Thursday WHEN SHE HAS HER HOSPITAL FOLLOW UP.

## 2022-07-01 NOTE — TELEPHONE ENCOUNTER
Unfortunately we cannot simply extend antibiotics without evaluation first. If she has remained fever free since Wednesday that is reassuring that patient can wait until visit with Hemanth. If she is worsening, then she will need to go back to hospital.

## 2022-07-02 ENCOUNTER — LAB REQUISITION (OUTPATIENT)
Dept: LAB | Facility: HOSPITAL | Age: 87
End: 2022-07-02

## 2022-07-02 ENCOUNTER — HOME CARE VISIT (OUTPATIENT)
Dept: HOME HEALTH SERVICES | Facility: HOME HEALTHCARE | Age: 87
End: 2022-07-02

## 2022-07-02 DIAGNOSIS — Z00.00 ENCOUNTER FOR GENERAL ADULT MEDICAL EXAMINATION WITHOUT ABNORMAL FINDINGS: ICD-10-CM

## 2022-07-02 LAB
BACTERIA UR QL AUTO: ABNORMAL /HPF
BILIRUB UR QL STRIP: NEGATIVE
CLARITY UR: CLEAR
COLOR UR: ABNORMAL
DEPRECATED RDW RBC AUTO: 40.1 FL (ref 37–54)
ERYTHROCYTE [DISTWIDTH] IN BLOOD BY AUTOMATED COUNT: 12 % (ref 12.3–15.4)
GLUCOSE UR STRIP-MCNC: NEGATIVE MG/DL
HCT VFR BLD AUTO: 36.5 % (ref 34–46.6)
HGB BLD-MCNC: 12.2 G/DL (ref 12–15.9)
HGB UR QL STRIP.AUTO: NEGATIVE
HYALINE CASTS UR QL AUTO: ABNORMAL /LPF
KETONES UR QL STRIP: ABNORMAL
LEUKOCYTE ESTERASE UR QL STRIP.AUTO: ABNORMAL
MCH RBC QN AUTO: 31.2 PG (ref 26.6–33)
MCHC RBC AUTO-ENTMCNC: 33.4 G/DL (ref 31.5–35.7)
MCV RBC AUTO: 93.4 FL (ref 79–97)
NITRITE UR QL STRIP: NEGATIVE
PH UR STRIP.AUTO: 6.5 [PH] (ref 5–8)
PLATELET # BLD AUTO: 351 10*3/MM3 (ref 140–450)
PMV BLD AUTO: 10 FL (ref 6–12)
PROT UR QL STRIP: ABNORMAL
RBC # BLD AUTO: 3.91 10*6/MM3 (ref 3.77–5.28)
RBC # UR STRIP: ABNORMAL /HPF
REF LAB TEST METHOD: ABNORMAL
SP GR UR STRIP: >=1.03 (ref 1–1.03)
SQUAMOUS #/AREA URNS HPF: ABNORMAL /HPF
UROBILINOGEN UR QL STRIP: ABNORMAL
WBC # UR STRIP: ABNORMAL /HPF
WBC NRBC COR # BLD: 3.87 10*3/MM3 (ref 3.4–10.8)

## 2022-07-02 PROCEDURE — G0299 HHS/HOSPICE OF RN EA 15 MIN: HCPCS

## 2022-07-02 PROCEDURE — 81001 URINALYSIS AUTO W/SCOPE: CPT | Performed by: NURSE PRACTITIONER

## 2022-07-02 PROCEDURE — 87086 URINE CULTURE/COLONY COUNT: CPT | Performed by: NURSE PRACTITIONER

## 2022-07-02 PROCEDURE — 85027 COMPLETE CBC AUTOMATED: CPT | Performed by: NURSE PRACTITIONER

## 2022-07-03 ENCOUNTER — HOME CARE VISIT (OUTPATIENT)
Dept: HOME HEALTH SERVICES | Facility: HOME HEALTHCARE | Age: 87
End: 2022-07-03

## 2022-07-03 VITALS
HEART RATE: 68 BPM | RESPIRATION RATE: 16 BRPM | SYSTOLIC BLOOD PRESSURE: 120 MMHG | DIASTOLIC BLOOD PRESSURE: 60 MMHG | TEMPERATURE: 97.2 F | OXYGEN SATURATION: 93 %

## 2022-07-03 VITALS
HEART RATE: 76 BPM | SYSTOLIC BLOOD PRESSURE: 104 MMHG | TEMPERATURE: 97.5 F | OXYGEN SATURATION: 94 % | DIASTOLIC BLOOD PRESSURE: 62 MMHG | RESPIRATION RATE: 20 BRPM

## 2022-07-03 PROCEDURE — G0151 HHCP-SERV OF PT,EA 15 MIN: HCPCS

## 2022-07-03 NOTE — HOME HEALTH
Recent hospital stay secondary to L LE cellulitis.  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   Pt is currently staying at son and daughter in laws but hopefully go home for a little bit then transition to [facility].  Family reports she has had at least 3 falls this year.    PLOF: amb with rollator, driving, living alone, I with ADL's. Pt reports she can't walk or stand for greater than 2 minutes before back pain starts (chronic).    Pt will benefit from skilled PT for HEP to increase ease of functional mobility and decrease her risk for falls.  Son reports she was requiring MOD a to transfer when she initially returned to their home after hospital stay, but gradually improving.  She is currently CGA from the recliner.  Plan for next visit: review and progress HEP, safety with transfers, gait with walker, and fall prevention education.

## 2022-07-04 ENCOUNTER — HOME CARE VISIT (OUTPATIENT)
Dept: HOME HEALTH SERVICES | Facility: HOME HEALTHCARE | Age: 87
End: 2022-07-04

## 2022-07-04 PROCEDURE — G0300 HHS/HOSPICE OF LPN EA 15 MIN: HCPCS

## 2022-07-05 ENCOUNTER — TELEPHONE (OUTPATIENT)
Dept: FAMILY MEDICINE CLINIC | Facility: CLINIC | Age: 87
End: 2022-07-05

## 2022-07-05 ENCOUNTER — HOME CARE VISIT (OUTPATIENT)
Dept: HOME HEALTH SERVICES | Facility: HOME HEALTHCARE | Age: 87
End: 2022-07-05

## 2022-07-05 VITALS
DIASTOLIC BLOOD PRESSURE: 74 MMHG | RESPIRATION RATE: 18 BRPM | TEMPERATURE: 97.2 F | SYSTOLIC BLOOD PRESSURE: 128 MMHG | OXYGEN SATURATION: 95 % | HEART RATE: 79 BPM

## 2022-07-05 LAB — BACTERIA SPEC AEROBE CULT: NO GROWTH

## 2022-07-05 NOTE — HOME HEALTH
Patient has a 4 layer compression wrap to bilateral lower legs.  No open areas are present.  Patient residing with her son and daughter in law at this time.  Patient is keeping legs elevated.  TAKE MORE COMPRESSION WRAPS ON NEXT VISIT,

## 2022-07-05 NOTE — HOME HEALTH
SN to patient's home for routine visit.  Called PCP APRn and received orders for 4-layer compression wraps to BLE twice weekly and U/A C&S if indicated and CBC r/t second night of confusion reported by patient with no fever this time.  She reports that she has had strange strong urges to urinate since returning home.  4layer compression wraps applied and 2 left in home until supplies arrive.  Noted that wound has healed to left calf.  Patient tolerated well.  Labs transported to hospital.

## 2022-07-05 NOTE — TELEPHONE ENCOUNTER
LEFT MESSAGE FOR SON TO CALL BACK        **HUB/** MAY RELAY MESSAGE      Patient was informed, just waiting for son to call back to give info.      ----- Message from TERRELL Miller sent at 7/5/2022  7:49 AM EDT -----  Please inform patient and son that urinalysis does not appear infectious but will wait on urine culture results.  CBC is reassuring with normal white blood cell count.  No further antibiotics at this time.  Please wait to see Meaghen this week.

## 2022-07-05 NOTE — TELEPHONE ENCOUNTER
LMTCB      **HUB/** MAY RELAY MESSAGE        ----- Message from TERRELL Miller sent at 7/5/2022  1:57 PM EDT -----  Please inform patient and son that urine culture is negative for infection.

## 2022-07-06 ENCOUNTER — READMISSION MANAGEMENT (OUTPATIENT)
Dept: CALL CENTER | Facility: HOSPITAL | Age: 87
End: 2022-07-06

## 2022-07-06 NOTE — OUTREACH NOTE
Medical Week 2 Survey    Flowsheet Row Responses   [facility] patient discharged from? [facility]   Does the patient have one of the following disease processes/diagnoses(primary or secondary)? Other   Week 2 attempt successful? No   Unsuccessful attempts Attempt 1   Discharge diagnosis Cellulitis of left lower extremity Acute kidney failure           GABBY SAMUEL - Registered Nurse

## 2022-07-07 ENCOUNTER — HOME CARE VISIT (OUTPATIENT)
Dept: HOME HEALTH SERVICES | Facility: HOME HEALTHCARE | Age: 87
End: 2022-07-07

## 2022-07-07 ENCOUNTER — OFFICE VISIT (OUTPATIENT)
Dept: FAMILY MEDICINE CLINIC | Facility: CLINIC | Age: 87
End: 2022-07-07

## 2022-07-07 VITALS
SYSTOLIC BLOOD PRESSURE: 122 MMHG | HEART RATE: 92 BPM | DIASTOLIC BLOOD PRESSURE: 60 MMHG | OXYGEN SATURATION: 98 % | TEMPERATURE: 96.9 F

## 2022-07-07 VITALS
HEIGHT: 64 IN | TEMPERATURE: 98 F | DIASTOLIC BLOOD PRESSURE: 84 MMHG | BODY MASS INDEX: 27.62 KG/M2 | WEIGHT: 161.8 LBS | OXYGEN SATURATION: 96 % | SYSTOLIC BLOOD PRESSURE: 118 MMHG | HEART RATE: 82 BPM

## 2022-07-07 DIAGNOSIS — Z09 HOSPITAL DISCHARGE FOLLOW-UP: Primary | ICD-10-CM

## 2022-07-07 DIAGNOSIS — M85.80 OSTEOPENIA, UNSPECIFIED LOCATION: ICD-10-CM

## 2022-07-07 PROCEDURE — G0300 HHS/HOSPICE OF LPN EA 15 MIN: HCPCS

## 2022-07-07 PROCEDURE — 99495 TRANSJ CARE MGMT MOD F2F 14D: CPT | Performed by: NURSE PRACTITIONER

## 2022-07-07 PROCEDURE — G0151 HHCP-SERV OF PT,EA 15 MIN: HCPCS

## 2022-07-07 PROCEDURE — 1111F DSCHRG MED/CURRENT MED MERGE: CPT | Performed by: NURSE PRACTITIONER

## 2022-07-07 NOTE — PROGRESS NOTES
Transitional Care Follow Up Visit  Subjective     Noemi Forbes is a 87 y.o. female who presents for a transitional care management visit.    Within 48 business hours after discharge our office contacted her via telephone to coordinate her care and needs.      I reviewed and discussed the details of that call along with the discharge summary, hospital problems, inpatient lab results, inpatient diagnostic studies, and consultation reports with Noemi.     Current outpatient and discharge medications have been reconciled for the patient.  Reviewed by: TERRELL Villagomez      Date of TCM Phone Call 6/29/2022   [facility]   Date of Admission 6/18/2022   Date of Discharge 6/29/2022   Discharge Disposition Home or Self Care     Risk for Readmission (LACE) Score: 8 (6/29/2022  6:01 AM)      History of Present Illness   Course During Hospital Stay:  87 y.o. female admitted with lower extremity cellulitis. She was started on antibiotics and had steady improvement in cellulitis. She does have chronic lymphedema and legs were wrapped. She was discharged on 10 days of oral antibiotics.     She had acute kidney injury that resolved with fluids. She also has a history of DVT on Eliquis regularly. She had left hip pain with movement but none at rest, x-ray was negative; CT of the left hip was done for follow-up and it was unremarkable also. Will have home health follow up on left hip pain and if pain persists she can follow up with PCP and consider MRI. She is going to be discharged home and stay with her son.      No longer having any hip pain. Bilateral legs are feeling better and not as swollen, she is no longer using the compression wraps for her legs.      The following portions of the patient's history were reviewed and updated as appropriate: allergies, current medications, past family history, past medical history, past social history, past surgical history and problem list.    Review of Systems    Constitutional: Negative for fatigue and fever.   Genitourinary: Negative for difficulty urinating, dysuria, flank pain, frequency and urgency.   Musculoskeletal: Negative for arthralgias, back pain, myalgias and neck pain.   Skin: Positive for color change (redness to bilateral lower legs). Negative for rash and wound.       Objective   Physical Exam  Vitals reviewed.   Constitutional:       General: She is awake. She is not in acute distress.     Appearance: Normal appearance.   HENT:      Head: Normocephalic.      Right Ear: Hearing normal.      Left Ear: Hearing normal.   Eyes:      General: Lids are normal. Vision grossly intact.   Neck:      Thyroid: No thyroid mass or thyroid tenderness.   Cardiovascular:      Rate and Rhythm: Normal rate and regular rhythm.      Pulses: Normal pulses.           Radial pulses are 2+ on the right side and 2+ on the left side.        Dorsalis pedis pulses are 2+ on the right side and 2+ on the left side.        Posterior tibial pulses are 2+ on the right side and 2+ on the left side.      Heart sounds: Normal heart sounds.   Pulmonary:      Effort: Pulmonary effort is normal.      Breath sounds: Normal breath sounds.   Skin:     General: Skin is warm and dry.      Capillary Refill: Capillary refill takes less than 2 seconds.      Findings: Erythema (bilateral lower legs) present.   Neurological:      General: No focal deficit present.      Mental Status: She is alert.   Psychiatric:         Attention and Perception: Attention normal.         Mood and Affect: Mood normal.         Speech: Speech normal.         Behavior: Behavior is cooperative.         Assessment & Plan   Problems Addressed this Visit        Musculoskeletal and Injuries    Osteopenia    Relevant Medications    Calcium Carb-Cholecalciferol (Calcium-Vitamin D3) 250-125 MG-UNIT tablet tablet      Other Visit Diagnoses     Hospital discharge follow-up    -  Primary      Diagnoses       Codes [facility]    Hospital  discharge follow-up    -  Primary ICD-10-CM: Z09  ICD-9-CM: V67.59     Osteopenia, unspecified location     ICD-10-CM: M85.80  ICD-9-CM: 733.90         Discussed with patient about continuing on current medications.  Instructed patient to continue follow-up with home health for physical therapy and watching the wound care for her legs.  Offered patient referral to lymphedema clinic, patient declined at this time.  Patient is currently wearing compression stockings which have been helpful.

## 2022-07-07 NOTE — HOME HEALTH
Patient reports she has to see the doctor today. She is having worse back pain than leg pain.     Plan for next visit  Progress with therapeutic exercise, transfers, gait

## 2022-07-08 VITALS
OXYGEN SATURATION: 9 % | DIASTOLIC BLOOD PRESSURE: 62 MMHG | TEMPERATURE: 97.1 F | SYSTOLIC BLOOD PRESSURE: 102 MMHG | HEART RATE: 64 BPM | RESPIRATION RATE: 18 BRPM

## 2022-07-08 NOTE — HOME HEALTH
removed dressings to bilateral ext, no blisters, no drainage, bilateral legs are skinny, nurse suggested for the patient to get compression hose.          notify [facility] nurset if you notice that your legs are weeping or blister delveop or increase swelling pt granddaughter and pt verbalize understanding.

## 2022-07-11 ENCOUNTER — HOME CARE VISIT (OUTPATIENT)
Dept: HOME HEALTH SERVICES | Facility: HOME HEALTHCARE | Age: 87
End: 2022-07-11

## 2022-07-11 PROCEDURE — G0299 HHS/HOSPICE OF RN EA 15 MIN: HCPCS

## 2022-07-12 PROCEDURE — G0180 MD CERTIFICATION HHA PATIENT: HCPCS | Performed by: NURSE PRACTITIONER

## 2022-07-13 VITALS
OXYGEN SATURATION: 97 % | DIASTOLIC BLOOD PRESSURE: 86 MMHG | TEMPERATURE: 98.4 F | HEART RATE: 70 BPM | RESPIRATION RATE: 18 BRPM | SYSTOLIC BLOOD PRESSURE: 128 MMHG

## 2022-07-13 NOTE — HOME HEALTH
PATIENT STATED SHE DID NOT WANT LEGS WRAPPED BUT SHE IS WEARING COMPRESSION HOSE ON BLE. LEGS HAD NO EDEMA AT THIS TIME    PLANS FOR NEXT VISIT: COMPRESSION HOSE

## 2022-07-14 ENCOUNTER — HOME CARE VISIT (OUTPATIENT)
Dept: HOME HEALTH SERVICES | Facility: HOME HEALTHCARE | Age: 87
End: 2022-07-14

## 2022-07-14 VITALS
DIASTOLIC BLOOD PRESSURE: 60 MMHG | HEART RATE: 75 BPM | TEMPERATURE: 97.6 F | OXYGEN SATURATION: 98 % | RESPIRATION RATE: 16 BRPM | SYSTOLIC BLOOD PRESSURE: 106 MMHG

## 2022-07-14 VITALS
OXYGEN SATURATION: 98 % | SYSTOLIC BLOOD PRESSURE: 98 MMHG | HEART RATE: 70 BPM | DIASTOLIC BLOOD PRESSURE: 56 MMHG | TEMPERATURE: 96.9 F

## 2022-07-14 PROCEDURE — G0151 HHCP-SERV OF PT,EA 15 MIN: HCPCS

## 2022-07-14 PROCEDURE — G0162 HHC RN E&M PLAN SVS, 15 MIN: HCPCS

## 2022-07-14 NOTE — HOME HEALTH
pt has been wearing knee Hi TEDS and appears to be managing BLE integ/edema well- no redness of s/s BLE skin breakdown       -hx- was using layer compression wraps to BLE twice weekly

## 2022-07-14 NOTE — HOME HEALTH
Patient reports her back is still painful. Her swelling is better but the left leg still looks bad.     Plan for next visit  Progress with therapeutic exercise, gait

## 2022-07-19 ENCOUNTER — HOME CARE VISIT (OUTPATIENT)
Dept: HOME HEALTH SERVICES | Facility: HOME HEALTHCARE | Age: 87
End: 2022-07-19

## 2022-07-19 VITALS
DIASTOLIC BLOOD PRESSURE: 70 MMHG | HEART RATE: 64 BPM | SYSTOLIC BLOOD PRESSURE: 110 MMHG | OXYGEN SATURATION: 98 % | TEMPERATURE: 97.9 F | RESPIRATION RATE: 16 BRPM

## 2022-07-19 PROCEDURE — G0162 HHC RN E&M PLAN SVS, 15 MIN: HCPCS

## 2022-07-19 NOTE — HOME HEALTH
pt has been wearing knee Hi TEDS and appears to be managing BLE integ/edema well- no redness of s/s BLE skin breakdown - PT SAW MD SINCE LAST SNV AND MD TOLD HER SHE DIDN'T NEED CHUYITA HOSE. PT REQUESTS SN D/C W/ GOALS MET      -hx- was using layer compression wraps to BLE twice weekly

## 2022-07-21 ENCOUNTER — HOME CARE VISIT (OUTPATIENT)
Dept: HOME HEALTH SERVICES | Facility: HOME HEALTHCARE | Age: 87
End: 2022-07-21

## 2022-07-21 VITALS
DIASTOLIC BLOOD PRESSURE: 80 MMHG | HEART RATE: 85 BPM | SYSTOLIC BLOOD PRESSURE: 110 MMHG | OXYGEN SATURATION: 93 % | TEMPERATURE: 97.4 F

## 2022-07-21 PROCEDURE — G0151 HHCP-SERV OF PT,EA 15 MIN: HCPCS

## 2022-07-21 NOTE — HOME HEALTH
Patient reports she was going to go back to her home but the air conditioning isnt working. Back is still painful but the legs are doing better.

## 2022-07-30 ENCOUNTER — DOCUMENTATION (OUTPATIENT)
Dept: FAMILY MEDICINE CLINIC | Facility: CLINIC | Age: 87
End: 2022-07-30

## 2022-07-30 RX ORDER — CEPHALEXIN 250 MG/1
500 CAPSULE ORAL 3 TIMES DAILY
Qty: 30 CAPSULE | Refills: 0 | Status: SHIPPED | OUTPATIENT
Start: 2022-07-30 | End: 2022-08-09

## 2022-08-03 ENCOUNTER — TELEPHONE (OUTPATIENT)
Dept: FAMILY MEDICINE CLINIC | Facility: CLINIC | Age: 87
End: 2022-08-03

## 2022-08-03 NOTE — TELEPHONE ENCOUNTER
The patient called with knee and leg swelling. She has had cellulitis before. The first available appointment is 8/9/22 at 2:00 pm with Charity. I was able to schedule her then. I did tell her that she needed to go to the Urgent Care or ER if the swelling and pain gets worse.

## 2022-08-09 ENCOUNTER — OFFICE VISIT (OUTPATIENT)
Dept: FAMILY MEDICINE CLINIC | Facility: CLINIC | Age: 87
End: 2022-08-09

## 2022-08-09 VITALS
DIASTOLIC BLOOD PRESSURE: 82 MMHG | SYSTOLIC BLOOD PRESSURE: 140 MMHG | HEIGHT: 64 IN | BODY MASS INDEX: 27.49 KG/M2 | WEIGHT: 161 LBS | HEART RATE: 74 BPM | RESPIRATION RATE: 16 BRPM | OXYGEN SATURATION: 97 % | TEMPERATURE: 98.6 F

## 2022-08-09 DIAGNOSIS — L03.119 CELLULITIS OF LOWER EXTREMITY, UNSPECIFIED LATERALITY: Primary | ICD-10-CM

## 2022-08-09 PROCEDURE — 99213 OFFICE O/P EST LOW 20 MIN: CPT | Performed by: NURSE PRACTITIONER

## 2022-08-09 RX ORDER — CLINDAMYCIN HYDROCHLORIDE 300 MG/1
300 CAPSULE ORAL 4 TIMES DAILY
Qty: 40 CAPSULE | Refills: 0 | Status: SHIPPED | OUTPATIENT
Start: 2022-08-09 | End: 2022-08-19

## 2022-08-09 NOTE — PROGRESS NOTES
"Chief Complaint  Cellulitis    Subjective        Noemi Forbes presents to [facility]  History of Present Illness  Cellulitis of lower extremity: Patient was seen at [facility] on 6/18/2022 through 6/29/2022.  Patient states symptoms have worsened since hospital discharge.  Most recent treatment included cephalexin prescribed by Dr. Madrigal on 7/30/2022.  Patient complains of bilateral lower extremity pain, redness and drainage but no fever, chest pain or shortness of breath.  Objective   Vital Signs:  /82 (BP Location: Left arm, Patient Position: Sitting, Cuff Size: Adult)   Pulse 74   Temp 98.6 °F (37 °C) (Temporal)   Resp 16   Ht 162.6 cm (64.02\")   Wt 73 kg (161 lb)   SpO2 97%   BMI 27.62 kg/m²   Estimated body mass index is 27.62 kg/m² as calculated from the following:    Height as of this encounter: 162.6 cm (64.02\").    Weight as of this encounter: 73 kg (161 lb).        Physical Exam  Vitals and nursing note reviewed.   Constitutional:       Appearance: Normal appearance.   Cardiovascular:      Rate and Rhythm: Normal rate and regular rhythm.   Pulmonary:      Effort: Pulmonary effort is normal.      Breath sounds: Normal breath sounds.   Neurological:      Mental Status: She is alert and oriented to person, place, and time.   Psychiatric:         Mood and Affect: Mood normal.            Result Review :           Assessment and Plan   Diagnoses and all orders for this visit:    1. Cellulitis of lower extremity, unspecified laterality (Primary)  -     Ambulatory Referral to Wound Clinic  -     Ambulatory Referral to Infectious Disease  -     clindamycin (Cleocin) 300 MG capsule; Take 1 capsule by mouth 4 (Four) Times a Day for 10 days.  Dispense: 40 capsule; Refill: 0    Patient refuses to go back to the emergency room so will change antibiotic to clindamycin and put in urgent referral to wound clinic and infectious disease.       I spent 23 minutes caring " guido Franklin on this date of service. This time includes time spent by me in the following activities:performing a medically appropriate examination and/or evaluation , counseling and educating the patient/family/caregiver, ordering medications, tests, or procedures, referring and communicating with other health care professionals  and documenting information in the medical record  Follow Up   Return if symptoms worsen or fail to improve.  Patient was given instructions and counseling regarding her condition or for health maintenance advice. Please see specific information pulled into the AVS if appropriate.

## 2022-08-16 ENCOUNTER — HOME HEALTH ADMISSION (OUTPATIENT)
Dept: HOME HEALTH SERVICES | Facility: HOME HEALTHCARE | Age: 87
End: 2022-08-16

## 2022-08-16 ENCOUNTER — TRANSCRIBE ORDERS (OUTPATIENT)
Dept: HOME HEALTH SERVICES | Facility: HOME HEALTHCARE | Age: 87
End: 2022-08-16

## 2022-08-16 ENCOUNTER — OFFICE VISIT (OUTPATIENT)
Dept: WOUND CARE | Facility: HOSPITAL | Age: 87
End: 2022-08-16

## 2022-08-16 DIAGNOSIS — L03.116 CELLULITIS OF LEG, LEFT: Primary | ICD-10-CM

## 2022-08-16 PROCEDURE — G0463 HOSPITAL OUTPT CLINIC VISIT: HCPCS

## 2022-08-19 ENCOUNTER — OFFICE VISIT (OUTPATIENT)
Dept: INFECTIOUS DISEASES | Facility: CLINIC | Age: 87
End: 2022-08-19

## 2022-08-19 VITALS
TEMPERATURE: 97.9 F | WEIGHT: 165.2 LBS | BODY MASS INDEX: 28.2 KG/M2 | HEIGHT: 64 IN | HEART RATE: 80 BPM | SYSTOLIC BLOOD PRESSURE: 121 MMHG | DIASTOLIC BLOOD PRESSURE: 79 MMHG | RESPIRATION RATE: 18 BRPM

## 2022-08-19 DIAGNOSIS — I87.2 VENOUS STASIS DERMATITIS OF BOTH LOWER EXTREMITIES: Primary | ICD-10-CM

## 2022-08-19 PROCEDURE — 99213 OFFICE O/P EST LOW 20 MIN: CPT | Performed by: INTERNAL MEDICINE

## 2022-08-19 NOTE — PROGRESS NOTES
Reason for clinic visits: Follow up for left lower extremity cellulitis    HPI: Noemi Forbes is a 87 y.o. female last seen by me in the hospital at the end of June.  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 She was treated with IV cefazolin in the hospital and discharged on Keflex 500 mg p.o. 4 times a day to complete an empiric 10-day course of antibiotics.  Per hospital discharge the patient did have 1 more episode of fever.  She completed her antibiotics but there was concern for ongoing swelling and infection.  She was seen in follow-up at the beginning of July and was doing well.  She was seen by her primary care provider on August 9 with concerns of recurrent left lower extremity cellulitis and most empirically started on vancomycin 300 mg p.o. 4 times a day x10 days.  Currently the patient states she was seen in the wound care clinic and her lower extremities were wrapped bilaterally.  She reports a lot of pain and discomfort associated with the wraps.  She denies any fevers chills or night sweats.  She states today is her last day of antibiotics and she is tolerating them well without abdominal pain nausea vomiting or diarrhea.  No rashes    Past Medical History:   Diagnosis Date   • Arthritis    • DVT of leg (deep venous thrombosis) (HCC)    • Osteopenia        Past Surgical History:   Procedure Laterality Date   • BACK SURGERY     • BRAIN SURGERY     • CATARACT EXTRACTION     • COLONOSCOPY     • HYSTERECTOMY     • ROTATOR CUFF REPAIR Right    • THYROID SURGERY         Social History   reports that she has been smoking. She started smoking about 72 years ago. She has a 34.00 pack-year smoking history. She has never used smokeless tobacco. She reports current alcohol use. She reports that she does not use drugs.    Family History  family history is not on file.    No Known Allergies    The medication list has been reviewed and updated.     Review of  Systems  Pertinent items are noted in HPI, all other systems reviewed and negative    Vital Signs   Vitals:    08/19/22 0947   BP: 121/79   Pulse: 80   Resp: 18   Temp: 97.9 °F (36.6 °C)       Physical Exam:   General: In no acute distress   Respiratory: Breathing comfortably on room air   GI: Abdomen is soft, non-tender, non-distended, positive bowel sounds bilaterally  Musculoskeletal: Bilateral lower extremity edema, bilateral brawny changes left greater than right, dry scaly skin on left lower extremity  Neurological: Alert and oriented, moving all 4 extremities  Psychiatric: Normal mood and affect     Lab Results   Component Value Date    WBC 3.87 07/02/2022    HGB 12.2 07/02/2022    HCT 36.5 07/02/2022    MCV 93.4 07/02/2022     07/02/2022       Lab Results   Component Value Date    GLUCOSE 86 06/26/2022    BUN 10 06/26/2022    CREATININE 0.74 06/26/2022    BCR 13.5 06/26/2022    CO2 26.0 06/26/2022    CALCIUM 8.6 06/26/2022    ALBUMIN 4.00 06/18/2022    LABIL2 1.2 06/16/2020    AST 13 06/18/2022    ALT 6 06/18/2022       Lab Results   Component Value Date    SEDRATE 57 (H) 06/18/2022       Assessment:  This is a 87 y.o. female who presents to clinic today for follow up of her left lower extremity cellulitis.  At this time the patient does not have any evidence of cellulitis.  She has bilateral lower extremity edema and bilateral skin changes consistent with stasis dermatitis.  I discussed with the patient the fact that infection is not the thing that is driving her swelling it is simply a side effect of the lymphedema/venous stasis.  I recommend continuing aggressive wrapping of her lower extremities which will limit the swelling and therefore stretching of her skin that most likely results in stasis dermatitis which appears similar to infection.  Once she has completed her current antibiotic course further antibiotics are not indicated.    Return to Infectious Disease clinic as needed

## 2022-08-23 ENCOUNTER — OFFICE VISIT (OUTPATIENT)
Dept: WOUND CARE | Facility: HOSPITAL | Age: 87
End: 2022-08-23

## 2022-08-23 PROCEDURE — G0463 HOSPITAL OUTPT CLINIC VISIT: HCPCS

## 2022-09-06 ENCOUNTER — OFFICE VISIT (OUTPATIENT)
Dept: WOUND CARE | Facility: HOSPITAL | Age: 87
End: 2022-09-06

## 2022-09-06 PROCEDURE — G0463 HOSPITAL OUTPT CLINIC VISIT: HCPCS

## 2022-09-08 ENCOUNTER — OFFICE VISIT (OUTPATIENT)
Dept: FAMILY MEDICINE CLINIC | Facility: CLINIC | Age: 87
End: 2022-09-08

## 2022-09-08 VITALS
HEART RATE: 77 BPM | TEMPERATURE: 97.1 F | BODY MASS INDEX: 28.06 KG/M2 | DIASTOLIC BLOOD PRESSURE: 78 MMHG | OXYGEN SATURATION: 96 % | WEIGHT: 163.6 LBS | SYSTOLIC BLOOD PRESSURE: 135 MMHG

## 2022-09-08 DIAGNOSIS — I87.2 VENOUS STASIS DERMATITIS OF BOTH LOWER EXTREMITIES: Primary | ICD-10-CM

## 2022-09-08 DIAGNOSIS — I89.0 LYMPHEDEMA: ICD-10-CM

## 2022-09-08 PROCEDURE — 99213 OFFICE O/P EST LOW 20 MIN: CPT | Performed by: NURSE PRACTITIONER

## 2022-09-08 RX ORDER — BETAMETHASONE DIPROPIONATE 0.5 MG/G
1 CREAM TOPICAL NIGHTLY
Qty: 45 G | Refills: 1 | Status: SHIPPED | OUTPATIENT
Start: 2022-09-08 | End: 2022-11-02

## 2022-09-08 RX ORDER — FUROSEMIDE 20 MG/1
40 TABLET ORAL DAILY
Qty: 90 TABLET | Refills: 0
Start: 2022-09-08 | End: 2022-11-09 | Stop reason: SDUPTHER

## 2022-09-09 ENCOUNTER — TELEPHONE (OUTPATIENT)
Dept: FAMILY MEDICINE CLINIC | Facility: CLINIC | Age: 87
End: 2022-09-09

## 2022-09-09 NOTE — TELEPHONE ENCOUNTER
Please inform patient that this will not cause glaucoma since she is using it only for 2 weeks on her lower extremities.

## 2022-09-09 NOTE — TELEPHONE ENCOUNTER
UNABLE TO WARM TRANSFER    Caller: Noemi Forbes    Relationship to patient: Self    Best call back number: [phone]    Patient is needing: PATIENT IS CONCERNED ABOUT CREAM THAT WAS PRESCRIBED 9/8 AND WANTS TO SPEAK TO A NURSE. PLEASE CALL AND ADVISE. IT SAYS IT CAN CAUSE GLAUCOMA AND SHE ALREADY HAS THAT AND WANTS TO MAKE SURE IT IS SAFE TO TAKE.

## 2022-09-12 ENCOUNTER — TELEPHONE (OUTPATIENT)
Dept: FAMILY MEDICINE CLINIC | Facility: CLINIC | Age: 87
End: 2022-09-12

## 2022-09-12 NOTE — PROGRESS NOTES
"Chief Complaint  cellulitis (Wants to discuss some issues)    Subjective        Noemi Forbes presents to [facility]  History of Present Illness  Venous stasis dermatitis of both lower extremities: Patient was seen by infectious disease on 8/19/2022.  Dr. Louie did not see any further signs of infection following recent antibiotic.  Dr. Louie recommended aggressive wrapping of lower extremities to limit swelling.  Patient states she is unable to do this due to pain.  Patient continues to see wound care.  Objective   Vital Signs:  /78 (BP Location: Left arm, Patient Position: Sitting, Cuff Size: Adult)   Pulse 77   Temp 97.1 °F (36.2 °C) (Temporal)   Wt 74.2 kg (163 lb 9.6 oz)   SpO2 96%   BMI 28.06 kg/m²   Estimated body mass index is 28.06 kg/m² as calculated from the following:    Height as of 8/19/22: 162.6 cm (64.02\").    Weight as of this encounter: 74.2 kg (163 lb 9.6 oz).        Physical Exam  Vitals and nursing note reviewed.   Constitutional:       Appearance: Normal appearance.   Musculoskeletal:      Comments: Bilateral lower extremity edema with scaliness over left lower extremity.  No erythema or tenderness to touch.   Neurological:      Mental Status: She is alert and oriented to person, place, and time.   Psychiatric:         Mood and Affect: Mood normal.        Result Review :           Assessment and Plan   Diagnoses and all orders for this visit:    1. Venous stasis dermatitis of both lower extremities (Primary)  Comments:  - Patient to continue seeing wound care.  Orders:  -     betamethasone dipropionate 0.05 % cream; Apply 1 application topically to the appropriate area as directed Every Night.  Dispense: 45 g; Refill: 1    2. Lymphedema  Comments:  - Will increase furosemide to 2 tablets daily for the next 2 weeks.  - Will draw BMP at follow-up.  Orders:  -     furosemide (LASIX) 20 MG tablet; Take 2 tablets by mouth Daily.  Dispense: 90 tablet; Refill: " 0           I spent 20 minutes caring for Noemi on this date of service. This time includes time spent by me in the following activities:performing a medically appropriate examination and/or evaluation , counseling and educating the patient/family/caregiver, ordering medications, tests, or procedures and documenting information in the medical record  Follow Up   Return in about 2 weeks (around 9/22/2022) for Recheck.  Patient was given instructions and counseling regarding her condition or for health maintenance advice. Please see specific information pulled into the AVS if appropriate.        Statement Selected

## 2022-09-12 NOTE — TELEPHONE ENCOUNTER
Please have her take them at the same time and yes I want her to keep her appointment with wound care to be sure nothing changes before our appointment.

## 2022-09-12 NOTE — TELEPHONE ENCOUNTER
Patient stated her furosemide was increased to two tablets and wanted to know if she should taked them at the same time or separate them throught the day. Also was asking if she should keep her upcoming apt on 9-20 with matthew may as she only trusts [facility].

## 2022-09-20 ENCOUNTER — OFFICE VISIT (OUTPATIENT)
Dept: WOUND CARE | Facility: HOSPITAL | Age: 87
End: 2022-09-20

## 2022-09-20 PROCEDURE — G0463 HOSPITAL OUTPT CLINIC VISIT: HCPCS

## 2022-10-04 ENCOUNTER — OFFICE VISIT (OUTPATIENT)
Dept: FAMILY MEDICINE CLINIC | Facility: CLINIC | Age: 87
End: 2022-10-04

## 2022-10-04 VITALS
DIASTOLIC BLOOD PRESSURE: 71 MMHG | BODY MASS INDEX: 27.41 KG/M2 | OXYGEN SATURATION: 97 % | TEMPERATURE: 97.9 F | WEIGHT: 159.8 LBS | SYSTOLIC BLOOD PRESSURE: 117 MMHG | HEART RATE: 66 BPM

## 2022-10-04 DIAGNOSIS — Z23 NEED FOR INFLUENZA VACCINATION: ICD-10-CM

## 2022-10-04 DIAGNOSIS — I89.0 LYMPHEDEMA: Primary | ICD-10-CM

## 2022-10-04 DIAGNOSIS — I87.2 VENOUS STASIS DERMATITIS OF BOTH LOWER EXTREMITIES: ICD-10-CM

## 2022-10-04 PROCEDURE — G0008 ADMIN INFLUENZA VIRUS VAC: HCPCS | Performed by: NURSE PRACTITIONER

## 2022-10-04 PROCEDURE — 99212 OFFICE O/P EST SF 10 MIN: CPT | Performed by: NURSE PRACTITIONER

## 2022-10-04 PROCEDURE — 90662 IIV NO PRSV INCREASED AG IM: CPT | Performed by: NURSE PRACTITIONER

## 2022-10-04 NOTE — PROGRESS NOTES
"Chief Complaint  venous stasis dermatitis lower extremities    Subjective        Noemi Forbes presents to [facility]  History of Present Illness  Lymphedema: Last seen on 9/8/2022.  Treatment included increasing furosemide to 40 mg daily.  Patient has since seen wound care and has been wearing compression stockings daily.  Patient reports swelling has nearly resolved.  Dermatitis has also nearly resolved.  Objective   Vital Signs:  /71 (BP Location: Right arm, Patient Position: Sitting, Cuff Size: Adult)   Pulse 66   Temp 97.9 °F (36.6 °C) (Temporal)   Wt 72.5 kg (159 lb 12.8 oz)   SpO2 97%   BMI 27.41 kg/m²   Estimated body mass index is 27.41 kg/m² as calculated from the following:    Height as of 8/19/22: 162.6 cm (64.02\").    Weight as of this encounter: 72.5 kg (159 lb 12.8 oz).        Physical Exam  Vitals and nursing note reviewed.   Constitutional:       Appearance: Normal appearance.   Musculoskeletal:      Right lower leg: No edema.      Left lower leg: No edema.      Comments: Wearing compression stockings   Neurological:      Mental Status: She is alert and oriented to person, place, and time.   Psychiatric:         Mood and Affect: Mood normal.            Result Review :           Assessment and Plan   Diagnoses and all orders for this visit:    1. Lymphedema (Primary)  -     Basic metabolic panel    2. Venous stasis dermatitis of both lower extremities  -     Basic metabolic panel    3. Need for influenza vaccination  -     Fluzone High-Dose 65+yrs (1981-2339)           I spent 12 minutes caring for Noemi on this date of service. This time includes time spent by me in the following activities:performing a medically appropriate examination and/or evaluation , counseling and educating the patient/family/caregiver, ordering medications, tests, or procedures and documenting information in the medical record  Follow Up   No follow-ups on file.  Patient was " given instructions and counseling regarding her condition or for health maintenance advice. Please see specific information pulled into the AVS if appropriate.

## 2022-10-05 LAB
BUN SERPL-MCNC: 23 MG/DL (ref 8–23)
BUN/CREAT SERPL: 20 (ref 7–25)
CALCIUM SERPL-MCNC: 9.9 MG/DL (ref 8.6–10.5)
CHLORIDE SERPL-SCNC: 101 MMOL/L (ref 98–107)
CO2 SERPL-SCNC: 31.4 MMOL/L (ref 22–29)
CREAT SERPL-MCNC: 1.15 MG/DL (ref 0.57–1)
EGFRCR SERPLBLD CKD-EPI 2021: 46.5 ML/MIN/1.73
GLUCOSE SERPL-MCNC: 79 MG/DL (ref 65–99)
POTASSIUM SERPL-SCNC: 4 MMOL/L (ref 3.5–5.2)
SODIUM SERPL-SCNC: 145 MMOL/L (ref 136–145)

## 2022-10-06 ENCOUNTER — TELEPHONE (OUTPATIENT)
Dept: FAMILY MEDICINE CLINIC | Facility: CLINIC | Age: 87
End: 2022-10-06

## 2022-10-06 NOTE — TELEPHONE ENCOUNTER
Caller: Noemi Forbes    Relationship: Self    Best call back number: [phone]     What test was performed: LABS     When was the test performed: 10/4/22    Where was the test performed: IN OFFICE    Additional notes: PATIENT WOULD LIKE FOR SOMEONE TO GIVE HER A CALL WITH HER RESULTS

## 2022-10-07 ENCOUNTER — TELEPHONE (OUTPATIENT)
Dept: FAMILY MEDICINE CLINIC | Facility: CLINIC | Age: 87
End: 2022-10-07

## 2022-10-07 NOTE — TELEPHONE ENCOUNTER
OK FOR HUB TO READ CALLED PATIENT NO ANSWER       Creatinine has slightly increased so would recommend continuing furosemide and potassium supplement as directed and repeat BMP in 4 weeks.

## 2022-10-07 NOTE — TELEPHONE ENCOUNTER
Creatinine has slightly increased so would recommend continuing furosemide and potassium supplement as directed and repeat BMP in 4 weeks.    Patient aware of results and recommendations.

## 2022-11-02 DIAGNOSIS — I87.2 VENOUS STASIS DERMATITIS OF BOTH LOWER EXTREMITIES: ICD-10-CM

## 2022-11-02 RX ORDER — BETAMETHASONE DIPROPIONATE 0.5 MG/G
CREAM TOPICAL
Qty: 45 G | Refills: 1 | Status: SHIPPED | OUTPATIENT
Start: 2022-11-02 | End: 2023-04-05 | Stop reason: SDUPTHER

## 2022-11-02 NOTE — TELEPHONE ENCOUNTER
Rx Refill Note  Requested Prescriptions     Pending Prescriptions Disp Refills   • betamethasone dipropionate 0.05 % cream [Pharmacy Med Name: BETAMETHASONE DIP 0.05% CRM 15GM] 45 g 1     Sig: APPLY TOPICALLY TO THE AFFECTED AREA EVERY NIGHT AS DIRECTED      Last office visit with prescribing clinician: 10/4/2022      Next office visit with prescribing clinician: 11/9/2022

## 2022-11-09 ENCOUNTER — OFFICE VISIT (OUTPATIENT)
Dept: FAMILY MEDICINE CLINIC | Facility: CLINIC | Age: 87
End: 2022-11-09

## 2022-11-09 VITALS
SYSTOLIC BLOOD PRESSURE: 129 MMHG | WEIGHT: 159 LBS | TEMPERATURE: 97.8 F | BODY MASS INDEX: 27.28 KG/M2 | OXYGEN SATURATION: 96 % | DIASTOLIC BLOOD PRESSURE: 74 MMHG | HEART RATE: 66 BPM

## 2022-11-09 DIAGNOSIS — I89.0 LYMPHEDEMA: ICD-10-CM

## 2022-11-09 PROCEDURE — 99213 OFFICE O/P EST LOW 20 MIN: CPT | Performed by: NURSE PRACTITIONER

## 2022-11-09 RX ORDER — FUROSEMIDE 20 MG/1
40 TABLET ORAL DAILY
Qty: 180 TABLET | Refills: 3 | Status: SHIPPED | OUTPATIENT
Start: 2022-11-09

## 2022-11-09 NOTE — PROGRESS NOTES
"Chief Complaint  lymphedema    Subjective        Noemi Forbes presents to [facility]  History of Present Illness  Lymphedema: Last seen on 10/4/2022.  Patient continues to use furosemide 40 mg daily and wears compression stockings daily. Patient reports doing well.  Objective   Vital Signs:  /74 (BP Location: Left arm, Patient Position: Sitting, Cuff Size: Adult)   Pulse 66   Temp 97.8 °F (36.6 °C) (Temporal)   Wt 72.1 kg (159 lb)   SpO2 96%   BMI 27.28 kg/m²   Estimated body mass index is 27.28 kg/m² as calculated from the following:    Height as of 8/19/22: 162.6 cm (64.02\").    Weight as of this encounter: 72.1 kg (159 lb).        Physical Exam  Vitals and nursing note reviewed.   Constitutional:       Appearance: Normal appearance.   Cardiovascular:      Rate and Rhythm: Normal rate and regular rhythm.   Pulmonary:      Effort: Pulmonary effort is normal.      Breath sounds: Normal breath sounds.   Musculoskeletal:      Right lower leg: No edema.      Left lower leg: No edema.   Skin:     General: Skin is warm and dry.   Neurological:      Mental Status: She is alert and oriented to person, place, and time.   Psychiatric:         Mood and Affect: Mood normal.        Result Review :  The following data was reviewed by: TERRELL Miller on 11/09/2022:  BMP    BMP 6/26/22 6/27/22 10/4/22   BUN 10  23   Creatinine 0.74  1.15 (A)   Sodium 141  145   Potassium 3.4 (A) 4.2 4.0   Chloride 106  101   CO2 26.0  31.4 (A)   Calcium 8.6  9.9   (A) Abnormal value       Comments are available for some flowsheets but are not being displayed.                  Assessment and Plan   Diagnoses and all orders for this visit:    1. Lymphedema  -     Basic metabolic panel  -     furosemide (LASIX) 20 MG tablet; Take 2 tablets by mouth Daily.  Dispense: 180 tablet; Refill: 3           I spent 20 minutes caring for Noemi on this date of service. This time includes time spent by me in " the following activities:reviewing tests, performing a medically appropriate examination and/or evaluation , counseling and educating the patient/family/caregiver, ordering medications, tests, or procedures and documenting information in the medical record  Follow Up   Return in about 6 months (around 4/28/2023) for Recheck, Medicare Wellness.  Patient was given instructions and counseling regarding her condition or for health maintenance advice. Please see specific information pulled into the AVS if appropriate.

## 2022-11-10 LAB
BUN SERPL-MCNC: 13 MG/DL (ref 8–23)
BUN/CREAT SERPL: 12.3 (ref 7–25)
CALCIUM SERPL-MCNC: 9.6 MG/DL (ref 8.6–10.5)
CHLORIDE SERPL-SCNC: 103 MMOL/L (ref 98–107)
CO2 SERPL-SCNC: 32.5 MMOL/L (ref 22–29)
CREAT SERPL-MCNC: 1.06 MG/DL (ref 0.57–1)
EGFRCR SERPLBLD CKD-EPI 2021: 51.3 ML/MIN/1.73
GLUCOSE SERPL-MCNC: 72 MG/DL (ref 65–99)
POTASSIUM SERPL-SCNC: 4.2 MMOL/L (ref 3.5–5.2)
SODIUM SERPL-SCNC: 145 MMOL/L (ref 136–145)

## 2022-12-09 RX ORDER — APIXABAN 5 MG/1
TABLET, FILM COATED ORAL
Qty: 180 TABLET | Refills: 3 | Status: SHIPPED | OUTPATIENT
Start: 2022-12-09

## 2022-12-28 ENCOUNTER — TELEPHONE (OUTPATIENT)
Dept: FAMILY MEDICINE CLINIC | Facility: CLINIC | Age: 87
End: 2022-12-28

## 2022-12-28 NOTE — TELEPHONE ENCOUNTER
Caller: Noemi Forbes    Relationship: Self    Best call back number: [phone]    PATIENT RECEIVED A LETTER ABOUT HARRISONGUILLAUME PATE LEAVING [facility] AND SHE WAS WONDERING IF THERE WAS ANYWAY HARRISON COULD PLEASE CALL HER WHEN SHE GETS A FREE SECOND PLEASE.    CRISTHIAN SAYS SHE IS SO SHOCKED, AND WAS HOPING THIS WAS A BAD DREAM AND SHE DOES NOT LIKE HARRISON......SHE NEVER DID LIKE HARRISON.                                                               SHE LOVES HER.     PATIENT WAS WONDERING IF THERE WAS ANOTHER PROVIDER THAT SHE WOULD SUGGEST CRISTHIAN GOES TO, AND CRISTHIAN INSISTS THIS NEW PROVIDER IS AS SWEET AS SHE IS, EVEN THOUGH THAT'S IMPOSSIBLE SHE SAYS.     SHE COULDN'T STOP SAYING GOOD THINGS ABOUT HARRISON.    SHE WANTED TO HAVE ONE LAST CHAT WITH HARRISON BEFORE SHE GOES FOR GOOD.      SHE SAYS IF HARRISON WERE TO WAKE UP AROUND 3 IN THE MORNING SHE WOULD EVEN TAKE HER CALL THEN, SHE JUST WANTS TO SAY BYE       PLEASE ADVISE

## 2023-01-03 ENCOUNTER — TELEPHONE (OUTPATIENT)
Dept: FAMILY MEDICINE CLINIC | Facility: CLINIC | Age: 88
End: 2023-01-03

## 2023-01-03 NOTE — TELEPHONE ENCOUNTER
Caller: Noemi Forbes    Relationship: Self    Best call back number: [phone]    Who is your current provider:  OFF BOARDING HARRISON PATE.    Who would you like your new provider to be: DR HOFFMAN     What are your reasons for transferring care: PROVIDER OFFBOARDING HARRISON     Additional notes: STATES TERRY RECOMEDNED DR HURLEY TO SEE HER. OKAY TO ACCEPT?

## 2023-01-10 ENCOUNTER — TELEPHONE (OUTPATIENT)
Dept: FAMILY MEDICINE CLINIC | Facility: CLINIC | Age: 88
End: 2023-01-10
Payer: MEDICARE

## 2023-01-13 NOTE — TELEPHONE ENCOUNTER
Informed patient. She will call back to make an appointment with Dr. Madrigal once she can get to her calendar.      PCP updated in chart

## 2023-01-16 ENCOUNTER — TELEPHONE (OUTPATIENT)
Dept: FAMILY MEDICINE CLINIC | Facility: CLINIC | Age: 88
End: 2023-01-16

## 2023-01-16 NOTE — TELEPHONE ENCOUNTER
Caller: Noemi Forbes    Relationship to patient: Self    Best call back number:[phone]     Patient is needing: PATIENT IS CALLING TO STATE THAT SOME TIME AGO, SHE CALLED TO REQUEST A MOTOR SCOOTER.  SHE IS WANTING TO KNOW THE STATUS OF THE PAPERWORK FROM MEDICARE FOR THE SCOOTER.     PLEASE ADVISE.

## 2023-01-19 NOTE — TELEPHONE ENCOUNTER
I have attempted to reach Medicare regarding concerns, their system was down and I was told to try to call back later.    Will attempt again this afternoon

## 2023-01-27 NOTE — TELEPHONE ENCOUNTER
Found order at [facility]. [facility] is waiting on call back from patient to start process. Patient has been informed and will be returning their call

## 2023-01-31 ENCOUNTER — OFFICE VISIT (OUTPATIENT)
Dept: FAMILY MEDICINE CLINIC | Facility: CLINIC | Age: 88
End: 2023-01-31
Payer: MEDICARE

## 2023-01-31 VITALS
HEART RATE: 66 BPM | OXYGEN SATURATION: 96 % | HEIGHT: 64 IN | BODY MASS INDEX: 28.07 KG/M2 | DIASTOLIC BLOOD PRESSURE: 64 MMHG | WEIGHT: 164.4 LBS | SYSTOLIC BLOOD PRESSURE: 120 MMHG

## 2023-01-31 DIAGNOSIS — Z76.89 ENCOUNTER FOR POWER MOBILITY DEVICE ASSESSMENT: Primary | ICD-10-CM

## 2023-01-31 DIAGNOSIS — Z74.09 IMPAIRED MOBILITY: ICD-10-CM

## 2023-01-31 DIAGNOSIS — G89.29 CHRONIC PAIN OF BOTH SHOULDERS: ICD-10-CM

## 2023-01-31 DIAGNOSIS — G89.29 CHRONIC LOW BACK PAIN, UNSPECIFIED BACK PAIN LATERALITY, UNSPECIFIED WHETHER SCIATICA PRESENT: ICD-10-CM

## 2023-01-31 DIAGNOSIS — M25.512 CHRONIC PAIN OF BOTH SHOULDERS: ICD-10-CM

## 2023-01-31 DIAGNOSIS — M25.511 CHRONIC PAIN OF BOTH SHOULDERS: ICD-10-CM

## 2023-01-31 DIAGNOSIS — M48.061 SPINAL STENOSIS OF LUMBAR REGION, UNSPECIFIED WHETHER NEUROGENIC CLAUDICATION PRESENT: ICD-10-CM

## 2023-01-31 DIAGNOSIS — M19.90 ARTHRITIS: ICD-10-CM

## 2023-01-31 DIAGNOSIS — M54.50 CHRONIC LOW BACK PAIN, UNSPECIFIED BACK PAIN LATERALITY, UNSPECIFIED WHETHER SCIATICA PRESENT: ICD-10-CM

## 2023-01-31 DIAGNOSIS — I89.0 LYMPHEDEMA: ICD-10-CM

## 2023-01-31 DIAGNOSIS — M51.36 DDD (DEGENERATIVE DISC DISEASE), LUMBAR: ICD-10-CM

## 2023-01-31 PROBLEM — S46.001A INJURY OF RIGHT ROTATOR CUFF: Status: ACTIVE | Noted: 2023-01-31

## 2023-01-31 PROCEDURE — 99214 OFFICE O/P EST MOD 30 MIN: CPT | Performed by: INTERNAL MEDICINE

## 2023-01-31 NOTE — PROGRESS NOTES
Subjective   Noemi Fobres is a 87 y.o. female.     Chief Complaint   Patient presents with   • Establish Care   • Dizziness     Falling, as well she was supposed to be getting a scooter and there has been some back and front confusion   • Edema       History of Present Illness   Answers for HPI/ROS submitted by the patient on 1/26/2023  Please describe your symptoms.: Pain in upper right arm  Have you had these symptoms before?: No  How long have you been having these symptoms?: Greater than 2 weeks  Please list any medications you are currently taking for this condition.: None  Please describe any probable cause for these symptoms. : Unknown  What is the primary reason for your visit?: Other    87-year-old female presenting as a new patient to me from Kathei Calhoun nurse practitioner formally within our office.  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  She presents today to establish with a new provider and for frequent falling for which she is needing to have a mobility evaluation for Medicare for the scooter.  Patient is being evaluated at [facility] care facility for assisted living.  Patient has chronic low back pain with chronic episodic weakness with numbness and lymphedema.  Has been using the rollator/walker for mobility and has difficulty with arms with past rotator cuff injuries and decreased range of motion and pain right greater than left.  Patient unable to use walker, cane or manual wheelchair effectively or support self/propel manual wheelchair with arms.  Patient needs elevation of the feet and  legs due to lymphedema and will be use the home and patient has the ability to use.    The following portions of the patient's history were reviewed and updated as appropriate: allergies, current medications, past family history, past medical history, past social history, past surgical history and problem list.    Depression Screen:  PHQ-2/PHQ-9 Depression Screening 4/28/2022   Retired Total Score -   Little Interest or Pleasure in Doing Things 0-->not at all   Feeling Down, Depressed or Hopeless 0-->not at all   PHQ-9: Brief Depression Severity Measure Score 0       Past Medical History:   Diagnosis Date   • Arthritis    • DVT of leg (deep venous thrombosis) (HCC)    • Osteopenia        Past Surgical History:   Procedure Laterality Date   • BACK SURGERY     • BRAIN SURGERY     • CATARACT EXTRACTION     • COLONOSCOPY     • HYSTERECTOMY     • ROTATOR CUFF REPAIR Right    • THYROID SURGERY         No family history on file.    Social History     Socioeconomic History   • Marital status:    Tobacco Use   • Smoking status: Every Day     Packs/day: 0.50     Years: 68.00     Pack years: 34.00     Types: Cigarettes     Start date: 1950   • Smokeless tobacco: Never   Substance and Sexual Activity   • Alcohol use: Yes     Comment: socially   • Drug use: Never       Current Outpatient Medications   Medication Sig Dispense Refill   • betamethasone dipropionate 0.05 % cream APPLY TOPICALLY TO THE AFFECTED AREA EVERY NIGHT AS DIRECTED 45 g 1   • Calcium Carb-Cholecalciferol (Calcium-Vitamin D3) 250-125 MG-UNIT tablet tablet Take 1 tablet by mouth Daily. 90 tablet 3   • Eliquis 5 MG tablet tablet TAKE 1 TABLET BY MOUTH  TWICE DAILY 180 tablet 3   • furosemide (LASIX) 20 MG tablet Take 2 tablets by mouth Daily. 180 tablet 3   • latanoprost (XALATAN) 0.005 % ophthalmic solution Administer 1 drop to both eyes Daily.     • potassium chloride (K-DUR,KLOR-CON) 10 MEQ CR tablet TAKE 1 TABLET BY MOUTH  DAILY 90 tablet 3     No  "current facility-administered medications for this visit.       Review of Systems   Constitutional: Negative for activity change, appetite change, fatigue, fever, unexpected weight gain and unexpected weight loss.   HENT: Negative for nosebleeds, rhinorrhea, trouble swallowing and voice change.    Eyes: Negative for visual disturbance.   Respiratory: Negative for cough, chest tightness, shortness of breath and wheezing.    Cardiovascular: Positive for leg swelling. Negative for chest pain and palpitations.   Gastrointestinal: Negative for abdominal pain, blood in stool, constipation, diarrhea, nausea, vomiting, GERD and indigestion.   Genitourinary: Negative for dysuria, frequency and hematuria.   Musculoskeletal: Positive for back pain. Negative for arthralgias and myalgias.        Chronic lumbar pain with leg pain and weakness and chronic lymphedema issues.  Unable to raise arms at the shoulders.   Skin: Negative for rash and wound.   Neurological: Negative for dizziness, tremors, weakness, light-headedness, numbness, headache and memory problem.   Hematological: Negative for adenopathy. Does not bruise/bleed easily.   Psychiatric/Behavioral: Negative for sleep disturbance and depressed mood. The patient is not nervous/anxious.        Objective   /64 (BP Location: Left arm, Patient Position: Sitting, Cuff Size: Adult)   Pulse 66   Ht 162.6 cm (64.02\")   Wt 74.6 kg (164 lb 6.4 oz)   SpO2 96%   BMI 28.20 kg/m²     Physical Exam  Vitals and nursing note reviewed.   Constitutional:       General: She is not in acute distress.     Appearance: She is well-developed. She is not diaphoretic.   HENT:      Head: Normocephalic and atraumatic.      Right Ear: External ear normal.      Left Ear: External ear normal.      Nose: Nose normal.   Eyes:      Conjunctiva/sclera: Conjunctivae normal.      Pupils: Pupils are equal, round, and reactive to light.   Neck:      Thyroid: No thyromegaly.      Trachea: No tracheal " deviation.   Cardiovascular:      Rate and Rhythm: Normal rate and regular rhythm.      Heart sounds: Normal heart sounds. No murmur heard.    No friction rub. No gallop.   Pulmonary:      Effort: Pulmonary effort is normal. No respiratory distress.      Breath sounds: Normal breath sounds.   Abdominal:      General: Bowel sounds are normal.      Palpations: Abdomen is soft. There is no mass.      Tenderness: There is no abdominal tenderness. There is no guarding.   Musculoskeletal:         General: Normal range of motion.      Cervical back: Normal range of motion and neck supple.      Comments: Wearing compression stockings and with tenderness in the legs diffusely.  Unable to raise arms beyond 45 degrees at the shoulders bilaterally right is worse than the left.  Weakness in legs and needs assistance with walking and standing.  Weakness is more noted in the left leg especially with walking as she does have some leg drop on the left.  Is able to transfer.   strength is normal bilaterally and she is right-hand dominant.   Lymphadenopathy:      Cervical: No cervical adenopathy.   Skin:     General: Skin is warm and dry.      Capillary Refill: Capillary refill takes less than 2 seconds.      Findings: No rash.   Neurological:      Mental Status: She is alert and oriented to person, place, and time.      Motor: No abnormal muscle tone.      Deep Tendon Reflexes: Reflexes normal.   Psychiatric:         Behavior: Behavior normal.         Thought Content: Thought content normal.         Judgment: Judgment normal.       Recent Results (from the past 2016 hour(s))   Basic metabolic panel    Collection Time: 11/09/22  1:42 PM    Specimen: Blood   Result Value Ref Range    Glucose 72 65 - 99 mg/dL    BUN 13 8 - 23 mg/dL    Creatinine 1.06 (H) 0.57 - 1.00 mg/dL    EGFR Result 51.3 (L) >60.0 mL/min/1.73    BUN/Creatinine Ratio 12.3 7.0 - 25.0    Sodium 145 136 - 145 mmol/L    Potassium 4.2 3.5 - 5.2 mmol/L    Chloride 103  98 - 107 mmol/L    Total CO2 32.5 (H) 22.0 - 29.0 mmol/L    Calcium 9.6 8.6 - 10.5 mg/dL     Assessment & Plan   Diagnoses and all orders for this visit:    1. Encounter for power mobility device assessment (Primary)    2. Arthritis    3. Chronic pain of both shoulders  -     Ambulatory Referral to Orthopedic Surgery    4. Impaired mobility    5. Lymphedema    6. Chronic low back pain, unspecified back pain laterality, unspecified whether sciatica present    7. DDD (degenerative disc disease), lumbar    8. Spinal stenosis of lumbar region, unspecified whether neurogenic claudication present    Face-to-face exam for mobility and the use of a power chair chair/scooter.  This patient requires a power chair/scooter for mobility assistive equipment as she is unable to utilize a cane/walker/manual wheelchair secondary to chronic issues with the shoulders that are progressive and unable to bear weight or propel a manual wheelchair on her own.  She does have the ability to utilize a power chair or scooter within the home which should be able to meet her needs to complete activities of daily living such as mobility within the home to and from the kitchen and bathroom and to perform her activities daily living such as toileting.  She does have the physical and mental ability to safely use such a device and she does appear to be able to safely transfer to and from the motorized chair or scooter from either her bed or another chair within the home on her own.  Her symptoms have been progressive and now is having difficulties with her shoulders for which this is currently being evaluated but she is not a surgical candidate.  At this time her mobility deficit cannot be sufficiently and safely resolved with use of a cane, walker, manual self propel wheelchair.  She would also benefit from the use of elevation of her legs secondary to her lymphedema.  The patient is interested in the power assist device within the home.            · COVID-19 Precautions - Patient was compliant in wearing a mask. When I saw the patient, I used appropriate personal protective equipment (PPE) including mask and eye shield (standard procedure).  Additionally, I used gown and gloves if indicated.  Hand hygiene was completed before and after seeing the patient.  · Dictated utilizing Dragon Dictation

## 2023-02-03 ENCOUNTER — TELEPHONE (OUTPATIENT)
Dept: ORTHOPEDIC SURGERY | Facility: CLINIC | Age: 88
End: 2023-02-03

## 2023-02-03 NOTE — TELEPHONE ENCOUNTER
Caller: Noemi Forbes     Relationship: SELF    Best call back number: [phone]    What is your medical concern?   PT IS SCHEDULED WITH DONYA 02/20 AND SHE IS REQUESTING A COPY OF A NEW PATIENT PACKAGE TO BE MAILED TO:  [facility]aspal Zhang  [facility] [id number]  PT HAS HARD TIME WRITING AND SHE PREFERS TO COMPLETE THIS AHEAD OF TIME.

## 2023-02-09 NOTE — TELEPHONE ENCOUNTER
Patient called and wanted to let you know that she went to [facility] for the evaluation for a wheelchair, they denied the evaluation because she walked in the place without any help.

## 2023-02-16 NOTE — TELEPHONE ENCOUNTER
PATIENT WANTS THE ORDER FOR HER MOTORCHAIR TO BE SENT TO [facility] NOW, INSTEAD OF [facility].     PLEASE ADVISE.

## 2023-02-21 ENCOUNTER — OFFICE VISIT (OUTPATIENT)
Dept: ORTHOPEDIC SURGERY | Facility: CLINIC | Age: 88
End: 2023-02-21
Payer: MEDICARE

## 2023-02-21 VITALS — TEMPERATURE: 97.1 F | BODY MASS INDEX: 30 KG/M2 | WEIGHT: 163 LBS | HEIGHT: 62 IN

## 2023-02-21 DIAGNOSIS — M67.911 DYSFUNCTION OF ROTATOR CUFF OF BOTH SHOULDERS: Primary | ICD-10-CM

## 2023-02-21 DIAGNOSIS — M25.512 CHRONIC PAIN OF BOTH SHOULDERS: ICD-10-CM

## 2023-02-21 DIAGNOSIS — M67.912 DYSFUNCTION OF ROTATOR CUFF OF BOTH SHOULDERS: Primary | ICD-10-CM

## 2023-02-21 DIAGNOSIS — G89.29 CHRONIC PAIN OF BOTH SHOULDERS: ICD-10-CM

## 2023-02-21 DIAGNOSIS — M25.511 CHRONIC PAIN OF BOTH SHOULDERS: ICD-10-CM

## 2023-02-21 PROCEDURE — 73030 X-RAY EXAM OF SHOULDER: CPT | Performed by: NURSE PRACTITIONER

## 2023-02-21 PROCEDURE — 99214 OFFICE O/P EST MOD 30 MIN: CPT | Performed by: NURSE PRACTITIONER

## 2023-02-21 PROCEDURE — 20610 DRAIN/INJ JOINT/BURSA W/O US: CPT | Performed by: NURSE PRACTITIONER

## 2023-02-21 RX ORDER — METHYLPREDNISOLONE ACETATE 80 MG/ML
80 INJECTION, SUSPENSION INTRA-ARTICULAR; INTRALESIONAL; INTRAMUSCULAR; SOFT TISSUE
Status: COMPLETED | OUTPATIENT
Start: 2023-02-21 | End: 2023-02-21

## 2023-02-21 RX ORDER — LIDOCAINE HYDROCHLORIDE 10 MG/ML
2 INJECTION, SOLUTION EPIDURAL; INFILTRATION; INTRACAUDAL; PERINEURAL
Status: COMPLETED | OUTPATIENT
Start: 2023-02-21 | End: 2023-02-21

## 2023-02-21 RX ADMIN — LIDOCAINE HYDROCHLORIDE 2 ML: 10 INJECTION, SOLUTION EPIDURAL; INFILTRATION; INTRACAUDAL; PERINEURAL at 14:05

## 2023-02-21 RX ADMIN — METHYLPREDNISOLONE ACETATE 80 MG: 80 INJECTION, SUSPENSION INTRA-ARTICULAR; INTRALESIONAL; INTRAMUSCULAR; SOFT TISSUE at 14:05

## 2023-02-21 NOTE — PROGRESS NOTES
[facility]  New Problem      Patient Name: Noemi Forbes  : 1935  Primary Care Physician: Villa Madrigal MD        Chief Complaint: Bilateral shoulder pain    HPI:   Noemi Forbes is a 87 y.o. year old who presents today for evaluation of bilateral shoulder pain.  Patient has a history of chronic bilateral shoulder pain and is here today for evaluation and treatment.  She previously had surgery on the right Rotator Cuff many years ago she says.  Her symptoms in both shoulders have been present for quite some time and worsening.  Worse with activities better with rest.  The right arm is worse and she is right-hand dominant.  She also endorses some right posterior shoulder blade pain that started in the last couple of days without any particular event or injury.  She denies any dedicated neck pain no specific numbness or tingling or radicular pattern to her symptoms.       Past Medical/Surgical, Social and Family History:  I have reviewed and/or updated pertinent history as noted in the medical record including:  Past Medical History:   Diagnosis Date   • Arthritis    • DVT of leg (deep venous thrombosis) (HCC)    • Osteopenia      Past Surgical History:   Procedure Laterality Date   • BACK SURGERY     • BRAIN SURGERY     • CATARACT EXTRACTION     • COLONOSCOPY     • HYSTERECTOMY     • ROTATOR CUFF REPAIR Right    • THYROID SURGERY       Social History     Occupational History   • Not on file   Tobacco Use   • Smoking status: Every Day     Packs/day: 0.50     Years: 68.00     Pack years: 34.00     Types: Cigarettes     Start date:      Passive exposure: Never   • Smokeless tobacco: Never   Substance and Sexual Activity   • Alcohol use: Yes     Comment: socially   • Drug use: Never   • Sexual activity: Not on file          Allergies: No Known Allergies    Medications:   Home Medications:  Current Outpatient Medications on File Prior to Visit   Medication Sig   • betamethasone dipropionate  0.05 % cream APPLY TOPICALLY TO THE AFFECTED AREA EVERY NIGHT AS DIRECTED   • Calcium Carb-Cholecalciferol (Calcium-Vitamin D3) 250-125 MG-UNIT tablet tablet Take 1 tablet by mouth Daily.   • Eliquis 5 MG tablet tablet TAKE 1 TABLET BY MOUTH  TWICE DAILY   • furosemide (LASIX) 20 MG tablet Take 2 tablets by mouth Daily.   • latanoprost (XALATAN) 0.005 % ophthalmic solution Administer 1 drop to both eyes Daily.   • potassium chloride (K-DUR,KLOR-CON) 10 MEQ CR tablet TAKE 1 TABLET BY MOUTH  DAILY     No current facility-administered medications on file prior to visit.         ROS:  14 point review of systems was negative except as listed in the HPI.    Physical Exam:   87 y.o. female  Body mass index is 29.81 kg/m²., 73.9 kg (163 lb)  Vitals:    02/21/23 1327   Temp: 97.1 °F (36.2 °C)     General: Alert, cooperative, appears well and in no observable distress. Appears stated age and BMI as listed above.  HEENT: Normocephalic, atraumatic on external visual inspection.  CV: No significant peripheral edema.  Respiratory: Normal respiratory effort.  Skin: Warm & well perfused; appropriate skin turgor.  Psych: Appropriate mood & affect.  Neuro: Gross sensation and motor intact in affected extremity/extremities.  Vascular: Peripheral pulses palpable in affected extremity/extremities.     MSK Exam:  Right Shoulder  No obvious deformities or wounds.   No redness or significant swelling.  Tenderness  scapula, lateral acromial, posterior acromial  Definitive painful arc.  +empty can  Negative drop arm test  AROM  Flexion 150   ER 40   IR lumbar spine     Rotator Cuff Strength:  Supraspinatus: 3+  ER: 0   IR: 3+  Isometric testing of the rotator cuff is painful.    Left shoulder  No obvious deformities or wounds.   No redness or significant swelling.  Tenderness  scapula, lateral acromial, posterior acromial  Definitive painful arc.  +empty can  Negative drop arm test  AROM  Flexion 160   ER 50   IR lumbar spine     Rotator  Cuff Strength:  Supraspinatus: 4  ER: 4   IR: 4  Isometric testing of the rotator cuff is painful.      Brief examination of the cervical spine did not reproduce any specific radicular pattern or symptoms.   Strength is reasonable and symmetric in the upper extremities.      Radiology:    The following X-rays were ordered/reviewed today to evaluate the patient's symptoms: Shoulder: AP, Scapular Y and Axillary Lateral of both shoulder(s) show Right shoulder demonstrates high riding humeral head with loss of subacromial space.  She has chronic degenerative changes of the greater tuberosity.  Overall great space in the glenohumeral interval.  Left shoulder demonstrates similar changes at the greater tuberosity however more preserved subacromial interval, glenohumeral space is also well-preserved on the left side.  No other obvious acute bony pathology.. There are no prior films available for direct comparison.    Procedure:   See Procedure Note: The potential risks and benefits of performing a diagnostic and therapeutic injection were discussed with the patient prior to procedure. Risks include, but are not limited to infection, swelling, transient increase in pain, bleeding, bruising. Patient was advised that injections are a diagnostic and therapeutic tool meaning they may not alleviate symptoms at all, or may only provide partial or temporary relief. Injection precautions and aftercare discussed. and Patient is currently on anticoagulation and/or a blood thinning agent which poses an increased risk of more significant bleeding or bruising following an injection. While joint injections are generally well tolerated even on AC, the patient was counseled on this increased risk and advised to monitor for signs of bleeding with proper follow up instructions. Injection precautions and aftercare discussed.     Large Joint Arthrocentesis: R subacromial bursa  Date/Time: 2/21/2023 2:05 PM  Consent given by: patient  Site  marked: site marked  Timeout: Immediately prior to procedure a time out was called to verify the correct patient, procedure, equipment, support staff and site/side marked as required   Supporting Documentation  Indications: pain   Procedure Details  Location: shoulder - R subacromial bursa  Preparation: Patient was prepped and draped in the usual sterile fashion  Needle gauge: 21G.  Approach: posterior  Medications administered: 80 mg methylPREDNISolone acetate 80 MG/ML; 2 mL lidocaine PF 1% 1 %  Patient tolerance: patient tolerated the procedure well with no immediate complications    Large Joint Arthrocentesis: L subacromial bursa  Date/Time: 2/21/2023 2:05 PM  Consent given by: patient  Site marked: site marked  Timeout: Immediately prior to procedure a time out was called to verify the correct patient, procedure, equipment, support staff and site/side marked as required   Supporting Documentation  Indications: pain   Procedure Details  Location: shoulder - L subacromial bursa  Preparation: Patient was prepped and draped in the usual sterile fashion  Needle gauge: 21G.  Approach: posterior  Medications administered: 80 mg methylPREDNISolone acetate 80 MG/ML; 2 mL lidocaine PF 1% 1 %  Patient tolerance: patient tolerated the procedure well with no immediate complications            Misc. Data/Labs: N/A    Assessment & Plan:    ICD-10-CM ICD-9-CM   1. 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 Chronic pain of both shoulders  M25.511 719.41    G89.29 338.29    M25.512      No orders of the defined types were placed in this encounter.    Orders Placed This Encounter   Procedures   • Large Joint Arthrocentesis: R subacromial bursa   • Large Joint Arthrocentesis: L subacromial bursa   • XR Shoulder 2+ View Bilateral     This is an 87-year-old female with bilateral shoulder pain I suspect the right shoulder is a failed repair and now a longstanding rotator cuff tear.  I also expect  the left shoulder is related to her Rotator Cuff however she has much better strength on that side.  I think at her age with other medical conditions conservative management is more than reasonable as a starting point.  Reverse total shoulder replacement particularly for the right shoulder would be 1 option but again I think conservative management would be preferred.  Plan is to proceed with injections the subacromial space bilaterally as a diagnostic and therapeutic tool.  Often periscapular pain can be related to underlying shoulder pathology so I would like to see if the injection improves that if it does not we will consider additional means of testing.    Return in about 4 weeks (around 3/21/2023) for Recheck.    Patient encouraged to call with questions or concerns prior to follow up.  Recommend ICE and/or HEAT PRN as discussed.  Will discuss with attending physician as needed.  Consider additional referrals, work up and/or advanced imaging as indicated or if patient fails to respond to conservative care.        Vamshi Cabrales, APRN

## 2023-03-20 DIAGNOSIS — I89.0 LYMPHEDEMA: ICD-10-CM

## 2023-03-20 RX ORDER — POTASSIUM CHLORIDE 750 MG/1
10 TABLET, EXTENDED RELEASE ORAL DAILY
Qty: 90 TABLET | Refills: 3 | Status: SHIPPED | OUTPATIENT
Start: 2023-03-20

## 2023-03-21 ENCOUNTER — OFFICE VISIT (OUTPATIENT)
Dept: ORTHOPEDIC SURGERY | Facility: CLINIC | Age: 88
End: 2023-03-21
Payer: MEDICARE

## 2023-03-21 VITALS — BODY MASS INDEX: 29.26 KG/M2 | HEIGHT: 62 IN | TEMPERATURE: 97.9 F | WEIGHT: 159 LBS

## 2023-03-21 DIAGNOSIS — G89.29 CHRONIC PAIN OF BOTH SHOULDERS: ICD-10-CM

## 2023-03-21 DIAGNOSIS — M25.511 CHRONIC PAIN OF BOTH SHOULDERS: ICD-10-CM

## 2023-03-21 DIAGNOSIS — M25.512 CHRONIC PAIN OF BOTH SHOULDERS: ICD-10-CM

## 2023-03-21 DIAGNOSIS — M67.911 DYSFUNCTION OF ROTATOR CUFF OF BOTH SHOULDERS: Primary | ICD-10-CM

## 2023-03-21 DIAGNOSIS — M67.912 DYSFUNCTION OF ROTATOR CUFF OF BOTH SHOULDERS: Primary | ICD-10-CM

## 2023-03-21 PROCEDURE — 1159F MED LIST DOCD IN RCRD: CPT | Performed by: NURSE PRACTITIONER

## 2023-03-21 PROCEDURE — 99213 OFFICE O/P EST LOW 20 MIN: CPT | Performed by: NURSE PRACTITIONER

## 2023-03-21 PROCEDURE — 1160F RVW MEDS BY RX/DR IN RCRD: CPT | Performed by: NURSE PRACTITIONER

## 2023-03-21 NOTE — PROGRESS NOTES
[facility]              Follow Up      Patient Name: Noemi Forbes  : 1935  Primary Care Physician: Villa Madrigal MD        Chief Complaint: Bilateral shoulder pain    HPI:   Noemi Forbes is a 87 y.o. year old who presents today for evaluation of bilateral shoulder pain.  Patient is coming in today for a 1 month follow-up for bilateral shoulder pain.  She had history of rotator cuff surgery years ago on the right.  She is right-hand dominant.  We elected to try subacromial injections bilaterally and she does feel that those helped improve her pain symptoms.  She does still have some discomfort especially the right shoulder with certain motions other times I do not really bother her quite as much.  Overall she says the shoulders are the least of her pain concerns she has pretty significant back pain.  She has tried Tylenol.  She is not interested in any further physical therapy.  She also does not express much interest in considering surgical options for her shoulder.      Past Medical/Surgical, Social and Family History:  I have reviewed and/or updated pertinent history as noted in the medical record including:  Past Medical History:   Diagnosis Date   • Arthritis    • DVT of leg (deep venous thrombosis) (HCC)    • Osteopenia      Past Surgical History:   Procedure Laterality Date   • BACK SURGERY     • BRAIN SURGERY     • CATARACT EXTRACTION     • COLONOSCOPY     • HYSTERECTOMY     • ROTATOR CUFF REPAIR Right    • THYROID SURGERY       Social History     Occupational History   • Not on file   Tobacco Use   • Smoking status: Every Day     Packs/day: 0.50     Years: 68.00     Pack years: 34.00     Types: Cigarettes     Start date:      Passive exposure: Never   • Smokeless tobacco: Never   Substance and Sexual Activity   • Alcohol use: Yes     Comment: socially   • Drug use: Never   • Sexual activity: Not on file          Allergies: No Known Allergies    Medications:   Home  Medications:  Current Outpatient Medications on File Prior to Visit   Medication Sig   • betamethasone dipropionate 0.05 % cream APPLY TOPICALLY TO THE AFFECTED AREA EVERY NIGHT AS DIRECTED   • Calcium Carb-Cholecalciferol (Calcium-Vitamin D3) 250-125 MG-UNIT tablet tablet Take 1 tablet by mouth Daily.   • Eliquis 5 MG tablet tablet TAKE 1 TABLET BY MOUTH  TWICE DAILY   • furosemide (LASIX) 20 MG tablet Take 2 tablets by mouth Daily.   • latanoprost (XALATAN) 0.005 % ophthalmic solution Administer 1 drop to both eyes Daily.   • potassium chloride (K-DUR,KLOR-CON) 10 MEQ CR tablet Take 1 tablet by mouth Daily.     No current facility-administered medications on file prior to visit.         ROS:  ROS negative except as listed in the HPI.    Physical Exam:   87 y.o. female  Body mass index is 29.08 kg/m²., 72.1 kg (159 lb)  Vitals:    03/21/23 1414   Temp: 97.9 °F (36.6 °C)     General: Alert, cooperative, appears well and in no observable distress. Appears stated age and BMI as listed above.  HEENT: Normocephalic, atraumatic on external visual inspection.  CV: No significant peripheral edema.  Respiratory: Normal respiratory effort.  Skin: Warm & well perfused; appropriate skin turgor.  Psych: Appropriate mood & affect.  Neuro: Gross sensation and motor intact in affected extremity/extremities.  Vascular: Peripheral pulses palpable in affected extremity/extremities.     MSK Exam:  Exam is unchanged     Radiology:    No new images were needed at the visit today.     Procedure:   N/A      Misc. Data/Labs: N/A    Assessment & Plan:    ICD-10-CM ICD-9-CM   1. 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 Chronic pain of both shoulders  M25.511 719.41    G89.29 338.29    M25.512      No orders of the defined types were placed in this encounter.    No orders of the defined types were placed in this encounter.      This is an 87-year-old female with suspected rotator cuff arthropathy  bilaterally right greater than left.  It does sound like she got some relief with the injections, not 100% but does sound like her symptoms are little bit better.  I think it would be reasonable to do injections again in 2 more months.  If her symptoms are still manageable at that time she could certainly cancel that appointment I will have her make an appointment just in case.  We talked about her surgical options that would be a reverse total shoulder.  I am not sure that she would be a great candidate for that and I do not think that she is interested in major elective surgery that would limit her further.    I did recommend that she discuss pain management options with Dr. Madrigal.  Looks like she was scheduled for a spinal cord stimulator back in 2021 but due to her being on Eliquis that just did not work out at the time.    Return in about 2 months (around 5/21/2023) for Injection with TERRELL Ibrahim.    Patient encouraged to call with questions or concerns prior to follow up.  Recommend ICE and/or HEAT PRN as discussed.  Will discuss with attending physician as needed.  Consider additional referrals, work up and/or advanced imaging as indicated or if patient fails to respond to conservative care.        TERRELL Noel      Dictated Utilizing Dragon Dictation

## 2023-04-05 DIAGNOSIS — I87.2 VENOUS STASIS DERMATITIS OF BOTH LOWER EXTREMITIES: ICD-10-CM

## 2023-04-05 RX ORDER — BETAMETHASONE DIPROPIONATE 0.5 MG/G
CREAM TOPICAL DAILY
Qty: 45 G | Refills: 1 | Status: SHIPPED | OUTPATIENT
Start: 2023-04-05

## 2023-04-05 NOTE — TELEPHONE ENCOUNTER
Caller: Noemi Forbes    Relationship: Self    Best call back number:[phone]     Requested Prescriptions:   Requested Prescriptions     Pending Prescriptions Disp Refills   • betamethasone dipropionate 0.05 % cream 45 g 1        Pharmacy where request should be sent: [id number] [place] [id number] [facility] AT [facility] [phone] [facility] [phone] FX     Last office visit with prescribing clinician: 1/31/2023   Last telemedicine visit with prescribing clinician: Visit date not found   Next office visit with prescribing clinician: Visit date not found     Additional details provided by patient: PATIENT CALLING STATING THAT SHE IS NEEDING A NEW PRESCRIPTION SENT TO THE PHARMACY     Does the patient have less than a 3 day supply:  [] Yes  [x] No    Would you like a call back once the refill request has been completed: [] Yes [x] No    If the office needs to give you a call back, can they leave a voicemail: [] Yes [x] No    Cat Lindo Rep   04/05/23 13:30 EDT

## 2023-08-14 ENCOUNTER — TELEPHONE (OUTPATIENT)
Dept: FAMILY MEDICINE CLINIC | Facility: CLINIC | Age: 88
End: 2023-08-14
Payer: MEDICARE

## 2023-08-14 NOTE — TELEPHONE ENCOUNTER
The patient is calling because she has had back pain for years now. Her friend recommended Solonpas lidocaine patches. She said she tried one and it worked wonderfully. However, she heard one of the side effects can be stomach bleeding because she is also on Eliquis. Can she continue to use the patch and take Eliquis? She is requesting a call back at [phone].

## 2023-09-19 ENCOUNTER — OFFICE VISIT (OUTPATIENT)
Dept: FAMILY MEDICINE CLINIC | Facility: CLINIC | Age: 88
End: 2023-09-19
Payer: MEDICARE

## 2023-09-19 ENCOUNTER — DOCUMENTATION (OUTPATIENT)
Dept: FAMILY MEDICINE CLINIC | Facility: CLINIC | Age: 88
End: 2023-09-19
Payer: MEDICARE

## 2023-09-19 VITALS
HEART RATE: 84 BPM | OXYGEN SATURATION: 96 % | TEMPERATURE: 97.8 F | WEIGHT: 166 LBS | SYSTOLIC BLOOD PRESSURE: 130 MMHG | HEIGHT: 61 IN | BODY MASS INDEX: 31.34 KG/M2 | DIASTOLIC BLOOD PRESSURE: 80 MMHG

## 2023-09-19 DIAGNOSIS — J01.10 ACUTE NON-RECURRENT FRONTAL SINUSITIS: Primary | ICD-10-CM

## 2023-09-19 DIAGNOSIS — R05.1 ACUTE COUGH: ICD-10-CM

## 2023-09-19 DIAGNOSIS — J01.10 ACUTE NON-RECURRENT FRONTAL SINUSITIS: ICD-10-CM

## 2023-09-19 DIAGNOSIS — R09.89 RUNNY NOSE: Primary | ICD-10-CM

## 2023-09-19 LAB
EXPIRATION DATE: NORMAL
FLUAV AG UPPER RESP QL IA.RAPID: NOT DETECTED
FLUBV AG UPPER RESP QL IA.RAPID: NOT DETECTED
INTERNAL CONTROL: NORMAL
Lab: NORMAL
SARS-COV-2 AG UPPER RESP QL IA.RAPID: NOT DETECTED

## 2023-09-19 PROCEDURE — 1160F RVW MEDS BY RX/DR IN RCRD: CPT | Performed by: NURSE PRACTITIONER

## 2023-09-19 PROCEDURE — 99213 OFFICE O/P EST LOW 20 MIN: CPT | Performed by: NURSE PRACTITIONER

## 2023-09-19 PROCEDURE — 1159F MED LIST DOCD IN RCRD: CPT | Performed by: NURSE PRACTITIONER

## 2023-09-19 PROCEDURE — 87428 SARSCOV & INF VIR A&B AG IA: CPT | Performed by: NURSE PRACTITIONER

## 2023-09-19 RX ORDER — AZITHROMYCIN 250 MG/1
TABLET, FILM COATED ORAL
Qty: 6 TABLET | Refills: 0 | Status: SHIPPED | OUTPATIENT
Start: 2023-09-19 | End: 2023-09-19 | Stop reason: SDUPTHER

## 2023-09-19 RX ORDER — AZITHROMYCIN 250 MG/1
TABLET, FILM COATED ORAL
Qty: 6 TABLET | Refills: 0 | Status: SHIPPED | OUTPATIENT
Start: 2023-09-19 | End: 2023-09-20 | Stop reason: SDUPTHER

## 2023-09-19 NOTE — PROGRESS NOTES
"Answers submitted by the patient for this visit:  Primary Reason for Visit (Submitted on 9/18/2023)  What is the primary reason for your visit?: Cough  Cough Questionnaire (Submitted on 9/18/2023)  Chief Complaint: Cough  Chronicity: new  Onset: in the past 7 days  Progression since onset: unchanged  Frequency: hourly  Cough characteristics: non-productive  chest pain: No  chills: No  ear congestion: No  ear pain: No  fever: No  headaches: No  heartburn: No  hemoptysis: No  myalgias: No  nasal congestion: Yes  postnasal drip: Yes  rash: No  rhinorrhea: Yes  shortness of breath: No  sore throat: No  sweats: No  weight loss: No  wheezing: No  Aggravated by: nothing  Risk factors for lung disease: smoking/tobacco exposure  Chief Complaint  Nasal Congestion and Cough    Subjective        Noemi Forbes presents to [facility]  History of Present Illness  Patient says she has been coughing for 7-8 days. Started with a mild sore throat. She has been using otc cough syrup. Denies shortness of breath, fever, wheezing. Does have a runny nose.   Objective   Vital Signs:  /80 (BP Location: Left arm, Patient Position: Sitting, Cuff Size: Adult)   Pulse 84   Temp 97.8 °F (36.6 °C) (Temporal)   Ht 154.9 cm (60.98\")   Wt 75.3 kg (166 lb)   SpO2 96%   BMI 31.38 kg/m²   Estimated body mass index is 31.38 kg/m² as calculated from the following:    Height as of this encounter: 154.9 cm (60.98\").    Weight as of this encounter: 75.3 kg (166 lb).               Physical Exam  Constitutional:       Appearance: Normal appearance.   HENT:      Head: Normocephalic.   Eyes:      Extraocular Movements: Extraocular movements intact.      Pupils: Pupils are equal, round, and reactive to light.   Cardiovascular:      Rate and Rhythm: Normal rate and regular rhythm.   Pulmonary:      Effort: Pulmonary effort is normal.      Breath sounds: Normal breath sounds.   Musculoskeletal:         General: Normal " range of motion.      Cervical back: Normal range of motion.   Skin:     General: Skin is warm and dry.   Neurological:      General: No focal deficit present.      Mental Status: She is alert and oriented to person, place, and time.   Psychiatric:         Mood and Affect: Mood normal.      Result Review :                   Assessment and Plan   Diagnoses and all orders for this visit:    1. Runny nose (Primary)  -     POCT SARS-CoV-2 Antigen BILLY + Flu    2. Acute cough  -     POCT SARS-CoV-2 Antigen BILLY + Flu    3. Acute non-recurrent frontal sinusitis  -     azithromycin (Zithromax Z-Daniel) 250 MG tablet; Take 2 tablets by mouth on day 1, then 1 tablet daily on days 2-5  Dispense: 6 tablet; Refill: 0    Other orders  -     dextromethorphan 15 MG/5ML syrup; Take 10 mL by mouth 4 (Four) Times a Day As Needed for Cough.  Dispense: 118 mL; Refill: 1             Follow Up   Return if symptoms worsen or fail to improve.  Patient was given instructions and counseling regarding her condition or for health maintenance advice. Please see specific information pulled into the AVS if appropriate.

## 2023-09-20 ENCOUNTER — TELEPHONE (OUTPATIENT)
Dept: FAMILY MEDICINE CLINIC | Facility: CLINIC | Age: 88
End: 2023-09-20
Payer: MEDICARE

## 2023-09-20 DIAGNOSIS — J01.10 ACUTE NON-RECURRENT FRONTAL SINUSITIS: ICD-10-CM

## 2023-09-20 RX ORDER — AZITHROMYCIN 250 MG/1
TABLET, FILM COATED ORAL
Qty: 6 TABLET | Refills: 0 | Status: SHIPPED | OUTPATIENT
Start: 2023-09-20

## 2023-09-20 NOTE — TELEPHONE ENCOUNTER
I called and spoke with the patient and let her know that I sent the medication to the pharmacy she had requested. She was not very happy that it was not sent to the right location but I tried assuring her that it was were she needed it now. She still was upset and verbalized that she could not go pick it up. I let her know that unfortunately there was nothing I could do because it is up to the pharmacy to fill and hand out the medication. I did however advise that if she had any other problems she could give us a call and she said she didn't think I could help because I was not helping now. I expressed how sorry I was for the mess up. She verbalized that she was just not going to take the medication which I advised was not a good idea. After this the phone call ended abruptly.

## 2023-09-20 NOTE — TELEPHONE ENCOUNTER
Caller: Noemi Forbes    Relationship: Self    Best call back number: [phone]     What medications are you currently taking: azithromycin (Zithromax Z-Daniel) 250 MG tablet                  dextromethorphan 15 MG/5ML syrup      What are your concerns: PATIENT CALLING STATING THAT THESE MEDICATION NEED TO GO TO [facility] THEY HAVE BEEN SENT TO MAIL ORDER PHARMACY PLEASE RESEND TO PHARMACY LISTED BELOW    PHARMACY:[id number] - [facility] [id number] AT [facility] - [phone] [facility] [phone]  [phone]

## 2023-09-20 NOTE — TELEPHONE ENCOUNTER
These medications were sent to the mail order but she needs them as soon as possible so I will resend to the requested pharmacy.     LOV- 9/19/2023  NOV-10/11/2023  LF- 9/19/2023

## 2023-09-25 RX ORDER — PHENOL 1.4 %
600 AEROSOL, SPRAY (ML) MUCOUS MEMBRANE DAILY
Qty: 90 TABLET | Refills: 3 | Status: SHIPPED | OUTPATIENT
Start: 2023-09-25

## 2023-09-25 NOTE — TELEPHONE ENCOUNTER
The patient is wanting to have the vitamin D3 and the Vitamin C sent through her mail service so she needs a prescription for it. Are you able to send this in for her? She says she takes 600 of the calcium and 125 of the D3.

## 2023-09-26 NOTE — TELEPHONE ENCOUNTER
Rx clarification request     30 mg/5ml correction from fax     There is not a 15 mg/ml suspension

## 2023-09-27 DIAGNOSIS — I89.0 LYMPHEDEMA: ICD-10-CM

## 2023-09-27 RX ORDER — FUROSEMIDE 20 MG/1
40 TABLET ORAL DAILY
Qty: 180 TABLET | Refills: 3 | Status: SHIPPED | OUTPATIENT
Start: 2023-09-27

## 2023-10-11 ENCOUNTER — OFFICE VISIT (OUTPATIENT)
Dept: FAMILY MEDICINE CLINIC | Facility: CLINIC | Age: 88
End: 2023-10-11
Payer: MEDICARE

## 2023-10-11 VITALS
HEART RATE: 74 BPM | TEMPERATURE: 96.9 F | SYSTOLIC BLOOD PRESSURE: 114 MMHG | HEIGHT: 63 IN | BODY MASS INDEX: 28.99 KG/M2 | WEIGHT: 163.6 LBS | OXYGEN SATURATION: 97 % | DIASTOLIC BLOOD PRESSURE: 62 MMHG

## 2023-10-11 DIAGNOSIS — M25.512 CHRONIC PAIN OF BOTH SHOULDERS: Primary | ICD-10-CM

## 2023-10-11 DIAGNOSIS — R32 URINARY INCONTINENCE, UNSPECIFIED TYPE: ICD-10-CM

## 2023-10-11 DIAGNOSIS — M12.811 ROTATOR CUFF ARTHROPATHY OF BOTH SHOULDERS: ICD-10-CM

## 2023-10-11 DIAGNOSIS — M15.9 GENERALIZED OSTEOARTHRITIS: ICD-10-CM

## 2023-10-11 DIAGNOSIS — M12.812 ROTATOR CUFF ARTHROPATHY OF BOTH SHOULDERS: ICD-10-CM

## 2023-10-11 DIAGNOSIS — M25.511 CHRONIC PAIN OF BOTH SHOULDERS: Primary | ICD-10-CM

## 2023-10-11 DIAGNOSIS — G89.29 CHRONIC PAIN OF BOTH SHOULDERS: Primary | ICD-10-CM

## 2023-10-11 PROBLEM — M20.41 HAMMER TOE OF RIGHT FOOT: Status: ACTIVE | Noted: 2023-10-11

## 2023-10-11 PROCEDURE — 1160F RVW MEDS BY RX/DR IN RCRD: CPT | Performed by: INTERNAL MEDICINE

## 2023-10-11 PROCEDURE — 1159F MED LIST DOCD IN RCRD: CPT | Performed by: INTERNAL MEDICINE

## 2023-10-11 PROCEDURE — 90662 IIV NO PRSV INCREASED AG IM: CPT | Performed by: INTERNAL MEDICINE

## 2023-10-11 PROCEDURE — G0008 ADMIN INFLUENZA VIRUS VAC: HCPCS | Performed by: INTERNAL MEDICINE

## 2023-10-11 PROCEDURE — 99214 OFFICE O/P EST MOD 30 MIN: CPT | Performed by: INTERNAL MEDICINE

## 2023-10-11 RX ORDER — PHENOL 1.4 %
600 AEROSOL, SPRAY (ML) MUCOUS MEMBRANE DAILY
Qty: 90 TABLET | Refills: 3 | Status: SHIPPED | OUTPATIENT
Start: 2023-10-11

## 2023-10-11 NOTE — PROGRESS NOTES
Subjective   Noemi Forbes is a 88 y.o. female.     Chief Complaint   Patient presents with    Arm Pain     She said she has been having pain in both arms from shoulder to elbow and can not reach above her head very well/ at all.    Neck Pain     She says she is having problems turning her head    Ear Fullness     She said she feels like her ears are closing up. She can hear but it sounds all jumbled and can't understand.     Urinary Incontinence     She says she has been having problems with incontinence.        History of Present Illness   Patient been having pain in both arms between her shoulder and elbows and difficulty reaching above her head.  This has been present for months.  She has neck pain and difficulty turning her head to the side.    Ear fullness difficulty hearing and understanding.  No pain or drainage.    Difficulties with urinary incontinence.  Started after started the furosemide twice a day.  No urinary incontinence at night with not taking the furosemide.    History of lymphedema of both legs in the past.  Is currently taking Eliquis 5 mg twice a day along with her furosemide 20 mg 2 tabs daily.  She was seen in the emergency room on 6/29/2023 after being evaluated in the office.  Her venous Doppler was negative showing only chronic right lower extremity DVT in the gastrocnemius and some superficial thrombophlebitis in the small saphenous in the right lower extremity.  Labs with a proBNP of only 215 CMP was good with a GFR of only 55.3 and a blood count with a white count of only 4.39 and no evidence of anemia.  Patient was discharged home with likely lymphedema as underlying cause.     The following portions of the patient's history were reviewed and updated as appropriate: allergies, current medications, past family history, past medical history, past social history, past surgical history and problem list.    Depression Screen:      4/28/2022     2:38 PM   PHQ-2/PHQ-9 Depression Screening    Little Interest or Pleasure in Doing Things 0-->not at all   Feeling Down, Depressed or Hopeless 0-->not at all   PHQ-9: Brief Depression Severity Measure Score 0       Past Medical History:   Diagnosis Date    Arthritis     DVT of leg (deep venous thrombosis)     Low back pain     Osteopenia        Past Surgical History:   Procedure Laterality Date    BACK SURGERY      BRAIN SURGERY      CATARACT EXTRACTION      COLONOSCOPY      HYSTERECTOMY      ROTATOR CUFF REPAIR Right     SPINE SURGERY      THYROID SURGERY      TONSILLECTOMY         Family History   Problem Relation Age of Onset    Dementia Mother        Social History     Socioeconomic History    Marital status:    Tobacco Use    Smoking status: Every Day     Packs/day: 0.50     Years: 73.00     Additional pack years: 0.00     Total pack years: 36.50     Types: Cigarettes     Start date: [birth year]1/1949     Passive exposure: Never    Smokeless tobacco: Never   Substance and Sexual Activity    Alcohol use: Yes     Alcohol/week: 6.0 standard drinks of alcohol     Types: 4 Glasses of wine, 2 Cans of beer per week     Comment: socially    Drug use: Never    Sexual activity: Not Currently       Current Outpatient Medications   Medication Sig Dispense Refill    calcium carbonate (OS-DARIAN) 600 MG tablet Take 1 tablet by mouth Daily. 90 tablet 3    Eliquis 5 MG tablet tablet TAKE 1 TABLET BY MOUTH  TWICE DAILY 180 tablet 3    furosemide (LASIX) 20 MG tablet Take 2 tablets by mouth Daily. 180 tablet 3    latanoprost (XALATAN) 0.005 % ophthalmic solution Administer 1 drop to both eyes Daily.      potassium chloride (K-DUR,KLOR-CON) 10 MEQ CR tablet Take 1 tablet by mouth Daily. 90 tablet 3    vitamin D3 125 MCG (5000 UT) capsule capsule Take 1 capsule by mouth Daily. 90 capsule 3    Ibuprofen 3 %, Gabapentin 10 %, Baclofen 2 %, lidocaine 4 % Apply 1-2 g topically to the appropriate area as directed 3 (Three) to 4 (Four) times daily. 90 g 5     No current  "facility-administered medications for this visit.       Review of Systems   Constitutional:  Negative for activity change, appetite change, fatigue, fever, unexpected weight gain and unexpected weight loss.   HENT:  Positive for hearing loss. Negative for ear discharge, ear pain, nosebleeds, rhinorrhea, trouble swallowing and voice change.    Eyes:  Negative for visual disturbance.   Respiratory:  Negative for cough, chest tightness, shortness of breath and wheezing.    Cardiovascular:  Negative for chest pain, palpitations and leg swelling.   Gastrointestinal:  Negative for abdominal pain, blood in stool, constipation, diarrhea, nausea, vomiting, GERD and indigestion.   Genitourinary:  Positive for frequency and urinary incontinence. Negative for dysuria and hematuria.   Musculoskeletal:  Negative for arthralgias, back pain and myalgias.        Bilateral shoulder, proximal arm pain and neck pain.   Skin:  Negative for rash and wound.   Neurological:  Negative for dizziness, tremors, weakness, light-headedness, numbness, headache and memory problem.   Hematological:  Negative for adenopathy. Does not bruise/bleed easily.   Psychiatric/Behavioral:  Negative for sleep disturbance and depressed mood. The patient is not nervous/anxious.        Objective   /62 (BP Location: Left arm, Patient Position: Sitting, Cuff Size: Large Adult)   Pulse 74   Temp 96.9 øF (36.1 øC) (Temporal)   Ht 159 cm (62.6\") Comment: Patient had on shoes  Wt 74.2 kg (163 lb 9.6 oz)   SpO2 97%   BMI 29.35 kg/mý     Physical Exam  Vitals and nursing note reviewed.   Constitutional:       General: She is not in acute distress.     Appearance: She is well-developed. She is not diaphoretic.   HENT:      Head: Normocephalic and atraumatic.      Right Ear: Tympanic membrane, ear canal and external ear normal. There is no impacted cerumen.      Left Ear: Tympanic membrane, ear canal and external ear normal. There is no impacted cerumen.      " Ears:      Comments: Difficulty hearing bilateral     Nose: Nose normal.      Mouth/Throat:      Mouth: Mucous membranes are moist.      Pharynx: No oropharyngeal exudate or posterior oropharyngeal erythema.   Eyes:      Conjunctiva/sclera: Conjunctivae normal.      Pupils: Pupils are equal, round, and reactive to light.   Neck:      Thyroid: No thyromegaly.      Trachea: No tracheal deviation.   Cardiovascular:      Rate and Rhythm: Normal rate and regular rhythm.      Heart sounds: Normal heart sounds. No murmur heard.     No friction rub. No gallop.   Pulmonary:      Effort: Pulmonary effort is normal. No respiratory distress.      Breath sounds: Normal breath sounds.   Abdominal:      General: Bowel sounds are normal.      Palpations: Abdomen is soft. There is no mass.      Tenderness: There is no abdominal tenderness. There is no guarding.   Musculoskeletal:         General: Normal range of motion.      Cervical back: Normal range of motion and neck supple.   Lymphadenopathy:      Cervical: No cervical adenopathy.   Skin:     General: Skin is warm and dry.      Capillary Refill: Capillary refill takes less than 2 seconds.      Findings: No rash.   Neurological:      Mental Status: She is alert and oriented to person, place, and time.      Motor: No abnormal muscle tone.      Deep Tendon Reflexes: Reflexes normal.   Psychiatric:         Behavior: Behavior normal.         Thought Content: Thought content normal.         Judgment: Judgment normal.       Recent Results (from the past 2016 hour(s))   POCT SARS-CoV-2 Antigen BILLY + Flu    Collection Time: 09/19/23  4:17 PM    Specimen: Swab   Result Value Ref Range    SARS Antigen Not Detected Not Detected, Presumptive Negative    Influenza A Antigen BILLY Not Detected Not Detected    Influenza B Antigen BILLY Not Detected Not Detected    Internal Control Passed Passed    Lot Number [id number]     Expiration Date 04/18/2024      Assessment & Plan   Diagnoses and all orders  for this visit:    1. Chronic pain of both shoulders (Primary)  -     Ibuprofen 3 %, Gabapentin 10 %, Baclofen 2 %, lidocaine 4 %; Apply 1-2 g topically to the appropriate area as directed 3 (Three) to 4 (Four) times daily.  Dispense: 90 g; Refill: 5    2. Generalized osteoarthritis  -     Ibuprofen 3 %, Gabapentin 10 %, Baclofen 2 %, lidocaine 4 %; Apply 1-2 g topically to the appropriate area as directed 3 (Three) to 4 (Four) times daily.  Dispense: 90 g; Refill: 5    3. Rotator cuff arthropathy of both shoulders  -     Ibuprofen 3 %, Gabapentin 10 %, Baclofen 2 %, lidocaine 4 %; Apply 1-2 g topically to the appropriate area as directed 3 (Three) to 4 (Four) times daily.  Dispense: 90 g; Refill: 5    4. Urinary incontinence, unspecified type    Other orders  -     vitamin D3 125 MCG (5000 UT) capsule capsule; Take 1 capsule by mouth Daily.  Dispense: 90 capsule; Refill: 3  -     calcium carbonate (OS-DARIAN) 600 MG tablet; Take 1 tablet by mouth Daily.  Dispense: 90 tablet; Refill: 3  -     Fluzone High-Dose 65+yrs (2178-1192)    Recommend decreasing the furosemide down from two times a day to once a day.  This should help the incontinence.  Shoulder, arm and neck pain, Will try topical compound cream to avoid interaction with blood thinner.  Hearing loss chronic.  Patient declines hearing aid evaluation.       COVID-19 Precautions - Patient was compliant in wearing a mask. When I saw the patient, I used appropriate personal protective equipment (PPE) including mask and eye shield (standard procedure).  Additionally, I used gown and gloves if indicated.  Hand hygiene was completed before and after seeing the patient.  Dictated utilizing Dragon Dictation

## 2023-11-02 RX ORDER — APIXABAN 5 MG/1
5 TABLET, FILM COATED ORAL 2 TIMES DAILY
Qty: 180 TABLET | Refills: 3 | Status: SHIPPED | OUTPATIENT
Start: 2023-11-02

## 2023-11-08 ENCOUNTER — OFFICE VISIT (OUTPATIENT)
Dept: FAMILY MEDICINE CLINIC | Facility: CLINIC | Age: 88
End: 2023-11-08
Payer: MEDICARE

## 2023-11-08 VITALS
BODY MASS INDEX: 29.55 KG/M2 | WEIGHT: 166.8 LBS | HEIGHT: 63 IN | SYSTOLIC BLOOD PRESSURE: 126 MMHG | DIASTOLIC BLOOD PRESSURE: 78 MMHG | OXYGEN SATURATION: 95 % | TEMPERATURE: 97.5 F | HEART RATE: 61 BPM

## 2023-11-08 DIAGNOSIS — Z00.00 MEDICARE ANNUAL WELLNESS VISIT, SUBSEQUENT: Primary | ICD-10-CM

## 2023-11-08 DIAGNOSIS — Z23 IMMUNIZATION DUE: ICD-10-CM

## 2023-11-08 NOTE — PROGRESS NOTES
The ABCs of the Annual Wellness Visit  Subsequent Medicare Wellness Visit    Subjective    Noemi Forbes is a 88 y.o. female who presents for a Subsequent Medicare Wellness Visit.    History of lymphedema of both legs in the past.  Is currently taking Eliquis 5 mg twice a day along with her furosemide 20 mg 2 tabs daily.  She was seen in the emergency room on 6/29/2023 after being evaluated in the office.  Her venous Doppler was negative showing only chronic right lower extremity DVT in the gastrocnemius and some superficial thrombophlebitis in the small saphenous in the right lower extremity.  Labs with a proBNP of only 215 CMP was good with a GFR of only 55.3 and a blood count with a white count of only 4.39 and no evidence of anemia.  Patient was discharged home with likely lymphedema as underlying cause.      Chronic shoulder, neck and arm pains despite meds and topical compounded creams with no relief.    The following portions of the patient's history were reviewed and   updated as appropriate: allergies, current medications, past family history, past medical history, past social history, past surgical history, and problem list.    Compared to one year ago, the patient feels her physical health is the same.    Compared to one year ago, the patient feels her mental health is the same.    Recent Hospitalizations:  She was not admitted to the hospital during the last year.       Current Medical Providers:  Patient Care Team:  Villa Madrigal MD as PCP - General (Internal Medicine)    Outpatient Medications Prior to Visit   Medication Sig Dispense Refill    calcium carbonate (OS-DARIAN) 600 MG tablet Take 1 tablet by mouth Daily. 90 tablet 3    Eliquis 5 MG tablet tablet Take 1 tablet by mouth 2 (Two) Times a Day. 180 tablet 3    furosemide (LASIX) 20 MG tablet Take 2 tablets by mouth Daily. 180 tablet 3    Ibuprofen 3 %, Gabapentin 10 %, Baclofen 2 %, lidocaine 4 % Apply 1-2 g topically to the appropriate area  as directed 3 (Three) to 4 (Four) times daily. 90 g 5    latanoprost (XALATAN) 0.005 % ophthalmic solution Administer 1 drop to both eyes Daily.      potassium chloride (K-DUR,KLOR-CON) 10 MEQ CR tablet Take 1 tablet by mouth Daily. 90 tablet 3    vitamin D3 125 MCG (5000 UT) capsule capsule Take 1 capsule by mouth Daily. 90 capsule 3     No facility-administered medications prior to visit.       No opioid medication identified on active medication list. I have reviewed chart for other potential  high risk medication/s and harmful drug interactions in the elderly.        Aspirin is not on active medication list.  Aspirin use is contraindicated for this patient due to: current use of Eliquis.  .    Patient Active Problem List   Diagnosis    Acute deep vein thrombosis (DVT) of distal vein of left lower extremity    Back pain, chronic    Arthritis    Chronic deep vein thrombosis (DVT) of distal vein of both lower extremities    Chronic urinary tract infection    Chronic venous insufficiency    Facet arthritis of lumbar region    Lymphedema    Aortic stenosis    Osteopenia    Spinal stenosis of lumbar region    Spondylolisthesis of lumbar region    Tobacco abuse    Venous stasis dermatitis of both lower extremities    Postlaminectomy syndrome    Cellulitis of left lower extremity    History of deep vein thrombosis (DVT) of lower extremity    Lymphedema    Acute kidney failure    Generalized osteoarthritis    DDD (degenerative disc disease), lumbar    Left hip pain    Impaired mobility    Encounter for power mobility device assessment    Injury of right rotator cuff    Shoulder pain, bilateral    Rotator cuff arthropathy of both shoulders    Hammer toe of right foot    Medicare annual wellness visit, subsequent     Advance Care Planning   Advance Care Planning     Advance Directive is on file.  ACP discussion was held with the patient during this visit. Patient has an advance directive in EMR which is still valid.     "  Objective    Vitals:    23 1502   BP: 126/78   BP Location: Left arm   Patient Position: Sitting   Cuff Size: Adult   Pulse: 61   Temp: 97.5 °F (36.4 °C)   TempSrc: Temporal   SpO2: 95%   Weight: 75.7 kg (166 lb 12.8 oz)   Height: 159 cm (62.6\")     Estimated body mass index is 29.93 kg/m² as calculated from the following:    Height as of this encounter: 159 cm (62.6\").    Weight as of this encounter: 75.7 kg (166 lb 12.8 oz).           Does the patient have evidence of cognitive impairment? No          HEALTH RISK ASSESSMENT    Smoking Status:  Social History     Tobacco Use   Smoking Status Every Day    Packs/day: 0.50    Years: 73.00    Additional pack years: 0.00    Total pack years: 36.50    Types: Cigarettes    Start date: 1950    Passive exposure: Never   Smokeless Tobacco Never     Alcohol Consumption:  Social History     Substance and Sexual Activity   Alcohol Use Yes    Alcohol/week: 6.0 standard drinks of alcohol    Types: 4 Glasses of wine, 2 Cans of beer per week    Comment: socially     Fall Risk Screen:    STEADI Fall Risk Assessment was completed, and patient is at MODERATE risk for falls. Assessment completed on:2023    Depression Screenin/8/2023     3:04 PM   PHQ-2/PHQ-9 Depression Screening   Little Interest or Pleasure in Doing Things 0-->not at all   Feeling Down, Depressed or Hopeless 0-->not at all   PHQ-9: Brief Depression Severity Measure Score 0       Health Habits and Functional and Cognitive Screenin/8/2023     3:03 PM   Functional & Cognitive Status   Do you have difficulty preparing food and eating? No   Do you have difficulty bathing yourself, getting dressed or grooming yourself? No   Do you have difficulty using the toilet? No   Do you have difficulty moving around from place to place? No   Do you have trouble with steps or getting out of a bed or a chair? No   Current Diet Well Balanced Diet   Dental Exam Up to date   Eye Exam Up to date "   Exercise (times per week) 0 times per week   Current Exercises Include No Regular Exercise   Do you need help using the phone?  No   Are you deaf or do you have serious difficulty hearing?  No   Do you need help to go to places out of walking distance? No   Do you need help shopping? No   Do you need help preparing meals?  No   Do you need help with housework?  No   Do you need help with laundry? No   Do you need help taking your medications? No   Do you need help managing money? No   Do you ever drive or ride in a car without wearing a seat belt? No   Have you felt unusual stress, anger or loneliness in the last month? No   Who do you live with? Community   If you need help, do you have trouble finding someone available to you? No   Have you been bothered in the last four weeks by sexual problems? No   Do you have difficulty concentrating, remembering or making decisions? No       Age-appropriate Screening Schedule:  Refer to the list below for future screening recommendations based on patient's age, sex and/or medical conditions. Orders for these recommended tests are listed in the plan section. The patient has been provided with a written plan.    Health Maintenance   Topic Date Due    DXA SCAN  Never done    COVID-19 Vaccine (6 - 2023-24 season) 09/01/2023    BMI FOLLOWUP  09/19/2024    ANNUAL WELLNESS VISIT  11/08/2024    TDAP/TD VACCINES (4 - Td or Tdap) 05/07/2030    INFLUENZA VACCINE  Completed    Pneumococcal Vaccine 65+  Completed    ZOSTER VACCINE  Completed        CMS Preventative Services Quick Reference  Risk Factors Identified During Encounter  Immunizations Discussed/Encouraged: COVID19 and RSV (Respiratory Syncytial Virus)  The above risks/problems have been discussed with the patient.  Pertinent information has been shared with the patient in the After Visit Summary.  An After Visit Summary and PPPS were made available to the patient.  Diagnoses and all orders for this visit:    1. Medicare  annual wellness visit, subsequent (Primary)    2. Immunization due  -     COVID-19 F23 (Pfizer) 12yrs+ (COMIRNATY)    Reviewed history and annual wellness visit with patient during office time.  Medications reviewed as appropriate.  Discussed advanced directives and living will.  Patient has living will: Living will: Directive already scanned in chart.  Discussed fall risk and precautions encourage removing throw rugs and using grab bars within the home and bathroom.  Will check the labs as ordered above to evaluate the blood sugars, kidney, liver, cholesterol for screening.  Discussed flu shot recommended to get the high-dose influenza vaccine annually in the fall.  The patient has started, but not completed, their COVID-19 vaccination series.  Prevnar-13 and pneumovax-23 up to date and appropriate.  RSV at the pharmacy and covid booster recommended today.  Encourage follow-up with the eye doctor on annual basis for glaucoma evaluation.  Discussed weight and encouraged exercise as tolerated while following a healthy diet.     Follow Up:   Next Medicare Wellness visit to be scheduled in 1 year.

## 2023-11-08 NOTE — PATIENT INSTRUCTIONS
Medicare Wellness  Personal Prevention Plan of Service     Date of Office Visit:    Encounter Provider:  Villa Madrigal MD  Place of Service:  [facility]  Patient Name: Noemi Forbes  :  1935    As part of the Medicare Wellness portion of your visit today, we are providing you with this personalized preventive plan of services (PPPS). This plan is based upon recommendations of the [place] Preventive Services Task Force (USPSTF) and the Advisory Committee on Immunization Practices (ACIP).    This lists the preventive care services that should be considered, and provides dates of when you are due. Items listed as completed are up-to-date and do not require any further intervention.    Health Maintenance   Topic Date Due    DXA SCAN  Never done    BMI FOLLOWUP  2024    ANNUAL WELLNESS VISIT  2024    TDAP/TD VACCINES (4 - Td or Tdap) 2030    COVID-19 Vaccine  Completed    INFLUENZA VACCINE  Completed    Pneumococcal Vaccine 65+  Completed    ZOSTER VACCINE  Completed       Orders Placed This Encounter   Procedures    COVID-19 F23 (Pfizer) 12yrs+ (COMIRNATY)       Return in about 6 months (around 2024) for Next scheduled follow up.

## 2024-01-30 ENCOUNTER — HOSPITAL ENCOUNTER (OUTPATIENT)
Facility: HOSPITAL | Age: 89
Discharge: HOME | End: 2024-01-30
Attending: EMERGENCY MEDICINE | Admitting: EMERGENCY MEDICINE
Payer: MEDICARE

## 2024-01-30 ENCOUNTER — APPOINTMENT (OUTPATIENT)
Dept: GENERAL RADIOLOGY | Facility: HOSPITAL | Age: 89
End: 2024-01-30
Payer: MEDICARE

## 2024-01-30 VITALS
WEIGHT: 165 LBS | BODY MASS INDEX: 32.39 KG/M2 | RESPIRATION RATE: 16 BRPM | TEMPERATURE: 97 F | HEART RATE: 68 BPM | OXYGEN SATURATION: 97 % | HEIGHT: 60 IN | SYSTOLIC BLOOD PRESSURE: 136 MMHG | DIASTOLIC BLOOD PRESSURE: 77 MMHG

## 2024-01-30 DIAGNOSIS — S93.601A SPRAIN OF RIGHT FOOT, INITIAL ENCOUNTER: ICD-10-CM

## 2024-01-30 DIAGNOSIS — S51.011A SKIN TEAR OF RIGHT ELBOW WITHOUT COMPLICATION, INITIAL ENCOUNTER: Primary | ICD-10-CM

## 2024-01-30 PROCEDURE — 73630 X-RAY EXAM OF FOOT: CPT

## 2024-01-30 PROCEDURE — G0463 HOSPITAL OUTPT CLINIC VISIT: HCPCS | Performed by: NURSE PRACTITIONER

## 2024-01-30 RX ORDER — CEPHALEXIN 500 MG/1
500 CAPSULE ORAL 4 TIMES DAILY
Qty: 28 CAPSULE | Refills: 0 | Status: SHIPPED | OUTPATIENT
Start: 2024-01-30 | End: 2024-01-30 | Stop reason: SDUPTHER

## 2024-01-30 RX ORDER — CEPHALEXIN 500 MG/1
500 CAPSULE ORAL 4 TIMES DAILY
Qty: 28 CAPSULE | Refills: 0 | Status: SHIPPED | OUTPATIENT
Start: 2024-01-30

## 2024-01-30 NOTE — FSED PROVIDER NOTE
Subjective   History of Present Illness  Patient is an 88-year-old female who presents complaining of skin tear to her right outer ankle status post mechanical fall earlier today.  States her foot got caught on something causing her to fall.  States she is unable to walk on her foot and it is painful.  She does take Eliquis for prior history of DVTs but denies hitting her head.      Review of Systems   Musculoskeletal:  Positive for arthralgias.   Skin:  Positive for wound.   All other systems reviewed and are negative.      Past Medical History:   Diagnosis Date    Arthritis     DVT of leg (deep venous thrombosis)     Low back pain     Osteopenia        No Known Allergies    Past Surgical History:   Procedure Laterality Date    BACK SURGERY      BRAIN SURGERY      CATARACT EXTRACTION      COLONOSCOPY      HYSTERECTOMY      ROTATOR CUFF REPAIR Right     SPINE SURGERY      THYROID SURGERY      TONSILLECTOMY         Family History   Problem Relation Age of Onset    Dementia Mother        Social History     Socioeconomic History    Marital status:    Tobacco Use    Smoking status: Every Day     Packs/day: 0.50     Years: 73.00     Additional pack years: 0.00     Total pack years: 36.50     Types: Cigarettes     Start date: 1/1/1950     Passive exposure: Never    Smokeless tobacco: Never   Vaping Use    Vaping Use: Never used   Substance and Sexual Activity    Alcohol use: Yes     Alcohol/week: 6.0 standard drinks of alcohol     Types: 4 Glasses of wine, 2 Cans of beer per week     Comment: socially    Drug use: Never    Sexual activity: Not Currently           Objective   Physical Exam  Vitals and nursing note reviewed.   Constitutional:       Appearance: Normal appearance.   HENT:      Head: Normocephalic and atraumatic.   Pulmonary:      Effort: Pulmonary effort is normal.   Musculoskeletal:         General: Tenderness (Right foot) present.      Cervical back: Normal range of motion and neck supple.   Skin:      General: Skin is warm and dry.      Capillary Refill: Capillary refill takes less than 2 seconds.      Comments: Skin tear to right ankle   Neurological:      General: No focal deficit present.      Mental Status: She is alert and oriented to person, place, and time.         Procedures           ED Course                                           Medical Decision Making  Imaging negative for acute fracture.  Wound was cleaned and dressed.  Patient will be started on Keflex.  She has a grossly nonfocal neuroexam.  Low suspicion for DVT as she is currently taking Eliquis.  She is follow-up with her primary care and was given strict return precautions.    Problems Addressed:  Skin tear of right elbow without complication, initial encounter: complicated acute illness or injury  Sprain of right foot, initial encounter: complicated acute illness or injury    Amount and/or Complexity of Data Reviewed  Radiology: ordered.    Risk  Prescription drug management.        Final diagnoses:   Skin tear of right elbow without complication, initial encounter   Sprain of right foot, initial encounter       ED Disposition  ED Disposition       ED Disposition   Discharge    Condition   Stable    Comment   --               Villa Madrigal MD  [id number] [address]  [phone]    Call   If symptoms worsen         Medication List        New Prescriptions      cephalexin 500 MG capsule  Commonly known as: KEFLEX  Take 1 capsule by mouth 4 (Four) Times a Day.               Where to Get Your Medications        These medications were sent to [facility] ([facility]) - [place], [place] - [address] [facility] - [phone]  - [phone]   [address] [address]      Phone: [phone]   cephalexin 500 MG capsule

## 2024-02-11 DIAGNOSIS — I89.0 LYMPHEDEMA: ICD-10-CM

## 2024-02-12 RX ORDER — POTASSIUM CHLORIDE 750 MG/1
10 TABLET, EXTENDED RELEASE ORAL DAILY
Qty: 90 TABLET | Refills: 1 | Status: SHIPPED | OUTPATIENT
Start: 2024-02-12

## 2024-03-07 ENCOUNTER — HOSPITAL ENCOUNTER (INPATIENT)
Facility: HOSPITAL | Age: 89
LOS: 1 days | Discharge: HOME | DRG: 300 | End: 2024-03-09
Attending: EMERGENCY MEDICINE | Admitting: HOSPITALIST
Payer: MEDICARE

## 2024-03-07 ENCOUNTER — APPOINTMENT (OUTPATIENT)
Dept: GENERAL RADIOLOGY | Facility: HOSPITAL | Age: 89
DRG: 300 | End: 2024-03-07
Payer: MEDICARE

## 2024-03-07 ENCOUNTER — APPOINTMENT (OUTPATIENT)
Dept: ULTRASOUND IMAGING | Facility: HOSPITAL | Age: 89
DRG: 300 | End: 2024-03-07
Payer: MEDICARE

## 2024-03-07 ENCOUNTER — TELEPHONE (OUTPATIENT)
Dept: PEDIATRICS | Facility: OTHER | Age: 89
End: 2024-03-07

## 2024-03-07 DIAGNOSIS — I82.441 DEEP VEIN THROMBOSIS (DVT) OF TIBIAL VEIN OF RIGHT LOWER EXTREMITY, UNSPECIFIED CHRONICITY: Primary | ICD-10-CM

## 2024-03-07 PROBLEM — I82.409 DVT (DEEP VENOUS THROMBOSIS): Status: ACTIVE | Noted: 2024-03-07

## 2024-03-07 LAB
ALBUMIN SERPL-MCNC: 4.2 G/DL (ref 3.5–5.2)
ALBUMIN/GLOB SERPL: 1.4 G/DL
ALP SERPL-CCNC: 100 U/L (ref 39–117)
ALT SERPL W P-5'-P-CCNC: 8 U/L (ref 1–33)
ANION GAP SERPL CALCULATED.3IONS-SCNC: 7.7 MMOL/L (ref 5–15)
APTT PPP: 32.3 SECONDS (ref 22.7–35.4)
AST SERPL-CCNC: 17 U/L (ref 1–32)
BASOPHILS # BLD AUTO: 0.03 10*3/MM3 (ref 0–0.2)
BASOPHILS NFR BLD AUTO: 0.6 % (ref 0–1.5)
BILIRUB SERPL-MCNC: 0.9 MG/DL (ref 0–1.2)
BUN SERPL-MCNC: 15 MG/DL (ref 8–23)
BUN/CREAT SERPL: 12.3 (ref 7–25)
CALCIUM SPEC-SCNC: 10 MG/DL (ref 8.6–10.5)
CHLORIDE SERPL-SCNC: 104 MMOL/L (ref 98–107)
CO2 SERPL-SCNC: 31.3 MMOL/L (ref 22–29)
CREAT SERPL-MCNC: 1.22 MG/DL (ref 0.57–1)
D-LACTATE SERPL-SCNC: 1 MMOL/L (ref 0.5–2)
DEPRECATED RDW RBC AUTO: 47.9 FL (ref 37–54)
EGFRCR SERPLBLD CKD-EPI 2021: 43 ML/MIN/1.73
EOSINOPHIL # BLD AUTO: 0.1 10*3/MM3 (ref 0–0.4)
EOSINOPHIL NFR BLD AUTO: 1.9 % (ref 0.3–6.2)
ERYTHROCYTE [DISTWIDTH] IN BLOOD BY AUTOMATED COUNT: 12.9 % (ref 12.3–15.4)
GLOBULIN UR ELPH-MCNC: 3 GM/DL
GLUCOSE SERPL-MCNC: 81 MG/DL (ref 65–99)
HCT VFR BLD AUTO: 41.3 % (ref 34–46.6)
HGB BLD-MCNC: 12.7 G/DL (ref 12–15.9)
IMM GRANULOCYTES # BLD AUTO: 0 10*3/MM3 (ref 0–0.05)
IMM GRANULOCYTES NFR BLD AUTO: 0 % (ref 0–0.5)
INR PPP: 1.13 (ref 0.9–1.1)
INR PPP: 1.2 (ref 0.91–1.09)
LYMPHOCYTES # BLD AUTO: 1.56 10*3/MM3 (ref 0.7–3.1)
LYMPHOCYTES NFR BLD AUTO: 29.1 % (ref 19.6–45.3)
MAGNESIUM SERPL-MCNC: 2.2 MG/DL (ref 1.6–2.4)
MCH RBC QN AUTO: 30.5 PG (ref 26.6–33)
MCHC RBC AUTO-ENTMCNC: 30.8 G/DL (ref 31.5–35.7)
MCV RBC AUTO: 99.3 FL (ref 79–97)
MONOCYTES # BLD AUTO: 0.46 10*3/MM3 (ref 0.1–0.9)
MONOCYTES NFR BLD AUTO: 8.6 % (ref 5–12)
NEUTROPHILS NFR BLD AUTO: 3.22 10*3/MM3 (ref 1.7–7)
NEUTROPHILS NFR BLD AUTO: 59.8 % (ref 42.7–76)
PLATELET # BLD AUTO: 232 10*3/MM3 (ref 140–450)
PMV BLD AUTO: 10.1 FL (ref 6–12)
POTASSIUM SERPL-SCNC: 3.6 MMOL/L (ref 3.5–5.2)
PROCALCITONIN SERPL-MCNC: 0.05 NG/ML (ref 0–0.25)
PROT SERPL-MCNC: 7.2 G/DL (ref 6–8.5)
PROTHROMBIN TIME: 13.8 SECONDS (ref 10–13.8)
PROTHROMBIN TIME: 14.6 SECONDS (ref 11.7–14.2)
RBC # BLD AUTO: 4.16 10*6/MM3 (ref 3.77–5.28)
SODIUM SERPL-SCNC: 143 MMOL/L (ref 136–145)
WBC NRBC COR # BLD AUTO: 5.37 10*3/MM3 (ref 3.4–10.8)

## 2024-03-07 PROCEDURE — 25010000002 HEPARIN (PORCINE) 25000-0.45 UT/250ML-% SOLUTION: Performed by: EMERGENCY MEDICINE

## 2024-03-07 PROCEDURE — 85610 PROTHROMBIN TIME: CPT | Performed by: EMERGENCY MEDICINE

## 2024-03-07 PROCEDURE — 87040 BLOOD CULTURE FOR BACTERIA: CPT | Performed by: EMERGENCY MEDICINE

## 2024-03-07 PROCEDURE — 93005 ELECTROCARDIOGRAM TRACING: CPT | Performed by: EMERGENCY MEDICINE

## 2024-03-07 PROCEDURE — 93010 ELECTROCARDIOGRAM REPORT: CPT | Performed by: INTERNAL MEDICINE

## 2024-03-07 PROCEDURE — 36415 COLL VENOUS BLD VENIPUNCTURE: CPT

## 2024-03-07 PROCEDURE — 83605 ASSAY OF LACTIC ACID: CPT | Performed by: EMERGENCY MEDICINE

## 2024-03-07 PROCEDURE — 99285 EMERGENCY DEPT VISIT HI MDM: CPT

## 2024-03-07 PROCEDURE — 85730 THROMBOPLASTIN TIME PARTIAL: CPT | Performed by: EMERGENCY MEDICINE

## 2024-03-07 PROCEDURE — 85025 COMPLETE CBC W/AUTO DIFF WBC: CPT | Performed by: EMERGENCY MEDICINE

## 2024-03-07 PROCEDURE — 93971 EXTREMITY STUDY: CPT

## 2024-03-07 PROCEDURE — 99284 EMERGENCY DEPT VISIT MOD MDM: CPT | Performed by: EMERGENCY MEDICINE

## 2024-03-07 PROCEDURE — 25010000002 HEPARIN (PORCINE) PER 1000 UNITS: Performed by: EMERGENCY MEDICINE

## 2024-03-07 PROCEDURE — 84145 PROCALCITONIN (PCT): CPT | Performed by: EMERGENCY MEDICINE

## 2024-03-07 PROCEDURE — 80053 COMPREHEN METABOLIC PANEL: CPT | Performed by: EMERGENCY MEDICINE

## 2024-03-07 PROCEDURE — 83735 ASSAY OF MAGNESIUM: CPT | Performed by: EMERGENCY MEDICINE

## 2024-03-07 PROCEDURE — 71045 X-RAY EXAM CHEST 1 VIEW: CPT

## 2024-03-07 RX ORDER — HEPARIN SODIUM 5000 [USP'U]/ML
40-80 INJECTION, SOLUTION INTRAVENOUS; SUBCUTANEOUS EVERY 6 HOURS PRN
Status: DISCONTINUED | OUTPATIENT
Start: 2024-03-07 | End: 2024-03-09 | Stop reason: HOSPADM

## 2024-03-07 RX ORDER — HEPARIN SODIUM 10000 [USP'U]/100ML
18 INJECTION, SOLUTION INTRAVENOUS
Status: DISCONTINUED | OUTPATIENT
Start: 2024-03-07 | End: 2024-03-09 | Stop reason: HOSPADM

## 2024-03-07 RX ORDER — HEPARIN SODIUM 5000 [USP'U]/ML
80 INJECTION, SOLUTION INTRAVENOUS; SUBCUTANEOUS ONCE
Status: COMPLETED | OUTPATIENT
Start: 2024-03-07 | End: 2024-03-07

## 2024-03-07 RX ADMIN — HEPARIN SODIUM 18 UNITS/KG/HR: 10000 INJECTION, SOLUTION INTRAVENOUS at 19:25

## 2024-03-07 RX ADMIN — HEPARIN SODIUM 6000 UNITS: 5000 INJECTION INTRAVENOUS; SUBCUTANEOUS at 19:24

## 2024-03-07 NOTE — FSED PROVIDER NOTE
Subjective   History of Present Illness  The patient is a 88-year-old female.  She does have a history of bilateral lower extremity DVT and bilateral lower extremity venous insufficiency with episodes of cellulitis.  She is maintained on Eliquis 5 twice daily.She is compliant.     She presents today with a several day history of increasing pain and redness in the right lower extremity. No fever/chills.  No trauma to the leg.  No cp or soa at this time.       Review of Systems    Past Medical History:   Diagnosis Date    Arthritis     DVT of leg (deep venous thrombosis)     Low back pain     Osteopenia        No Known Allergies    Past Surgical History:   Procedure Laterality Date    BACK SURGERY      BRAIN SURGERY      CATARACT EXTRACTION      COLONOSCOPY      HYSTERECTOMY      ROTATOR CUFF REPAIR Right     SPINE SURGERY      THYROID SURGERY      TONSILLECTOMY         Family History   Problem Relation Age of Onset    Dementia Mother        Social History     Socioeconomic History    Marital status:    Tobacco Use    Smoking status: Every Day     Current packs/day: 0.50     Average packs/day: 0.5 packs/day for 74.2 years (37.1 ttl pk-yrs)     Types: Cigarettes     Start date: 1/1/1950     Passive exposure: Never    Smokeless tobacco: Never   Vaping Use    Vaping status: Never Used   Substance and Sexual Activity    Alcohol use: Yes     Alcohol/week: 6.0 standard drinks of alcohol     Types: 4 Glasses of wine, 2 Cans of beer per week     Comment: socially    Drug use: Never    Sexual activity: Not Currently           Objective   Physical Exam  Vital signs: Reviewed in nurses notes    General: Patient is awake alert no acute distress    HEENT: Pupils equal round responsive to light.  Extra-ocular movements are intact.  No scleral icterus.  Nasopharynx is clear.  Oropharynx is clear with moist mucous membranes.  No masses noted    Neck:   Supple without lymphadenopathy    Respiratory:   Nonlabored respirations.   Clear to auscultation bilaterally.  Equal breath sounds bilaterally.  No wheezes or stridor noted.    Cardiovascular: Regular rate and rhythm.  4-6 soft ejection murmur noted.  Pedal pulses are intact bilaterally.    Extremities: Patient has chronic venous insufficiency bilaterally.  There is trace pretibial edema on the left with 1+ on the right.  Patient does have some sloughing of the skin with a bullae noted on the right anterior tibial area.    Abdomen: Nondistended nontender to palpation    Skin:   Patient has bilateral erythema of the anterior aspect of both legs greater in the right than the left    Neurological examination: Patient is awake alert oriented x4.  Speech is normal.  No facial palsy.  No focal motor or sensory deficits.    Procedures           ED Course  ED Course as of 03/08/24 0905   Thu Mar 07, 2024   2110 [facility] paged for admit [SD]   2120 D/w Dr. De La Torre, [facility] hospitalist accepting. Pt stable transport. [SD]      ED Course User Index  [SD] Jem Loo MD      Ultrasound was performed when patient was triaged as the ultrasound technologist was in house.  This was positive for right posterior tibial DVT.  Secondary to positive DVT in the setting of oral anticoagulation utilizing Eliquis, the decision will be made for admission.  I did go ahead and write for appropriate blood work to evaluate for evidence of secondary cellulitis.  Will go ahead and initiate heparin at this time.        1900: Care will be dispositioned to Dr. Loo at 1900.  Patient is currently stable.  Will require admission once appropriate information has been obtained                                   DDx: DVT cellulitis, chronic venous insufficiency   Medical Decision Making  Problems Addressed:  Deep vein thrombosis (DVT) of tibial vein of right lower extremity, unspecified chronicity: complicated acute illness or injury    Amount and/or Complexity of Data Reviewed  Labs: ordered.  Radiology: ordered.  ECG/medicine  tests: ordered.    Risk  Prescription drug management.  Decision regarding hospitalization.      Disposition.:  Patient will be dispositioned to Dr. Loo at 7 PM.  Currently pending baseline blood work.  Heparin has been ordered.  Anticipate admission after labs have been evaluated    Final diagnoses:   Deep vein thrombosis (DVT) of tibial vein of right lower extremity, unspecified chronicity       ED Disposition  ED Disposition       ED Disposition   Decision to Admit    Condition   --    Comment   Level of Care: Telemetry [5]   Diagnosis: DVT (deep venous thrombosis) [112502]   Admitting Physician: EMMA ROTH [[id number]]   Attending Physician: EMMA ROTH [[id number]]   Certification: I Certify That Inpatient Hospital Services Are Medically Necessary For Greater Than 2 Midnights                 No follow-up provider specified.       Medication List      No changes were made to your prescriptions during this visit.

## 2024-03-07 NOTE — TELEPHONE ENCOUNTER
Caller: Noemi Forbes    Relationship to patient: Self    Best call back number:     [phone]        Chief complaint: PATIENT IS CALLING TO STATE HER LEGS ARE SWOLLEN AND WEEPING.  SHE STATES SHE IS AFRAID SHE MAY BE HAVING A BLOOD CLOT.      Patient directed to call 911 or go to their nearest emergency room.     Patient verbalized understanding: [x] Yes  [] No  If no, why?    Additional notes:PATIENT STATES WILL GET DRESSED AND GO TO THE EMERGENCY ROOM.    PLEASE ADIVSE.

## 2024-03-08 PROBLEM — I44.7 LBBB (LEFT BUNDLE BRANCH BLOCK): Status: ACTIVE | Noted: 2024-03-08

## 2024-03-08 LAB
ANION GAP SERPL CALCULATED.3IONS-SCNC: 8.8 MMOL/L (ref 5–15)
APTT PPP: 118.9 SECONDS (ref 22.7–35.4)
APTT PPP: 128.5 SECONDS (ref 22.7–35.4)
APTT PPP: 178.9 SECONDS (ref 22.7–35.4)
APTT PPP: 57.6 SECONDS (ref 22.7–35.4)
BASOPHILS # BLD AUTO: 0.04 10*3/MM3 (ref 0–0.2)
BASOPHILS # BLD AUTO: 0.04 10*3/MM3 (ref 0–0.2)
BASOPHILS NFR BLD AUTO: 0.8 % (ref 0–1.5)
BASOPHILS NFR BLD AUTO: 0.9 % (ref 0–1.5)
BUN SERPL-MCNC: 13 MG/DL (ref 8–23)
BUN/CREAT SERPL: 11.8 (ref 7–25)
CALCIUM SPEC-SCNC: 8.8 MG/DL (ref 8.6–10.5)
CHLORIDE SERPL-SCNC: 109 MMOL/L (ref 98–107)
CO2 SERPL-SCNC: 28.2 MMOL/L (ref 22–29)
CREAT SERPL-MCNC: 1.1 MG/DL (ref 0.57–1)
DEPRECATED RDW RBC AUTO: 42.2 FL (ref 37–54)
DEPRECATED RDW RBC AUTO: 43.4 FL (ref 37–54)
EGFRCR SERPLBLD CKD-EPI 2021: 48.7 ML/MIN/1.73
EOSINOPHIL # BLD AUTO: 0.18 10*3/MM3 (ref 0–0.4)
EOSINOPHIL # BLD AUTO: 0.18 10*3/MM3 (ref 0–0.4)
EOSINOPHIL NFR BLD AUTO: 3.7 % (ref 0.3–6.2)
EOSINOPHIL NFR BLD AUTO: 4.3 % (ref 0.3–6.2)
ERYTHROCYTE [DISTWIDTH] IN BLOOD BY AUTOMATED COUNT: 12.1 % (ref 12.3–15.4)
ERYTHROCYTE [DISTWIDTH] IN BLOOD BY AUTOMATED COUNT: 12.3 % (ref 12.3–15.4)
GLUCOSE SERPL-MCNC: 82 MG/DL (ref 65–99)
HCT VFR BLD AUTO: 36.4 % (ref 34–46.6)
HCT VFR BLD AUTO: 36.7 % (ref 34–46.6)
HGB BLD-MCNC: 11.8 G/DL (ref 12–15.9)
HGB BLD-MCNC: 11.9 G/DL (ref 12–15.9)
IMM GRANULOCYTES # BLD AUTO: 0.01 10*3/MM3 (ref 0–0.05)
IMM GRANULOCYTES # BLD AUTO: 0.01 10*3/MM3 (ref 0–0.05)
IMM GRANULOCYTES NFR BLD AUTO: 0.2 % (ref 0–0.5)
IMM GRANULOCYTES NFR BLD AUTO: 0.2 % (ref 0–0.5)
INR PPP: 1.17 (ref 0.9–1.1)
LYMPHOCYTES # BLD AUTO: 1.78 10*3/MM3 (ref 0.7–3.1)
LYMPHOCYTES # BLD AUTO: 1.96 10*3/MM3 (ref 0.7–3.1)
LYMPHOCYTES NFR BLD AUTO: 40.6 % (ref 19.6–45.3)
LYMPHOCYTES NFR BLD AUTO: 42.1 % (ref 19.6–45.3)
MCH RBC QN AUTO: 31 PG (ref 26.6–33)
MCH RBC QN AUTO: 31.1 PG (ref 26.6–33)
MCHC RBC AUTO-ENTMCNC: 32.2 G/DL (ref 31.5–35.7)
MCHC RBC AUTO-ENTMCNC: 32.7 G/DL (ref 31.5–35.7)
MCV RBC AUTO: 94.8 FL (ref 79–97)
MCV RBC AUTO: 96.6 FL (ref 79–97)
MONOCYTES # BLD AUTO: 0.46 10*3/MM3 (ref 0.1–0.9)
MONOCYTES # BLD AUTO: 0.49 10*3/MM3 (ref 0.1–0.9)
MONOCYTES NFR BLD AUTO: 10.1 % (ref 5–12)
MONOCYTES NFR BLD AUTO: 10.9 % (ref 5–12)
NEUTROPHILS NFR BLD AUTO: 1.76 10*3/MM3 (ref 1.7–7)
NEUTROPHILS NFR BLD AUTO: 2.15 10*3/MM3 (ref 1.7–7)
NEUTROPHILS NFR BLD AUTO: 41.6 % (ref 42.7–76)
NEUTROPHILS NFR BLD AUTO: 44.6 % (ref 42.7–76)
NRBC BLD AUTO-RTO: 0 /100 WBC (ref 0–0.2)
NRBC BLD AUTO-RTO: 0 /100 WBC (ref 0–0.2)
NT-PROBNP SERPL-MCNC: 340 PG/ML (ref 0–1800)
PLATELET # BLD AUTO: 200 10*3/MM3 (ref 140–450)
PLATELET # BLD AUTO: 207 10*3/MM3 (ref 140–450)
PMV BLD AUTO: 10.3 FL (ref 6–12)
PMV BLD AUTO: 10.5 FL (ref 6–12)
POTASSIUM SERPL-SCNC: 4.1 MMOL/L (ref 3.5–5.2)
PROTHROMBIN TIME: 15.1 SECONDS (ref 11.7–14.2)
QT INTERVAL: 467 MS
QTC INTERVAL: 475 MS
RBC # BLD AUTO: 3.8 10*6/MM3 (ref 3.77–5.28)
RBC # BLD AUTO: 3.84 10*6/MM3 (ref 3.77–5.28)
SODIUM SERPL-SCNC: 146 MMOL/L (ref 136–145)
URATE SERPL-MCNC: 6.6 MG/DL (ref 2.4–5.7)
WBC NRBC COR # BLD AUTO: 4.23 10*3/MM3 (ref 3.4–10.8)
WBC NRBC COR # BLD AUTO: 4.83 10*3/MM3 (ref 3.4–10.8)

## 2024-03-08 PROCEDURE — 25010000002 CEFAZOLIN IN DEXTROSE 2-4 GM/100ML-% SOLUTION: Performed by: HOSPITALIST

## 2024-03-08 PROCEDURE — 85025 COMPLETE CBC W/AUTO DIFF WBC: CPT | Performed by: NURSE PRACTITIONER

## 2024-03-08 PROCEDURE — 84550 ASSAY OF BLOOD/URIC ACID: CPT | Performed by: NURSE PRACTITIONER

## 2024-03-08 PROCEDURE — 85730 THROMBOPLASTIN TIME PARTIAL: CPT | Performed by: EMERGENCY MEDICINE

## 2024-03-08 PROCEDURE — 83880 ASSAY OF NATRIURETIC PEPTIDE: CPT | Performed by: NURSE PRACTITIONER

## 2024-03-08 PROCEDURE — 85025 COMPLETE CBC W/AUTO DIFF WBC: CPT | Performed by: EMERGENCY MEDICINE

## 2024-03-08 PROCEDURE — 25010000002 HEPARIN (PORCINE) 25000-0.45 UT/250ML-% SOLUTION: Performed by: EMERGENCY MEDICINE

## 2024-03-08 PROCEDURE — 80048 BASIC METABOLIC PNL TOTAL CA: CPT | Performed by: NURSE PRACTITIONER

## 2024-03-08 PROCEDURE — 85610 PROTHROMBIN TIME: CPT | Performed by: NURSE PRACTITIONER

## 2024-03-08 PROCEDURE — 85730 THROMBOPLASTIN TIME PARTIAL: CPT | Performed by: STUDENT IN AN ORGANIZED HEALTH CARE EDUCATION/TRAINING PROGRAM

## 2024-03-08 PROCEDURE — 99222 1ST HOSP IP/OBS MODERATE 55: CPT | Performed by: INTERNAL MEDICINE

## 2024-03-08 PROCEDURE — 85730 THROMBOPLASTIN TIME PARTIAL: CPT | Performed by: HOSPITALIST

## 2024-03-08 RX ORDER — BISACODYL 10 MG
10 SUPPOSITORY, RECTAL RECTAL DAILY PRN
Status: DISCONTINUED | OUTPATIENT
Start: 2024-03-08 | End: 2024-03-09 | Stop reason: HOSPADM

## 2024-03-08 RX ORDER — POLYETHYLENE GLYCOL 3350 17 G/17G
17 POWDER, FOR SOLUTION ORAL DAILY PRN
Status: DISCONTINUED | OUTPATIENT
Start: 2024-03-08 | End: 2024-03-09 | Stop reason: HOSPADM

## 2024-03-08 RX ORDER — CALCIUM CARBONATE 500(1250)
500 TABLET ORAL DAILY
Status: DISCONTINUED | OUTPATIENT
Start: 2024-03-08 | End: 2024-03-09 | Stop reason: HOSPADM

## 2024-03-08 RX ORDER — SODIUM CHLORIDE 0.9 % (FLUSH) 0.9 %
10 SYRINGE (ML) INJECTION EVERY 12 HOURS SCHEDULED
Status: DISCONTINUED | OUTPATIENT
Start: 2024-03-08 | End: 2024-03-09 | Stop reason: HOSPADM

## 2024-03-08 RX ORDER — ACETAMINOPHEN 325 MG/1
650 TABLET ORAL EVERY 4 HOURS PRN
Status: DISCONTINUED | OUTPATIENT
Start: 2024-03-08 | End: 2024-03-09 | Stop reason: HOSPADM

## 2024-03-08 RX ORDER — SODIUM CHLORIDE 0.9 % (FLUSH) 0.9 %
10 SYRINGE (ML) INJECTION AS NEEDED
Status: DISCONTINUED | OUTPATIENT
Start: 2024-03-08 | End: 2024-03-09 | Stop reason: HOSPADM

## 2024-03-08 RX ORDER — NITROGLYCERIN 0.4 MG/1
0.4 TABLET SUBLINGUAL
Status: DISCONTINUED | OUTPATIENT
Start: 2024-03-08 | End: 2024-03-09 | Stop reason: HOSPADM

## 2024-03-08 RX ORDER — SODIUM CHLORIDE 9 MG/ML
40 INJECTION, SOLUTION INTRAVENOUS AS NEEDED
Status: DISCONTINUED | OUTPATIENT
Start: 2024-03-08 | End: 2024-03-09 | Stop reason: HOSPADM

## 2024-03-08 RX ORDER — CHOLECALCIFEROL (VITAMIN D3) 25 MCG
5000 TABLET ORAL DAILY
Status: DISCONTINUED | OUTPATIENT
Start: 2024-03-08 | End: 2024-03-09 | Stop reason: HOSPADM

## 2024-03-08 RX ORDER — ACETAMINOPHEN 160 MG/5ML
650 SOLUTION ORAL EVERY 4 HOURS PRN
Status: DISCONTINUED | OUTPATIENT
Start: 2024-03-08 | End: 2024-03-09 | Stop reason: HOSPADM

## 2024-03-08 RX ORDER — BISACODYL 5 MG/1
5 TABLET, DELAYED RELEASE ORAL DAILY PRN
Status: DISCONTINUED | OUTPATIENT
Start: 2024-03-08 | End: 2024-03-09 | Stop reason: HOSPADM

## 2024-03-08 RX ORDER — FUROSEMIDE 40 MG/1
40 TABLET ORAL DAILY
Status: DISCONTINUED | OUTPATIENT
Start: 2024-03-08 | End: 2024-03-08

## 2024-03-08 RX ORDER — CEFAZOLIN SODIUM 2 G/100ML
2000 INJECTION, SOLUTION INTRAVENOUS EVERY 8 HOURS
Status: DISCONTINUED | OUTPATIENT
Start: 2024-03-08 | End: 2024-03-09 | Stop reason: HOSPADM

## 2024-03-08 RX ORDER — AMOXICILLIN 250 MG
2 CAPSULE ORAL 2 TIMES DAILY PRN
Status: DISCONTINUED | OUTPATIENT
Start: 2024-03-08 | End: 2024-03-09 | Stop reason: HOSPADM

## 2024-03-08 RX ORDER — ONDANSETRON 2 MG/ML
4 INJECTION INTRAMUSCULAR; INTRAVENOUS EVERY 6 HOURS PRN
Status: DISCONTINUED | OUTPATIENT
Start: 2024-03-08 | End: 2024-03-09 | Stop reason: HOSPADM

## 2024-03-08 RX ORDER — ACETAMINOPHEN 650 MG/1
650 SUPPOSITORY RECTAL EVERY 4 HOURS PRN
Status: DISCONTINUED | OUTPATIENT
Start: 2024-03-08 | End: 2024-03-09 | Stop reason: HOSPADM

## 2024-03-08 RX ADMIN — Medication 500 MG: at 12:54

## 2024-03-08 RX ADMIN — CEFAZOLIN SODIUM 2000 MG: 2 INJECTION, SOLUTION INTRAVENOUS at 16:48

## 2024-03-08 RX ADMIN — Medication 10 ML: at 08:46

## 2024-03-08 RX ADMIN — HEPARIN SODIUM 12 UNITS/KG/HR: 10000 INJECTION, SOLUTION INTRAVENOUS at 19:23

## 2024-03-08 RX ADMIN — Medication 5000 UNITS: at 12:02

## 2024-03-08 NOTE — PLAN OF CARE
Goal Outcome Evaluation:   VSS. A&OX4. No c/o pain or discomfort. Purewick placed d/t pain with ambulation. BLE noted to be bright red, blister noted to RLE, pt states was weeping earlier. On heparin gtt, not currently therapeutic. To be rechecked at 10:30. Call light in reach, bed alarm on.

## 2024-03-08 NOTE — FSED PROVIDER NOTE
Subjective   History of Present Illness  Assumed care from Dr. Pruitt 1900hrs. See initial H&P/ROS.        Review of Systems    Past Medical History:   Diagnosis Date    Arthritis     DVT of leg (deep venous thrombosis)     Low back pain     Osteopenia        No Known Allergies    Past Surgical History:   Procedure Laterality Date    BACK SURGERY      BRAIN SURGERY      CATARACT EXTRACTION      COLONOSCOPY      HYSTERECTOMY      ROTATOR CUFF REPAIR Right     SPINE SURGERY      THYROID SURGERY      TONSILLECTOMY         Family History   Problem Relation Age of Onset    Dementia Mother        Social History     Socioeconomic History    Marital status:    Tobacco Use    Smoking status: Every Day     Current packs/day: 0.50     Average packs/day: 0.5 packs/day for 74.2 years (37.1 ttl pk-yrs)     Types: Cigarettes     Start date: 1/1/1950     Passive exposure: Never    Smokeless tobacco: Never   Vaping Use    Vaping status: Never Used   Substance and Sexual Activity    Alcohol use: Yes     Alcohol/week: 6.0 standard drinks of alcohol     Types: 4 Glasses of wine, 2 Cans of beer per week     Comment: socially    Drug use: Never    Sexual activity: Not Currently           Objective   Physical Exam  Vitals and nursing note reviewed.   Constitutional:       Appearance: Normal appearance.   HENT:      Head: Normocephalic.      Right Ear: External ear normal.      Left Ear: External ear normal.      Nose: Nose normal.      Mouth/Throat:      Mouth: Mucous membranes are moist.      Pharynx: Oropharynx is clear.   Eyes:      Conjunctiva/sclera: Conjunctivae normal.      Pupils: Pupils are equal, round, and reactive to light.   Cardiovascular:      Rate and Rhythm: Normal rate and regular rhythm.      Pulses: Normal pulses.      Heart sounds: S1 normal and S2 normal. Murmur heard.       with a grade of 3/6.      No friction rub. No gallop.   Pulmonary:      Effort: Pulmonary effort is normal. No respiratory distress.       Breath sounds: Normal breath sounds. No wheezing, rhonchi or rales.   Abdominal:      General: Abdomen is flat. Bowel sounds are normal. There is no distension.      Palpations: Abdomen is soft.      Tenderness: There is no abdominal tenderness. There is no guarding or rebound.   Musculoskeletal:         General: Tenderness present. No deformity or signs of injury.      Cervical back: Normal range of motion and neck supple. No rigidity.      Right lower leg: Edema present.   Lymphadenopathy:      Cervical: No cervical adenopathy.   Skin:     General: Skin is warm.   Neurological:      General: No focal deficit present.      Mental Status: She is alert and oriented to person, place, and time.         Procedures           ED Course  ED Course as of 03/07/24 2122   Th Mar 07, 2024   2110 [facility] paged for admit [SD]   2120 D/w Dr. De La Torre, [facility] hospitalist accepting. Pt stable transport. [SD]      ED Course User Index  [SD] Jem Loo MD      Labs Reviewed   COMPREHENSIVE METABOLIC PANEL - Abnormal; Notable for the following components:       Result Value    Creatinine 1.22 (*)     CO2 31.3 (*)     eGFR 43.0 (*)     All other components within normal limits    Narrative:     GFR Normal >60  Chronic Kidney Disease <60  Kidney Failure <15    The GFR formula is only valid for adults with stable renal function between ages 18 and 70.   PROTIME-INR - Abnormal; Notable for the following components:    Protime 14.6 (*)     INR 1.13 (*)     All other components within normal limits   CBC WITH AUTO DIFFERENTIAL - Abnormal; Notable for the following components:    MCV 99.3 (*)     MCHC 30.8 (*)     All other components within normal limits   POCT PROTIME - INR - Abnormal; Notable for the following components:    INR 1.2 (*)     All other components within normal limits   MAGNESIUM - Normal   LACTIC ACID, PLASMA - Normal   PROCALCITONIN - Normal    Narrative:     As a Marker for Sepsis (Non-Neonates):    1. <0.5 ng/mL  "represents a low risk of severe sepsis and/or septic shock.  2. >2 ng/mL represents a high risk of severe sepsis and/or septic shock.    As a Marker for Lower Respiratory Tract Infections that require antibiotic therapy:    PCT on Admission    Antibiotic Therapy       6-12 Hrs later    >0.5                Strongly Recommended  >0.25 - <0.5        Recommended   0.1 - 0.25          Discouraged              Remeasure/reassess PCT  <0.1                Strongly Discouraged     Remeasure/reassess PCT    As 28 day mortality risk marker: \"Change in Procalcitonin Result\" (>80% or <=80%) if Day 0 (or Day 1) and Day 4 values are available. Refer to http://[email or web address]    Change in PCT <=80%  A decrease of PCT levels below or equal to 80% defines a positive change in PCT test result representing a higher risk for 28-day all-cause mortality of patients diagnosed with severe sepsis for septic shock.    Change in PCT >80%  A decrease of PCT levels of more than 80% defines a negative change in PCT result representing a lower risk for 28-day all-cause mortality of patients diagnosed with severe sepsis or septic shock.      APTT - Normal   BLOOD CULTURE   BLOOD CULTURE   APTT   CBC WITH AUTO DIFFERENTIAL   CBC AND DIFFERENTIAL    Narrative:     The following orders were created for panel order CBC & Differential.  Procedure                               Abnormality         Status                     ---------                               -----------         ------                     CBC Auto Differential[[id number]]        Abnormal            Final result                 Please view results for these tests on the individual orders.   CBC AND DIFFERENTIAL    Narrative:     The following orders were created for panel order CBC & Differential.  Procedure                               Abnormality         Status                     ---------                               -----------         ------                     CBC " Auto Differential[[id number]]                                                         Please view results for these tests on the individual orders.     XR Chest 1 View    Result Date: 3/7/2024  Narrative: XR CHEST 1 VW-  HISTORY: Female who is 88 years-old, edema  TECHNIQUE: Frontal view of the chest  COMPARISON: None available  FINDINGS: The heart size is normal. Aorta is tortuous, calcified. Pulmonary vasculature is unremarkable. Minimal atelectasis or infiltrate at the bases. No pleural effusion, or pneumothorax. No acute osseous process.      Impression: Minimal atelectasis or infiltrate at the bases. Tortuous aorta.  This report was finalized on 3/7/2024 7:07 PM by Dr. Dany Dorantes M.D on Workstation: [id number]      US Venous Doppler Lower Extremity Right (duplex)    Result Date: 3/7/2024  Narrative: Patient: LEVON CALERO  Time Out: 18:25 Exam(s): US VENOUS RIGHT LOWER EXTREMITY EXAM:   US Duplex Right Lower Extremity Veins CLINICAL HISTORY:    Reason for exam: DVT rule out. TECHNIQUE:   Real-time duplex ultrasound scan of the right lower extremity veins integrating B-mode two-dimensional vascular structure, Doppler spectral analysis, color flow Doppler imaging and compression. COMPARISON:   None FINDINGS:   Deep veins: Occlusive thrombus in the right posterior tibial veins.  No DVT in the visualized common femoral, femoral, proximal deep femoral or popliteal veins.    Superficial veins:  Unremarkable.  No thrombus in the visualized great saphenous vein.   Soft tissues:  No acute findings.  No popliteal cyst. IMPRESSION:     Occlusive thrombus in the right posterior tibial veins.     Impression: Electronically signed by Félix Washington M.D. on 03-07-24 at 1825                                        Medical Decision Making  Problems Addressed:  Deep vein thrombosis (DVT) of tibial vein of right lower extremity, unspecified chronicity: complicated acute illness or injury    Amount and/or Complexity of Data  Reviewed  Labs: ordered.  Radiology: ordered.  ECG/medicine tests: ordered.    Risk  Prescription drug management.  Decision regarding hospitalization.        Final diagnoses:   Deep vein thrombosis (DVT) of tibial vein of right lower extremity, unspecified chronicity       ED Disposition  ED Disposition       ED Disposition   Decision to Admit    Condition   --    Comment   Level of Care: Telemetry [5]   Diagnosis: DVT (deep venous thrombosis) [153875]   Admitting Physician: EMMA ROTH [[id number]]   Attending Physician: EMMA ROTH [[id number]]   Certification: I Certify That Inpatient Hospital Services Are Medically Necessary For Greater Than 2 Midnights                 No follow-up provider specified.       Medication List      No changes were made to your prescriptions during this visit.

## 2024-03-08 NOTE — CONSULTS
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 Baires    RECORDS OBTAINED:  Records of the patients history including those obtained from the referring provider were reviewed and summarized in detail.    HISTORY OF PRESENT ILLNESS:  The patient is a 88 y.o. year old female who is here for an opinion about the above issue.    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  Patient has chronic venous insufficiency of the bilateral lower extremities with lymphedema.  She has immobility and back pain and arctic stenosis.  She came to the ER because of bilateral leg pain and swelling right greater than left.  She denies any injury.  She has been taking her Eliquis but she says she forgets it on and off.  She states that her lower extremities are very red and swollen.    In the ER she had a chest x-ray which showed minimal atelectasis or infiltrate at the bases.  She had a ultrasound bilaterally which showed deep venous thrombosis in the right posterior tibial veins.  Occlusive thrombus was seen only in the right posterior tibial vein.  There was no acute clot.    Patient has been placed on heparin drip.  I do not think she failed brain drip as this is not a acute clot.    Her main issue is cellulitis of the lower extremities though she denies fever.  Discussed with Dr. Baires who is planning to place patient on antibiotic for the cellulitis        Past Medical History:   Diagnosis Date    Arthritis     DVT of leg (deep venous thrombosis)     Low back pain     Osteopenia         Past Surgical History:   Procedure Laterality Date    BACK SURGERY      BRAIN SURGERY      CATARACT EXTRACTION      COLONOSCOPY      HYSTERECTOMY       ROTATOR CUFF REPAIR Right     SPINE SURGERY      THYROID SURGERY      TONSILLECTOMY          No current facility-administered medications on file prior to encounter.     Current Outpatient Medications on File Prior to Encounter   Medication Sig Dispense Refill    calcium carbonate (OS-DARIAN) 600 MG tablet Take 1 tablet by mouth Daily. 90 tablet 3    furosemide (LASIX) 20 MG tablet Take 2 tablets by mouth Daily. 180 tablet 3    vitamin D3 125 MCG (5000 UT) capsule capsule Take 1 capsule by mouth Daily. 90 capsule 3    cephalexin (KEFLEX) 500 MG capsule Take 1 capsule by mouth 4 (Four) Times a Day. 28 capsule 0    Eliquis 5 MG tablet tablet Take 1 tablet by mouth 2 (Two) Times a Day. 180 tablet 3    Ibuprofen 3 %, Gabapentin 10 %, Baclofen 2 %, lidocaine 4 % Apply 1-2 g topically to the appropriate area as directed 3 (Three) to 4 (Four) times daily. 90 g 5    latanoprost (XALATAN) 0.005 % ophthalmic solution Administer 1 drop to both eyes Daily.      potassium chloride (K-DUR,KLOR-CON,KLOR-CON M10) 10 MEQ CR tablet TAKE 1 TABLET BY MOUTH DAILY 90 tablet 1        ALLERGIES:  No Known Allergies     Social History     Socioeconomic History    Marital status:    Tobacco Use    Smoking status: Every Day     Current packs/day: 0.50     Average packs/day: 0.5 packs/day for 74.2 years (37.1 ttl pk-yrs)     Types: Cigarettes     Start date: 1/1/1949     Passive exposure: Never    Smokeless tobacco: Never   Vaping Use    Vaping status: Never Used   Substance and Sexual Activity    Alcohol use: Yes     Alcohol/week: 6.0 standard drinks of alcohol     Types: 4 Glasses of wine, 2 Cans of beer per week     Comment: socially    Drug use: Never    Sexual activity: Not Currently        Family History   Problem Relation Age of Onset    Dementia Mother         Review of Systems   Constitutional:  Negative for appetite change, chills, diaphoresis, fatigue, fever and unexpected weight change.   HENT:  Negative for hearing loss, sore  throat and trouble swallowing.    Respiratory:  Negative for cough, chest tightness, shortness of breath and wheezing.    Cardiovascular:  Negative for chest pain, palpitations and leg swelling.   Gastrointestinal:  Negative for abdominal distention, abdominal pain, constipation, diarrhea, nausea and vomiting.   Genitourinary:  Negative for dysuria, frequency, hematuria and urgency.   Musculoskeletal:  Negative for joint swelling.        No muscle weakness.   Skin:  Negative for rash and wound.   Neurological:  Negative for seizures, syncope, speech difficulty, weakness, numbness and headaches.   Hematological:  Negative for adenopathy. Does not bruise/bleed easily.   Psychiatric/Behavioral:  Negative for behavioral problems, confusion and suicidal ideas.       Patient complaining of pain and leg swelling and redness of bilateral lower extremities    Objective     Vitals:    03/08/24 0317 03/08/24 0700 03/08/24 1100 03/08/24 1500   BP:  115/61 111/55 123/53   BP Location:  Right arm Right arm Right arm   Patient Position:  Lying Lying Lying   Pulse:  50 55 67   Resp:  13 16 20   Temp:  97.7 °F (36.5 °C) 97.6 °F (36.4 °C) 97.3 °F (36.3 °C)   TempSrc:  Oral Oral Oral   SpO2: 97% 97% 93% 95%   Weight:       Height:              No data to display                Physical Exam    RESPIRATORY:  Normal respiratory effort.  No rales  or wheezing, clear.   CARDIOVASCULAR:  Regular rate and rhythm, no murmur  No significant lower extremity edema.  Abdomen: Soft nontender positive bowel sounds no hepatosplenomegaly  SKIN: No wounds.  No rashes.  MUSCULOSKELETAL/EXTREMITIES: No clubbing or cyanosis.  No apparent unilateral weakness.  NEURO: CN 2-12 appear intact. No focal neurological deficits noted.  PSYCHIATRIC:  Normal judgment and insight.  Normal mood and affect.  Bilateral lower extremity erythema, bleb on the right lower extremity, warmth and tenderness    RECENT LABS:  Hematology WBC   Date Value Ref Range Status    03/08/2024 4.23 3.40 - 10.80 10*3/mm3 Final     RBC   Date Value Ref Range Status   03/08/2024 3.84 3.77 - 5.28 10*6/mm3 Final     Hemoglobin   Date Value Ref Range Status   03/08/2024 11.9 (L) 12.0 - 15.9 g/dL Final     Hematocrit   Date Value Ref Range Status   03/08/2024 36.4 34.0 - 46.6 % Final     Platelets   Date Value Ref Range Status   03/08/2024 200 140 - 450 10*3/mm3 Final          Assessment & Plan     # Chronic and recurrent DVTs in the past on Eliquis.  Patient got admitted with leg swelling and pain and there was concern of new DVT  However Doppler ultrasound done shows only chronic right tibial vein thrombus.  No acute thrombus  Patient has forgot on Eliquis once in a while but otherwise she does take Eliquis  Initially they placed her on heparin drip given the concern that she was noncompliant    #.  Cellulitis of the bilateral lower extremities  Discussed with Dr. Baires about consideration of antibiotic      Plan  patient could be switched back to Eliquis per primary care  Continue antibiotics  Will sign off and patient can be followed with her primary care physician    Anayeli Daugherty MD

## 2024-03-08 NOTE — H&P
"    Patient Name:  Noemi Forbes  YOB: 1935  MRN:  [id number]  Admit Date:  3/7/2024  Patient Care Team:  Villa Madrigal MD as PCP - General (Internal Medicine)      Subjective   History Present Illness     Chief Complaint   Patient presents with    Leg Swelling       Ms. Forbes is a 88 y.o.  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 She had increased swelling bilaterally but worse on the right leg with increased erythema, pain and blister. She denies fever chills CP or SOB. She has been taking her eliquis but may have missed 1 or 2 doses in the last few weeks.      History of Present Illness    Review of Systems   Constitutional:  Positive for activity change. Negative for appetite change, chills, fatigue, fever and unexpected weight change.   HENT:  Negative for trouble swallowing.    Respiratory:  Negative for cough, choking, chest tightness, shortness of breath and stridor.    Cardiovascular:  Positive for leg swelling. Negative for chest pain.   Gastrointestinal:  Negative for abdominal distention, abdominal pain, blood in stool, constipation, diarrhea, nausea and vomiting.   Musculoskeletal:  Negative for back pain.   Skin:  Positive for color change and wound.   Neurological:  Negative for dizziness.   Hematological:  Bruises/bleeds easily.   Psychiatric/Behavioral: Negative.  Negative for confusion.         Personal History     Past Medical History:   Diagnosis Date    Arthritis     DVT of leg (deep venous thrombosis)     Low back pain     Osteopenia      Past Surgical History:   Procedure Laterality Date    BACK SURGERY      BRAIN SURGERY      CATARACT EXTRACTION      COLONOSCOPY      HYSTERECTOMY      ROTATOR CUFF REPAIR Right     SPINE SURGERY      THYROID SURGERY      TONSILLECTOMY       Family History   Problem Relation Age of Onset    Dementia Mother  "     Social History     Tobacco Use    Smoking status: Every Day     Current packs/day: 0.50     Average packs/day: 0.5 packs/day for 74.2 years (37.1 ttl pk-yrs)     Types: Cigarettes     Start date: 1/1/1950     Passive exposure: Never    Smokeless tobacco: Never   Vaping Use    Vaping status: Never Used   Substance Use Topics    Alcohol use: Yes     Alcohol/week: 6.0 standard drinks of alcohol     Types: 4 Glasses of wine, 2 Cans of beer per week     Comment: socially    Drug use: Never     No current facility-administered medications on file prior to encounter.     Current Outpatient Medications on File Prior to Encounter   Medication Sig Dispense Refill    calcium carbonate (OS-DARIAN) 600 MG tablet Take 1 tablet by mouth Daily. 90 tablet 3    furosemide (LASIX) 20 MG tablet Take 2 tablets by mouth Daily. 180 tablet 3    vitamin D3 125 MCG (5000 UT) capsule capsule Take 1 capsule by mouth Daily. 90 capsule 3    cephalexin (KEFLEX) 500 MG capsule Take 1 capsule by mouth 4 (Four) Times a Day. 28 capsule 0    Eliquis 5 MG tablet tablet Take 1 tablet by mouth 2 (Two) Times a Day. 180 tablet 3    Ibuprofen 3 %, Gabapentin 10 %, Baclofen 2 %, lidocaine 4 % Apply 1-2 g topically to the appropriate area as directed 3 (Three) to 4 (Four) times daily. 90 g 5    latanoprost (XALATAN) 0.005 % ophthalmic solution Administer 1 drop to both eyes Daily.      potassium chloride (K-DUR,KLOR-CON,KLOR-CON M10) 10 MEQ CR tablet TAKE 1 TABLET BY MOUTH DAILY 90 tablet 1     No Known Allergies    Objective    Objective     Vital Signs  Temp:  [97.7 °F (36.5 °C)-98.7 °F (37.1 °C)] 97.7 °F (36.5 °C)  Heart Rate:  [50-96] 50  Resp:  [13-18] 13  BP: (115-138)/(61-83) 115/61  SpO2:  [94 %-98 %] 97 %  on   ;   Device (Oxygen Therapy): room air  Body mass index is 28.64 kg/m².    Physical Exam  Vitals and nursing note reviewed.   Constitutional:       Appearance: She is well-developed.   HENT:      Head: Normocephalic and atraumatic.   Eyes:       Pupils: Pupils are equal, round, and reactive to light.   Cardiovascular:      Rate and Rhythm: Normal rate and regular rhythm.      Heart sounds: Murmur heard.   Pulmonary:      Effort: Pulmonary effort is normal.      Breath sounds: Normal breath sounds.   Abdominal:      General: Bowel sounds are normal. There is no distension or abdominal bruit.      Palpations: Abdomen is soft. Abdomen is not rigid. There is no shifting dullness, fluid wave, mass or pulsatile mass.      Tenderness: There is no abdominal tenderness. There is no guarding.      Hernia: No hernia is present.   Musculoskeletal:         General: Normal range of motion.      Comments: BLE chronic insufficiency. BLE eyrthema and warmth but RLE with edema and right pretibial bullae     Skin:     General: Skin is warm and dry.   Neurological:      General: No focal deficit present.      Mental Status: She is alert and oriented to person, place, and time. Mental status is at baseline.   Psychiatric:         Mood and Affect: Mood normal.         Behavior: Behavior normal.         Results Review:  I reviewed the patient's new clinical results.  I reviewed the patient's new imaging results and agree with the interpretation.  I reviewed the patient's other test results and agree with the interpretation  I personally viewed and interpreted the patient's EKG/Telemetry data  Discussed with ED provider.    Lab Results (last 24 hours)       Procedure Component Value Units Date/Time    Comprehensive Metabolic Panel [[id number]]  (Abnormal) Collected: 03/07/24 1917    Specimen: Blood Updated: 03/07/24 1954     Glucose 81 mg/dL      BUN 15 mg/dL      Creatinine 1.22 mg/dL      Sodium 143 mmol/L      Potassium 3.6 mmol/L      Chloride 104 mmol/L      CO2 31.3 mmol/L      Calcium 10.0 mg/dL      Total Protein 7.2 g/dL      Albumin 4.2 g/dL      ALT (SGPT) 8 U/L      AST (SGOT) 17 U/L      Alkaline Phosphatase 100 U/L      Total Bilirubin 0.9 mg/dL      Globulin 3.0  "gm/dL      A/G Ratio 1.4 g/dL      BUN/Creatinine Ratio 12.3     Anion Gap 7.7 mmol/L      eGFR 43.0 mL/min/1.73     Narrative:      GFR Normal >60  Chronic Kidney Disease <60  Kidney Failure <15    The GFR formula is only valid for adults with stable renal function between ages 18 and 70.    Magnesium [[id number]]  (Normal) Collected: 03/07/24 1917    Specimen: Blood Updated: 03/07/24 1954     Magnesium 2.2 mg/dL     Blood Culture - Blood, Arm, Right [[id number]] Collected: 03/07/24 1917    Specimen: Blood from Arm, Right Updated: 03/07/24 1922    Blood Culture - Blood, Arm, Left [[id number]] Collected: 03/07/24 1917    Specimen: Blood from Arm, Left Updated: 03/07/24 1922    Lactic Acid, Plasma [[id number]]  (Normal) Collected: 03/07/24 1917    Specimen: Blood Updated: 03/07/24 1947     Lactate 1.0 mmol/L     Procalcitonin [[id number]]  (Normal) Collected: 03/07/24 1917    Specimen: Blood Updated: 03/07/24 2113     Procalcitonin 0.05 ng/mL     Narrative:      As a Marker for Sepsis (Non-Neonates):    1. <0.5 ng/mL represents a low risk of severe sepsis and/or septic shock.  2. >2 ng/mL represents a high risk of severe sepsis and/or septic shock.    As a Marker for Lower Respiratory Tract Infections that require antibiotic therapy:    PCT on Admission    Antibiotic Therapy       6-12 Hrs later    >0.5                Strongly Recommended  >0.25 - <0.5        Recommended   0.1 - 0.25          Discouraged              Remeasure/reassess PCT  <0.1                Strongly Discouraged     Remeasure/reassess PCT    As 28 day mortality risk marker: \"Change in Procalcitonin Result\" (>80% or <=80%) if Day 0 (or Day 1) and Day 4 values are available. Refer to http://www.[facility]-pct-calculator.com    Change in PCT <=80%  A decrease of PCT levels below or equal to 80% defines a positive change in PCT test result representing a higher risk for 28-day all-cause mortality of patients diagnosed with severe sepsis for septic " shock.    Change in PCT >80%  A decrease of PCT levels of more than 80% defines a negative change in PCT result representing a lower risk for 28-day all-cause mortality of patients diagnosed with severe sepsis or septic shock.       Protime-INR [[id number]]  (Abnormal) Collected: 03/07/24 1917    Specimen: Blood Updated: 03/07/24 2106     Protime 14.6 Seconds      INR 1.13    aPTT [[id number]]  (Normal) Collected: 03/07/24 1917    Specimen: Blood Updated: 03/07/24 2106     PTT 32.3 seconds     CBC & Differential [[id number]]  (Abnormal) Collected: 03/07/24 1917    Specimen: Blood Updated: 03/07/24 1925    Narrative:      The following orders were created for panel order CBC & Differential.  Procedure                               Abnormality         Status                     ---------                               -----------         ------                     CBC Auto Differential[[id number]]        Abnormal            Final result                 Please view results for these tests on the individual orders.    CBC Auto Differential [[id number]]  (Abnormal) Collected: 03/07/24 1917    Specimen: Blood Updated: 03/07/24 1925     WBC 5.37 10*3/mm3      RBC 4.16 10*6/mm3      Hemoglobin 12.7 g/dL      Hematocrit 41.3 %      MCV 99.3 fL      MCH 30.5 pg      MCHC 30.8 g/dL      RDW 12.9 %      RDW-SD 47.9 fl      MPV 10.1 fL      Platelets 232 10*3/mm3      Neutrophil % 59.8 %      Lymphocyte % 29.1 %      Monocyte % 8.6 %      Eosinophil % 1.9 %      Basophil % 0.6 %      Immature Grans % 0.0 %      Neutrophils, Absolute 3.22 10*3/mm3      Lymphocytes, Absolute 1.56 10*3/mm3      Monocytes, Absolute 0.46 10*3/mm3      Eosinophils, Absolute 0.10 10*3/mm3      Basophils, Absolute 0.03 10*3/mm3      Immature Grans, Absolute 0.00 10*3/mm3     POC Protime / INR [[id number]]  (Abnormal) Collected: 03/07/24 1926    Specimen: Blood Updated: 03/07/24 1927     Protime 13.8 seconds      INR 1.2    CBC & Differential [[id number]]   (Abnormal) Collected: 03/08/24 0211    Specimen: Blood Updated: 03/08/24 0226    Narrative:      The following orders were created for panel order CBC & Differential.  Procedure                               Abnormality         Status                     ---------                               -----------         ------                     CBC Auto Differential[[id number]]        Abnormal            Final result                 Please view results for these tests on the individual orders.    CBC Auto Differential [[id number]]  (Abnormal) Collected: 03/08/24 0211    Specimen: Blood Updated: 03/08/24 0226     WBC 4.83 10*3/mm3      RBC 3.80 10*6/mm3      Hemoglobin 11.8 g/dL      Hematocrit 36.7 %      MCV 96.6 fL      MCH 31.1 pg      MCHC 32.2 g/dL      RDW 12.3 %      RDW-SD 43.4 fl      MPV 10.3 fL      Platelets 207 10*3/mm3      Neutrophil % 44.6 %      Lymphocyte % 40.6 %      Monocyte % 10.1 %      Eosinophil % 3.7 %      Basophil % 0.8 %      Immature Grans % 0.2 %      Neutrophils, Absolute 2.15 10*3/mm3      Lymphocytes, Absolute 1.96 10*3/mm3      Monocytes, Absolute 0.49 10*3/mm3      Eosinophils, Absolute 0.18 10*3/mm3      Basophils, Absolute 0.04 10*3/mm3      Immature Grans, Absolute 0.01 10*3/mm3      nRBC 0.0 /100 WBC     aPTT [[id number]]  (Abnormal) Collected: 03/08/24 0211    Specimen: Blood Updated: 03/08/24 0329     .9 seconds     aPTT [[id number]]  (Abnormal) Collected: 03/08/24 0556    Specimen: Blood from Arm, Left Updated: 03/08/24 0736     .9 seconds     Basic Metabolic Panel [[id number]]  (Abnormal) Collected: 03/08/24 0556    Specimen: Blood Updated: 03/08/24 0652     Glucose 82 mg/dL      BUN 13 mg/dL      Creatinine 1.10 mg/dL      Sodium 146 mmol/L      Potassium 4.1 mmol/L      Chloride 109 mmol/L      CO2 28.2 mmol/L      Calcium 8.8 mg/dL      BUN/Creatinine Ratio 11.8     Anion Gap 8.8 mmol/L      eGFR 48.7 mL/min/1.73     Narrative:      GFR Normal >60  Chronic Kidney  Disease <60  Kidney Failure <15    The GFR formula is only valid for adults with stable renal function between ages 18 and 70.    CBC Auto Differential [[id number]]  (Abnormal) Collected: 03/08/24 0556    Specimen: Blood Updated: 03/08/24 0644     WBC 4.23 10*3/mm3      RBC 3.84 10*6/mm3      Hemoglobin 11.9 g/dL      Hematocrit 36.4 %      MCV 94.8 fL      MCH 31.0 pg      MCHC 32.7 g/dL      RDW 12.1 %      RDW-SD 42.2 fl      MPV 10.5 fL      Platelets 200 10*3/mm3      Neutrophil % 41.6 %      Lymphocyte % 42.1 %      Monocyte % 10.9 %      Eosinophil % 4.3 %      Basophil % 0.9 %      Immature Grans % 0.2 %      Neutrophils, Absolute 1.76 10*3/mm3      Lymphocytes, Absolute 1.78 10*3/mm3      Monocytes, Absolute 0.46 10*3/mm3      Eosinophils, Absolute 0.18 10*3/mm3      Basophils, Absolute 0.04 10*3/mm3      Immature Grans, Absolute 0.01 10*3/mm3      nRBC 0.0 /100 WBC     Protime-INR [[id number]]  (Abnormal) Collected: 03/08/24 0556    Specimen: Blood from Arm, Left Updated: 03/08/24 0712     Protime 15.1 Seconds      INR 1.17    BNP [[id number]] Collected: 03/08/24 0556    Specimen: Blood Updated: 03/08/24 1133    Uric Acid [[id number]] Collected: 03/08/24 0556    Specimen: Blood Updated: 03/08/24 1133    aPTT [[id number]] Collected: 03/08/24 1026    Specimen: Blood from Hand, Left Updated: 03/08/24 1034            Imaging Results (Last 24 Hours)       Procedure Component Value Units Date/Time    XR Chest 1 View [[id number]] Collected: 03/07/24 1905     Updated: 03/07/24 1911    Narrative:      XR CHEST 1 VW-     HISTORY: Female who is 88 years-old, edema     TECHNIQUE: Frontal view of the chest     COMPARISON: None available     FINDINGS: The heart size is normal. Aorta is tortuous, calcified.  Pulmonary vasculature is unremarkable. Minimal atelectasis or infiltrate  at the bases. No pleural effusion, or pneumothorax. No acute osseous  process.       Impression:      Minimal atelectasis or infiltrate at the  bases. Tortuous  aorta.     This report was finalized on 3/7/2024 7:07 PM by Dr. Dany Dorantes M.D on Workstation: [id number]       US Venous Doppler Lower Extremity Right (duplex) [[id number]] Collected: 03/07/24 1826     Updated: 03/07/24 1826    Narrative:        Patient: LEVON CALERO  Time Out: 18:25  Exam(s): US VENOUS RIGHT LOWER EXTREMITY     EXAM:    US Duplex Right Lower Extremity Veins    CLINICAL HISTORY:     Reason for exam: DVT rule out.    TECHNIQUE:    Real-time duplex ultrasound scan of the right lower extremity veins   integrating B-mode two-dimensional vascular structure, Doppler spectral   analysis, color flow Doppler imaging and compression.    COMPARISON:    None    FINDINGS:    Deep veins: Occlusive thrombus in the right posterior tibial veins.  No   DVT in the visualized common femoral, femoral, proximal deep femoral or   popliteal veins.      Superficial veins:  Unremarkable.  No thrombus in the visualized great   saphenous vein.    Soft tissues:  No acute findings.  No popliteal cyst.    IMPRESSION:       Occlusive thrombus in the right posterior tibial veins.      Impression:          Electronically signed by Félix Washington M.D. on 03-07-24 at 1825                ECG 12 Lead Other; dvt   Preliminary Result   HEART RATE= 62  bpm   RR Interval= 968  ms   WI Interval= 164  ms   P Horizontal Axis= 14  deg   P Front Axis= 25  deg   QRSD Interval= 133  ms   QT Interval= 467  ms   QTcB= 475  ms   QRS Axis= -6  deg   T Wave Axis= 105  deg   - ABNORMAL ECG -   Sinus rhythm   Left bundle branch block   Electronically Signed By:    Date and Time of Study: 2024-03-07 19:37:11      Telemetry Scan   Final Result      Telemetry Scan   Final Result           Assessment/Plan     Active Hospital Problems    Diagnosis  POA    **DVT (deep venous thrombosis) [I82.409]  Yes    Impaired mobility [Z74.09]  Yes    Generalized osteoarthritis [M15.9]  Yes    Tobacco abuse [Z72.0]  Yes    Chronic deep vein  "thrombosis (DVT) of distal vein of both lower extremities [I82.5Z3]  Yes    Venous stasis dermatitis of both lower extremities [I87.2]  Yes    Aortic stenosis [I35.0]  Yes    Chronic venous insufficiency [I87.2]  Yes      Resolved Hospital Problems   No resolved problems to display.       Ms. Forbes is a 88 y.o. smoker with a history of recurrent DVTs on eliquis that presents with acute RLE DVT    Right RLE DVT, posterior tibial  Hx recurrent BLE DVT (chronic RLE DVT in gastrocnemius 2023, chronic LLE 2021).  Remote hx of brain clot as \"young adult\"  She is somewhat compliant with Eliquis, reports missing less than 3 doses. Heparin drip continued. Consult hematology for assistance.     BLE insufficiency  Lymphedema   RLE bullae  Appearance of BLE less suspicious with active cellulitis, without systemic evidence of infection. Monitor closely   Wound care consult. Based on med rec she was on keflex in January   Hold home lasix.      CKD  No recent labs available. Last crt 2023 0.9 GFR 55. Currently 1.1. hold home lasix.check BNP uric acid.     Aortic stenosis - denies cardiac complaints. +murmur    Chronic pain     Tobacco abuse- cessation imperative. Stable on room air.   Impaired mobility- PT OT eval. Lives in [place]  I discussed the patient's findings and my recommendations with patient and nursing staff.    VTE Prophylaxis -  heparin gtt .  Code Status - Full code.       TERRELL Vargas  [facility]  03/08/24  11:36 EST  "

## 2024-03-08 NOTE — PROGRESS NOTES
"Nutrition Services    Patient Name:  Noemi Forbes  YOB: 1935  MRN: [id number]  Admit Date:  3/7/2024    Assessment Date:  03/08/24    NUTRITION SCREENING      Reason for Encounter MST score 2+   Diagnosis/Problem DVT, lymphedema, CKD       PO Diet Diet: Cardiac; Healthy Heart (2-3 Na+); Fluid Consistency: Thin (IDDSI 0)   Supplements    PO Intake % 75% x 2       Medications MAR reviewed by RD   Labs  Listed below, reviewed   Physical Findings A&O x 4, overweight   GI Function WNL, BM 3/7   Skin Status 2+ edema, blister, bruising       Height  Weight  BMI  Weight Trend     Height: 167.6 cm (66\")  Weight: 80.5 kg (177 lb 7.5 oz) (03/08/24 0108)  Body mass index is 28.64 kg/m².  Stable       Nutrition Problem (PES) No nutrition diagnosis at this time.       Intervention/Plan Nutrition screen. No significant nutrition concerns at this time. Eating well, Wt is stable, Skin intact    RD to follow up per protocol.     Results from last 7 days   Lab Units 03/08/24  0556 03/07/24  1917   SODIUM mmol/L 146* 143   POTASSIUM mmol/L 4.1 3.6   CHLORIDE mmol/L 109* 104   CO2 mmol/L 28.2 31.3*   BUN mg/dL 13 15   CREATININE mg/dL 1.10* 1.22*   CALCIUM mg/dL 8.8 10.0   BILIRUBIN mg/dL  --  0.9   ALK PHOS U/L  --  100   ALT (SGPT) U/L  --  8   AST (SGOT) U/L  --  17   GLUCOSE mg/dL 82 81     Results from last 7 days   Lab Units 03/08/24  0556 03/08/24  0211 03/07/24  1917   MAGNESIUM mg/dL  --   --  2.2   HEMOGLOBIN g/dL 11.9*   < > 12.7   HEMATOCRIT % 36.4   < > 41.3    < > = values in this interval not displayed.     No results found for: \"HGBA1C\"      Electronically signed by:  Dee Lynn, GELY  03/08/24 14:09 EST  "

## 2024-03-08 NOTE — CASE MANAGEMENT/SOCIAL WORK
Discharge Planning Assessment  [facility]     Patient Name: Noemi Forbes  MRN: [id number]  Today's Date: 3/8/2024    Admit Date: 3/7/2024    [place] to [place]   Discharge Needs Assessment       Row Name 03/08/24 1252       Living Environment    People in Home alone    Current Living Arrangements independent living facility    Potentially Unsafe Housing Conditions none    Primary Care Provided by self    Provides Primary Care For no one    Family Caregiver if Needed child(evangelista), adult    Quality of Family Relationships supportive       Transition Planning    Patient/Family Anticipates Transition to home    Patient/Family Anticipated Services at Transition none       Discharge Needs Assessment    Equipment Currently Used at Home rollator    Concerns to be Addressed no discharge needs identified    Equipment Needed After Discharge none                   Discharge Plan       Row Name 03/08/24 1252       Plan    Plan [place] to [place]    Patient/Family in Agreement with Plan yes    Plan Comments Met with pt at bedside. Introduced self, explained CCP role, facesheet verified. Pt states she lives alone in [facility].  Uses rollator and has wheelchair if needed. Has used [facility] in past.  Has been to [facility] in past.  Plans to return home at discharge, no identified needs. CCP will follow.  MARIEL Mckeon RN                  Continued Care and Services - Admitted Since 3/7/2024    No active coordination exists for this encounter.          Demographic Summary       Row Name 03/08/24 1252       General Information    Admission Type inpatient    Arrived From home    Referral Source admission list    Reason for Consult discharge planning    Preferred Language English       Contact Information    Permission Granted to Share Info With family/designee  Pual Forbes (son) [phone]                   Functional Status    No documentation.                  Psychosocial    No documentation.                  Abuse/Neglect     No documentation.                  Legal    No documentation.                  Substance Abuse    No documentation.                  Patient Forms    No documentation.                     Leidy Mckeon RN

## 2024-03-09 ENCOUNTER — READMISSION MANAGEMENT (OUTPATIENT)
Dept: CALL CENTER | Facility: HOSPITAL | Age: 89
End: 2024-03-09
Payer: MEDICARE

## 2024-03-09 VITALS
OXYGEN SATURATION: 96 % | DIASTOLIC BLOOD PRESSURE: 95 MMHG | SYSTOLIC BLOOD PRESSURE: 109 MMHG | WEIGHT: 177.47 LBS | RESPIRATION RATE: 16 BRPM | TEMPERATURE: 98.3 F | HEART RATE: 52 BPM | HEIGHT: 66 IN | BODY MASS INDEX: 28.52 KG/M2

## 2024-03-09 LAB
ALBUMIN SERPL-MCNC: 3.1 G/DL (ref 3.5–5.2)
ANION GAP SERPL CALCULATED.3IONS-SCNC: 10.2 MMOL/L (ref 5–15)
APTT PPP: 98.1 SECONDS (ref 22.7–35.4)
BASOPHILS # BLD AUTO: 0.03 10*3/MM3 (ref 0–0.2)
BASOPHILS NFR BLD AUTO: 0.5 % (ref 0–1.5)
BUN SERPL-MCNC: 15 MG/DL (ref 8–23)
BUN/CREAT SERPL: 13.3 (ref 7–25)
CALCIUM SPEC-SCNC: 8.4 MG/DL (ref 8.6–10.5)
CHLORIDE SERPL-SCNC: 109 MMOL/L (ref 98–107)
CO2 SERPL-SCNC: 24.8 MMOL/L (ref 22–29)
CREAT SERPL-MCNC: 1.13 MG/DL (ref 0.57–1)
DEPRECATED RDW RBC AUTO: 42.6 FL (ref 37–54)
EGFRCR SERPLBLD CKD-EPI 2021: 47.2 ML/MIN/1.73
EOSINOPHIL # BLD AUTO: 0.11 10*3/MM3 (ref 0–0.4)
EOSINOPHIL NFR BLD AUTO: 2 % (ref 0.3–6.2)
ERYTHROCYTE [DISTWIDTH] IN BLOOD BY AUTOMATED COUNT: 12.1 % (ref 12.3–15.4)
GLUCOSE SERPL-MCNC: 84 MG/DL (ref 65–99)
HCT VFR BLD AUTO: 36.8 % (ref 34–46.6)
HGB BLD-MCNC: 12 G/DL (ref 12–15.9)
IMM GRANULOCYTES # BLD AUTO: 0.02 10*3/MM3 (ref 0–0.05)
IMM GRANULOCYTES NFR BLD AUTO: 0.4 % (ref 0–0.5)
LYMPHOCYTES # BLD AUTO: 1.6 10*3/MM3 (ref 0.7–3.1)
LYMPHOCYTES NFR BLD AUTO: 28.6 % (ref 19.6–45.3)
MAGNESIUM SERPL-MCNC: 2.1 MG/DL (ref 1.6–2.4)
MCH RBC QN AUTO: 31.2 PG (ref 26.6–33)
MCHC RBC AUTO-ENTMCNC: 32.6 G/DL (ref 31.5–35.7)
MCV RBC AUTO: 95.6 FL (ref 79–97)
MONOCYTES # BLD AUTO: 0.61 10*3/MM3 (ref 0.1–0.9)
MONOCYTES NFR BLD AUTO: 10.9 % (ref 5–12)
NEUTROPHILS NFR BLD AUTO: 3.22 10*3/MM3 (ref 1.7–7)
NEUTROPHILS NFR BLD AUTO: 57.6 % (ref 42.7–76)
NRBC BLD AUTO-RTO: 0 /100 WBC (ref 0–0.2)
PHOSPHATE SERPL-MCNC: 3.1 MG/DL (ref 2.5–4.5)
PLATELET # BLD AUTO: 184 10*3/MM3 (ref 140–450)
PMV BLD AUTO: 11.1 FL (ref 6–12)
POTASSIUM SERPL-SCNC: 3.8 MMOL/L (ref 3.5–5.2)
RBC # BLD AUTO: 3.85 10*6/MM3 (ref 3.77–5.28)
SODIUM SERPL-SCNC: 144 MMOL/L (ref 136–145)
WBC NRBC COR # BLD AUTO: 5.59 10*3/MM3 (ref 3.4–10.8)

## 2024-03-09 PROCEDURE — 80069 RENAL FUNCTION PANEL: CPT | Performed by: NURSE PRACTITIONER

## 2024-03-09 PROCEDURE — 25010000002 CEFAZOLIN IN DEXTROSE 2-4 GM/100ML-% SOLUTION: Performed by: HOSPITALIST

## 2024-03-09 PROCEDURE — 85025 COMPLETE CBC W/AUTO DIFF WBC: CPT | Performed by: EMERGENCY MEDICINE

## 2024-03-09 PROCEDURE — 85730 THROMBOPLASTIN TIME PARTIAL: CPT | Performed by: HOSPITALIST

## 2024-03-09 PROCEDURE — 83735 ASSAY OF MAGNESIUM: CPT | Performed by: HOSPITALIST

## 2024-03-09 RX ORDER — CEPHALEXIN 500 MG/1
500 CAPSULE ORAL 3 TIMES DAILY
Qty: 21 CAPSULE | Refills: 0 | Status: SHIPPED | OUTPATIENT
Start: 2024-03-09 | End: 2024-03-13

## 2024-03-09 RX ADMIN — Medication 10 ML: at 08:47

## 2024-03-09 RX ADMIN — Medication 500 MG: at 08:46

## 2024-03-09 RX ADMIN — CEFAZOLIN SODIUM 2000 MG: 2 INJECTION, SOLUTION INTRAVENOUS at 08:47

## 2024-03-09 RX ADMIN — Medication 5000 UNITS: at 08:46

## 2024-03-09 RX ADMIN — CEFAZOLIN SODIUM 2000 MG: 2 INJECTION, SOLUTION INTRAVENOUS at 00:29

## 2024-03-09 RX ADMIN — ACETAMINOPHEN 325MG 650 MG: 325 TABLET ORAL at 08:46

## 2024-03-09 NOTE — OUTREACH NOTE
Prep Survey      Flowsheet Row Responses   [facility] patient discharged from? [place]   Is LACE score < 7 ? Yes   Eligibility [facility]   Date of Admission 03/07/24   Date of Discharge 03/09/24   Discharge Disposition Home or Self Care   Discharge diagnosis DVT (deep venous thrombosis)   Does the patient have one of the following disease processes/diagnoses(primary or secondary)? Other   Prep survey completed? Yes            Lauren DORADO - Registered Nurse

## 2024-03-09 NOTE — PLAN OF CARE
Goal Outcome Evaluation:   VSS. A&OX4. No c/o pain or discomfort. Continues on heparin gtt. BLE wrapped, wound care to see. Continues on IV abt. Call light in reach.

## 2024-03-09 NOTE — CASE MANAGEMENT/SOCIAL WORK
Case Management Discharge Note      Final Note: [place] to [place] at [place]         Selected Continued Care - Discharged on 3/9/2024 Admission date: 3/7/2024 - Discharge disposition: Home or Self Care      Destination    No services have been selected for the patient.                Durable Medical Equipment    No services have been selected for the patient.                Dialysis/Infusion    No services have been selected for the patient.                Home Medical Care    No services have been selected for the patient.                Therapy    No services have been selected for the patient.                Community Resources    No services have been selected for the patient.                Community & DME    No services have been selected for the patient.                         Final Discharge Disposition Code: 01 - home or self-care

## 2024-03-09 NOTE — DISCHARGE SUMMARY
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        Hospital Course     Very pleasant 89yo woman with h/o recurrent DVTs, on Eliquis chronically, chronic venous insufficiency of BLEs with lymphedema and venous stasis changes, chronic immobility, chronic back pain, mild aortic stenosis, LBBB, and tobacco abuse, who presented to [facility] ER with c/o worsening pain and swelling in BLEs (R>L). Denied F/C/NS. No injury. Admitted to missing doses of Eliquis occasionally. She was presented to us as having a new RLE DVT and we were asked to accept in transfer for tx with heparin gtt and Heme/Onc consult.    Upon arrival pt was noted to have RLE cellulitis. Her DVT's in BLE are chronic and not acute.  She has shown good response to IV Cefazolin, blood cultures are NGTD, no leukocytosis, PCT wnl, she is afebrile. She is very eager to go home today. Will send her home on a 7d course of oral Keflex and have asked her to f/u with her PCP in 3-5 days.  Compression wraps placed here by staff.  Heme/Onc saw pt here and agreed that her DVTs are chronic and she can continue Eliquis at [place].  Her Cr is slightly elevated. No h/o CKD in chart. BPs are acceptable. Lasix on hold. She will need to f/u  with her PCP for recheck of Cr in a few days.    D/w pt and RN. D/w Dr. Daugherty.    Day of Discharge     Subjective:  She is feeling fine today. Her back hurts but she says that is normal for her. She is very eager to go home. Voiding well. Tolerating po. No F/C/NS. No SOA or CP.     Physical Exam:  Temp:  [97.3 °F (36.3 °C)-98.4 °F (36.9 °C)] 98 °F (36.7 °C)  Heart Rate:  [55-67] 55  Resp:  [13-22] 13  BP: (111-138)/(53-80) 138/63  Body mass index is 28.64 kg/m².  Physical Exam  Alert in NAD, chronically ill but non-toxic  HEENT: unremarkable  Neck: supple  Lungs: CTAB with good air mvmt  CV: RRR, 3/6 MATA over right USB, pulses intact  Abd: soft NTND, +BS  Extr: WWP, chronic woody edema in BLEs, chronic venous stasis changes in BLEs, however RLE is more swollen/red/warm and there is a large bulla present anteriorly over shin, no purulence, no streaking erythema  Neuro: no focal deficits  Skin: as above  Psych: very pleasant and appropriate    Consultants     Consult Orders (all) (From admission, onward)       Start     Ordered    03/08/24 0851  Hematology & Oncology Inpatient Consult  Once,   Status:  Canceled        Specialty:  Hematology and Oncology  Provider:  (Not yet assigned)    03/08/24 0850    03/08/24 0741  Hematology & Oncology Inpatient Consult  Once        Specialty:  Hematology and Oncology  Provider:  Anayeli Daugherty MD    03/08/24 0740    03/08/24 0139  Inpatient Case Management  Consult  Once        Provider:  (Not yet assigned)    03/08/24 0139                  Procedures     * Surgery not found *    Imaging Results (All)       Procedure Component Value Units Date/Time    XR Chest 1 View [[id number]] Collected: 03/07/24 1905     Updated: 03/07/24 1911    Narrative:      XR CHEST 1 VW-     HISTORY: Female who is 88 years-old, edema     TECHNIQUE: Frontal view of the chest     COMPARISON: None available     FINDINGS: The heart size is normal. Aorta is tortuous, calcified.  Pulmonary  vasculature is unremarkable. Minimal atelectasis or infiltrate  at the bases. No pleural effusion, or pneumothorax. No acute osseous  process.       Impression:      Minimal atelectasis or infiltrate at the bases. Tortuous  aorta.     This report was finalized on 3/7/2024 7:07 PM by Dr. Dany Dorantes M.D on Workstation: [id number]       US Venous Doppler Lower Extremity Right (duplex) [[id number]] Collected: 03/07/24 1826     Updated: 03/07/24 1826    Narrative:        Patient: LEVON CALERO  Time Out: 18:25  Exam(s): US VENOUS RIGHT LOWER EXTREMITY     EXAM:    US Duplex Right Lower Extremity Veins    CLINICAL HISTORY:     Reason for exam: DVT rule out.    TECHNIQUE:    Real-time duplex ultrasound scan of the right lower extremity veins   integrating B-mode two-dimensional vascular structure, Doppler spectral   analysis, color flow Doppler imaging and compression.    COMPARISON:    None    FINDINGS:    Deep veins: Occlusive thrombus in the right posterior tibial veins.  No   DVT in the visualized common femoral, femoral, proximal deep femoral or   popliteal veins.      Superficial veins:  Unremarkable.  No thrombus in the visualized great   saphenous vein.    Soft tissues:  No acute findings.  No popliteal cyst.    IMPRESSION:       Occlusive thrombus in the right posterior tibial veins.      Impression:          Electronically signed by Félix Washington M.D. on 03-07-24 at 1825          Results for orders placed during the hospital encounter of 06/29/23    Duplex Venous Lower Extremity - Bilateral CAR    Interpretation Summary    Chronic right lower extremity deep vein thrombosis noted in the gastrocnemius.    Chronic right lower extremity superficial thrombophlebitis noted in the small saphenous.    All other veins appeared normal bilaterally.      Pertinent Labs     Results from last 7 days   Lab Units 03/09/24  0401 03/08/24  0556 03/08/24  0211 03/07/24  1917   WBC 10*3/mm3 5.59 4.23 4.83 5.37   HEMOGLOBIN  "g/dL 12.0 11.9* 11.8* 12.7   PLATELETS 10*3/mm3 184 200 207 232     Results from last 7 days   Lab Units 03/09/24  0401 03/08/24  0556 03/07/24 1917   SODIUM mmol/L 144 146* 143   POTASSIUM mmol/L 3.8 4.1 3.6   CHLORIDE mmol/L 109* 109* 104   CO2 mmol/L 24.8 28.2 31.3*   BUN mg/dL 15 13 15   CREATININE mg/dL 1.13* 1.10* 1.22*   GLUCOSE mg/dL 84 82 81   EGFR mL/min/1.73 47.2* 48.7* 43.0*     Results from last 7 days   Lab Units 03/09/24  0401 03/07/24 1917   ALBUMIN g/dL 3.1* 4.2   BILIRUBIN mg/dL  --  0.9   ALK PHOS U/L  --  100   AST (SGOT) U/L  --  17   ALT (SGPT) U/L  --  8     Results from last 7 days   Lab Units 03/09/24  0401 03/08/24  0556 03/07/24 1917   CALCIUM mg/dL 8.4* 8.8 10.0   ALBUMIN g/dL 3.1*  --  4.2   MAGNESIUM mg/dL 2.1  --  2.2   PHOSPHORUS mg/dL 3.1  --   --        Results from last 7 days   Lab Units 03/08/24  0556   PROBNP pg/mL 340.0     Results from last 7 days   Lab Units 03/08/24  0556   URIC ACID mg/dL 6.6*         Invalid input(s): \"LDLCALC\"  Results from last 7 days   Lab Units 03/07/24 1917   BLOODCX  No growth at 24 hours  No growth at 24 hours         Test Results Pending at Discharge     Pending Labs       Order Current Status    Blood Culture - Blood, Arm, Left Preliminary result    Blood Culture - Blood, Arm, Right Preliminary result            Discharge Details        Discharge Medications        Changes to Medications        Instructions Start Date   cephalexin 500 MG capsule  Commonly known as: Keflex  What changed: when to take this   500 mg, Oral, 3 Times Daily             Continue These Medications        Instructions Start Date   calcium carbonate 600 MG tablet  Commonly known as: OS-DARIAN   600 mg, Oral, Daily      Eliquis 5 MG tablet tablet  Generic drug: apixaban   5 mg, Oral, 2 Times Daily      Ibuprofen 3 %, Gabapentin 10 %, Baclofen 2 %, lidocaine 4 %   1-2 g, Topical, 3 to 4 Times Daily      latanoprost 0.005 % ophthalmic solution  Commonly known as: XALATAN   1 " drop, Both Eyes, Daily      potassium chloride 10 MEQ CR tablet  Commonly known as: K-DUR,KLOR-CON,KLOR-CON M10   10 mEq, Oral, Daily      vitamin D3 125 MCG (5000 UT) capsule capsule   5,000 Units, Oral, Daily             Stop These Medications      furosemide 20 MG tablet  Commonly known as: LASIX              No Known Allergies    Discharge Disposition:  Home or Self Care      Discharge Diet:  Diet Order   Procedures    Diet: Cardiac; Healthy Heart (2-3 Na+); Fluid Consistency: Thin (IDDSI 0)       Discharge Activity:   As tolerated    CODE STATUS:    Code Status and Medical Interventions:   Ordered at: 03/08/24 0318     Medical Intervention Limits:    NO intubation (DNI)    NO cardioversion     Code Status (Patient has no pulse and is not breathing):    No CPR (Do Not Attempt to Resuscitate)     Medical Interventions (Patient has pulse or is breathing):    Limited Support       No future appointments.  Additional Instructions for the Follow-ups that You Need to Schedule       Discharge Follow-up with PCP   As directed       Currently Documented PCP:    Villa Madriagl MD    PCP Phone Number:    [phone]     Follow Up Details: Dr. Madrigal (PCP) in 3-5 days               Follow-up Information       Villa Madrigal MD .    Specialty: Internal Medicine  Why: Dr. Madrigal (PCP) in 3-5 days  Contact information:  [address] [place], [place] [id number] [phone]                             Additional Instructions for the Follow-ups that You Need to Schedule       Discharge Follow-up with PCP   As directed       Currently Documented PCP:    Villa Madrigal MD    PCP Phone Number:    [phone]     Follow Up Details: Dr. Madrigal (PCP) in 3-5 days            Time Spent on Discharge:  Greater than 30 minutes      Sarath Baires MD  [facility]  03/09/24  10:25 EST

## 2024-03-11 ENCOUNTER — TRANSITIONAL CARE MANAGEMENT TELEPHONE ENCOUNTER (OUTPATIENT)
Dept: CALL CENTER | Facility: HOSPITAL | Age: 89
End: 2024-03-11
Payer: MEDICARE

## 2024-03-11 NOTE — OUTREACH NOTE
Call Center TCM Note      Flowsheet Row Responses   [facility] patient discharged from? [place]   Does the patient have one of the following disease processes/diagnoses(primary or secondary)? Other   TCM attempt successful? Yes   Call start time 0939   Call end time 0955   Discharge diagnosis DVT (deep venous thrombosis)   Person spoke with today (if not patient) and relationship pt   Meds reviewed with patient/caregiver? Yes   Is the patient having any side effects they believe may be caused by any medication additions or changes? No   Does the patient have all medications ordered at discharge? Yes   Is the patient taking all medications as directed (includes completed medication regime)? Yes   Does the patient have an appointment with their PCP within 7-14 days of discharge? Yes  [3/13/2024 at 9:00 AM]   Psychosocial issues? No   Did the patient receive a copy of their discharge instructions? Yes   Nursing interventions Reviewed instructions with patient   What is the patient's perception of their health status since discharge? Improving   Is the patient/caregiver able to teach back signs and symptoms related to disease process for when to call PCP? Yes   Is the patient/caregiver able to teach back signs and symptoms related to disease process for when to call 911? Yes   Is the patient/caregiver able to teach back the hierarchy of who to call/visit for symptoms/problems? PCP, Specialist, Home health nurse, Urgent Care, ED, 911 Yes   If the patient is a current smoker, are they able to teach back resources for cessation? Not a smoker   TCM call completed? Yes   Wrap up additional comments Pt states she having trouble with her bandages being wet at RLE r/t cellulitis as dsg was done in hospital. Son called and he will  more dsg and rewrap right LE for pt till she is seen by Dr Madrigal on 3/13/24. Pt/son confirmed pt taking antibiotic. Pt will fu with PCP about cellulitis/DVT of RLE.   Call end time  0955   Would this patient benefit from a Referral to [facility] Social Work? No   Is the patient interested in additional calls from an ambulatory ? No            Eri Aly RN    3/11/2024, 09:59 EDT

## 2024-03-12 LAB
BACTERIA SPEC AEROBE CULT: NORMAL
BACTERIA SPEC AEROBE CULT: NORMAL

## 2024-03-13 ENCOUNTER — OFFICE VISIT (OUTPATIENT)
Dept: FAMILY MEDICINE CLINIC | Facility: CLINIC | Age: 89
End: 2024-03-13
Payer: MEDICARE

## 2024-03-13 VITALS
WEIGHT: 173 LBS | DIASTOLIC BLOOD PRESSURE: 80 MMHG | RESPIRATION RATE: 18 BRPM | HEIGHT: 66 IN | BODY MASS INDEX: 27.8 KG/M2 | OXYGEN SATURATION: 98 % | SYSTOLIC BLOOD PRESSURE: 120 MMHG | HEART RATE: 68 BPM | TEMPERATURE: 97.8 F

## 2024-03-13 DIAGNOSIS — I82.5Z3 CHRONIC DEEP VEIN THROMBOSIS (DVT) OF DISTAL VEIN OF BOTH LOWER EXTREMITIES: ICD-10-CM

## 2024-03-13 DIAGNOSIS — R23.8 SKIN BULLA: ICD-10-CM

## 2024-03-13 DIAGNOSIS — L03.119 CELLULITIS OF LOWER EXTREMITY, UNSPECIFIED LATERALITY: ICD-10-CM

## 2024-03-13 DIAGNOSIS — Z09 HOSPITAL DISCHARGE FOLLOW-UP: Primary | ICD-10-CM

## 2024-03-13 DIAGNOSIS — N17.9 AKI (ACUTE KIDNEY INJURY): ICD-10-CM

## 2024-03-13 DIAGNOSIS — R22.43 LOCALIZED SWELLING OF BOTH LOWER LEGS: ICD-10-CM

## 2024-03-13 RX ORDER — FUROSEMIDE 20 MG/1
TABLET ORAL
Status: ON HOLD | COMMUNITY
Start: 2024-03-12

## 2024-03-13 RX ORDER — DOXYCYCLINE HYCLATE 100 MG/1
100 CAPSULE ORAL 2 TIMES DAILY
Qty: 20 CAPSULE | Refills: 0 | Status: ON HOLD | OUTPATIENT
Start: 2024-03-13 | End: 2024-03-23

## 2024-03-13 NOTE — PROGRESS NOTES
Transitional Care Follow Up Visit  Subjective     Noemi Forbes is a 88 y.o. female who presents for a transitional care management visit.    Within 48 business hours after discharge our office contacted her via telephone to coordinate her care and needs.      I reviewed and discussed the details of that call along with the discharge summary, hospital problems, inpatient lab results, inpatient diagnostic studies, and consultation reports with Noemi.     Current outpatient and discharge medications have been reconciled for the patient.  Reviewed by: TERRELL Blanchard          3/9/2024     1:03 PM   Date of TCM Phone Call   [facility]   Date of Admission 3/7/2024   Date of Discharge 3/9/2024   Discharge Disposition Home or Self Care     Risk for Readmission (LACE) Score: 5 (3/9/2024  6:00 AM)      History of Present Illness   Course During Hospital Stay:    03/07/24-03/09/24:  Very pleasant 87yo woman with h/o recurrent DVTs, on Eliquis chronically, chronic venous insufficiency of BLEs with lymphedema and venous stasis changes, chronic immobility, chronic back pain, mild aortic stenosis, LBBB, and tobacco abuse, who presented to [facility] ER with c/o worsening pain and swelling in BLEs (R>L). Denied F/C/NS. No injury. Admitted to missing doses of Eliquis occasionally. She was presented to us as having a new RLE DVT and we were asked to accept in transfer for tx with heparin gtt and Heme/Onc consult.     Upon arrival pt was noted to have RLE cellulitis. Her DVT's in BLE are chronic and not acute.  She has shown good response to IV Cefazolin, blood cultures are NGTD, no leukocytosis, PCT wnl, she is afebrile. She is very eager to go home today. Will send her home on a 7d course of oral Keflex and have asked her to f/u with her PCP in 3-5 days.  Compression wraps placed here by staff.  Heme/Onc saw pt here and agreed that her DVTs are chronic and she can continue Eliquis at [place].  Her  Cr is slightly elevated. No h/o CKD in chart. BPs are acceptable. Lasix on hold. She will need to f/u with her PCP for recheck of Cr in a few days.      F/U:  Pcp; today  No other f/u with providers.    Hospital started keflex at discharge.  Stopped lasix due to ARTURO. Patient reports she was unaware she was supposed to discontinue this and has been taking it daily since she was discharged from hospital.    Home health; No  Daughter in law changing bandages;last changed yesterday.  Currently lives at [facility]; has been there since may 2023.   Daughter in law helps her.      I removed ace wraps and bandage in office today. See pictures below.  I cleaned bullae with normal saline, applied antibiotic ointment, and covered with dry dressing.   I did not place ace wraps back on legs due to swelling decreased, and I wanted patient to visualize the redness in her legs daily.  Legs have worsened since discharge patient states, right worse than left.         Right leg             Left leg      The following portions of the patient's history were reviewed and updated as appropriate: She  has a past medical history of Arthritis, DVT of leg (deep venous thrombosis), Low back pain, and Osteopenia.  She does not have any pertinent problems on file.  She  has a past surgical history that includes Brain surgery; Hysterectomy; Rotator cuff repair (Right); Cataract extraction; Back surgery; Colonoscopy; Thyroid surgery; Spine surgery; and Tonsillectomy.  Her family history includes Dementia in her mother.  She  reports that she has been smoking cigarettes. She started smoking about 74 years ago. She has a 37.1 pack-year smoking history. She has never been exposed to tobacco smoke. She has never used smokeless tobacco. She reports current alcohol use of about 6.0 standard drinks of alcohol per week. She reports that she does not use drugs.  Current Outpatient Medications   Medication Sig Dispense Refill    calcium  carbonate (OS-DARIAN) 600 MG tablet Take 1 tablet by mouth Daily. 90 tablet 3    Eliquis 5 MG tablet tablet Take 1 tablet by mouth 2 (Two) Times a Day. 180 tablet 3    furosemide (LASIX) 20 MG tablet       Ibuprofen 3 %, Gabapentin 10 %, Baclofen 2 %, lidocaine 4 % Apply 1-2 g topically to the appropriate area as directed 3 (Three) to 4 (Four) times daily. 90 g 5    latanoprost (XALATAN) 0.005 % ophthalmic solution Administer 1 drop to both eyes Daily.      potassium chloride (K-DUR,KLOR-CON,KLOR-CON M10) 10 MEQ CR tablet TAKE 1 TABLET BY MOUTH DAILY 90 tablet 1    vitamin D3 125 MCG (5000 UT) capsule capsule Take 1 capsule by mouth Daily. 90 capsule 3    doxycycline (VIBRAMYCIN) 100 MG capsule Take 1 capsule by mouth 2 (Two) Times a Day for 10 days. 20 capsule 0     No current facility-administered medications for this visit.     She has No Known Allergies..        Objective   Physical Exam  Vitals reviewed.   Constitutional:       General: She is not in acute distress.     Appearance: Normal appearance.   HENT:      Head: Normocephalic and atraumatic.      Right Ear: Tympanic membrane normal.      Left Ear: Tympanic membrane normal.      Nose: Nose normal. No congestion.      Mouth/Throat:      Mouth: Mucous membranes are moist.      Pharynx: No oropharyngeal exudate or posterior oropharyngeal erythema.   Eyes:      Conjunctiva/sclera: Conjunctivae normal.      Pupils: Pupils are equal, round, and reactive to light.   Cardiovascular:      Rate and Rhythm: Normal rate and regular rhythm.      Pulses: Normal pulses.      Heart sounds: No murmur heard.     No gallop.   Pulmonary:      Effort: Pulmonary effort is normal. No respiratory distress.      Breath sounds: Normal breath sounds. No wheezing.   Abdominal:      General: Bowel sounds are normal. There is no distension.      Palpations: Abdomen is soft.      Tenderness: There is no abdominal tenderness.   Musculoskeletal:         General: Normal range of motion.       Cervical back: Normal range of motion and neck supple. No tenderness.      Right lower le+ Pitting Edema present.      Comments: BLE chronic insufficiency. BLE eyrthema and warmth but RLE worse than left.  1-2 pitting edema, with large right anterior tibial bullae. Bullae is draining purulent and serosanguineous drainage.   Skin:     General: Skin is warm and dry.   Neurological:      Mental Status: She is alert and oriented to person, place, and time. Mental status is at baseline.   Psychiatric:         Mood and Affect: Mood normal.         Assessment & Plan   Diagnoses and all orders for this visit:    1. Hospital discharge follow-up (Primary)    2. Localized swelling of both lower legs  -     Ambulatory Referral to Wound Clinic  -     Ambulatory Referral to Podiatry    3. Cellulitis of lower extremity, unspecified laterality  -     Ambulatory Referral to Wound Clinic  -     Ambulatory Referral to Podiatry  -     doxycycline (VIBRAMYCIN) 100 MG capsule; Take 1 capsule by mouth 2 (Two) Times a Day for 10 days.  Dispense: 20 capsule; Refill: 0  -     CBC w AUTO Differential    4. ARTURO (acute kidney injury)  -     Comprehensive metabolic panel    5. Skin bulla  -     Ambulatory Referral to Wound Clinic    6. Chronic deep vein thrombosis (DVT) of distal vein of both lower extremities    Other orders  -     Comprehensive Metabolic Panel      I removed ace wraps and bandage in office today. I cleaned bullae with normal saline, applied antibiotic ointment, and covered with dry dressing.   I did not place ace wraps back on legs due to swelling decreased, and I wanted patient to visualize the redness in her legs daily.  Stop keflex, start doxycycline today.  Wound referral placed today, patient reports she is able to take herself to appointment.   F/u on Friday for wound check.  Will f/u with lab results in office on Friday  Stop lasix per hospital d/c summary.  Discussed changing dressing daily with dry dressing,  provided supplies in office today. May change as needed also if soiled.  Discussed red flags and when patient should return to the hospital, patient voices understanding.  Patient may wrap legs with Ace wraps if needed, remove at night.  Patient may shower as needed, change dressing.

## 2024-03-13 NOTE — PATIENT INSTRUCTIONS
Referral team will contact you to set up appt with wound care clinic and podiatry.  We will call you with lab results.  Stop taking keflex and start doxycycline today.  If swelling, redness worsens, fever, wound looks worse go to ER.  Follow up with me on Friday for wound check and cellulitis check.  Continue to hold lasix.  May keep legs wrapped during the day, take off wraps at night. Keep legs elevated during the night or when resting.  Change dressing for blister daily with dry dressing..  Patient sent home with supplies.

## 2024-03-14 ENCOUNTER — TELEPHONE (OUTPATIENT)
Dept: FAMILY MEDICINE CLINIC | Facility: CLINIC | Age: 89
End: 2024-03-14
Payer: MEDICARE

## 2024-03-14 LAB
ALBUMIN SERPL-MCNC: 4.2 G/DL (ref 3.5–5.2)
ALBUMIN/GLOB SERPL: 1.5 G/DL
ALP SERPL-CCNC: 100 U/L (ref 39–117)
ALT SERPL-CCNC: 10 U/L (ref 1–33)
AST SERPL-CCNC: 18 U/L (ref 1–32)
BASOPHILS # BLD AUTO: 0.03 10*3/MM3 (ref 0–0.2)
BASOPHILS NFR BLD AUTO: 0.7 % (ref 0–1.5)
BILIRUB SERPL-MCNC: 0.4 MG/DL (ref 0–1.2)
BUN SERPL-MCNC: 22 MG/DL (ref 8–23)
BUN/CREAT SERPL: 18.5 (ref 7–25)
CALCIUM SERPL-MCNC: 10 MG/DL (ref 8.6–10.5)
CHLORIDE SERPL-SCNC: 107 MMOL/L (ref 98–107)
CO2 SERPL-SCNC: 31.9 MMOL/L (ref 22–29)
CREAT SERPL-MCNC: 1.19 MG/DL (ref 0.57–1)
EGFRCR SERPLBLD CKD-EPI 2021: 44.3 ML/MIN/1.73
EOSINOPHIL # BLD AUTO: 0.21 10*3/MM3 (ref 0–0.4)
EOSINOPHIL NFR BLD AUTO: 4.6 % (ref 0.3–6.2)
ERYTHROCYTE [DISTWIDTH] IN BLOOD BY AUTOMATED COUNT: 12.1 % (ref 12.3–15.4)
GLOBULIN SER CALC-MCNC: 2.8 GM/DL
GLUCOSE SERPL-MCNC: 98 MG/DL (ref 65–99)
HCT VFR BLD AUTO: 40.6 % (ref 34–46.6)
HGB BLD-MCNC: 12.9 G/DL (ref 12–15.9)
IMM GRANULOCYTES # BLD AUTO: 0.01 10*3/MM3 (ref 0–0.05)
IMM GRANULOCYTES NFR BLD AUTO: 0.2 % (ref 0–0.5)
LYMPHOCYTES # BLD AUTO: 0.75 10*3/MM3 (ref 0.7–3.1)
LYMPHOCYTES NFR BLD AUTO: 16.5 % (ref 19.6–45.3)
MCH RBC QN AUTO: 30.4 PG (ref 26.6–33)
MCHC RBC AUTO-ENTMCNC: 31.8 G/DL (ref 31.5–35.7)
MCV RBC AUTO: 95.8 FL (ref 79–97)
MONOCYTES # BLD AUTO: 0.48 10*3/MM3 (ref 0.1–0.9)
MONOCYTES NFR BLD AUTO: 10.5 % (ref 5–12)
NEUTROPHILS # BLD AUTO: 3.07 10*3/MM3 (ref 1.7–7)
NEUTROPHILS NFR BLD AUTO: 67.5 % (ref 42.7–76)
NRBC BLD AUTO-RTO: 0 /100 WBC (ref 0–0.2)
PLATELET # BLD AUTO: 280 10*3/MM3 (ref 140–450)
POTASSIUM SERPL-SCNC: 4.7 MMOL/L (ref 3.5–5.2)
PROT SERPL-MCNC: 7 G/DL (ref 6–8.5)
RBC # BLD AUTO: 4.24 10*6/MM3 (ref 3.77–5.28)
SODIUM SERPL-SCNC: 149 MMOL/L (ref 136–145)
WBC # BLD AUTO: 4.55 10*3/MM3 (ref 3.4–10.8)

## 2024-03-14 NOTE — TELEPHONE ENCOUNTER
I called and spoke with Noemi and let her know per brenda that she is allowed to shower but she needs to  keep the blister covered with a bandage she was given in the office yesterday. I let her know brenda said she would re evaluate her tomorrow and answer her questions and go over labs then. I did let her know she should hold the lasix per the hospital because she had elevated kidney function and brenda would explain her this to her tomorrow. She verbalized understanding and will call back if needed.

## 2024-03-14 NOTE — TELEPHONE ENCOUNTER
Caller: Noemi Forbes    Relationship: Self    Best call back number:    [phone]       What was the call regarding: PLEASE CALL PATIENT

## 2024-03-15 ENCOUNTER — OFFICE VISIT (OUTPATIENT)
Dept: FAMILY MEDICINE CLINIC | Facility: CLINIC | Age: 89
End: 2024-03-15
Payer: MEDICARE

## 2024-03-15 VITALS
HEIGHT: 66 IN | DIASTOLIC BLOOD PRESSURE: 80 MMHG | TEMPERATURE: 98 F | SYSTOLIC BLOOD PRESSURE: 114 MMHG | OXYGEN SATURATION: 94 % | RESPIRATION RATE: 18 BRPM | BODY MASS INDEX: 27.8 KG/M2 | HEART RATE: 85 BPM | WEIGHT: 173 LBS

## 2024-03-15 DIAGNOSIS — I82.5Z3 CHRONIC DEEP VEIN THROMBOSIS (DVT) OF DISTAL VEIN OF BOTH LOWER EXTREMITIES: ICD-10-CM

## 2024-03-15 DIAGNOSIS — R22.43 LOCALIZED SWELLING OF BOTH LOWER LEGS: ICD-10-CM

## 2024-03-15 DIAGNOSIS — N17.9 ACUTE RENAL FAILURE, UNSPECIFIED ACUTE RENAL FAILURE TYPE: ICD-10-CM

## 2024-03-15 DIAGNOSIS — R62.7 ADULT FAILURE TO THRIVE: ICD-10-CM

## 2024-03-15 DIAGNOSIS — R23.8 SKIN BULLA: ICD-10-CM

## 2024-03-15 DIAGNOSIS — L03.115 CELLULITIS OF RIGHT LOWER EXTREMITY: Primary | ICD-10-CM

## 2024-03-15 DIAGNOSIS — Z51.89 ENCOUNTER FOR WOUND CARE: ICD-10-CM

## 2024-03-15 NOTE — PROGRESS NOTES
"Chief Complaint  Wound check and lab review    Subjective          History of Present Illness    Lab Results   Component Value Date    GLUCOSE 98 03/13/2024    BUN 22 03/13/2024    CREATININE 1.19 (H) 03/13/2024    EGFRRESULT 44.3 (L) 03/13/2024    EGFR 47.2 (L) 03/09/2024    BCR 18.5 03/13/2024    K 4.7 03/13/2024    CO2 31.9 (H) 03/13/2024    CALCIUM 10.0 03/13/2024    PROTENTOTREF 7.0 03/13/2024    ALBUMIN 4.2 03/13/2024    BILITOT 0.4 03/13/2024    AST 18 03/13/2024    ALT 10 03/13/2024     Kidney function has returned to baseline, resume lasix due to increased swelling in BLE.      Cellulitis and wound check  -Started new antibiotic on Wednesday.  -Overall right lower leg redness has decreased, blister of right leg has completely popped and now is large open wound.  -Serosanguineous drainage noted, no foul odor noted from wound or purulent drainage.  -Warmness has decreased in right leg.  -Patient reports left leg is more swollen today with increased pain and tenderness.  Patient has chronic history of DVTs, this is an ongoing issue for patient.        Right leg             left leg        Objective   Vital Signs:  /80 (BP Location: Left arm, Patient Position: Sitting, Cuff Size: Large Adult)   Pulse 85   Temp 98 °F (36.7 °C) (Tympanic)   Resp 18   Ht 167.6 cm (65.98\")   Wt 78.5 kg (173 lb)   SpO2 94%   BMI 27.94 kg/m²   Estimated body mass index is 27.94 kg/m² as calculated from the following:    Height as of this encounter: 167.6 cm (65.98\").    Weight as of this encounter: 78.5 kg (173 lb).               Physical Exam  Vitals reviewed.   Constitutional:       General: She is not in acute distress.     Appearance: Normal appearance.   HENT:      Head: Normocephalic and atraumatic.   Eyes:      Conjunctiva/sclera: Conjunctivae normal.      Pupils: Pupils are equal, round, and reactive to light.   Cardiovascular:      Rate and Rhythm: Normal rate and regular rhythm.      Pulses: Normal pulses.      " Heart sounds: No murmur heard.     No gallop.   Pulmonary:      Effort: Pulmonary effort is normal. No respiratory distress.      Breath sounds: Normal breath sounds. No wheezing.   Abdominal:      General: Bowel sounds are normal. There is no distension.      Palpations: Abdomen is soft.      Tenderness: There is no abdominal tenderness.   Musculoskeletal:         General: Normal range of motion.      Cervical back: Normal range of motion and neck supple. No tenderness.      Right lower leg: Swelling and tenderness present. 2+ Pitting Edema present.      Left lower leg: Swelling and tenderness present. 1+ Pitting Edema present.   Skin:     General: Skin is warm and dry.      Findings: Wound present.      Comments: Please see pictures in chart.   Neurological:      Mental Status: She is alert and oriented to person, place, and time. Mental status is at baseline.   Psychiatric:         Mood and Affect: Mood normal.        Result Review :                     Assessment and Plan     Diagnoses and all orders for this visit:    1. Cellulitis of right lower extremity (Primary)  -     Ambulatory Referral to Home Health  -     Ambulatory Referral to Case Management    2. Localized swelling of both lower legs    3. Skin bulla  -     Ambulatory Referral to Home Health  -     Ambulatory Referral to Case Management    4. 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 Adult failure to thrive  -     Ambulatory Referral to Home Health  -     Ambulatory Referral to Case Management    6. Encounter for wound care  -     Ambulatory Referral to Home Health  -     Ambulatory Referral to Case Management    7. Acute renal failure, unspecified acute renal failure type    Patient's dressing was removed in office today.  Wound cleaned with normal saline.  Dry dressing reapplied to the area.  Urgent wound care order placed for patient on Wednesday, appointment has been made as of today for wound care for  next Tuesday.  Tuesday 12:30pm wound care appt.  Re-start Lasix today.  Continue Doxycycline.  Referral team will call you for home health and . Patient is elderly with confusion and unable to complete dressing changes by herself.  May not be able to successfully complete wound care requirements and may need wound care set up at home.  May shower, keep wound covered with dry dressing.  Monitor legs, if swelling, pain, redness worsens or wound begins to have yellow foul odor drainage, experience nausea vomiting diarrhea, fever or chills go to ER immediately.  Follow-up with me on Monday for wound check.  Patient voices understanding, but is easily forgetful, all information documented on AVS and printed and highlighted for patient.             Follow Up   Return in about 3 days (around 3/18/2024) for Recheck.  Patient was given instructions and counseling regarding her condition or for health maintenance advice. Please see specific information pulled into the AVS if appropriate.

## 2024-03-17 ENCOUNTER — HOSPITAL ENCOUNTER (OUTPATIENT)
Facility: HOSPITAL | Age: 89
Setting detail: OBSERVATION
Discharge: HOME HEALTH | End: 2024-03-19
Attending: EMERGENCY MEDICINE | Admitting: STUDENT IN AN ORGANIZED HEALTH CARE EDUCATION/TRAINING PROGRAM
Payer: MEDICARE

## 2024-03-17 ENCOUNTER — APPOINTMENT (OUTPATIENT)
Dept: CARDIOLOGY | Facility: HOSPITAL | Age: 89
End: 2024-03-17
Payer: MEDICARE

## 2024-03-17 DIAGNOSIS — Z74.09 IMPAIRED MOBILITY: ICD-10-CM

## 2024-03-17 DIAGNOSIS — L03.115 BILATERAL LOWER LEG CELLULITIS: Primary | ICD-10-CM

## 2024-03-17 DIAGNOSIS — M54.50 CHRONIC LOW BACK PAIN, UNSPECIFIED BACK PAIN LATERALITY, UNSPECIFIED WHETHER SCIATICA PRESENT: ICD-10-CM

## 2024-03-17 DIAGNOSIS — M47.816 FACET ARTHRITIS OF LUMBAR REGION: ICD-10-CM

## 2024-03-17 DIAGNOSIS — M19.90 ARTHRITIS: ICD-10-CM

## 2024-03-17 DIAGNOSIS — I82.4Z2 ACUTE DEEP VEIN THROMBOSIS (DVT) OF DISTAL VEIN OF LEFT LOWER EXTREMITY: ICD-10-CM

## 2024-03-17 DIAGNOSIS — M48.061 SPINAL STENOSIS OF LUMBAR REGION, UNSPECIFIED WHETHER NEUROGENIC CLAUDICATION PRESENT: ICD-10-CM

## 2024-03-17 DIAGNOSIS — G89.29 CHRONIC LOW BACK PAIN, UNSPECIFIED BACK PAIN LATERALITY, UNSPECIFIED WHETHER SCIATICA PRESENT: ICD-10-CM

## 2024-03-17 DIAGNOSIS — M85.80 OSTEOPENIA, UNSPECIFIED LOCATION: ICD-10-CM

## 2024-03-17 DIAGNOSIS — L03.116 BILATERAL LOWER LEG CELLULITIS: Primary | ICD-10-CM

## 2024-03-17 PROBLEM — I82.509 CHRONIC DEEP VEIN THROMBOSIS (DVT): Status: ACTIVE | Noted: 2024-03-07

## 2024-03-17 PROBLEM — Z66 DNR (DO NOT RESUSCITATE): Status: ACTIVE | Noted: 2024-03-17

## 2024-03-17 LAB
ALBUMIN SERPL-MCNC: 3.7 G/DL (ref 3.5–5.2)
ALBUMIN/GLOB SERPL: 1.2 G/DL
ALP SERPL-CCNC: 96 U/L (ref 39–117)
ALT SERPL W P-5'-P-CCNC: 6 U/L (ref 1–33)
ANION GAP SERPL CALCULATED.3IONS-SCNC: 11 MMOL/L (ref 5–15)
AST SERPL-CCNC: 14 U/L (ref 1–32)
BASOPHILS # BLD AUTO: 0.04 10*3/MM3 (ref 0–0.2)
BASOPHILS NFR BLD AUTO: 0.8 % (ref 0–1.5)
BH CV LOWER VASCULAR LEFT COMMON FEMORAL AUGMENT: NORMAL
BH CV LOWER VASCULAR LEFT COMMON FEMORAL COMPETENT: NORMAL
BH CV LOWER VASCULAR LEFT COMMON FEMORAL COMPRESS: NORMAL
BH CV LOWER VASCULAR LEFT COMMON FEMORAL PHASIC: NORMAL
BH CV LOWER VASCULAR LEFT COMMON FEMORAL SPONT: NORMAL
BH CV LOWER VASCULAR RIGHT COMMON FEMORAL AUGMENT: NORMAL
BH CV LOWER VASCULAR RIGHT COMMON FEMORAL COMPETENT: NORMAL
BH CV LOWER VASCULAR RIGHT COMMON FEMORAL COMPRESS: NORMAL
BH CV LOWER VASCULAR RIGHT COMMON FEMORAL PHASIC: NORMAL
BH CV LOWER VASCULAR RIGHT COMMON FEMORAL SPONT: NORMAL
BH CV LOWER VASCULAR RIGHT DISTAL FEMORAL COMPRESS: NORMAL
BH CV LOWER VASCULAR RIGHT GASTRONEMIUS COMPRESS: NORMAL
BH CV LOWER VASCULAR RIGHT GREATER SAPH AK COMPRESS: NORMAL
BH CV LOWER VASCULAR RIGHT GREATER SAPH BK COMPRESS: NORMAL
BH CV LOWER VASCULAR RIGHT LESSER SAPH COMPRESS: NORMAL
BH CV LOWER VASCULAR RIGHT MID FEMORAL AUGMENT: NORMAL
BH CV LOWER VASCULAR RIGHT MID FEMORAL COMPETENT: NORMAL
BH CV LOWER VASCULAR RIGHT MID FEMORAL COMPRESS: NORMAL
BH CV LOWER VASCULAR RIGHT MID FEMORAL PHASIC: NORMAL
BH CV LOWER VASCULAR RIGHT MID FEMORAL SPONT: NORMAL
BH CV LOWER VASCULAR RIGHT PERONEAL COMPRESS: NORMAL
BH CV LOWER VASCULAR RIGHT POPLITEAL AUGMENT: NORMAL
BH CV LOWER VASCULAR RIGHT POPLITEAL COMPETENT: NORMAL
BH CV LOWER VASCULAR RIGHT POPLITEAL COMPRESS: NORMAL
BH CV LOWER VASCULAR RIGHT POPLITEAL PHASIC: NORMAL
BH CV LOWER VASCULAR RIGHT POPLITEAL SPONT: NORMAL
BH CV LOWER VASCULAR RIGHT POSTERIOR TIBIAL COMPRESS: NORMAL
BH CV LOWER VASCULAR RIGHT PROFUNDA FEMORAL COMPRESS: NORMAL
BH CV LOWER VASCULAR RIGHT PROXIMAL FEMORAL COMPRESS: NORMAL
BH CV LOWER VASCULAR RIGHT SAPHENOFEMORAL JUNCTION COMPRESS: NORMAL
BILIRUB SERPL-MCNC: 0.5 MG/DL (ref 0–1.2)
BUN SERPL-MCNC: 18 MG/DL (ref 8–23)
BUN/CREAT SERPL: 15.9 (ref 7–25)
CALCIUM SPEC-SCNC: 9 MG/DL (ref 8.6–10.5)
CHLORIDE SERPL-SCNC: 107 MMOL/L (ref 98–107)
CO2 SERPL-SCNC: 25 MMOL/L (ref 22–29)
CREAT SERPL-MCNC: 1.13 MG/DL (ref 0.57–1)
DEPRECATED RDW RBC AUTO: 41.4 FL (ref 37–54)
EGFRCR SERPLBLD CKD-EPI 2021: 47.2 ML/MIN/1.73
EOSINOPHIL # BLD AUTO: 0.15 10*3/MM3 (ref 0–0.4)
EOSINOPHIL NFR BLD AUTO: 3.1 % (ref 0.3–6.2)
ERYTHROCYTE [DISTWIDTH] IN BLOOD BY AUTOMATED COUNT: 12 % (ref 12.3–15.4)
GLOBULIN UR ELPH-MCNC: 3.2 GM/DL
GLUCOSE SERPL-MCNC: 86 MG/DL (ref 65–99)
HCT VFR BLD AUTO: 38.3 % (ref 34–46.6)
HGB BLD-MCNC: 12.5 G/DL (ref 12–15.9)
IMM GRANULOCYTES # BLD AUTO: 0.02 10*3/MM3 (ref 0–0.05)
IMM GRANULOCYTES NFR BLD AUTO: 0.4 % (ref 0–0.5)
LYMPHOCYTES # BLD AUTO: 1.42 10*3/MM3 (ref 0.7–3.1)
LYMPHOCYTES NFR BLD AUTO: 29.4 % (ref 19.6–45.3)
MCH RBC QN AUTO: 30.9 PG (ref 26.6–33)
MCHC RBC AUTO-ENTMCNC: 32.6 G/DL (ref 31.5–35.7)
MCV RBC AUTO: 94.8 FL (ref 79–97)
MONOCYTES # BLD AUTO: 0.53 10*3/MM3 (ref 0.1–0.9)
MONOCYTES NFR BLD AUTO: 11 % (ref 5–12)
NEUTROPHILS NFR BLD AUTO: 2.67 10*3/MM3 (ref 1.7–7)
NEUTROPHILS NFR BLD AUTO: 55.3 % (ref 42.7–76)
NRBC BLD AUTO-RTO: 0 /100 WBC (ref 0–0.2)
NT-PROBNP SERPL-MCNC: 347 PG/ML (ref 0–1800)
PLATELET # BLD AUTO: 290 10*3/MM3 (ref 140–450)
PMV BLD AUTO: 9.9 FL (ref 6–12)
POTASSIUM SERPL-SCNC: 4.3 MMOL/L (ref 3.5–5.2)
PROCALCITONIN SERPL-MCNC: 0.06 NG/ML (ref 0–0.25)
PROT SERPL-MCNC: 6.9 G/DL (ref 6–8.5)
RBC # BLD AUTO: 4.04 10*6/MM3 (ref 3.77–5.28)
SODIUM SERPL-SCNC: 143 MMOL/L (ref 136–145)
WBC NRBC COR # BLD AUTO: 4.83 10*3/MM3 (ref 3.4–10.8)

## 2024-03-17 PROCEDURE — G0378 HOSPITAL OBSERVATION PER HR: HCPCS

## 2024-03-17 PROCEDURE — 99285 EMERGENCY DEPT VISIT HI MDM: CPT

## 2024-03-17 PROCEDURE — 83880 ASSAY OF NATRIURETIC PEPTIDE: CPT | Performed by: EMERGENCY MEDICINE

## 2024-03-17 PROCEDURE — 85025 COMPLETE CBC W/AUTO DIFF WBC: CPT | Performed by: EMERGENCY MEDICINE

## 2024-03-17 PROCEDURE — 80053 COMPREHEN METABOLIC PANEL: CPT | Performed by: EMERGENCY MEDICINE

## 2024-03-17 PROCEDURE — 25010000002 CEFAZOLIN PER 500 MG: Performed by: EMERGENCY MEDICINE

## 2024-03-17 PROCEDURE — 96365 THER/PROPH/DIAG IV INF INIT: CPT

## 2024-03-17 PROCEDURE — 93971 EXTREMITY STUDY: CPT

## 2024-03-17 PROCEDURE — 84145 PROCALCITONIN (PCT): CPT | Performed by: EMERGENCY MEDICINE

## 2024-03-17 PROCEDURE — 36415 COLL VENOUS BLD VENIPUNCTURE: CPT

## 2024-03-17 RX ORDER — FUROSEMIDE 20 MG/1
20 TABLET ORAL DAILY
Status: DISCONTINUED | OUTPATIENT
Start: 2024-03-18 | End: 2024-03-19 | Stop reason: HOSPADM

## 2024-03-17 RX ORDER — ACETAMINOPHEN 325 MG/1
650 TABLET ORAL EVERY 4 HOURS PRN
Status: DISCONTINUED | OUTPATIENT
Start: 2024-03-17 | End: 2024-03-19 | Stop reason: HOSPADM

## 2024-03-17 RX ORDER — SODIUM CHLORIDE 0.9 % (FLUSH) 0.9 %
10 SYRINGE (ML) INJECTION EVERY 12 HOURS SCHEDULED
Status: DISCONTINUED | OUTPATIENT
Start: 2024-03-17 | End: 2024-03-19 | Stop reason: HOSPADM

## 2024-03-17 RX ORDER — AMOXICILLIN 250 MG
2 CAPSULE ORAL 2 TIMES DAILY PRN
Status: DISCONTINUED | OUTPATIENT
Start: 2024-03-17 | End: 2024-03-19 | Stop reason: HOSPADM

## 2024-03-17 RX ORDER — SODIUM CHLORIDE 0.9 % (FLUSH) 0.9 %
10 SYRINGE (ML) INJECTION AS NEEDED
Status: DISCONTINUED | OUTPATIENT
Start: 2024-03-17 | End: 2024-03-19 | Stop reason: HOSPADM

## 2024-03-17 RX ORDER — BISACODYL 10 MG
10 SUPPOSITORY, RECTAL RECTAL DAILY PRN
Status: DISCONTINUED | OUTPATIENT
Start: 2024-03-17 | End: 2024-03-19 | Stop reason: HOSPADM

## 2024-03-17 RX ORDER — BISACODYL 5 MG/1
5 TABLET, DELAYED RELEASE ORAL DAILY PRN
Status: DISCONTINUED | OUTPATIENT
Start: 2024-03-17 | End: 2024-03-19 | Stop reason: HOSPADM

## 2024-03-17 RX ORDER — SODIUM CHLORIDE 9 MG/ML
40 INJECTION, SOLUTION INTRAVENOUS AS NEEDED
Status: DISCONTINUED | OUTPATIENT
Start: 2024-03-17 | End: 2024-03-19 | Stop reason: HOSPADM

## 2024-03-17 RX ORDER — POLYETHYLENE GLYCOL 3350 17 G/17G
17 POWDER, FOR SOLUTION ORAL DAILY PRN
Status: DISCONTINUED | OUTPATIENT
Start: 2024-03-17 | End: 2024-03-19 | Stop reason: HOSPADM

## 2024-03-17 RX ADMIN — Medication 10 ML: at 20:18

## 2024-03-17 RX ADMIN — APIXABAN 5 MG: 5 TABLET, FILM COATED ORAL at 20:18

## 2024-03-17 RX ADMIN — ACETAMINOPHEN 325MG 650 MG: 325 TABLET ORAL at 20:18

## 2024-03-17 RX ADMIN — CEFAZOLIN 1000 MG: 1 INJECTION, POWDER, FOR SOLUTION INTRAMUSCULAR; INTRAVENOUS at 15:14

## 2024-03-17 NOTE — PLAN OF CARE
Goal Outcome Evaluation:              Outcome Evaluation: Pt. 88 yr. old female AOX4 and able to verbalize need asmitted to observation r/t swelling leg pain, Pt has a wound on right leg (see MAR). Pt  room air.  PT consulted.  Pt has no other questions at this time.

## 2024-03-17 NOTE — ED NOTES
Patient to ER via car from home for redness swelling pain and weeping on both legs    Patient states she was dx with DVT and cellulitis recently and was in the hospital, states it has gotten worse

## 2024-03-17 NOTE — ED PROVIDER NOTES
EMERGENCY DEPARTMENT ENCOUNTER    Room Number:  13/13  PCP: Villa Madrigal MD  Historian: Patient      HPI:  Chief Complaint: Bilateral leg swelling  A complete HPI/ROS/PMH/PSH/SH/FH are unobtainable due to: None  Context: Noemi Forbes is a 88 y.o. female who presents to the ED c/o bilateral leg swelling.  Patient has history of venous stasis.  Patient was admitted to the hospital recently for DVT that turned out to be chronic.  Patient has had redness to both legs.  Was on Keflex.  Patient was seen by primary provider on the 15th and switched to doxycycline told to return to the emergency department if worse.  Patient states the redness and pain is worse.  Has had no fevers.  Has had no vomiting or diarrhea.  No chest pain pressure tightness.            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 02/16/2021    Chronic urinary tract infection 06/26/2013    Chronic venous insufficiency 11/26/2014    Facet arthritis of lumbar region 11/10/2015    Lymphedema 10/04/2018    Aortic stenosis 10/04/2018    Osteopenia 02/10/2015    Spinal stenosis of lumbar region 10/04/2018    Spondylolisthesis of lumbar region 11/10/2015    Tobacco abuse 03/16/2021    Venous stasis dermatitis of both lower extremities 02/16/2021    Postlaminectomy syndrome 12/13/2021    Cellulitis of left lower extremity 06/18/2022    History of deep vein thrombosis (DVT) of lower extremity 06/18/2022    Lymphedema 06/18/2022    Acute kidney failure 06/18/2022    Generalized osteoarthritis 06/18/2022    DDD (degenerative disc disease), lumbar 06/18/2022    Left hip pain 06/27/2022    Impaired mobility 01/31/2023    Encounter for power mobility device assessment 01/31/2023    Injury of right rotator  cuff 01/31/2023    Shoulder pain, bilateral 01/31/2023    Rotator cuff arthropathy of both shoulders 10/11/2023    Hammer toe of right foot 10/11/2023    Medicare annual wellness visit, subsequent 11/08/2023    DVT (deep venous thrombosis) 03/07/2024    LBBB (left bundle branch block) 03/08/2024    Edema 05/27/2015    Neuropathy 05/27/2015    Prediabetes 08/20/2015    Chronic back pain 05/27/2015    Hospital discharge follow-up 03/13/2024    Cellulitis of right lower extremity 03/15/2024    Localized swelling of both lower legs 03/15/2024    Skin bulla 03/15/2024     Resolved Ambulatory Problems     Diagnosis Date Noted    No Resolved Ambulatory Problems     Past Medical History:   Diagnosis Date    DVT of leg (deep venous thrombosis)     Low back pain          PAST SURGICAL HISTORY  Past Surgical History:   Procedure Laterality Date    BACK SURGERY      BRAIN SURGERY      CATARACT EXTRACTION      COLONOSCOPY      HYSTERECTOMY      ROTATOR CUFF REPAIR Right     SPINE SURGERY      THYROID SURGERY      TONSILLECTOMY           FAMILY HISTORY  Family History   Problem Relation Age of Onset    Dementia Mother          SOCIAL HISTORY  Social History     Socioeconomic History    Marital status:    Tobacco Use    Smoking status: Every Day     Current packs/day: 0.50     Average packs/day: 0.5 packs/day for 74.2 years (37.1 ttl pk-yrs)     Types: Cigarettes     Start date: 1/1/1950     Passive exposure: Never    Smokeless tobacco: Never   Vaping Use    Vaping status: Never Used   Substance and Sexual Activity    Alcohol use: Yes     Alcohol/week: 6.0 standard drinks of alcohol     Types: 4 Glasses of wine, 2 Cans of beer per week     Comment: socially    Drug use: Never    Sexual activity: Not Currently         ALLERGIES  Patient has no known allergies.        REVIEW OF SYSTEMS  Review of Systems   Bilateral leg redness      PHYSICAL EXAM  ED Triage Vitals [03/17/24 1303]   Temp Heart Rate Resp BP SpO2   97 °F (36.1  °C) 68 18 -- 94 %      Temp src Heart Rate Source Patient Position BP Location FiO2 (%)   -- -- -- -- --       Physical Exam      GENERAL: no acute distress  HENT: nares patent  EYES: no scleral icterus  CV: regular rhythm, normal rate  RESPIRATORY: normal effort  ABDOMEN: soft  MUSCULOSKELETAL: no deformity.  Tenderness to both legs  NEURO: alert, moves all extremities, follows commands  PSYCH:  calm, cooperative  SKIN: warm, dry.  Chronic venous stasis    Vital signs and nursing notes reviewed.          LAB RESULTS  Recent Results (from the past 24 hour(s))   Comprehensive Metabolic Panel    Collection Time: 03/17/24  1:27 PM    Specimen: Blood   Result Value Ref Range    Glucose 86 65 - 99 mg/dL    BUN 18 8 - 23 mg/dL    Creatinine 1.13 (H) 0.57 - 1.00 mg/dL    Sodium 143 136 - 145 mmol/L    Potassium 4.3 3.5 - 5.2 mmol/L    Chloride 107 98 - 107 mmol/L    CO2 25.0 22.0 - 29.0 mmol/L    Calcium 9.0 8.6 - 10.5 mg/dL    Total Protein 6.9 6.0 - 8.5 g/dL    Albumin 3.7 3.5 - 5.2 g/dL    ALT (SGPT) 6 1 - 33 U/L    AST (SGOT) 14 1 - 32 U/L    Alkaline Phosphatase 96 39 - 117 U/L    Total Bilirubin 0.5 0.0 - 1.2 mg/dL    Globulin 3.2 gm/dL    A/G Ratio 1.2 g/dL    BUN/Creatinine Ratio 15.9 7.0 - 25.0    Anion Gap 11.0 5.0 - 15.0 mmol/L    eGFR 47.2 (L) >60.0 mL/min/1.73   BNP    Collection Time: 03/17/24  1:27 PM    Specimen: Blood   Result Value Ref Range    proBNP 347.0 0.0 - 1,800.0 pg/mL   Procalcitonin    Collection Time: 03/17/24  1:27 PM    Specimen: Blood   Result Value Ref Range    Procalcitonin 0.06 0.00 - 0.25 ng/mL   CBC Auto Differential    Collection Time: 03/17/24  1:27 PM    Specimen: Blood   Result Value Ref Range    WBC 4.83 3.40 - 10.80 10*3/mm3    RBC 4.04 3.77 - 5.28 10*6/mm3    Hemoglobin 12.5 12.0 - 15.9 g/dL    Hematocrit 38.3 34.0 - 46.6 %    MCV 94.8 79.0 - 97.0 fL    MCH 30.9 26.6 - 33.0 pg    MCHC 32.6 31.5 - 35.7 g/dL    RDW 12.0 (L) 12.3 - 15.4 %    RDW-SD 41.4 37.0 - 54.0 fl    MPV 9.9 6.0  - 12.0 fL    Platelets 290 140 - 450 10*3/mm3    Neutrophil % 55.3 42.7 - 76.0 %    Lymphocyte % 29.4 19.6 - 45.3 %    Monocyte % 11.0 5.0 - 12.0 %    Eosinophil % 3.1 0.3 - 6.2 %    Basophil % 0.8 0.0 - 1.5 %    Immature Grans % 0.4 0.0 - 0.5 %    Neutrophils, Absolute 2.67 1.70 - 7.00 10*3/mm3    Lymphocytes, Absolute 1.42 0.70 - 3.10 10*3/mm3    Monocytes, Absolute 0.53 0.10 - 0.90 10*3/mm3    Eosinophils, Absolute 0.15 0.00 - 0.40 10*3/mm3    Basophils, Absolute 0.04 0.00 - 0.20 10*3/mm3    Immature Grans, Absolute 0.02 0.00 - 0.05 10*3/mm3    nRBC 0.0 0.0 - 0.2 /100 WBC       Ordered the above labs and reviewed the results.        RADIOLOGY  No Radiology Exams Resulted Within Past 24 Hours            PROCEDURES  Procedures              MEDICATIONS GIVEN IN ER  Medications   ceFAZolin 1000 mg IVPB in 100 mL NS (MBP) (1,000 mg Intravenous New Bag 3/17/24 1514)                   MEDICAL DECISION MAKING, PROGRESS, and CONSULTS    All labs have been independently reviewed by me.  All radiology studies have been reviewed by me and I have also reviewed the radiology report.   EKG's independently viewed and interpreted by me.  Discussion below represents my analysis of pertinent findings related to patient's condition, differential diagnosis, treatment plan and final disposition.      Additional sources:  - Discussed/ obtained information from independent historians: History obtained from daughter who states patient has had recurrent cellulitis    - External (non-ED) record review: Epic reviewed and patient admitted here 3/7/2024 for cellulitis and DVT    - Chronic or social conditions impacting care: None    - Shared decision making: None      Orders placed during this visit:  Orders Placed This Encounter   Procedures    Comprehensive Metabolic Panel    BNP    Procalcitonin    CBC Auto Differential    LHA (on-call MD unless specified) Details    Initiate Observation Status    CBC & Differential         Additional  orders considered but not ordered:  None        Differential diagnosis includes but is not limited to:    Cellulitis versus venous stasis versus DVTs      Independent interpretation of labs, radiology studies, and discussions with consultants:  ED Course as of 03/17/24 1518   Sun Mar 17, 2024   1267 14:57 EDT  Patient presents with bilateral cellulitis again.  Family thinks it is getting worse.  Patient also has a bullae that is ruptured.  Unclear whether this is venous stasis or cellulitis.  Family states it is worse since Friday.  Has been discussed with Dr. Smith who will admit [SL]      ED Course User Index  [SL] Pawan Brantley MD                 DIAGNOSIS  Final diagnoses:   Bilateral lower leg cellulitis         DISPOSITION  admit            Latest Documented Vital Signs:  As of 15:18 EDT  BP- 139/66 HR- 60 Temp- 98.7 °F (37.1 °C) (Oral) O2 sat- 93%              --    Please note that portions of this were completed with a voice recognition program.       Note Disclaimer: At [facility], we believe that sharing information builds trust and better relationships. You are receiving this note because you are receiving care at [facility] or recently visited. It is possible you will see health information before a provider has talked with you about it. This kind of information can be easy to misunderstand. To help you fully understand what it means for your health, we urge you to discuss this note with your provider.            Pawan Brantley MD  03/17/24 1519

## 2024-03-17 NOTE — NURSING NOTE
Nursing report ED to floor  Noemi Forbes  88 y.o.  female    HPI :  HPI (Adult)  Stated Reason for Visit: Leg pain swelling redness    Chief Complaint  Chief Complaint   Patient presents with    Leg Swelling    Leg Pain       Admitting doctor:   Ramón Smith MD    Admitting diagnosis:   The encounter diagnosis was Bilateral lower leg cellulitis.    Code status:   Current Code Status       Date Active Code Status Order ID Comments User Context       Prior            Allergies:   Patient has no known allergies.    Isolation:   No active isolations    Intake and Output  No intake or output data in the 24 hours ending 03/17/24 1531    Weight:   There were no vitals filed for this visit.    Most recent vitals:   Vitals:    03/17/24 1303 03/17/24 1316 03/17/24 1317   BP:  139/66    Pulse: 68 60    Resp: 18     Temp: 97 °F (36.1 °C)  98.7 °F (37.1 °C)   TempSrc:   Oral   SpO2: 94% 93%        Active LDAs/IV Access:   Lines, Drains & Airways       Active LDAs       Name Placement date Placement time Site Days    Peripheral IV 03/17/24 1500 Distal;Posterior;Right Forearm 03/17/24  1500  Forearm  less than 1                    Labs (abnormal labs have a star):   Labs Reviewed   COMPREHENSIVE METABOLIC PANEL - Abnormal; Notable for the following components:       Result Value    Creatinine 1.13 (*)     eGFR 47.2 (*)     All other components within normal limits    Narrative:     GFR Normal >60  Chronic Kidney Disease <60  Kidney Failure <15    The GFR formula is only valid for adults with stable renal function between ages 18 and 70.   CBC WITH AUTO DIFFERENTIAL - Abnormal; Notable for the following components:    RDW 12.0 (*)     All other components within normal limits   BNP (IN-HOUSE) - Normal    Narrative:     This assay is used as an aid in the diagnosis of individuals suspected of having heart failure. It can be used as an aid in the diagnosis of acute decompensated heart failure (ADHF) in patients presenting with  "signs and symptoms of ADHF to the emergency department (ED). In addition, NT-proBNP of <300 pg/mL indicates ADHF is not likely.    Age Range Result Interpretation  NT-proBNP Concentration (pg/mL:      <50             Positive            >450                   Gray                 300-450                    Negative             <300    50-75           Positive            >900                  Gray                300-900                  Negative            <300      >75             Positive            >1800                  Gray                300-1800                  Negative            <300   PROCALCITONIN - Normal    Narrative:     As a Marker for Sepsis (Non-Neonates):    1. <0.5 ng/mL represents a low risk of severe sepsis and/or septic shock.  2. >2 ng/mL represents a high risk of severe sepsis and/or septic shock.    As a Marker for Lower Respiratory Tract Infections that require antibiotic therapy:    PCT on Admission    Antibiotic Therapy       6-12 Hrs later    >0.5                Strongly Recommended  >0.25 - <0.5        Recommended   0.1 - 0.25          Discouraged              Remeasure/reassess PCT  <0.1                Strongly Discouraged     Remeasure/reassess PCT    As 28 day mortality risk marker: \"Change in Procalcitonin Result\" (>80% or <=80%) if Day 0 (or Day 1) and Day 4 values are available. Refer to http://www.[employer]-pct-calculator.com    Change in PCT <=80%  A decrease of PCT levels below or equal to 80% defines a positive change in PCT test result representing a higher risk for 28-day all-cause mortality of patients diagnosed with severe sepsis for septic shock.    Change in PCT >80%  A decrease of PCT levels of more than 80% defines a negative change in PCT result representing a lower risk for 28-day all-cause mortality of patients diagnosed with severe sepsis or septic shock.      CBC AND DIFFERENTIAL    Narrative:     The following orders were created for panel order CBC & " Differential.  Procedure                               Abnormality         Status                     ---------                               -----------         ------                     CBC Auto Differential[[id number]]        Abnormal            Final result                 Please view results for these tests on the individual orders.       EKG:   No orders to display       Meds given in ED:   Medications   ceFAZolin 1000 mg IVPB in 100 mL NS (MBP) (1,000 mg Intravenous New Bag 3/17/24 1514)   apixaban (ELIQUIS) tablet 5 mg (has no administration in time range)   furosemide (LASIX) tablet 20 mg (has no administration in time range)       Imaging results:  No radiology results for the last day    Ambulatory status:   - br    Social issues:   Social History     Socioeconomic History    Marital status:    Tobacco Use    Smoking status: Every Day     Current packs/day: 0.50     Average packs/day: 0.5 packs/day for 74.2 years (37.1 ttl pk-yrs)     Types: Cigarettes     Start date: 1/1/1949     Passive exposure: Never    Smokeless tobacco: Never   Vaping Use    Vaping status: Never Used   Substance and Sexual Activity    Alcohol use: Yes     Alcohol/week: 6.0 standard drinks of alcohol     Types: 4 Glasses of wine, 2 Cans of beer per week     Comment: socially    Drug use: Never    Sexual activity: Not Currently       Peripheral Neurovascular  Peripheral Neurovascular (Adult)  Peripheral Neurovascular WDL: .WDL except, pulse assessment  Pulse Assessment: dorsalis pedis    Neuro Cognitive  Neuro Cognitive (Adult)  Cognitive/Neuro/Behavioral WDL: WDL    Learning       Respiratory  Respiratory WDL  Respiratory WDL: WDL    Abdominal Pain       Pain Assessments  Pain (Adult)  Preferred Pain Scale: FACES (Foote-Baker FACES Pain Rating Scale)  FACES Pain Rating: Rest: 6-->hurts even more    NIH Stroke Scale       Chuck Feng RN  03/17/24 15:31 EDT

## 2024-03-17 NOTE — H&P
Patient Name:  Noemi Forbes  YOB: 1935  MRN:  [id number]  Admit Date:  3/17/2024  Patient Care Team:  Villa Madrigal MD as PCP - General (Internal Medicine)      Subjective   History Present Illness     Chief Complaint   Patient presents with    Leg Swelling    Leg Pain         History of Present Illness  Ms. Forbes is a 88 y.o. possible history of DVT on Eliquis, bilateral venous stasis dermatitis, bilateral lower extremity lipidemia presenting with erythema bilateral legs.  Recently discharged here with right lower extremity cellulitis, which improved with IV cefazolin.  Patient was thought to have a new right lower extremity DVT last admission, however heme-onc was consulted and this was a chronic DVT.  Patient states that her lower extremities have been red for about 10 years.  Right lower extremity with more swelling and tenderness to palpation.  Has not missed any doses of Eliquis.  No fevers, chills, nausea, vomiting, abdominal pain, diarrhea.    Review of Systems   Constitutional:  Negative for chills and fever.   HENT:  Negative for rhinorrhea and sore throat.    Respiratory:  Negative for cough and shortness of breath.    Cardiovascular:  Positive for leg swelling. Negative for chest pain.   Gastrointestinal:  Negative for abdominal pain, constipation, diarrhea, nausea and vomiting.   Genitourinary:  Negative for dysuria, frequency and urgency.   Musculoskeletal:  Negative for back pain and myalgias.   Skin:  Negative for rash.   Neurological:  Negative for dizziness and headaches.        Personal History     Past Medical History:   Diagnosis Date    Arthritis     DVT of leg (deep venous thrombosis)     Low back pain     Osteopenia      Past Surgical History:   Procedure Laterality Date    BACK SURGERY      BRAIN SURGERY      CATARACT EXTRACTION      COLONOSCOPY      HYSTERECTOMY      ROTATOR CUFF REPAIR Right     SPINE SURGERY      THYROID SURGERY      TONSILLECTOMY        Family History   Problem Relation Age of Onset    Dementia Mother      Social History     Tobacco Use    Smoking status: Every Day     Current packs/day: 0.50     Average packs/day: 0.5 packs/day for 74.2 years (37.1 ttl pk-yrs)     Types: Cigarettes     Start date: 1/1/1950     Passive exposure: Never    Smokeless tobacco: Never   Vaping Use    Vaping status: Never Used   Substance Use Topics    Alcohol use: Yes     Alcohol/week: 6.0 standard drinks of alcohol     Types: 4 Glasses of wine, 2 Cans of beer per week     Comment: socially    Drug use: Never     No current facility-administered medications on file prior to encounter.     Current Outpatient Medications on File Prior to Encounter   Medication Sig Dispense Refill    calcium carbonate (OS-DARIAN) 600 MG tablet Take 1 tablet by mouth Daily. 90 tablet 3    doxycycline (VIBRAMYCIN) 100 MG capsule Take 1 capsule by mouth 2 (Two) Times a Day for 10 days. 20 capsule 0    Eliquis 5 MG tablet tablet Take 1 tablet by mouth 2 (Two) Times a Day. 180 tablet 3    furosemide (LASIX) 20 MG tablet       latanoprost (XALATAN) 0.005 % ophthalmic solution Administer 1 drop to both eyes Daily.      potassium chloride (K-DUR,KLOR-CON,KLOR-CON M10) 10 MEQ CR tablet TAKE 1 TABLET BY MOUTH DAILY 90 tablet 1    vitamin D3 125 MCG (5000 UT) capsule capsule Take 1 capsule by mouth Daily. 90 capsule 3    Ibuprofen 3 %, Gabapentin 10 %, Baclofen 2 %, lidocaine 4 % Apply 1-2 g topically to the appropriate area as directed 3 (Three) to 4 (Four) times daily. 90 g 5     No Known Allergies    Objective    Objective     Vital Signs  Temp:  [97 °F (36.1 °C)-98.7 °F (37.1 °C)] 98.7 °F (37.1 °C)  Heart Rate:  [56-68] 67  Resp:  [18] 18  BP: (137-139)/(64-66) 137/64  SpO2:  [92 %-94 %] 94 %  on   ;      There is no height or weight on file to calculate BMI.    Physical Exam  Constitutional:       General: She is not in acute distress.     Appearance: She is not ill-appearing.      Comments:  Elderly   Cardiovascular:      Rate and Rhythm: Normal rate and regular rhythm.   Pulmonary:      Effort: Pulmonary effort is normal. No respiratory distress.   Abdominal:      General: Abdomen is flat. There is no distension.      Tenderness: There is no abdominal tenderness.   Musculoskeletal:         General: No swelling or deformity. Normal range of motion.   Skin:     General: Skin is warm and dry.      Comments: Right anterior leg with swelling erythema distal to knee and 5 to 6 cm burst bullae.  Tender to palpation.  Left leg erythema   Neurological:      General: No focal deficit present.      Mental Status: She is alert. Mental status is at baseline.         Results Review:  I reviewed the patient's new clinical results.  I reviewed the patient's new imaging results and agree with the interpretation.  I reviewed the patient's other test results and agree with the interpretation  I personally viewed and interpreted the patient's EKG/Telemetry data  Discussed with ED provider.    Lab Results (last 24 hours)       Procedure Component Value Units Date/Time    CBC & Differential [[id number]]  (Abnormal) Collected: 03/17/24 1327    Specimen: Blood Updated: 03/17/24 7011    Narrative:      The following orders were created for panel order CBC & Differential.  Procedure                               Abnormality         Status                     ---------                               -----------         ------                     CBC Auto Differential[[id number]]        Abnormal            Final result                 Please view results for these tests on the individual orders.    Comprehensive Metabolic Panel [[id number]]  (Abnormal) Collected: 03/17/24 1327    Specimen: Blood Updated: 03/17/24 1419     Glucose 86 mg/dL      BUN 18 mg/dL      Creatinine 1.13 mg/dL      Sodium 143 mmol/L      Potassium 4.3 mmol/L      Comment: Slight hemolysis detected by analyzer. Result may be falsely elevated.        Chloride 107  mmol/L      CO2 25.0 mmol/L      Calcium 9.0 mg/dL      Total Protein 6.9 g/dL      Albumin 3.7 g/dL      ALT (SGPT) 6 U/L      AST (SGOT) 14 U/L      Alkaline Phosphatase 96 U/L      Total Bilirubin 0.5 mg/dL      Globulin 3.2 gm/dL      A/G Ratio 1.2 g/dL      BUN/Creatinine Ratio 15.9     Anion Gap 11.0 mmol/L      eGFR 47.2 mL/min/1.73     Narrative:      GFR Normal >60  Chronic Kidney Disease <60  Kidney Failure <15    The GFR formula is only valid for adults with stable renal function between ages 18 and 70.    BNP [[id number]]  (Normal) Collected: 03/17/24 1327    Specimen: Blood Updated: 03/17/24 1424     proBNP 347.0 pg/mL     Narrative:      This assay is used as an aid in the diagnosis of individuals suspected of having heart failure. It can be used as an aid in the diagnosis of acute decompensated heart failure (ADHF) in patients presenting with signs and symptoms of ADHF to the emergency department (ED). In addition, NT-proBNP of <300 pg/mL indicates ADHF is not likely.    Age Range Result Interpretation  NT-proBNP Concentration (pg/mL:      <50             Positive            >450                   Gray                 300-450                    Negative             <300    50-75           Positive            >900                  Gray                300-900                  Negative            <300      >75             Positive            >1800                  Gray                300-1800                  Negative            <300    Procalcitonin [[id number]]  (Normal) Collected: 03/17/24 1327    Specimen: Blood Updated: 03/17/24 1424     Procalcitonin 0.06 ng/mL     Narrative:      As a Marker for Sepsis (Non-Neonates):    1. <0.5 ng/mL represents a low risk of severe sepsis and/or septic shock.  2. >2 ng/mL represents a high risk of severe sepsis and/or septic shock.    As a Marker for Lower Respiratory Tract Infections that require antibiotic therapy:    PCT on Admission    Antibiotic Therapy        "6-12 Hrs later    >0.5                Strongly Recommended  >0.25 - <0.5        Recommended   0.1 - 0.25          Discouraged              Remeasure/reassess PCT  <0.1                Strongly Discouraged     Remeasure/reassess PCT    As 28 day mortality risk marker: \"Change in Procalcitonin Result\" (>80% or <=80%) if Day 0 (or Day 1) and Day 4 values are available. Refer to http://www.[facility]-pct-calculator.com    Change in PCT <=80%  A decrease of PCT levels below or equal to 80% defines a positive change in PCT test result representing a higher risk for 28-day all-cause mortality of patients diagnosed with severe sepsis for septic shock.    Change in PCT >80%  A decrease of PCT levels of more than 80% defines a negative change in PCT result representing a lower risk for 28-day all-cause mortality of patients diagnosed with severe sepsis or septic shock.       CBC Auto Differential [[id number]]  (Abnormal) Collected: 03/17/24 1327    Specimen: Blood Updated: 03/17/24 1357     WBC 4.83 10*3/mm3      RBC 4.04 10*6/mm3      Hemoglobin 12.5 g/dL      Hematocrit 38.3 %      MCV 94.8 fL      MCH 30.9 pg      MCHC 32.6 g/dL      RDW 12.0 %      RDW-SD 41.4 fl      MPV 9.9 fL      Platelets 290 10*3/mm3      Neutrophil % 55.3 %      Lymphocyte % 29.4 %      Monocyte % 11.0 %      Eosinophil % 3.1 %      Basophil % 0.8 %      Immature Grans % 0.4 %      Neutrophils, Absolute 2.67 10*3/mm3      Lymphocytes, Absolute 1.42 10*3/mm3      Monocytes, Absolute 0.53 10*3/mm3      Eosinophils, Absolute 0.15 10*3/mm3      Basophils, Absolute 0.04 10*3/mm3      Immature Grans, Absolute 0.02 10*3/mm3      nRBC 0.0 /100 WBC             Imaging Results (Last 24 Hours)       ** No results found for the last 24 hours. **                No orders to display        Assessment/Plan     Active Hospital Problems    Diagnosis  POA    **Cellulitis of right leg [L03.115]  Yes    DNR (do not resuscitate) [Z66]  Yes    Chronic deep vein thrombosis " (DVT) [I82.509]  Yes    Venous stasis dermatitis of both lower extremities [I87.2]  Yes    Lymphedema [I89.0]  Yes      Resolved Hospital Problems   No resolved problems to display.     Right lower extremity nonpurulent cellulitis with bulla  Bilateral venous stasis dermatitis  Lower extremity lymphedema  Chronic right posterior tibial vein thrombus  -was on doxycyline as outpatient  -Continue IV cefazolin  -Suspect that she has significant underlying venous stasis dermatitis and discussed this with patient, patient and daughter states that the patient has had erythema of her legs for at least 10 years.  -Continue home Eliquis, she has not missed any doses.  Check right lower extremity Doppler  -Continue home Lasix 20 mg  -PT, OT for lymphedema wraps, wound care      I discussed the patient's findings and my recommendations with patient, family, and ED provider.    VTE Prophylaxis - Eliquis (home med).  Code Status - Limited code (no CPR, no intubation).       Ramón Smith MD  [facility]  03/17/24  17:22 EDT

## 2024-03-18 ENCOUNTER — HOME HEALTH ADMISSION (OUTPATIENT)
Dept: HOME HEALTH SERVICES | Facility: HOME HEALTHCARE | Age: 89
End: 2024-03-18
Payer: MEDICARE

## 2024-03-18 ENCOUNTER — APPOINTMENT (OUTPATIENT)
Dept: GENERAL RADIOLOGY | Facility: HOSPITAL | Age: 89
End: 2024-03-18
Payer: MEDICARE

## 2024-03-18 ENCOUNTER — REFERRAL TRIAGE (OUTPATIENT)
Dept: CASE MANAGEMENT | Facility: OTHER | Age: 89
End: 2024-03-18
Payer: MEDICARE

## 2024-03-18 LAB
ANION GAP SERPL CALCULATED.3IONS-SCNC: 7 MMOL/L (ref 5–15)
BUN SERPL-MCNC: 17 MG/DL (ref 8–23)
BUN/CREAT SERPL: 16.5 (ref 7–25)
CALCIUM SPEC-SCNC: 9 MG/DL (ref 8.6–10.5)
CHLORIDE SERPL-SCNC: 107 MMOL/L (ref 98–107)
CO2 SERPL-SCNC: 28 MMOL/L (ref 22–29)
CREAT SERPL-MCNC: 1.03 MG/DL (ref 0.57–1)
DEPRECATED RDW RBC AUTO: 41.5 FL (ref 37–54)
EGFRCR SERPLBLD CKD-EPI 2021: 52.7 ML/MIN/1.73
ERYTHROCYTE [DISTWIDTH] IN BLOOD BY AUTOMATED COUNT: 11.9 % (ref 12.3–15.4)
GLUCOSE SERPL-MCNC: 90 MG/DL (ref 65–99)
HCT VFR BLD AUTO: 37.2 % (ref 34–46.6)
HGB BLD-MCNC: 12 G/DL (ref 12–15.9)
MCH RBC QN AUTO: 30.5 PG (ref 26.6–33)
MCHC RBC AUTO-ENTMCNC: 32.3 G/DL (ref 31.5–35.7)
MCV RBC AUTO: 94.7 FL (ref 79–97)
PLATELET # BLD AUTO: 270 10*3/MM3 (ref 140–450)
PMV BLD AUTO: 10 FL (ref 6–12)
POTASSIUM SERPL-SCNC: 4.1 MMOL/L (ref 3.5–5.2)
RBC # BLD AUTO: 3.93 10*6/MM3 (ref 3.77–5.28)
SODIUM SERPL-SCNC: 142 MMOL/L (ref 136–145)
WBC NRBC COR # BLD AUTO: 4.49 10*3/MM3 (ref 3.4–10.8)

## 2024-03-18 PROCEDURE — 97162 PT EVAL MOD COMPLEX 30 MIN: CPT

## 2024-03-18 PROCEDURE — 73590 X-RAY EXAM OF LOWER LEG: CPT

## 2024-03-18 PROCEDURE — G0378 HOSPITAL OBSERVATION PER HR: HCPCS

## 2024-03-18 PROCEDURE — 97530 THERAPEUTIC ACTIVITIES: CPT

## 2024-03-18 PROCEDURE — 97165 OT EVAL LOW COMPLEX 30 MIN: CPT

## 2024-03-18 PROCEDURE — 25010000002 CEFAZOLIN PER 500 MG: Performed by: STUDENT IN AN ORGANIZED HEALTH CARE EDUCATION/TRAINING PROGRAM

## 2024-03-18 PROCEDURE — 85027 COMPLETE CBC AUTOMATED: CPT | Performed by: STUDENT IN AN ORGANIZED HEALTH CARE EDUCATION/TRAINING PROGRAM

## 2024-03-18 PROCEDURE — 80048 BASIC METABOLIC PNL TOTAL CA: CPT | Performed by: STUDENT IN AN ORGANIZED HEALTH CARE EDUCATION/TRAINING PROGRAM

## 2024-03-18 RX ORDER — EMOLLIENT COMBINATION NO.110
LOTION (ML) TOPICAL DAILY
Status: DISCONTINUED | OUTPATIENT
Start: 2024-03-18 | End: 2024-03-19 | Stop reason: HOSPADM

## 2024-03-18 RX ADMIN — APIXABAN 5 MG: 5 TABLET, FILM COATED ORAL at 20:44

## 2024-03-18 RX ADMIN — SODIUM CHLORIDE 2000 MG: 900 INJECTION INTRAVENOUS at 20:44

## 2024-03-18 RX ADMIN — ACETAMINOPHEN 325MG 650 MG: 325 TABLET ORAL at 09:39

## 2024-03-18 RX ADMIN — Medication 10 ML: at 20:45

## 2024-03-18 RX ADMIN — Medication 10 ML: at 09:34

## 2024-03-18 RX ADMIN — Medication: at 22:50

## 2024-03-18 RX ADMIN — FUROSEMIDE 20 MG: 20 TABLET ORAL at 09:33

## 2024-03-18 RX ADMIN — APIXABAN 5 MG: 5 TABLET, FILM COATED ORAL at 09:33

## 2024-03-18 RX ADMIN — SODIUM CHLORIDE 2000 MG: 900 INJECTION INTRAVENOUS at 15:19

## 2024-03-18 NOTE — PLAN OF CARE
Goal Outcome Evaluation:  Plan of Care Reviewed With: patient           Outcome Evaluation: Pt. is an 88 year old Female admitted with RLE cellulitis. Pt. reports that prior to admission she was using a Rollator for ambulation.  Pt. currently presents with decreased strength, decreased balance, decreased ROM, and decreased tolerance to functional activity.  This AM, pt. able to ambulate 20 feet, CGA x 1, with use of Rwx.  Pt. requires Min. assist x 1 for bed mobility and Min. assist x 1 for sit <-> stand transfers.  Verbal/tactile cues given during ambulation for posture correction.  Pt. will benefit from skilled inpt. P.T. to address her functional deficits and to assist pt. in regaining her maximum level of independence with functional mobility.      Anticipated Discharge Disposition (PT): skilled nursing facility, home with assist, home with home health (Pending pt. progress)

## 2024-03-18 NOTE — NURSING NOTE
03/18/24 1710   Wound 03/17/24 Right anterior leg Venous Ulcer   Placement Date: 03/17/24   Present on Original Admission: Yes  Side: Right  Orientation: anterior  Location: leg  Primary Wound Type: Venous Ulcer   Dressing Appearance   (optifoam)   Base pink;moist;clean   Periwound redness;dry  (chronic venous stasis)   Wound Length (cm)   (approx 5x5 cm mid shin)   Drainage Amount none  (moist)   Care, Wound cleansed with;soap and water;irrigated with;sterile normal saline   Dressing Care   (applied vaseline gauze, wrap affected area with kerlix)     Wound/ostomy - consult received regarding venous ulcer to E, see previous note by [facility] nurse with more detailed history. Went to see patient to assess leg, an optifoam is in place, no weeping, moist superficial wound present. BLE with erythema consistent with chronic venous stasis, dry flaky skin, L seems more so dry than R.     Discussed with patient management of chronic condition which requires application of romel quality lotion or emollient (vaseline, aquaphore or Eucerin lotion) and compression to BLE every day with ace wrappings or compression socks. Patient states has never been to a wound care center, discussed with her that she did last year and that her legs improved and she was d/c'd from there an was to continue to wear the compression socks, she does not recall the wound center but she does endorse use of compression socks/stockings, says she uses and had been using and then the blister occurred, she notes she is not consistent with use of lotion to leg using vaseline, aquaphore or Eucerine.     Recommend while here to manage legs with cleansing with soap and water, application of Eucrine lotion, vaseline gauze to any areas of blisters or skin loss, kerlix and 2 ace wraps to each leg toe to knee.     Patient will need to go to the wound care center ASAP to become established after d/c. Difficult situation as issue is entirely dependent on compliance  with hygiene, application of high quality moisturizing to legs and diligent use of compression, all things that the wound center had previously suggested for management. Based on my encounter with patient today with poor recall and memory,  patient will need ongoing daily assistance to do these things.

## 2024-03-18 NOTE — PLAN OF CARE
Goal Outcome Evaluation:  Plan of Care Reviewed With: patient        Progress: improving  Outcome Evaluation: IV antx started, monitor labs in AM, possible home tomorrow      Problem: Adult Inpatient Plan of Care  Goal: Plan of Care Review  Outcome: Ongoing, Progressing  Flowsheets (Taken 3/18/2024 1844)  Progress: improving  Plan of Care Reviewed With: patient  Outcome Evaluation: IV antx started, monitor labs in AM, possible home tomorrow  Goal: Patient-Specific Goal (Individualized)  Outcome: Ongoing, Progressing  Goal: Absence of Hospital-Acquired Illness or Injury  Outcome: Ongoing, Progressing  Intervention: Identify and Manage Fall Risk  Recent Flowsheet Documentation  Taken 3/18/2024 0942 by Silvestre Vaca RN  Safety Promotion/Fall Prevention:   nonskid shoes/slippers when out of bed   room organization consistent   safety round/check completed   clutter free environment maintained   fall prevention program maintained   lighting adjusted   activity supervised  Taken 3/18/2024 0828 by Silvestre Vaca RN  Safety Promotion/Fall Prevention:   nonskid shoes/slippers when out of bed   room organization consistent   safety round/check completed   clutter free environment maintained   fall prevention program maintained   lighting adjusted   activity supervised  Intervention: Prevent Skin Injury  Recent Flowsheet Documentation  Taken 3/18/2024 0942 by Silvestre Vaca RN  Body Position:   supine, legs elevated   weight shifting  Taken 3/18/2024 0828 by Silvestre Vaca RN  Body Position:   supine, legs elevated   weight shifting  Skin Protection: adhesive use limited  Intervention: Prevent and Manage VTE (Venous Thromboembolism) Risk  Recent Flowsheet Documentation  Taken 3/18/2024 0942 by Silvestre Vaca RN  Activity Management: activity encouraged  Taken 3/18/2024 0828 by Silvestre Vaca RN  Activity Management: activity encouraged  VTE Prevention/Management: patient refused intervention  Range of Motion: active ROM (range of  motion) encouraged  Intervention: Prevent Infection  Recent Flowsheet Documentation  Taken 3/18/2024 0942 by Silvestre Vaca RN  Infection Prevention:   rest/sleep promoted   single patient room provided  Taken 3/18/2024 0828 by Silvestre Vaca RN  Infection Prevention:   rest/sleep promoted   single patient room provided  Goal: Optimal Comfort and Wellbeing  Outcome: Ongoing, Progressing  Intervention: Provide Person-Centered Care  Recent Flowsheet Documentation  Taken 3/18/2024 0828 by Silvestre Vaca RN  Trust Relationship/Rapport:   care explained   questions answered   empathic listening provided   thoughts/feelings acknowledged   reassurance provided  Goal: Readiness for Transition of Care  Outcome: Ongoing, Progressing     Problem: Skin Injury Risk Increased  Goal: Skin Health and Integrity  Outcome: Ongoing, Progressing  Intervention: Optimize Skin Protection  Recent Flowsheet Documentation  Taken 3/18/2024 0942 by Silvestre Vaca RN  Head of Bed (HOB) Positioning: HOB at 20-30 degrees  Taken 3/18/2024 0828 by Silvestre Vaca RN  Pressure Reduction Techniques: frequent weight shift encouraged  Head of Bed (HOB) Positioning: HOB at 20-30 degrees  Pressure Reduction Devices: alternating pressure pump (ADD)  Skin Protection: adhesive use limited     Problem: Fall Injury Risk  Goal: Absence of Fall and Fall-Related Injury  Outcome: Ongoing, Progressing  Intervention: Identify and Manage Contributors  Recent Flowsheet Documentation  Taken 3/18/2024 0942 by Silvestre Vaca RN  Medication Review/Management: medications reviewed  Taken 3/18/2024 0828 by Silvestre Vaca RN  Medication Review/Management: medications reviewed  Intervention: Promote Injury-Free Environment  Recent Flowsheet Documentation  Taken 3/18/2024 0942 by Silvestre Vaca RN  Safety Promotion/Fall Prevention:   nonskid shoes/slippers when out of bed   room organization consistent   safety round/check completed   clutter free environment maintained   fall  prevention program maintained   lighting adjusted   activity supervised  Taken 3/18/2024 0828 by Silvestre Vaca, RN  Safety Promotion/Fall Prevention:   nonskid shoes/slippers when out of bed   room organization consistent   safety round/check completed   clutter free environment maintained   fall prevention program maintained   lighting adjusted   activity supervised

## 2024-03-18 NOTE — PLAN OF CARE
Problem: Adult Inpatient Plan of Care  Goal: Plan of Care Review  Outcome: Ongoing, Progressing  Flowsheets (Taken 3/18/2024 0251)  Plan of Care Reviewed With: patient  Goal: Patient-Specific Goal (Individualized)  Outcome: Ongoing, Progressing  Goal: Absence of Hospital-Acquired Illness or Injury  Outcome: Ongoing, Progressing  Intervention: Identify and Manage Fall Risk  Recent Flowsheet Documentation  Taken 3/18/2024 0200 by Janiya Rotmhan RN  Safety Promotion/Fall Prevention:   activity supervised   assistive device/personal items within reach   clutter free environment maintained   safety round/check completed  Taken 3/17/2024 2358 by Janiya Rothman RN  Safety Promotion/Fall Prevention:   activity supervised   assistive device/personal items within reach   clutter free environment maintained   safety round/check completed  Taken 3/17/2024 2143 by Janiya Rothman RN  Safety Promotion/Fall Prevention:   activity supervised   assistive device/personal items within reach   clutter free environment maintained   safety round/check completed  Taken 3/17/2024 2000 by Janiya Rothman RN  Safety Promotion/Fall Prevention: safety round/check completed  Intervention: Prevent Skin Injury  Recent Flowsheet Documentation  Taken 3/18/2024 0200 by Janiya Rothman RN  Body Position: position changed independently  Taken 3/17/2024 2358 by Janiya Rothman RN  Body Position: position changed independently  Taken 3/17/2024 2143 by Janiya Rothman RN  Body Position: position changed independently  Taken 3/17/2024 2000 by Janiya Rothman RN  Body Position: position changed independently  Skin Protection: adhesive use limited  Intervention: Prevent and Manage VTE (Venous Thromboembolism) Risk  Recent Flowsheet Documentation  Taken 3/17/2024 2000 by Janiya Rothman RN  VTE Prevention/Management: (BLE pain, cellulitis)   patient refused intervention   other (see comments)  Range of Motion: active ROM (range of motion)  encouraged  Intervention: Prevent Infection  Recent Flowsheet Documentation  Taken 3/17/2024 2000 by Janiya Rothman RN  Infection Prevention: single patient room provided  Goal: Optimal Comfort and Wellbeing  Outcome: Ongoing, Progressing  Intervention: Provide Person-Centered Care  Recent Flowsheet Documentation  Taken 3/17/2024 2000 by Janiya Rothman RN  Trust Relationship/Rapport:   choices provided   care explained  Goal: Readiness for Transition of Care  Outcome: Ongoing, Progressing     Problem: Skin Injury Risk Increased  Goal: Skin Health and Integrity  Outcome: Ongoing, Progressing  Intervention: Optimize Skin Protection  Recent Flowsheet Documentation  Taken 3/18/2024 0200 by Janiya Rothman RN  Head of Bed (HOB) Positioning: HOB at 20 degrees  Taken 3/17/2024 2358 by Janiya Rothman RN  Head of Bed (HOB) Positioning: HOB at 20 degrees  Taken 3/17/2024 2143 by Janiya Rothman RN  Head of Bed (HOB) Positioning: HOB at 20 degrees  Taken 3/17/2024 2000 by Janiya Rothman RN  Pressure Reduction Techniques: frequent weight shift encouraged  Head of Bed (HOB) Positioning: HOB at 20 degrees  Pressure Reduction Devices: alternating pressure pump (ADD)  Skin Protection: adhesive use limited   Goal Outcome Evaluation:  Plan of Care Reviewed With: patient

## 2024-03-18 NOTE — THERAPY EVALUATION
Patient Name: Noemi Forbes  : 1935    MRN: [id number]                              Today's Date: 3/18/2024       Admit Date: 3/17/2024    Visit Dx:     ICD-10-CM ICD-9-CM   1. Bilateral lower leg cellulitis  L03.116 682.6    L03.115      Patient Active Problem List   Diagnosis    Acute deep vein thrombosis (DVT) of distal vein of left lower extremity    Back pain, chronic    Arthritis    Chronic deep vein thrombosis (DVT) of distal vein of both lower extremities    Chronic urinary tract infection    Chronic venous insufficiency    Facet arthritis of lumbar region    Lymphedema    Aortic stenosis    Osteopenia    Spinal stenosis of lumbar region    Spondylolisthesis of lumbar region    Tobacco abuse    Venous stasis dermatitis of both lower extremities    Postlaminectomy syndrome    Cellulitis of left lower extremity    History of deep vein thrombosis (DVT) of lower extremity    Lymphedema    Acute kidney failure    Generalized osteoarthritis    DDD (degenerative disc disease), lumbar    Left hip pain    Impaired mobility    Encounter for power mobility device assessment    Injury of right rotator cuff    Shoulder pain, bilateral    Rotator cuff arthropathy of both shoulders    Hammer toe of right foot    Medicare annual wellness visit, subsequent    Chronic deep vein thrombosis (DVT)    LBBB (left bundle branch block)    Edema    Neuropathy    Prediabetes    Chronic back pain    Hospital discharge follow-up    Cellulitis of right leg    Localized swelling of both lower legs    Skin bulla    Bilateral lower leg cellulitis    DNR (do not resuscitate)     Past Medical History:   Diagnosis Date    Arthritis     DVT of leg (deep venous thrombosis)     Low back pain     Osteopenia      Past Surgical History:   Procedure Laterality Date    BACK SURGERY      BRAIN SURGERY      CATARACT EXTRACTION      COLONOSCOPY      HYSTERECTOMY      ROTATOR CUFF REPAIR Right     SPINE SURGERY      THYROID SURGERY       TONSILLECTOMY        General Information       Row Name 03/18/24 0918          Physical Therapy Time and Intention    Document Type evaluation  Pt. admitted with RLE Cellulitis  -MS     Mode of Treatment physical therapy;individual therapy  -MS       Row Name 03/18/24 0918          General Information    Patient Profile Reviewed yes  -MS     Prior Level of Function --  Use of Rollator for ambulation  -MS     Existing Precautions/Restrictions fall   Exit alarm  -MS     Barriers to Rehab none identified  -MS       Row Name 03/18/24 0918          Cognition    Orientation Status (Cognition) oriented x 3  -MS       Row Name 03/18/24 0918          Safety Issues, Functional Mobility    Comment, Safety Issues/Impairments (Mobility) Gait belt used for safety.  -MS               User Key  (r) = Recorded By, (t) = Taken By, (c) = Cosigned By      Initials Name Provider Type    MS Villa Choe, PT Physical Therapist                   Mobility       Row Name 03/18/24 0919          Bed Mobility    Bed Mobility supine-sit;sit-supine  -MS     Supine-Sit [place] (Bed Mobility) minimum assist (75% patient effort)  -MS     Sit-Supine [place] (Bed Mobility) minimum assist (75% patient effort)  -MS       Row Name 03/18/24 0919          Sit-Stand Transfer    Sit-Stand [place] (Transfers) minimum assist (75% patient effort)  -MS     Assistive Device (Sit-Stand Transfers) walker, front-wheeled  -MS       Row Name 03/18/24 0919          Gait/Stairs (Locomotion)    [place] Level (Gait) minimum assist (75% patient effort)  -MS     Assistive Device (Gait) walker, front-wheeled  -MS     Distance in Feet (Gait) 20  -MS     Deviations/Abnormal Patterns (Gait) leyda decreased  -MS     Bilateral Gait Deviations forward flexed posture  -MS     Comment, (Gait/Stairs) Verbal/tactile cues given for posture correction.  -MS               User Key  (r) = Recorded By, (t) = Taken By, (c) = Cosigned By      Initials Name Provider  Type    Villa Miller ALISSA, PT Physical Therapist                   Obj/Interventions       Row Name 03/18/24 0920          Range of Motion Comprehensive    Comment, General Range of Motion B Shld (Imp. 50%);  BLE (WFL's)  -MS       Row Name 03/18/24 0920          Strength Comprehensive (MMT)    Comment, General Manual Muscle Testing (MMT) Assessment B Shld (3-/5);  BLE (3/5)  -MS               User Key  (r) = Recorded By, (t) = Taken By, (c) = Cosigned By      Initials Name Provider Type    Villa Miller L, PT Physical Therapist                   Goals/Plan       Row Name 03/18/24 0921          Bed Mobility Goal 1 (PT)    Activity/Assistive Device (Bed Mobility Goal 1, PT) bed mobility activities, all  -MS     [place] Level/Cues Needed (Bed Mobility Goal 1, PT) standby assist  -MS     Time Frame (Bed Mobility Goal 1, PT) long term goal (LTG);1 week  -MS       Row Name 03/18/24 0921          Transfer Goal 1 (PT)    Activity/Assistive Device (Transfer Goal 1, PT) transfers, all;walker, rolling  -MS     [place] Level/Cues Needed (Transfer Goal 1, PT) standby assist  -MS     Time Frame (Transfer Goal 1, PT) long term goal (LTG);1 week  -MS       Row Name 03/18/24 0921          Gait Training Goal 1 (PT)    Activity/Assistive Device (Gait Training Goal 1, PT) gait (walking locomotion);walker, rolling  -MS     [place] Level (Gait Training Goal 1, PT) standby assist  -MS     Distance (Gait Training Goal 1, PT) 100 feet  -MS     Time Frame (Gait Training Goal 1, PT) long term goal (LTG);1 week  -MS       Row Name 03/18/24 0921          Therapy Assessment/Plan (PT)    Planned Therapy Interventions (PT) balance training;bed mobility training;gait training;home exercise program;patient/family education;postural re-education;transfer training;strengthening  -MS               User Key  (r) = Recorded By, (t) = Taken By, (c) = Cosigned By      Initials Name Provider Type    Villa Miller L, PT Physical  Therapist                   Clinical Impression       Row Name 03/18/24 0920          Pain    Pretreatment Pain Rating 7/10  -MS     Posttreatment Pain Rating 7/10  -MS     Pain Location - back  -MS     Pain Intervention(s) Medication (See MAR);Nursing Notified;Repositioned  -MS       Row Name 03/18/24 0920          Plan of Care Review    Plan of Care Reviewed With patient  -MS       Row Name 03/18/24 0920          Therapy Assessment/Plan (PT)    Rehab Potential (PT) good, to achieve stated therapy goals  -MS     Criteria for Skilled Interventions Met (PT) skilled treatment is necessary  -MS     Therapy Frequency (PT) 6 times/wk  -MS       Row Name 03/18/24 0920          Positioning and Restraints    Pre-Treatment Position in bed  -MS     Post Treatment Position bed  -MS     In Bed notified nsg;supine;call light within reach;encouraged to call for assist;exit alarm on  -MS               User Key  (r) = Recorded By, (t) = Taken By, (c) = Cosigned By      Initials Name Provider Type    Villa Miller, PT Physical Therapist                   Outcome Measures       Row Name 03/18/24 0922          How much help from another person do you currently need...    Turning from your back to your side while in flat bed without using bedrails? 3  -MS     Moving from lying on back to sitting on the side of a flat bed without bedrails? 3  -MS     Moving to and from a bed to a chair (including a wheelchair)? 3  -MS     Standing up from a chair using your arms (e.g., wheelchair, bedside chair)? 3  -MS     Climbing 3-5 steps with a railing? 2  -MS     To walk in hospital room? 3  -MS     AM-PAC 6 Clicks Score (PT) 17  -MS     Highest Level of Mobility Goal 5 --> Static standing  -MS       Row Name 03/18/24 0922          Functional Assessment    Outcome Measure Options AM-PAC 6 Clicks Basic Mobility (PT)  -MS               User Key  (r) = Recorded By, (t) = Taken By, (c) = Cosigned By      Initials Name Provider Type    MS  Villa Choe, PT Physical Therapist                                 Physical Therapy Education       Title: PT OT SLP Therapies (In Progress)       Topic: Physical Therapy (In Progress)       Point: Mobility training (Done)       Learning Progress Summary             Patient Acceptance, E,D, VU,NR by MS at 3/18/2024 0922                         Point: Home exercise program (Not Started)       Learner Progress:  Not documented in this visit.              Point: Body mechanics (Done)       Learning Progress Summary             Patient Acceptance, E,D, VU,NR by MS at 3/18/2024 0922                         Point: Precautions (Done)       Learning Progress Summary             Patient Acceptance, E,D, VU,NR by MS at 3/18/2024 0922                                         User Key       Initials Effective Dates Name Provider Type Discipline    MS 06/16/21 -  Villa Choe PT Physical Therapist PT                  PT Recommendation and Plan  Planned Therapy Interventions (PT): balance training, bed mobility training, gait training, home exercise program, patient/family education, postural re-education, transfer training, strengthening  Plan of Care Reviewed With: patient  Outcome Evaluation: Pt. is an 88 year old Female admitted with RLE cellulitis. Pt. reports that prior to admission she was using a Rollator for ambulation.  Pt. currently presents with decreased strength, decreased balance, decreased ROM, and decreased tolerance to functional activity.  This AM, pt. able to ambulate 20 feet, CGA x 1, with use of Rwx.  Pt. requires Min. assist x 1 for bed mobility and Min. assist x 1 for sit <-> stand transfers.  Verbal/tactile cues given during ambulation for posture correction.  Pt. will benefit from skilled inpt. P.T. to address her functional deficits and to assist pt. in regaining her maximum level of independence with functional mobility.     Time Calculation:         PT Charges       Row Name 03/18/24 5364              Time Calculation    Start Time 0900  -MS      Stop Time 0915  -MS      Time Calculation (min) 15 min  -MS      PT Received On 03/18/24  -MS      PT - Next Appointment 03/19/24  -MS      PT Goal Re-Cert Due Date 03/25/24  -MS         Time Calculation- PT    Total Timed Code Minutes- PT 14 minute(s)  -MS                User Key  (r) = Recorded By, (t) = Taken By, (c) = Cosigned By      Initials Name Provider Type    Villa Miller, PT Physical Therapist                  Therapy Charges for Today       Code Description Service Date Service Provider Modifiers Qty    42143189055 HC PT EVAL MOD COMPLEXITY 2 3/18/2024 Villa Choe, PT GP 1    29964755854 HC PT THERAPEUTIC ACT EA 15 MIN 3/18/2024 Villa Choe, PT GP 1            PT G-Codes  Outcome Measure Options: AM-PAC 6 Clicks Basic Mobility (PT)  AM-PAC 6 Clicks Score (PT): 17  PT Discharge Summary  Anticipated Discharge Disposition (PT): home with assist, home with home health, skilled nursing facility (Pending pt. progress)    Villa Choe, PT  3/18/2024

## 2024-03-18 NOTE — PROGRESS NOTES
Name: Noemi Forbes ADMIT: 3/17/2024   : 1935  PCP: Villa Madrigal MD    MRN: [id number] LOS: 0 days   AGE/SEX: 88 y.o. female  ROOM: [facility]     Subjective   Subjective   Patient seen and examined this morning.  Hospital day 1.  She is resting in bed, continues to endorse erythema, pain and swelling of bilateral lower extremities.       Objective   Objective   Vital Signs  Temp:  [97.3 °F (36.3 °C)-97.9 °F (36.6 °C)] 97.5 °F (36.4 °C)  Heart Rate:  [53-67] 56  Resp:  [18] 18  BP: (107-146)/(58-80) 107/58  SpO2:  [92 %-98 %] 94 %  on   ;   Device (Oxygen Therapy): room air  Body mass index is 27.66 kg/m².  Physical Exam  Vitals and nursing note reviewed.   Constitutional:       General: She is not in acute distress.     Comments: Elderly/frail, chronically ill-appearing   Cardiovascular:      Rate and Rhythm: Normal rate and regular rhythm.      Pulses: Normal pulses.      Heart sounds: Normal heart sounds.   Pulmonary:      Effort: Pulmonary effort is normal. No respiratory distress.      Breath sounds: Normal breath sounds.   Abdominal:      General: Bowel sounds are normal. There is no distension.      Palpations: Abdomen is soft.      Tenderness: There is no abdominal tenderness.   Skin:     General: Skin is warm and dry.      Coloration: Skin is pale.      Comments: Erythema, tenderness of lower extremities, evidence of chronic venous stasis changes of the lower extremities   Neurological:      Mental Status: She is alert.       Results Review     I reviewed the patient's new clinical results.  Results from last 7 days   Lab Units 24  0543 24  1327 24  1129   WBC 10*3/mm3 4.49 4.83 4.55   HEMOGLOBIN g/dL 12.0 12.5 12.9   PLATELETS 10*3/mm3 270 290 280     Results from last 7 days   Lab Units 24  0543 24  1327 24  1129   SODIUM mmol/L 142 143 149*   POTASSIUM mmol/L 4.1 4.3 4.7   CHLORIDE mmol/L 107 107 107   CO2 mmol/L 28.0 25.0 31.9*   BUN mg/dL 17 18   "  CREATININE mg/dL 1.03* 1.13* 1.19*   GLUCOSE mg/dL 90 86 98   EGFR mL/min/1.73 52.7* 47.2*  --      Results from last 7 days   Lab Units 03/17/24  1327 03/13/24  1129   ALBUMIN g/dL 3.7 4.2   BILIRUBIN mg/dL 0.5 0.4   ALK PHOS U/L 96 100   AST (SGOT) U/L 14 18   ALT (SGPT) U/L 6 10     Results from last 7 days   Lab Units 03/18/24  0543 03/17/24  1327 03/13/24  1129   CALCIUM mg/dL 9.0 9.0 10.0   ALBUMIN g/dL  --  3.7 4.2     Results from last 7 days   Lab Units 03/17/24  1327   PROCALCITONIN ng/mL 0.06     No results found for: \"HGBA1C\", \"POCGLU\"    No radiology results for the last day    I have personally reviewed all medications:  Scheduled Medications  apixaban, 5 mg, Oral, BID  ceFAZolin, 2,000 mg, Intravenous, Q8H  furosemide, 20 mg, Oral, Daily  sodium chloride, 10 mL, Intravenous, Q12H    Infusions   Diet  Diet: Regular/House; Fluid Consistency: Thin (IDDSI 0)    I have personally reviewed:  [x]  Laboratory   [x]  Microbiology   [x]  Radiology   [x]  EKG/Telemetry  [x]  Cardiology/Vascular   []  Pathology    []  Records       Assessment/Plan     Active Hospital Problems    Diagnosis  POA    **Cellulitis of right leg [L03.115]  Yes    DNR (do not resuscitate) [Z66]  Yes    Chronic deep vein thrombosis (DVT) [I82.509]  Yes    Venous stasis dermatitis of both lower extremities [I87.2]  Yes    Lymphedema [I89.0]  Yes      Resolved Hospital Problems   No resolved problems to display.       88 y.o. female admitted with Cellulitis of right leg.    Right lower extremity nonpurulent cellulitis with bulla  Bilateral venous stasis dermatitis  Lower extremity lymphedema  Chronic right posterior tibial vein thrombus  - Discussed with pharmacy, prior to arrival, patient was on Keflex then changed to Doxycycline, has received ~11 days of antibiotics.  - She is now on Cefazolin. Although this could be refractory cellulitis, suspect that she has significant underlying venous stasis dermatitis, which is likely causing " majority of the problems and skin changes seen, which was discussed with patient. Duplex negative for RLE DVT.  - PT, OT for lymphedema wraps, wound care/  - Order B/L XR Tib-Fib to assess for any evidence of soft tissue swelling.  - Continue antibiotics for now, but further management based on results of imaging and clinical course.  - Continue home Lasix, Eliquis.    Chronic kidney disease stage 3A  - On most recent labs, patient's creatinine found to be stable and near apparent baseline (around 1.1), per review of previous lab values and outside records.  - No indications to warrant acute changes and/or intervention at this time.  - Order repeat BMP in AM for reassessment of renal function.         Eliquis (home med) for DVT prophylaxis.  Limited code (no CPR, no intubation).  Discussed with patient, nursing staff, CCP, and care team on multidisciplinary rounds.  Anticipate discharge home tomorrow.      Rajesh Llanos DO  [facility]  03/18/24

## 2024-03-18 NOTE — NURSING NOTE
Wound/ostomy - consult received regarding a wound to lower extremity. Patient has a long history of lymphedema bilateral venous stasis dermatitis, with erythema bilateral legs and repeat bouts of cellulitis, OT has also been consulted for the lymphedema.     Due to the chronic nature of venous stasis and lymphedema ongoing compression is necessary for successful management. Per [facility] review, patient has been to [facility] wound care [place], was d/c from services on 9/20/22 after 5 week treatment, edema improved with compression wraps initially, then size F Tubi  and then transitioned to compression socks, upon d/c visit  it was advised that patient needs to continue to moisturize legs with Eucerine, aquaphore or vaseline and wear compression socks every day.     Patient was at PCP office on 3/15 for this same issue and was noted to have a blister. PCP office sent referrals to podiatry, home health and [facility] wound care center. An encounter was not noted for an upcoming appt in [facility] but I called and spoke to Margoth at [facility] and she confirmed that they have received a referral and patient had an appt for 3/19 at 1230, it just had not been updated in EPIC yet. I advised that patient is in the hospital, Margoth states they have availability in the schedule this week for new patient's. Advised Margoth that we will contact wound center back about appt once d/c date is established.

## 2024-03-18 NOTE — PLAN OF CARE
Goal Outcome Evaluation:                           Pt admit with redness, swelling, weeping B LE.  Recent ED for chronic DVT.  Lymphedema consult ordered.  Pt reports has had lymphedema wraps in the past (confirm per EPIC review from admits a few years ago).  Pt reports does not tolerate wraps and does not want lymphedema wraps now.   OT explains purpose of wraps to reduce edema with plan for long term management.  Pt reports aware and does not want wraps.  Pt reports edema has improved in the hospital.  Offer information on OP lymph clinic if pt wants to pursue again in future but pt declines.  Pt is alert and oriented.  Will sign off at this time. Discuss with RN.

## 2024-03-18 NOTE — THERAPY DISCHARGE NOTE
Acute Care - Occupational Therapy Lymphedema Initial Evaluation/Discharge  [facility]     Patient Name: Noemi Forbes  : 1935  MRN: [id number]  Today's Date: 3/18/2024                Admit Date: 3/17/2024    Visit Dx:    ICD-10-CM ICD-9-CM   1. Bilateral lower leg cellulitis  L03.116 682.6    L03.115          Patient Active Problem List   Diagnosis    Acute deep vein thrombosis (DVT) of distal vein of left lower extremity    Back pain, chronic    Arthritis    Chronic deep vein thrombosis (DVT) of distal vein of both lower extremities    Chronic urinary tract infection    Chronic venous insufficiency    Facet arthritis of lumbar region    Lymphedema    Aortic stenosis    Osteopenia    Spinal stenosis of lumbar region    Spondylolisthesis of lumbar region    Tobacco abuse    Venous stasis dermatitis of both lower extremities    Postlaminectomy syndrome    Cellulitis of left lower extremity    History of deep vein thrombosis (DVT) of lower extremity    Lymphedema    Acute kidney failure    Generalized osteoarthritis    DDD (degenerative disc disease), lumbar    Left hip pain    Impaired mobility    Encounter for power mobility device assessment    Injury of right rotator cuff    Shoulder pain, bilateral    Rotator cuff arthropathy of both shoulders    Hammer toe of right foot    Medicare annual wellness visit, subsequent    Chronic deep vein thrombosis (DVT)    LBBB (left bundle branch block)    Edema    Neuropathy    Prediabetes    Chronic back pain    Hospital discharge follow-up    Cellulitis of right leg    Localized swelling of both lower legs    Skin bulla    Bilateral lower leg cellulitis    DNR (do not resuscitate)        Past Medical History:   Diagnosis Date    Arthritis     DVT of leg (deep venous thrombosis)     Low back pain     Osteopenia         Past Surgical History:   Procedure Laterality Date    BACK SURGERY      BRAIN SURGERY      CATARACT EXTRACTION      COLONOSCOPY      HYSTERECTOMY    "   ROTATOR CUFF REPAIR Right     SPINE SURGERY      THYROID SURGERY      TONSILLECTOMY           Occupational Therapy Education       Title: PT OT SLP Therapies (In Progress)       Topic: Occupational Therapy (Done)       Point: ADL training (Done)       Description:   Instruct learner(s) on proper safety adaptation and remediation techniques during self care or transfers.   Instruct in proper use of assistive devices.                  Learning Progress Summary             Patient Acceptance, E, Bed IU by GRAZYNA at 3/18/2024 1006    Comment: ed role of OT for lymphedema consult.  OT ed purpose of lymph wraps for long term management of edema.   OT offer handout on OP lymph clinic if pt wants to pursue in future and pt declines.                                         User Key       Initials Effective Dates Name Provider Type Discipline    LE 06/16/21 -  Amparo Mendosa OTR Occupational Therapist OT                       Lymphedema       Row Name 03/18/24 1200             Subjective    Subjective Comments \"I've had lymph wraps before and they were terrible\".  \"Am I a wimp for not being able tolerate wraps?\"  \"I am aware and I realized  my legs stay red and swell but it's been years and the things I've tried haven't made a big difference\"  -LE      Recorded by [GRAZYNA] Amparo Mendosa OTR              Lymphedema Assessment    Lymphedema Assessment Comments pt has been wrapped by OT over a year ago.   pt R LE overall minimal edema, bandage at R shin for wound (WOCN consulted). L LE less edema than R and  not pitting edema noted L LE.  B redness shins and pt reports present \"for years\".  -LE      Recorded by [GRAZYNA] Amparo Mendosa OTR                User Key  (r) = Recorded By, (t) = Taken By, (c) = Cosigned By      Initials Name Effective Dates    Amparo Tyler OTR 06/16/21 -                   Wound 03/17/24 2000 Right lower leg (Active)   Pressure Injury Stage 2 03/17/24 2000   Dressing Appearance dry;intact 03/18/24 0828 "   Closure Adhesive bandage 03/18/24 0828   Base red 03/17/24 2000   Drainage Amount none 03/18/24 0828   Care, Wound cleansed with;sterile normal saline 03/17/24 2000   Dressing Care foam 03/18/24 0828         OT Recommendation and Plan                        Time Calculation:    Time Calculation- OT       Row Name 03/18/24 1248             Time Calculation- OT    OT Start Time 1002  -LE      OT Stop Time 1017  -LE      OT Time Calculation (min) 15 min  -LE      Total Timed Code Minutes- OT 15 minute(s)  -LE      OT Received On 03/18/24  -LE         Untimed Charges    OT Eval/Re-eval Minutes 15  -LE         Total Minutes    Untimed Charges Total Minutes 15  -LE       Total Minutes 15  -LE                User Key  (r) = Recorded By, (t) = Taken By, (c) = Cosigned By      Initials Name Provider Type    Amparo Tyler OTR Occupational Therapist                    Therapy Charges for Today       Code Description Service Date Service Provider Modifiers Qty    03059992379 HC OT EVAL LOW COMPLEXITY 2 3/18/2024 Amparo Mendosa OTR GO 1                             SUZANNE Le  3/18/2024

## 2024-03-19 ENCOUNTER — READMISSION MANAGEMENT (OUTPATIENT)
Dept: CALL CENTER | Facility: HOSPITAL | Age: 89
End: 2024-03-19
Payer: MEDICARE

## 2024-03-19 VITALS
OXYGEN SATURATION: 95 % | SYSTOLIC BLOOD PRESSURE: 100 MMHG | HEIGHT: 65 IN | WEIGHT: 166.23 LBS | BODY MASS INDEX: 27.7 KG/M2 | TEMPERATURE: 98 F | DIASTOLIC BLOOD PRESSURE: 66 MMHG | HEART RATE: 62 BPM | RESPIRATION RATE: 16 BRPM

## 2024-03-19 LAB
ANION GAP SERPL CALCULATED.3IONS-SCNC: 7.2 MMOL/L (ref 5–15)
BASOPHILS # BLD AUTO: 0.05 10*3/MM3 (ref 0–0.2)
BASOPHILS NFR BLD AUTO: 1 % (ref 0–1.5)
BUN SERPL-MCNC: 17 MG/DL (ref 8–23)
BUN/CREAT SERPL: 14.7 (ref 7–25)
CALCIUM SPEC-SCNC: 8.6 MG/DL (ref 8.6–10.5)
CHLORIDE SERPL-SCNC: 108 MMOL/L (ref 98–107)
CO2 SERPL-SCNC: 27.8 MMOL/L (ref 22–29)
CREAT SERPL-MCNC: 1.16 MG/DL (ref 0.57–1)
DEPRECATED RDW RBC AUTO: 41.4 FL (ref 37–54)
EGFRCR SERPLBLD CKD-EPI 2021: 45.7 ML/MIN/1.73
EOSINOPHIL # BLD AUTO: 0.11 10*3/MM3 (ref 0–0.4)
EOSINOPHIL NFR BLD AUTO: 2.3 % (ref 0.3–6.2)
ERYTHROCYTE [DISTWIDTH] IN BLOOD BY AUTOMATED COUNT: 12 % (ref 12.3–15.4)
GLUCOSE SERPL-MCNC: 89 MG/DL (ref 65–99)
HCT VFR BLD AUTO: 38.4 % (ref 34–46.6)
HGB BLD-MCNC: 12.6 G/DL (ref 12–15.9)
IMM GRANULOCYTES # BLD AUTO: 0.01 10*3/MM3 (ref 0–0.05)
IMM GRANULOCYTES NFR BLD AUTO: 0.2 % (ref 0–0.5)
LYMPHOCYTES # BLD AUTO: 1.28 10*3/MM3 (ref 0.7–3.1)
LYMPHOCYTES NFR BLD AUTO: 26.8 % (ref 19.6–45.3)
MCH RBC QN AUTO: 31 PG (ref 26.6–33)
MCHC RBC AUTO-ENTMCNC: 32.8 G/DL (ref 31.5–35.7)
MCV RBC AUTO: 94.3 FL (ref 79–97)
MONOCYTES # BLD AUTO: 0.45 10*3/MM3 (ref 0.1–0.9)
MONOCYTES NFR BLD AUTO: 9.4 % (ref 5–12)
NEUTROPHILS NFR BLD AUTO: 2.88 10*3/MM3 (ref 1.7–7)
NEUTROPHILS NFR BLD AUTO: 60.3 % (ref 42.7–76)
NRBC BLD AUTO-RTO: 0 /100 WBC (ref 0–0.2)
PLATELET # BLD AUTO: 295 10*3/MM3 (ref 140–450)
PMV BLD AUTO: 10 FL (ref 6–12)
POTASSIUM SERPL-SCNC: 4.1 MMOL/L (ref 3.5–5.2)
RBC # BLD AUTO: 4.07 10*6/MM3 (ref 3.77–5.28)
SODIUM SERPL-SCNC: 143 MMOL/L (ref 136–145)
WBC NRBC COR # BLD AUTO: 4.78 10*3/MM3 (ref 3.4–10.8)

## 2024-03-19 PROCEDURE — G0378 HOSPITAL OBSERVATION PER HR: HCPCS

## 2024-03-19 PROCEDURE — 25010000002 CEFAZOLIN PER 500 MG: Performed by: STUDENT IN AN ORGANIZED HEALTH CARE EDUCATION/TRAINING PROGRAM

## 2024-03-19 PROCEDURE — 85025 COMPLETE CBC W/AUTO DIFF WBC: CPT | Performed by: STUDENT IN AN ORGANIZED HEALTH CARE EDUCATION/TRAINING PROGRAM

## 2024-03-19 PROCEDURE — 80048 BASIC METABOLIC PNL TOTAL CA: CPT | Performed by: STUDENT IN AN ORGANIZED HEALTH CARE EDUCATION/TRAINING PROGRAM

## 2024-03-19 RX ORDER — EMOLLIENT COMBINATION NO.110
1 LOTION (ML) TOPICAL DAILY
Qty: 30 ML | Refills: 0 | Status: SHIPPED | OUTPATIENT
Start: 2024-03-20

## 2024-03-19 RX ORDER — EMOLLIENT COMBINATION NO.110
1 LOTION (ML) TOPICAL DAILY
Qty: 30 ML | Refills: 0 | Status: SHIPPED | OUTPATIENT
Start: 2024-03-20 | End: 2024-03-19

## 2024-03-19 RX ORDER — CEPHALEXIN 500 MG/1
500 CAPSULE ORAL 3 TIMES DAILY
Qty: 9 CAPSULE | Refills: 0 | Status: SHIPPED | OUTPATIENT
Start: 2024-03-19 | End: 2024-03-19

## 2024-03-19 RX ORDER — CEPHALEXIN 500 MG/1
500 CAPSULE ORAL 3 TIMES DAILY
Qty: 9 CAPSULE | Refills: 0 | Status: SHIPPED | OUTPATIENT
Start: 2024-03-19 | End: 2024-03-22

## 2024-03-19 RX ADMIN — SODIUM CHLORIDE 2000 MG: 900 INJECTION INTRAVENOUS at 05:30

## 2024-03-19 RX ADMIN — FUROSEMIDE 20 MG: 20 TABLET ORAL at 08:50

## 2024-03-19 RX ADMIN — Medication: at 08:53

## 2024-03-19 RX ADMIN — APIXABAN 5 MG: 5 TABLET, FILM COATED ORAL at 08:50

## 2024-03-19 RX ADMIN — SODIUM CHLORIDE 2000 MG: 900 INJECTION INTRAVENOUS at 14:14

## 2024-03-19 RX ADMIN — ACETAMINOPHEN 325MG 650 MG: 325 TABLET ORAL at 08:50

## 2024-03-19 NOTE — DISCHARGE PLACEMENT REQUEST
"Noemi Forbes (88 y.o. Female)       Date of Birth   1935    Social Security Number       Address   [address] [place] [address] [address] [address]    Home Phone   [phone]    MRN   [id number]       Anabaptism   Religion    Marital Status                               Admission Date   3/17/24    Admission Type   Emergency    Admitting Provider   Ramón Smith MD    Attending Provider   Rajesh Llanos DO    Department, Room/Bed   [address], [address]/1       Discharge Date       Discharge Disposition   Home-Health Care [facility]    Discharge Destination                                 Attending Provider: Rajesh Llanos DO    Allergies: No Known Allergies    Isolation: None   Infection: None   Code Status: No CPR    Ht: 165.1 cm (65\")   Wt: 75.4 kg (166 lb 3.6 oz)    Admission Cmt: None   Principal Problem: Cellulitis of right leg [L03.115]                   Active Insurance as of 3/17/2024       Primary Coverage       Payor Plan Insurance Group Employer/Plan Group    [facility] MEDICARE REPLACEMENT [facility] [id number]       Payor Plan Address Payor Plan Phone Number Payor Plan Fax Number Effective Dates    [address] [address]   1/1/2024 - None Entered    [facility] [address]         Subscriber Name Subscriber Birth Date Member ID       NOEMI FORBES 1935 [id number]                     Emergency Contacts        (Rel.) Home Phone Work Phone Mobile Phone    Paul Forbes (Son) [phone] -- [phone]    martin(inLaw)quinn (Daughter) [phone] -- --                "

## 2024-03-19 NOTE — CASE MANAGEMENT/SOCIAL WORK
Discharge Planning Assessment  [facility]     Patient Name: Noemi Forbes  MRN: [id number]  Today's Date: 3/19/2024    Admit Date: 3/17/2024    Plan: Return to [facility].   Discharge Needs Assessment       Row Name 03/19/24 1323       Living Environment    People in Home alone;facility resident      Row Name 03/19/24 1302       Living Environment    Unique Family Situation [facility] Independent Living    Current Living Arrangements independent living facility    Potentially Unsafe Housing Conditions none    Primary Care Provided by self    Provides Primary Care For no one    Family Caregiver if Needed child(evangelista), adult    Family Caregiver Names Paul Forbes son [phone] or Ju Forbes [phone]    Quality of Family Relationships supportive    Able to Return to Prior Arrangements yes       Resource/Environmental Concerns    Resource/Environmental Concerns none       Transition Planning    Transportation Anticipated family or friend will provide       Discharge Needs Assessment    Equipment Currently Used at Home rollator;wheelchair, motorized    Concerns to be Addressed discharge planning    Anticipated Changes Related to Illness none    Equipment Needed After Discharge none    Outpatient/Agency/Support Group Needs homecare agency    Discharge Facility/Level of Care Needs home with home health    Provided Post Acute Provider List? N/A    N/A Provider List Comment [facility]    Current Discharge Risk lives alone                   Discharge Plan       Row Name 03/19/24 3513       Plan    Plan Return to [facility].    Patient/Family in Agreement with Plan yes    Plan Comments [place] 3/17/2024. Met with patient at bedside. Face sheet verified. Prior to admission patient was living at [facility]. Patient uses the [facility] on [address], denies any issues affording  or remembering to take her medication. Will be using [facility] at discharge. Paul Forbes son [phone] is patient’s POA. Discharge plans discussed, plan patient would like to return to [facility]. Spoke with Lakia PT with [facility] [phone], she would be able to therapy with patient 5x per week, unfortunately can’t provide any type of would care. Spoke with Lakia and  at facility, they supported [facility] to provide therapy and nursing to assess wound. Spoke with Ju Daughter in law, she will transport back to facility around 4:30 today.  Will continue to monitor for new or changing discharge needs. Stcai LUI RN CCP                  Continued Care and Services - Admitted Since 3/17/2024       Home Medical Care       Service Provider Request Status Selected Services Address Phone Fax Patient Preferred    [facility] [address] [facility] [facility] [address], [address], [facility] [address] [phone] --    [facility] Declined  Out of network N/A [address], [facility] [address] [phone] --                  Selected Continued Care - Episodes Includes continued care and service providers with selected services from the active episodes listed below      Chronic Care Management Episode start date: 3/18/2024   There are no active outsourced providers for this episode.                 Expected Discharge Date and Time       Expected Discharge Date Expected Discharge Time    Mar 19, 2024 11:35 AM           Demographic Summary       Row Name 03/19/24 1301       General Information    Admission Type observation    Arrived From emergency department    Referral Source admission list    Reason for Consult discharge planning    Preferred Language English       Contact Information    Permission Granted to Share Info With family/designee  Paul Forbes son [phone] or Ju Forbes BANDAR [phone]                    Functional Status       Row Name 03/19/24 1301       Functional Status    Usual Activity Tolerance fair    Current Activity Tolerance fair       Functional Status, IADL    Medications independent    Meal Preparation assistive person    Housekeeping assistive person    Laundry assistive person    Shopping assistive person       Mental Status    General Appearance WDL WDL       Mental Status Summary    Recent Changes in Mental Status/Cognitive Functioning no changes       Employment/    Employment Status retired                   Psychosocial    No documentation.                  Abuse/Neglect    No documentation.                  Legal    No documentation.                  Substance Abuse    No documentation.                  Patient Forms    No documentation.                     Staci Saleh RN

## 2024-03-19 NOTE — DISCHARGE SUMMARY
Patient Name: Noemi Forbes  : 1935  MRN: [id number]    Date of Admission: 3/17/2024  Date of Discharge:  3/19/2024  Primary Care Physician: Villa Madrigal MD      Chief Complaint:   Leg Swelling and Leg Pain      Discharge Diagnoses     Active Hospital Problems    Diagnosis  POA    **Cellulitis of right leg [L03.115]  Yes    DNR (do not resuscitate) [Z66]  Yes    Chronic deep vein thrombosis (DVT) [I82.509]  Yes    Venous stasis dermatitis of both lower extremities [I87.2]  Yes    Lymphedema [I89.0]  Yes      Resolved Hospital Problems   No resolved problems to display.        Hospital Course     Ms. Forbes is a 88 y.o. female with a history of DVT on Eliquis, bilateral venous stasis dermatitis, bilateral lower extremity lymphedema  who presented to [facility] initially complaining of erythema bilateral legs .  Please see the admitting history and physical for further details.  She was admitted to the hospital for further evaluation and treatment.     Right lower extremity nonpurulent cellulitis with bulla  Bilateral venous stasis dermatitis  Lower extremity lymphedema  Chronic right posterior tibial vein thrombus  - Patient with erythema, tenderness and evidence of venous stasis dermatitis on arrival. She had previously been on outpatient antibiotics, but had endorsed no improvement in symptoms. She stated her lower extremity erythema had been present for about 10 years. She was started on Cefazolin and PT/OT, wound care were consulted. She was encouraged to wear wraps for lower extremities and she stated she had tried this in the past but it hurt so she was not always able to wear them as directed, however, she was agreeable to try again. X-ray of lower extremities was negative for evidence of acute pathology. Duplex negative for DVT. Transition to oral antibiotics with Keflex for 3 more days after discharge to complete antibiotic course (renally dosed). Suspect a major component  of skin findings is a result of significant venous stasis dermatitis and it is very important that she be compliant with wrapping her legs and follow wound care instructions. HH with skilled nursing and PT were ordered for patient at discharge, and they can perform wound care. Recommend close follow up with PCP within 1 week after discharge for reassessment to guide ongoing management decisions. Continue Lasix and Eliquis as previously prescribed after discharge.     Chronic kidney disease stage 3A  - On most recent labs, patient's creatinine found to be stable and near apparent baseline (around 1.1), per review of previous lab values and outside records. No indications to warrant acute changes and/or intervention at this time. Recommend repeat BMP with PCP within 1 week for reassessment.      Day of Discharge     Subjective:  Patient seen and examined this morning.  Hospital day 2.  She is awake and resting in bed, legs feels slightly better today when compared to prior.    Physical Exam:  Temp:  [97.3 °F (36.3 °C)-98.4 °F (36.9 °C)] 98 °F (36.7 °C)  Heart Rate:  [56-72] 62  Resp:  [16] 16  BP: (100-146)/(51-72) 100/66  Body mass index is 27.66 kg/m².  Physical Exam  Vitals and nursing note reviewed.   Constitutional:       General: She is not in acute distress.     Comments: Elderly/frail, chronically ill-appearing   Cardiovascular:      Rate and Rhythm: Normal rate and regular rhythm.      Pulses: Normal pulses.      Heart sounds: Normal heart sounds.   Pulmonary:      Effort: Pulmonary effort is normal. No respiratory distress.      Breath sounds: Normal breath sounds. No wheezing.   Abdominal:      General: Bowel sounds are normal. There is no distension.      Palpations: Abdomen is soft.      Tenderness: There is no abdominal tenderness.   Skin:     General: Skin is warm and dry.      Coloration: Skin is pale.      Comments: Erythema, tenderness of lower extremities, evidence of chronic venous stasis changes  of the lower extremities   Neurological:      Mental Status: She is alert.         Consultants     Consult Orders (all) (From admission, onward)       Start     Ordered    03/17/24 1428  [facility] (on-call MD unless specified) Details  Once        Specialty:  Hospitalist  Provider:  (Not yet assigned)    03/17/24 1427                  Procedures     * Surgery not found *    Imaging Results (All)       Procedure Component Value Units Date/Time    XR Tibia Fibula 2 View Bilateral [[id number]] Collected: 03/18/24 1550     Updated: 03/18/24 1557    Narrative:      XR TIBIA FIBULA 2 VW BILATERAL-     INDICATIONS: Cellulitis     TECHNIQUE: 7 VIEWS INCLUDING THE BILATERAL LOWER LEGS     COMPARISON: None available     FINDINGS:     No acute fracture, erosion, or dislocation is identified. Arterial  calcifications are present. Follow-up/further evaluation can be obtained  as indications persist.       Impression:         As described.           This report was finalized on 3/18/2024 3:54 PM by Dr. Dany Dorantes M.D on Workstation: [email or web address]             Results for orders placed during the hospital encounter of 03/17/24    Duplex Venous Lower Extremity - Right CAR    Interpretation Summary    Normal right lower extremity venous duplex scan.      Pertinent Labs     Results from last 7 days   Lab Units 03/19/24  0732 03/18/24  0543 03/17/24  1327 03/13/24  1129   WBC 10*3/mm3 4.78 4.49 4.83 4.55   HEMOGLOBIN g/dL 12.6 12.0 12.5 12.9   PLATELETS 10*3/mm3 295 270 290 280     Results from last 7 days   Lab Units 03/19/24  0732 03/18/24  0543 03/17/24  1327 03/13/24  1129   SODIUM mmol/L 143 142 143 149*   POTASSIUM mmol/L 4.1 4.1 4.3 4.7   CHLORIDE mmol/L 108* 107 107 107   CO2 mmol/L 27.8 28.0 25.0 31.9*   BUN mg/dL 17 17 18 22   CREATININE mg/dL 1.16* 1.03* 1.13* 1.19*   GLUCOSE mg/dL 89 90 86 98   EGFR mL/min/1.73 45.7* 52.7* 47.2*  --      Results from last 7 days   Lab Units 03/17/24  1327 03/13/24  1129   ALBUMIN g/dL 3.7 4.2  "  BILIRUBIN mg/dL 0.5 0.4   ALK PHOS U/L 96 100   AST (SGOT) U/L 14 18   ALT (SGPT) U/L 6 10     Results from last 7 days   Lab Units 03/19/24  0732 03/18/24  0543 03/17/24  1327 03/13/24  1129   CALCIUM mg/dL 8.6 9.0 9.0 10.0   ALBUMIN g/dL  --   --  3.7 4.2       Results from last 7 days   Lab Units 03/17/24  1327   PROBNP pg/mL 347.0           Invalid input(s): \"LDLCALC\"          Test Results Pending at Discharge       Discharge Details        Discharge Medications        New Medications        Instructions Start Date   cephalexin 500 MG capsule  Commonly known as: Keflex   500 mg, Oral, 3 Times Daily      Eucerin original healing lotion lotion   1 Application, Topical, Daily   Start Date: March 20, 2024            Continue These Medications        Instructions Start Date   calcium carbonate 600 MG tablet  Commonly known as: OS-DARIAN   600 mg, Oral, Daily      Eliquis 5 MG tablet tablet  Generic drug: apixaban   5 mg, Oral, 2 Times Daily      furosemide 20 MG tablet  Commonly known as: LASIX       Ibuprofen 3 %, Gabapentin 10 %, Baclofen 2 %, lidocaine 4 %   1-2 g, Topical, 3 to 4 Times Daily      latanoprost 0.005 % ophthalmic solution  Commonly known as: XALATAN   1 drop, Both Eyes, Daily      potassium chloride 10 MEQ CR tablet  Commonly known as: KLOR-CON M10   10 mEq, Oral, Daily      vitamin D3 125 MCG (5000 UT) capsule capsule   5,000 Units, Oral, Daily             Stop These Medications      doxycycline 100 MG capsule  Commonly known as: VIBRAMYCIN              No Known Allergies    Discharge Disposition:  Home-Health Care [facility]      Discharge Diet:  Diet Order   Procedures    Diet: Regular/House; Fluid Consistency: Thin (IDDSI 0)       Discharge Activity:   Activity Instructions       Gradually Increase Activity Until at Pre-Hospitalization Level      Up WIth Assist              CODE STATUS:    Code Status and Medical Interventions:   Ordered at: 03/17/24 1537     Medical Intervention Limits:    NO " intubation (DNI)     Code Status (Patient has no pulse and is not breathing):    No CPR (Do Not Attempt to Resuscitate)     Medical Interventions (Patient has pulse or is breathing):    Limited Support       Future Appointments   Date Time Provider Department Center   3/27/2024  9:00 AM Nancy Hoffman MD [id number]     Additional Instructions for the Follow-ups that You Need to Schedule       Ambulatory Referral to Home Health   As directed      Face to Face Visit Date: 3/19/2024   Follow-up provider for Plan of Care?: I treated the patient in an acute care facility and will not continue treatment after discharge.   Follow-up provider: NANCY HOFFMAN [[id number]]   Reason/Clinical Findings: decreased strength, decreased balance, decreased ROM, and decreased tolerance to functional activity   Describe mobility limitations that make leaving home difficult: Decreased functional mobility with recent RLE cellulitis; deconditioned   Nursing/Therapeutic Services Requested: Physical Therapy   PT orders: Therapeutic exercise Gait Training Transfer training Strengthening Home safety assessment   Weight Bearing Status: As Tolerated   Frequency: 1 Week 1        Ambulatory Referral to Home Health   As directed      Face to Face Visit Date: 3/19/2024   Follow-up provider for Plan of Care?: I treated the patient in an acute care facility and will not continue treatment after discharge.   Follow-up provider: NANCY HOFFMAN [[id number]]   Reason/Clinical Findings: Deconditioned d/t hospitalization with Cellulitis RLE & venous stasis ulcers-decreased strength, decreased balance, decreased ROM, and decreased tolerance to functional activity   Describe mobility limitations that make leaving home difficult: Decondiitioned with recent cellulitis RLE-decreased strength, decreased balance, decreased ROM, and decreased tolerance to functional activity   Nursing/Therapeutic Services Requested: Skilled Nursing   Skilled nursing  orders: Wound care dressing/changes   Instructions: Pt. has a right anterior leg Venous Ulcer however, need to cleanse both legs daily with soap and water, application of Eucerine lotion, vaseline gauze to any areas of blisters or skin loss, kerlix and 2 ace wraps to each leg toe to knee.   Frequency: Other (Daily until pt. able to go to outpt. wound clinic)        Discharge Follow-up with PCP   As directed       Currently Documented PCP:    Nancy Hoffman MD    PCP Phone Number:    [phone]     Follow Up Details: Within 1 week after discharge for reassessment, medication and symptom review, BMP and CBC               Contact information for follow-up providers       Nancy Hoffman MD Follow up on 3/27/2024.    Specialty: Internal Medicine  Why: Appointment will be Wednesday, March 27, 2024 at 9:00am  Contact information:  [address]  [place] [address]  [phone]               Nancy Hoffman MD .    Specialty: Internal Medicine  Why: Within 1 week after discharge for reassessment, medication and symptom review, BMP and CBC  Contact information:  [address]  [place] [address]  [phone]                       Contact information for after-discharge care       Home Medical Care       [facility] .    Service: Home Health Services  Contact information:  [address], [address]  [place] [facility] [address] [phone]                                   Additional Instructions for the Follow-ups that You Need to Schedule       Ambulatory Referral to Home Health   As directed      Face to Face Visit Date: 3/19/2024   Follow-up provider for Plan of Care?: I treated the patient in an acute care facility and will not continue treatment after discharge.   Follow-up provider: NANCY HOFFMAN [[id number]]   Reason/Clinical Findings: decreased strength, decreased balance, decreased ROM, and decreased tolerance to functional activity    Describe mobility limitations that make leaving home difficult: Decreased functional mobility with recent RLE cellulitis; deconditioned   Nursing/Therapeutic Services Requested: Physical Therapy   PT orders: Therapeutic exercise Gait Training Transfer training Strengthening Home safety assessment   Weight Bearing Status: As Tolerated   Frequency: 1 Week 1        Ambulatory Referral to Home Health   As directed      Face to Face Visit Date: 3/19/2024   Follow-up provider for Plan of Care?: I treated the patient in an acute care facility and will not continue treatment after discharge.   Follow-up provider: NANCY HOFFMAN [[id number]]   Reason/Clinical Findings: Deconditioned d/t hospitalization with Cellulitis RLE & venous stasis ulcers-decreased strength, decreased balance, decreased ROM, and decreased tolerance to functional activity   Describe mobility limitations that make leaving home difficult: Decondiitioned with recent cellulitis RLE-decreased strength, decreased balance, decreased ROM, and decreased tolerance to functional activity   Nursing/Therapeutic Services Requested: Skilled Nursing   Skilled nursing orders: Wound care dressing/changes   Instructions: Pt. has a right anterior leg Venous Ulcer however, need to cleanse both legs daily with soap and water, application of Eucerine lotion, vaseline gauze to any areas of blisters or skin loss, kerlix and 2 ace wraps to each leg toe to knee.   Frequency: Other (Daily until pt. able to go to outpt. wound clinic)        Discharge Follow-up with PCP   As directed       Currently Documented PCP:    Nancy Hoffman MD    PCP Phone Number:    [phone]     Follow Up Details: Within 1 week after discharge for reassessment, medication and symptom review, BMP and CBC            Time Spent on Discharge:  Greater than 30 minutes      DO [facility]  03/19/24  15:15 EDT

## 2024-03-19 NOTE — PLAN OF CARE
Problem: Adult Inpatient Plan of Care  Goal: Plan of Care Review  Outcome: Adequate for Care Transition  Goal: Patient-Specific Goal (Individualized)  Outcome: Adequate for Care Transition  Goal: Absence of Hospital-Acquired Illness or Injury  Outcome: Adequate for Care Transition  Intervention: Identify and Manage Fall Risk  Recent Flowsheet Documentation  Taken 3/19/2024 1200 by Diane Vázquez RN  Safety Promotion/Fall Prevention:   activity supervised   assistive device/personal items within reach   clutter free environment maintained   fall prevention program maintained   nonskid shoes/slippers when out of bed   room organization consistent   safety round/check completed  Taken 3/19/2024 1000 by Diane Vázquez RN  Safety Promotion/Fall Prevention:   activity supervised   assistive device/personal items within reach   clutter free environment maintained   fall prevention program maintained   nonskid shoes/slippers when out of bed   room organization consistent   safety round/check completed  Taken 3/19/2024 0800 by Diane Vázquez RN  Safety Promotion/Fall Prevention:   activity supervised   assistive device/personal items within reach   clutter free environment maintained   fall prevention program maintained   nonskid shoes/slippers when out of bed   room organization consistent   safety round/check completed  Intervention: Prevent Skin Injury  Recent Flowsheet Documentation  Taken 3/19/2024 1200 by Diane Vázquez RN  Body Position: position changed independently  Taken 3/19/2024 1000 by Diane Vázquez RN  Body Position: position changed independently  Taken 3/19/2024 0800 by Diane Vázquez RN  Body Position: position changed independently  Intervention: Prevent and Manage VTE (Venous Thromboembolism) Risk  Recent Flowsheet Documentation  Taken 3/19/2024 1200 by Diane Vázquez RN  Activity Management: activity encouraged  Taken 3/19/2024 1000 by Diane Vázquez RN  Activity Management: activity encouraged  Taken  3/19/2024 0850 by Diane Vázquez RN  Range of Motion: active ROM (range of motion) encouraged  Taken 3/19/2024 0800 by Diane Vázquez RN  Activity Management: activity encouraged  Intervention: Prevent Infection  Recent Flowsheet Documentation  Taken 3/19/2024 1200 by Diane Vázquez RN  Infection Prevention:   hand hygiene promoted   personal protective equipment utilized   rest/sleep promoted  Taken 3/19/2024 1000 by Diane Vázquez RN  Infection Prevention:   hand hygiene promoted   personal protective equipment utilized   rest/sleep promoted  Taken 3/19/2024 0800 by Diane Vázquez RN  Infection Prevention:   hand hygiene promoted   personal protective equipment utilized   rest/sleep promoted  Goal: Optimal Comfort and Wellbeing  Outcome: Adequate for Care Transition  Intervention: Provide Person-Centered Care  Recent Flowsheet Documentation  Taken 3/19/2024 0850 by Diane Vázquez RN  Trust Relationship/Rapport:   care explained   questions encouraged   reassurance provided   thoughts/feelings acknowledged  Goal: Readiness for Transition of Care  Outcome: Adequate for Care Transition   Goal Outcome Evaluation:

## 2024-03-19 NOTE — OUTREACH NOTE
Prep Survey      Flowsheet Row Responses   [facility] facility patient discharged from? [facility]   Is LACE score < 7 ? Yes   Eligibility [facility]   Date of Admission 03/17/24   Date of Discharge 03/19/24   Discharge Disposition Home-Health Care Sv   Discharge diagnosis Cellulitis of right leg   Does the patient have one of the following disease processes/diagnoses(primary or secondary)? Other   Does the patient have Home health ordered? Yes   What is the Home health agency?  [facility]   Is there a DME ordered? No   Prep survey completed? Yes            SALVADOR BOLAND - Registered Nurse

## 2024-03-19 NOTE — DISCHARGE PLACEMENT REQUEST
"Noemi Forbes (88 y.o. Female)       Date of Birth   1935    Social Security Number       Address   [address] [place] [address] [address] [address]    Home Phone   [phone]    MRN   [id number]       Church   [employer]    Marital Status                               Admission Date   3/17/24    Admission Type   Emergency    Admitting Provider   Ramón Smith MD    Attending Provider   Rajesh Llanos DO    Department, Room/Bed   [address], [address]/1       Discharge Date       [place] [facility] [place]                                 Attending Provider: Rajesh Llanos DO    Allergies: No Known Allergies    Isolation: None   Infection: None   Code Status: No CPR    Ht: 165.1 cm (65\")   Wt: 75.4 kg (166 lb 3.6 oz)    Admission Cmt: None   Principal Problem: Cellulitis of right leg [L03.115]                   Active Insurance as of 3/17/2024       Primary Coverage       Payor Plan Insurance Group Employer/Plan Group    [facility] MEDICARE REPLACEMENT [facility] GROUP [id number]       Payor Plan Address Payor Plan Phone Number Payor Plan Fax Number Effective Dates    [address] [address]   1/1/2024 - None Entered    [facility] [id number]         Subscriber Name Subscriber Birth Date Member ID       NOEMI FORBES [id number]                     Emergency Contacts        (Rel.) Home Phone Work Phone Mobile Phone    Paul Forbes (Son) [phone] -- [phone]    martin(inLaw)quinn (Daughter) [phone] -- --                "

## 2024-03-19 NOTE — PLAN OF CARE
Goal Outcome Evaluation: patient condition improving, vital signs stable. She is currently on antibiotics therapy           Progress: improving

## 2024-03-20 ENCOUNTER — TRANSITIONAL CARE MANAGEMENT TELEPHONE ENCOUNTER (OUTPATIENT)
Dept: CALL CENTER | Facility: HOSPITAL | Age: 89
End: 2024-03-20
Payer: MEDICARE

## 2024-03-20 NOTE — OUTREACH NOTE
Call Center TCM Note      Flowsheet Row Responses   [facility] patient discharged from? [place]   Does the patient have one of the following disease processes/diagnoses(primary or secondary)? Other   TCM attempt successful? No   Unsuccessful attempts Attempt 1            Kaleigh Rose MA    3/20/2024, 09:07 EDT

## 2024-03-20 NOTE — OUTREACH NOTE
Call Center TCM Note      Flowsheet Row Responses   [facility] patient discharged from? [place]   Does the patient have one of the following disease processes/diagnoses(primary or secondary)? Other   TCM attempt successful? Yes   Call start time 1601   Call end time 1619   Discharge diagnosis Cellulitis of right leg   Meds reviewed with patient/caregiver? Yes   Is the patient having any side effects they believe may be caused by any medication additions or changes? No   Does the patient have all medications ordered at discharge? Yes   Is the patient taking all medications as directed (includes completed medication regime)? Yes   Does the patient have an appointment with their PCP within 7-14 days of discharge? Yes   What is the Home health agency?  [facility]   Has home health visited the patient within 72 hours of discharge? Yes   Psychosocial issues? No   Did the patient receive a copy of their discharge instructions? Yes   Nursing interventions Reviewed instructions with patient   What is the patient's perception of their health status since discharge? Same   Is the patient/caregiver able to teach back signs and symptoms related to disease process for when to call PCP? Yes   Is the patient/caregiver able to teach back signs and symptoms related to disease process for when to call 911? Yes   Is the patient/caregiver able to teach back the hierarchy of who to call/visit for symptoms/problems? PCP, Specialist, Home health nurse, Urgent Care, ED, 911 Yes   TCM call completed? Yes   Wrap up additional comments D/C DX:Cellulitis of right leg - Pt doing fair, she is overwhelmed emotinoally from hospital admit. I cannot get clear picture on how much family assist she has. [facility] Nursing will be following pt and I confirmed this for pt with Teresa at [facility]. TCM Appt with PCP Dr Madrigal is 03/27/2024.   Call end time 1619            Kaleigh Rose MA    3/20/2024, 16:22 EDT

## 2024-03-21 ENCOUNTER — PATIENT OUTREACH (OUTPATIENT)
Dept: CASE MANAGEMENT | Facility: OTHER | Age: 89
End: 2024-03-21
Payer: MEDICARE

## 2024-03-21 DIAGNOSIS — I87.2 CHRONIC VENOUS INSUFFICIENCY: ICD-10-CM

## 2024-03-21 DIAGNOSIS — M51.36 DDD (DEGENERATIVE DISC DISEASE), LUMBAR: Primary | ICD-10-CM

## 2024-03-21 NOTE — OUTREACH NOTE
AMBULATORY CASE MANAGEMENT NOTE    Name and Relationship of Patient/Support Person: Noemi Forbes - Self    CCM Interim Update    Called and spoke to patient for CCM services per provider referral. Introduced self and role. Discussed reason for the call and CCM's purpose and goals. Verbal consent obtained.     Patient was hospitalized for Cellulitis and non healing would, discharged with orders for Home [facility]. She currently stays in an Independent living facility and that they provide transportation. Per Transition care team notes, patient to have PT and and Nursing. During the call patient states that she has not had a her dressing changed yet since the last day of being in the hospital. She verbalizes she's somewhat confused but there was someone from [facility] that came but the person is unable to do the dressing change, assuming PT. Will assist care coordination with [facility] to make sure they follow up with Nursing to do wound care.    Care Coordination    Spoke to [facility] and they confirmed that Nursing is scheduled to come and see patient today. Informed patient that [facility] will come today and that she will stay in to wait for them.     Informed patient her follow up appointment with Diane TEJADA on 4/1 at 01:30 PM for Hospital follow up.     Patient grateful for the call and assistance.    Adult Patient Profile  Questions/Answers      Flowsheet Row Most Recent Value   Symptoms/Conditions Managed at Home peripheral/neurovascular   Barriers to Managing Health stress of chronic illness   Peripheral Neurovascular Symptoms/Conditions venous thromboembolic disease   Peripheral Neurovascular Management Strategies coping strategies, medication therapy, routine screening   Peripheral Neurovascular Self-Management Outcome 2 (bad)   Peripheral Neurovascular Comment [facility] - PT and OT suppose to follow s/p DC from hospital.        SDOH updated and reviewed with the  patient during this program:  Financial Resource Strain: Low Risk  (3/21/2024)    Overall Financial Resource Strain (CARDIA)     Difficulty of Paying Living Expenses: Not hard at all      --     Food Insecurity: No Food Insecurity (3/21/2024)    Hunger Vital Sign     Worried About Running Out of Food in the Last Year: Never true     Ran Out of Food in the Last Year: Never true      --     Housing Stability: Not At Risk (3/19/2024)    Housing Stability     Current Living Arrangements: independent living facility     Potentially Unsafe Housing Conditions: none      --     Transportation Needs: No Transportation Needs (3/21/2024)    PRAPARE - Transportation     Lack of Transportation (Medical): No     Lack of Transportation (Non-Medical): No       Education Documentation  No documentation found.        Opal VALENCIA  Ambulatory Case Management    3/21/2024, 12:08 EDT

## 2024-03-27 ENCOUNTER — PATIENT OUTREACH (OUTPATIENT)
Dept: CASE MANAGEMENT | Facility: OTHER | Age: 89
End: 2024-03-27
Payer: MEDICARE

## 2024-03-27 DIAGNOSIS — I87.2 CHRONIC VENOUS INSUFFICIENCY: ICD-10-CM

## 2024-03-27 DIAGNOSIS — M51.36 DDD (DEGENERATIVE DISC DISEASE), LUMBAR: Primary | ICD-10-CM

## 2024-03-27 NOTE — OUTREACH NOTE
[facility] End of Month Documentation    This Chronic Medical Management Care Plan for Noemi Forbes, 88 y.o. female, has been a new plan of care implemented; established and a new plan of care implemented for the month of March.  A cumulative time of 25  minutes was spent on this patient record this month, including phone call with patient; chart review, [facility] consent. Follow up call after hospital stay to assess needs. [facility] to follow patient with PT and Nursing for wound care..    Regarding the patient's problems: has Acute deep vein thrombosis (DVT) of distal vein of left lower extremity; Back pain, chronic; Arthritis; Chronic deep vein thrombosis (DVT) of distal vein of both lower extremities; Chronic urinary tract infection; Chronic venous insufficiency; Facet arthritis of lumbar region; Lymphedema; Aortic stenosis; Osteopenia; Spinal stenosis of lumbar region; Spondylolisthesis of lumbar region; Tobacco abuse; Venous stasis dermatitis of both lower extremities; Postlaminectomy syndrome; Cellulitis of left lower extremity; History of deep vein thrombosis (DVT) of lower extremity; Lymphedema; Acute kidney failure; Generalized osteoarthritis; DDD (degenerative disc disease), lumbar; Left hip pain; Impaired mobility; Encounter for power mobility device assessment; Injury of right rotator cuff; Shoulder pain, bilateral; Rotator cuff arthropathy of both shoulders; Hammer toe of right foot; Medicare annual wellness visit, subsequent; Chronic deep vein thrombosis (DVT); LBBB (left bundle branch block); Edema; Neuropathy; Prediabetes; Chronic back pain; Hospital discharge follow-up; Cellulitis of right leg; Localized swelling of both lower legs; Skin bulla; Bilateral lower leg cellulitis; and DNR (do not resuscitate) on their problem list., the following items were addressed: medical records; medications and any changes can be found within the plan section of the note.  A detailed listing of time spent for chronic care  management is tracked within each outreach encounter.  Current medications include:  has a current medication list which includes the following prescription(s): calcium carbonate, eliquis, eucerin original healing lotion, furosemide, rx alternatives general pain, latanoprost, potassium chloride, and vitamin d3. and the patient is reported to be patient is compliant with medication protocol,  Medications are reported to be effective.  Regarding these diagnoses, referrals were made to the following provider(s):  n/a.  All notes on chart for PCP to review.    The patient was monitored remotely for medications; activity level.    The patient's physical needs include:  help taking medications as prescribed; physical healthcare.     The patient's mental support needs include:  increased support    The patient's cognitive support needs include:  health care; medication; continued support    The patient's psychosocial support needs include:  continued support    The patient's functional needs include: physical healthcare    The patient's environmental needs include:  not applicable, n/a    Care Plan overall comments:  No data recorded    Refer to previous outreach notes for more information on the areas listed above.    Monthly Billing Diagnoses  (M51.36) DDD (degenerative disc disease), lumbar    (I87.2) Chronic venous insufficiency    Medications   Medications have been reconciled    Care Plan progress this month:      Recently Modified Care Plans Updates made since 2/25/2024 12:00 AM       Fall Risk (Adult)           Problem Priority Last Modified     Fall Risk --  3/21/2024 12:23 PM by Opal Asher RN              Goal Recent Progress Last Modified     Absence of Fall and Fall-Related Injury --  3/21/2024 12:23 PM by Opal Asher RN     Evidence-based guidance:   Assess fall risk using a validated tool when available. Consider balance and gait impairment, muscle weakness, diminished vision or hearing,  environmental hazards, presence of urinary or bowel urgency and/or incontinence.   Communicate fall injury risk to interprofessional healthcare team.   Develop a fall prevention plan with the patient and family.   Promote use of personal vision and auditory aids.   Promote reorientation, appropriate sensory stimulation, and routines to decrease risk of fall when changes in mental status are present.   Assess assistance level required for safe and effective self-care; consider referral for home care.   Encourage physical activity, such as performance of self-care at highest level of ability, strength and balance exercise program, and provision of appropriate assistive devices; refer to rehabilitation therapy.   Refer to community-based fall prevention program where available.   If fall occurs, determine the cause and revise fall injury prevention plan.   Regularly review medication contribution to fall risk; consider risk related to polypharmacy and age.   Refer to pharmacist for consultation when concerns about medications are revealed.   Balance adequate pain management with potential for oversedation.   Provide guidance related to environmental modifications.   Consider supplementation with Vitamin D.    Notes:            Task Due Date Last Modified     Identify and Manage Contributors to Fall Risk --  3/21/2024 12:24 PM by Opal Asher RN     Care Management Activities:      - barriers to safety identified  - cognition assessed      Notes:                     Wellness (Adult)           Problem Priority Last Modified     Barriers to Treatment --  3/21/2024 12:25 PM by Opal Asher RN              Goal Recent Progress Last Modified     Barriers to Treatment Identified and Managed --  3/21/2024 12:25 PM by Opal Asher RN     Evidence-based guidance:   Follow up after suicide attempt or self-harm with brief intervention and referral for treatment.   Schedule follow-up appointment as soon as possible  after psychiatric hospitalization to reduce risk of ongoing self-harmful behavior.   Collaborate with staff from treatment facility to ensure smooth transition of care and follow-through on discharge plan.   Assess willingness to receive help; engage patient in conversation about the perceived benefits of mental health treatment.   Assess for and manage barriers to keeping appointments, such as transportation, finances or stigma.   Provide anticipatory guidance about the risk of increased symptoms and potential psychiatric hospitalization for those who have a pattern of nonattendance at mental health appointments.   Explore impact on family relationships, functioning at work and home, as well as financial status; acknowledge, normalize and validate the emotional burden to the family; offer emotional support to family members.    Discuss strategies for increasing the patient's resiliency to suicidal ideation that may include social and emotional support, as well as enhancement of sense of belonging.    Notes:            Task Due Date Last Modified     Alleviate Barriers to Mental Health Treatment --  3/21/2024 12:25 PM by Opal Asher RN     Care Management Activities:      - adherence to treatment plan encouraged  - barriers to treatment adherence identified  - community resource information provided  - problem-solving facilitated  - reassurance provided  - support and encouragement provided  - willingness to receive help encouraged      Notes:                     Medication Regimen           Problem Priority Last Modified     Medication Adherence --  3/21/2024 12:23 PM by Opal Asher RN              Goal Recent Progress Last Modified     Consistently take medications as prescribed --  3/21/2024 12:23 PM by Opal Asher RN              Task Due Date Last Modified     Discuss barriers to medication adherence with patient --  3/21/2024 12:23 PM by Opal Asher, RN        Educate patient on  frequency and refill details of meds --  3/21/2024 12:23 PM by Opal Asher RN        Assist patients in setting up daily notes/alarms for meds. Consider pill box use --  3/21/2024 12:23 PM by Opal Asher RN        Refer to value-based pharmacist --  3/21/2024 12:23 PM by Opal Asher RN                Problem Priority Last Modified     Limited Financial Resources --  3/21/2024 12:23 PM by Opal Asher RN              Goal Recent Progress Last Modified     Establish options for financial assistance --  3/21/2024 12:23 PM by Opal Asher RN              Task Due Date Last Modified     Provide community resources for financial assistance --  3/21/2024 12:23 PM by Opal Asher RN        Discuss financial planning with patient --  3/21/2024 12:23 PM by Opal Asher RN        Refer patient to social work --  3/21/2024 12:23 PM by Opal Asher RN                     Community Resources           Problem Priority Last Modified     Lack of Transportation --  3/21/2024 12:23 PM by Opal Asher RN              Goal Recent Progress Last Modified     Establish Reliable Transportation --  3/21/2024 12:23 PM by Opal Asher RN              Task Due Date Last Modified     Discuss current transportation plan with patient --  3/21/2024 12:24 PM by Opal Asher RN        Assign community resources for transportation assistance --  3/21/2024 12:24 PM by Opal Asher RN        Provide community resources for financial assistance --  3/21/2024 12:24 PM by Opal Asher RN                          Current Specialty Plan of Care Status signed by both patient and provider    Instructions   Patient was provided an electronic copy of care plan  CCM services were explained and offered and patient has accepted these services.  Patient has given their written consent to receive CCM services and understands that this includes the authorization of electronic communication of medical  information with the other treating providers.  Patient understands that they may stop CCM services at any time and these changes will be effective at the end of the calendar month and will effectively revocate the agreement of CCM services.  Patient understands that only one practitioner can furnish and be paid for CCM services during one calendar month.  Patient also understands that there may be co-payment or deductible fees in association with CCM services.  Patient will continue with at least monthly follow-up calls with the Ambulatory .    Opal VALENCIA  Ambulatory Case Management    3/27/2024, 10:12 EDT

## 2024-04-01 ENCOUNTER — OFFICE VISIT (OUTPATIENT)
Dept: FAMILY MEDICINE CLINIC | Facility: CLINIC | Age: 89
End: 2024-04-01
Payer: MEDICARE

## 2024-04-01 VITALS
DIASTOLIC BLOOD PRESSURE: 65 MMHG | BODY MASS INDEX: 28.72 KG/M2 | HEART RATE: 92 BPM | HEIGHT: 65 IN | TEMPERATURE: 98.2 F | SYSTOLIC BLOOD PRESSURE: 123 MMHG | OXYGEN SATURATION: 91 % | WEIGHT: 172.4 LBS

## 2024-04-01 DIAGNOSIS — N17.9 AKI (ACUTE KIDNEY INJURY): ICD-10-CM

## 2024-04-01 DIAGNOSIS — I82.5Z3 CHRONIC DEEP VEIN THROMBOSIS (DVT) OF DISTAL VEIN OF BOTH LOWER EXTREMITIES: ICD-10-CM

## 2024-04-01 DIAGNOSIS — Z09 HOSPITAL DISCHARGE FOLLOW-UP: Primary | ICD-10-CM

## 2024-04-01 DIAGNOSIS — I87.2 VENOUS STASIS DERMATITIS OF BOTH LOWER EXTREMITIES: ICD-10-CM

## 2024-04-01 DIAGNOSIS — R22.43 LOCALIZED SWELLING OF BOTH LOWER LEGS: ICD-10-CM

## 2024-04-01 NOTE — PROGRESS NOTES
Transitional Care Follow Up Visit  Subjective     Noemi Forbes is a 88 y.o. female who presents for a transitional care management visit.    Within 48 business hours after discharge our office contacted her via telephone to coordinate her care and needs.      I reviewed and discussed the details of that call along with the discharge summary, hospital problems, inpatient lab results, inpatient diagnostic studies, and consultation reports with Noemi.     Current outpatient and discharge medications have been reconciled for the patient.  Reviewed by: TERRELL Blanchard          3/19/2024     5:50 PM   Date of TCM Phone Call   [facility]   Date of Admission 3/17/2024   Date of Discharge 3/19/2024   Discharge Disposition Home-Health Care [facility] [place]     Risk for Readmission (LACE) Score: 3 (3/19/2024  6:00 AM)      History of Present Illness   Course During Hospital Stay:  3/17/24-3/19/24  Ms. Forbes is a 88 y.o. female with a history of DVT on Eliquis, bilateral venous stasis dermatitis, bilateral lower extremity lymphedema  who presented to [facility] initially complaining of erythema bilateral legs .  Please see the admitting history and physical for further details.  She was admitted to the hospital for further evaluation and treatment.      Right lower extremity nonpurulent cellulitis with bulla  Bilateral venous stasis dermatitis  Lower extremity lymphedema  Chronic right posterior tibial vein thrombus  - Patient with erythema, tenderness and evidence of venous stasis dermatitis on arrival. She had previously been on outpatient antibiotics, but had endorsed no improvement in symptoms. She stated her lower extremity erythema had been present for about 10 years. She was started on Cefazolin and PT/OT, wound care were consulted. She was encouraged to wear wraps for lower extremities and she stated she had tried this in the past but it hurt so she was not always able to wear them as  directed, however, she was agreeable to try again. X-ray of lower extremities was negative for evidence of acute pathology. Duplex negative for DVT. Transition to oral antibiotics with Keflex for 3 more days after discharge to complete antibiotic course (renally dosed). Suspect a major component of skin findings is a result of significant venous stasis dermatitis and it is very important that she be compliant with wrapping her legs and follow wound care instructions. HH with skilled nursing and PT were ordered for patient at discharge, and they can perform wound care. Recommend close follow up with PCP within 1 week after discharge for reassessment to guide ongoing management decisions. Continue Lasix and Eliquis as previously prescribed after discharge.     Chronic kidney disease stage 3A  - On most recent labs, patient's creatinine found to be stable and near apparent baseline (around 1.1), per review of previous lab values and outside records. No indications to warrant acute changes and/or intervention at this time. Recommend repeat BMP with PCP within 1 week for reassessment.   Pt. has a right anterior leg Venous Ulcer however, need to cleanse both legs daily with soap and water, application of Eucerine lotion, vaseline gauze to any areas of blisters or skin loss, kerlix and 2 ace wraps to each leg toe to knee.   Frequency: Other (Daily until pt. able to go to outpt. wound clinic)       Follow-ups:  HH with skilled nursing and PT  Wound care- Patient reports bandages haven't been changed but once since she been home from hospital.  Home health should be coming Tuesdays and Fridays; to change bandages.  Patient refused PT, said it would not help her at this time. She admits to being weak and her balance being off, almost falling several times. Encouraged to do PT, but she continued to refuse.  Referral to outpatient wound clinic.          Patient reports she doesn't feel any better. Last time her bandages were  changed on her legs was Tuesday or Friday. Doesn't ace wrap them because it's too painful.  Podiatry- [facility], patient would like to f/u with them to get toe nails clipped.  Leg were unwrapped and assessed. Bullae is almost healed, overall legs improving since last visit.         The following portions of the patient's history were reviewed and updated as appropriate: She  has a past medical history of Arthritis, DVT of leg (deep venous thrombosis), Low back pain, and Osteopenia.  She does not have any pertinent problems on file.  She  has a past surgical history that includes Brain surgery; Hysterectomy; Rotator cuff repair (Right); Cataract extraction; Back surgery; Colonoscopy; Thyroid surgery; Spine surgery; and Tonsillectomy.  Her family history includes Dementia in her mother.  She  reports that she has been smoking cigarettes. She started smoking about 74 years ago. She has a 37.1 pack-year smoking history. She has never been exposed to tobacco smoke. She has never used smokeless tobacco. She reports current alcohol use of about 6.0 standard drinks of alcohol per week. She reports that she does not use drugs.  Current Outpatient Medications   Medication Sig Dispense Refill    calcium carbonate (OS-DARIAN) 600 MG tablet Take 1 tablet by mouth Daily. 90 tablet 3    Eliquis 5 MG tablet tablet Take 1 tablet by mouth 2 (Two) Times a Day. 180 tablet 3    Emollient (Eucerin original healing lotion) lotion Apply 1 Application topically to the appropriate area as directed Daily. 30 mL 0    furosemide (LASIX) 20 MG tablet       Ibuprofen 3 %, Gabapentin 10 %, Baclofen 2 %, lidocaine 4 % Apply 1-2 g topically to the appropriate area as directed 3 (Three) to 4 (Four) times daily. 90 g 5    latanoprost (XALATAN) 0.005 % ophthalmic solution Administer 1 drop to both eyes Daily.      potassium chloride (K-DUR,KLOR-CON,KLOR-CON M10) 10 MEQ CR tablet TAKE 1 TABLET BY MOUTH DAILY 90 tablet 1    vitamin D3 125 MCG (5000 UT) capsule  capsule Take 1 capsule by mouth Daily. 90 capsule 3     No current facility-administered medications for this visit.     She has No Known Allergies..      Objective   Physical Exam  Vitals reviewed.   Constitutional:       General: She is not in acute distress.     Appearance: Normal appearance.   HENT:      Head: Normocephalic and atraumatic.   Eyes:      Conjunctiva/sclera: Conjunctivae normal.      Pupils: Pupils are equal, round, and reactive to light.   Cardiovascular:      Rate and Rhythm: Normal rate and regular rhythm.      Pulses: Normal pulses.      Heart sounds: No murmur heard.     No gallop.   Pulmonary:      Effort: Pulmonary effort is normal. No respiratory distress.      Breath sounds: Normal breath sounds. No wheezing.   Abdominal:      General: Bowel sounds are normal. There is no distension.      Palpations: Abdomen is soft.      Tenderness: There is no abdominal tenderness.   Musculoskeletal:         General: Normal range of motion.      Cervical back: Normal range of motion and neck supple. No tenderness.      Right lower le+ Pitting Edema present.      Left lower le+ Pitting Edema present.   Skin:     General: Skin is warm and dry.      Comments: See picture in chart.    Neurological:      Mental Status: She is alert and oriented to person, place, and time. Mental status is at baseline.   Psychiatric:         Mood and Affect: Mood normal.         Assessment & Plan   Diagnoses and all orders for this visit:    1. Hospital discharge follow-up (Primary)    2. ARTURO (acute kidney injury)  -     Comprehensive metabolic panel    3. Localized swelling of both lower legs    4. Venous stasis dermatitis of both lower extremities  -     Ambulatory Referral to Wound Clinic    5. 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  Referral to wound clinic ordered again today.  Allow home health to continue twice a week. Contact office if no one  shows up.  Patient encouraged to complete PT, but she continued to refuse.  Discussed possibly transitioning to assisted living side of residence but patient states she can still do everything on her own.  Legs unwrapped and cleaned with normal saline today and re-wrapped with kerlix.

## 2024-04-02 ENCOUNTER — TELEPHONE (OUTPATIENT)
Dept: CASE MANAGEMENT | Facility: OTHER | Age: 89
End: 2024-04-02
Payer: MEDICARE

## 2024-04-02 LAB
ALBUMIN SERPL-MCNC: 4.1 G/DL (ref 3.5–5.2)
ALBUMIN/GLOB SERPL: 1.2 G/DL
ALP SERPL-CCNC: 110 U/L (ref 39–117)
ALT SERPL-CCNC: 8 U/L (ref 1–33)
AST SERPL-CCNC: 14 U/L (ref 1–32)
BILIRUB SERPL-MCNC: 0.5 MG/DL (ref 0–1.2)
BUN SERPL-MCNC: 27 MG/DL (ref 8–23)
BUN/CREAT SERPL: 17.8 (ref 7–25)
CALCIUM SERPL-MCNC: 9.6 MG/DL (ref 8.6–10.5)
CHLORIDE SERPL-SCNC: 107 MMOL/L (ref 98–107)
CO2 SERPL-SCNC: 29.1 MMOL/L (ref 22–29)
CREAT SERPL-MCNC: 1.52 MG/DL (ref 0.57–1)
EGFRCR SERPLBLD CKD-EPI 2021: 33.1 ML/MIN/1.73
GLOBULIN SER CALC-MCNC: 3.5 GM/DL
GLUCOSE SERPL-MCNC: 93 MG/DL (ref 65–99)
POTASSIUM SERPL-SCNC: 4.7 MMOL/L (ref 3.5–5.2)
PROT SERPL-MCNC: 7.6 G/DL (ref 6–8.5)
SODIUM SERPL-SCNC: 146 MMOL/L (ref 136–145)

## 2024-04-03 ENCOUNTER — TELEPHONE (OUTPATIENT)
Dept: CASE MANAGEMENT | Facility: OTHER | Age: 89
End: 2024-04-03
Payer: MEDICARE

## 2024-04-03 PROBLEM — N17.9 AKI (ACUTE KIDNEY INJURY): Status: ACTIVE | Noted: 2024-04-03

## 2024-04-04 ENCOUNTER — TELEPHONE (OUTPATIENT)
Dept: FAMILY MEDICINE CLINIC | Facility: CLINIC | Age: 89
End: 2024-04-04
Payer: MEDICARE

## 2024-04-04 ENCOUNTER — PATIENT OUTREACH (OUTPATIENT)
Dept: CASE MANAGEMENT | Facility: OTHER | Age: 89
End: 2024-04-04
Payer: MEDICARE

## 2024-04-04 DIAGNOSIS — M51.36 DDD (DEGENERATIVE DISC DISEASE), LUMBAR: Primary | ICD-10-CM

## 2024-04-04 DIAGNOSIS — I87.2 CHRONIC VENOUS INSUFFICIENCY: ICD-10-CM

## 2024-04-04 NOTE — TELEPHONE ENCOUNTER
----- Message from TERRELL Blanchard sent at 4/4/2024  8:44 AM EDT -----  Kidney function still elevated and GFR has decreased, hold water pill  until follow-up with dr gusman on 4/30/24.  Will recheck labs then. If swelling in legs gets worse after discontinuing lasix, contact office.

## 2024-04-04 NOTE — TELEPHONE ENCOUNTER
OK TO RELAY.    ----- Message from TERRELL Blanchard sent at 4/4/2024  8:44 AM EDT -----  Kidney function still elevated and GFR has decreased, hold water pill  until follow-up with dr gusman on 4/30/24.  Will recheck labs then. If swelling in legs gets worse after discontinuing lasix, contact office.      LM to call office for reesults.    [facility]

## 2024-04-04 NOTE — TELEPHONE ENCOUNTER
OK to relay     LMTCB     Kidney function still elevated and GFR has decreased, hold water pill  until follow-up with dr gusman on 4/30/24.   Will recheck labs then. If swelling in legs gets worse after discontinuing lasix, contact office.

## 2024-04-04 NOTE — TELEPHONE ENCOUNTER
Name: Noemi Forbes    Relationship: Self    Best Callback Number:      HUB PROVIDED THE RELAY MESSAGE FROM THE OFFICE   PATIENT HAS FURTHER QUESTIONS AND WOULD LIKE A CALL BACK AT THE FOLLOWING PHONE NUMBER [phone]     ADDITIONAL INFORMATION:  PATIENT WOULD LIKE A CALL BACK TO EXPLAIN WHAT GFR IS IN THE MESSAGE FROM THE OFFICE.

## 2024-04-04 NOTE — OUTREACH NOTE
"AMBULATORY CASE MANAGEMENT NOTE    Name and Relationship of Patient/Support Person: Noemi Forbes - Self    CCM Interim Update    Called and spoke to patient for CCM update. She states HH [facility] comes to do wound care Tuesdays and Fridays. She states that she was told by HH RN that she can remove her dressing and shower without it the day before HH RN comes, HH RN to redress and do wound care the next day.     Care Coordination    Called [facility] and spoke to clinical staff to go over recent wound care notes. Per clinical notes wound \"area has redness, excoriation but very little. Cleansed with normal saline, vaseline applied with gauze and wrapped with kerlix.\" Clinical staff states they can and will fax wound notes to office. Provided office fax number.     CCM Interim Update    Called patient to inform her of clinical notes from . Also reviewed notes from Diane TEJADA office visit on 4/1/24 and viewed wound picture in media. Reassured patient that per chart review her wound looks improved compared to previous weeks ago and that clinical recommendations changes depending on the wound's progress. Discussed Wound Care Clinic referral and informed patient that they are trying to contact her today, provided patient wound care clinic contact number and encouraged her to follow up with them so she can make an appointment and get evaluated.    Patient states her balance is unsteady, encouraged to participate in PT and explained benefits of it. Patient is reluctant to do PT at this time. She requested information on medical alert systems. Provided patient call number to [facility]. She will follow up with podiatry referral and does have her office number.     Also reviewed with patient recent lab result as office was attempting to call her. Informed her per Diane Lynn,\"Kidney function still elevated and GFR has decreased, hold water pill  until follow-up with dr gusman on 4/30/24.  Will recheck labs " "then. If swelling in legs gets worse after discontinuing lasix, contact office.\"    Reviewed and confirmed lasix is her furosemide and that is her \"water pill\". She also was asking what GFR is, explained to patient that GFR is a rate that shows how well her kidney is filtering and functioning. Encouraged her to stay hydrated, goal 6-8 glasses of water a day but keep an eye on swelling. She verbalizes understanding of all the above and will call for any questions.     Education Documentation  Provider Follow-Up, taught by Opal Asher, RN at 4/4/2024  1:58 PM.  Learner: Patient  Readiness: Acceptance  Method: Explanation  Response: Verbalizes Understanding    medication management, taught by Opal Asher, RN at 4/4/2024  1:58 PM.  Learner: Patient  Readiness: Acceptance  Method: Explanation  Response: Verbalizes Understanding    food/fluid intake, taught by Opal Asher, RN at 4/4/2024  1:58 PM.  Learner: Patient  Readiness: Acceptance  Method: Explanation  Response: Verbalizes Understanding          Opal VALENCIA  Ambulatory Case Management    4/4/2024, 13:58 EDT  "

## 2024-04-05 ENCOUNTER — PATIENT OUTREACH (OUTPATIENT)
Dept: CASE MANAGEMENT | Facility: OTHER | Age: 89
End: 2024-04-05
Payer: MEDICARE

## 2024-04-05 DIAGNOSIS — I87.2 CHRONIC VENOUS INSUFFICIENCY: ICD-10-CM

## 2024-04-05 DIAGNOSIS — M51.36 DDD (DEGENERATIVE DISC DISEASE), LUMBAR: Primary | ICD-10-CM

## 2024-04-05 NOTE — OUTREACH NOTE
"AMBULATORY CASE MANAGEMENT NOTE    Name and Relationship of Patient/Support Person:  -     [facility] Interim Update    Incoming call from patient. She's very discouraged to go to Wound Clinic since her Cellulitis has been going on for about 11-12 years and nothing is working. I encouraged patient to still consider to go because that would give them a chance to see her for proper evaluation and treatment. Patient states she's tired of going to multiple appointments and \"what would be the difference between going to the clinic versus what HH nurse is doing now\". I advised patient that HH usually would have limit on how long they can see her but may be able to get more time depending on her wound status and provider's recommendation.     Encouraged patient to attempt doing her own dressing change when HH nurse comes in and see if she will be able to perform the change on her own. She states she will try. Advised patient to keep close follow ups with PCP.     Education Documentation  No documentation found.        Opal VALENCIA  Ambulatory Case Management    4/5/2024, 15:24 EDT  "

## 2024-04-06 NOTE — TELEPHONE ENCOUNTER
PHARM states they have not received anything from our office, can we please resend the rx for her Eliquis asap because patient is out    1 Principal Discharge DX:	AMS (altered mental status)  Secondary Diagnosis:	Fever

## 2024-04-11 ENCOUNTER — TELEPHONE (OUTPATIENT)
Dept: FAMILY MEDICINE CLINIC | Facility: CLINIC | Age: 89
End: 2024-04-11
Payer: MEDICARE

## 2024-04-11 NOTE — TELEPHONE ENCOUNTER
OKAY FOR HUB TO RELAY    I tried calling and speaking with the patient but did not get an answer. I left a brief message letting her know I was calling to answer some of her questions and for her to call back. If she calls back:      Your results mean that basically your overall function of your kidneys has decreased. The EGFR is a term for one of the labs and shows your kidney function so it being low means your kidney function has decreased. She wants to repeat these labs when you come and see Dr Madrigal.

## 2024-04-11 NOTE — TELEPHONE ENCOUNTER
Name: Noemi Forbes    Relationship: Self    Best Callback Number: [phone]    HUB PROVIDED THE RELAY MESSAGE FROM THE OFFICE   PATIENT HAS FURTHER QUESTIONS AND WOULD LIKE A CALL BACK AT THE FOLLOWING PHONE NUMBER [phone]    ADDITIONAL INFORMATION: PLEASE CALL PATIENT BACK ABOUT 11AM 04/12/24

## 2024-04-11 NOTE — TELEPHONE ENCOUNTER
PATIENT CALLED BACK, SHARED INFO WITH PATIENT. SHE STATES SHE HAS STOPPED   furosemide (LASIX) 20 MG tablet   ABOUT 3 OR 4 DAYS AGO. HER LEGS ARE WRAPPED. SHE WILL GET REWRAPPED TUESDAYS AND FRIDAYS.   SHE STATES IT IS UNCOMFORTABLE TO HAVE THEM WRAPPED.  SHE WOULD LIKE TO KNOW WHAT KIDNEYS ELEVATED AND WHAT GFR DECREASED  MEANS    PLEASE CALL AND ADVISE [phone]

## 2024-04-11 NOTE — TELEPHONE ENCOUNTER
Caller: Noemi Forbes    Relationship: Self    Best call back number: [phone]      Who are you requesting to speak with (clinical staff, provider,  specific staff member): CARMEN    What was the call regarding: PATIENT WOULD LIKE TO TALK TO CARMEN ABOUT HER LAB RESULTS. PLEASE ADVISE ASAP. PATIENT WOULD LIKE A CALL TODAY.

## 2024-04-11 NOTE — TELEPHONE ENCOUNTER
OKAY FOR HUB TO RELAY    I tried calling and speaking with the patient but did not get an answer. I left a brief message asking her to call back. If she calls back:    I don't know exactly what you needed but brenda said your kidney function still elevated and GFR has decreased she wants you to hold your water pill  until your follow-up with dr gusman on 4/30/24 and we will recheck labs then. If your swelling in legs gets worse after quitting lasix than please contact our office. Please let us know if you have any further questions.

## 2024-04-12 NOTE — TELEPHONE ENCOUNTER
I called and spoke with clara and explained that her creatine was high and her lab EGFR test was low and that these two test basically show she has had a decrease in her kidney function. She asked if she would notice if she had a decrease and I let her know that that was not necessarily the case. She asked if this was serious and I let her know that that is why they want to recheck her labs to keep a check on her numbers. She verbalized understanding and will call back if needed.

## 2024-04-18 ENCOUNTER — TELEPHONE (OUTPATIENT)
Dept: CASE MANAGEMENT | Facility: OTHER | Age: 89
End: 2024-04-18
Payer: MEDICARE

## 2024-04-23 ENCOUNTER — TELEPHONE (OUTPATIENT)
Dept: CASE MANAGEMENT | Facility: OTHER | Age: 89
End: 2024-04-23
Payer: MEDICARE

## 2024-04-23 ENCOUNTER — PATIENT OUTREACH (OUTPATIENT)
Dept: CASE MANAGEMENT | Facility: OTHER | Age: 89
End: 2024-04-23
Payer: MEDICARE

## 2024-04-23 DIAGNOSIS — I87.2 CHRONIC VENOUS INSUFFICIENCY: ICD-10-CM

## 2024-04-23 DIAGNOSIS — M51.36 DDD (DEGENERATIVE DISC DISEASE), LUMBAR: Primary | ICD-10-CM

## 2024-04-23 NOTE — OUTREACH NOTE
AMBULATORY CASE MANAGEMENT NOTE    Names and Relationships of Patient/Support Persons: Caller: Noemi Forbes; Relationship: Self  Caller: Noemi Forbes; Relationship: Self  Caller: Noemi Forbes; Relationship: Self -     CCM Interim Update    Patient returning call for CCM update. LEE ANN Sarmiento continues to follow patient for wound care treatment for cellulitis. She states that wound is not really improving much, [facility] will care coordinate should an extended treatment is needed. Patient continues to be hesitant to go wound clinic for her cellulitis, she states she's been there and gone to multiple appointments and that it didn't do much for her. She will see PCP on 4/30 and planning to discuss wound and pain management. She's is interested about dry needling, she states she's been dealing with pain for a while now mostly her back and neck. Encouraged patient to do some mild exercises, she states they do have some social activities at her Independent Living facility. She states she did gain weight since she eats what they serve there. Will provide patient handouts on food choices to help with wound healing on next outreach.     Education Documentation  No documentation found.        Opal VALENCIA  Ambulatory Case Management    4/23/2024, 15:32 EDT

## 2024-04-29 ENCOUNTER — PATIENT OUTREACH (OUTPATIENT)
Dept: CASE MANAGEMENT | Facility: OTHER | Age: 89
End: 2024-04-29
Payer: MEDICARE

## 2024-04-29 DIAGNOSIS — I87.2 CHRONIC VENOUS INSUFFICIENCY: ICD-10-CM

## 2024-04-29 DIAGNOSIS — M51.36 DDD (DEGENERATIVE DISC DISEASE), LUMBAR: Primary | ICD-10-CM

## 2024-04-29 NOTE — OUTREACH NOTE
[facility] End of Month Documentation    This Chronic Medical Management Care Plan for Noemi Forbes, 89 y.o. female, has been monitored and managed; reviewed and a new plan of care implemented for the month of April.  A cumulative time of 78 minutes was spent on this patient record this month, including phone call with patient; chart review, F/U with wound care, management and care coodination with caretenders. Discussed plans and offered disease education, support and encouragement..    Regarding the patient's problems: has Acute deep vein thrombosis (DVT) of distal vein of left lower extremity; Back pain, chronic; Arthritis; Chronic deep vein thrombosis (DVT) of distal vein of both lower extremities; Chronic urinary tract infection; Chronic venous insufficiency; Facet arthritis of lumbar region; Lymphedema; Aortic stenosis; Osteopenia; Spinal stenosis of lumbar region; Spondylolisthesis of lumbar region; Tobacco abuse; Venous stasis dermatitis of both lower extremities; Postlaminectomy syndrome; Cellulitis of left lower extremity; History of deep vein thrombosis (DVT) of lower extremity; Lymphedema; Acute kidney failure; Generalized osteoarthritis; DDD (degenerative disc disease), lumbar; Left hip pain; Impaired mobility; Encounter for power mobility device assessment; Injury of right rotator cuff; Shoulder pain, bilateral; Rotator cuff arthropathy of both shoulders; Hammer toe of right foot; Medicare annual wellness visit, subsequent; Chronic deep vein thrombosis (DVT); LBBB (left bundle branch block); Edema; Neuropathy; Prediabetes; Chronic back pain; Hospital discharge follow-up; Cellulitis of right leg; Localized swelling of both lower legs; Skin bulla; Bilateral lower leg cellulitis; DNR (do not resuscitate); and ARTURO (acute kidney injury) on their problem list., the following items were addressed: medical records; medications and any changes can be found within the plan section of the note.  A detailed listing of  time spent for chronic care management is tracked within each outreach encounter.  Current medications include:  has a current medication list which includes the following prescription(s): calcium carbonate, eliquis, eucerin original healing lotion, furosemide, rx alternatives general pain, latanoprost, potassium chloride, and vitamin d3. and the patient is reported to be patient is compliant with medication protocol,  Medications are reported to be effective.  Regarding these diagnoses, referrals were made to the following provider(s):  n/a.  All notes on chart for PCP to review.    The patient was monitored remotely for medications; activity level; mood & behavior; pain.    The patient's physical needs include:  help taking medications as prescribed; physical healthcare; medication education; DME supplies.     The patient's mental support needs include:  increased support    The patient's cognitive support needs include:  health care; medication; continued support    The patient's psychosocial support needs include:  continued support; medication management or adherence    The patient's functional needs include: physical healthcare; DME    The patient's environmental needs include:  not applicable, n/a    Care Plan overall comments:  No data recorded    Refer to previous outreach notes for more information on the areas listed above.    Monthly Billing Diagnoses  (M51.36) DDD (degenerative disc disease), lumbar    (I87.2) Chronic venous insufficiency    Medications   Medications have been reconciled    Care Plan progress this month:      Recently Modified Care Plans Updates made since 3/29/2024 12:00 AM      No recently modified care plans.          Instructions   Patient was provided an electronic copy of care plan  CCM services were explained and offered and patient has accepted these services.  Patient has given their written consent to receive CCM services and understands that this includes the authorization of  electronic communication of medical information with the other treating providers.  Patient understands that they may stop CCM services at any time and these changes will be effective at the end of the calendar month and will effectively revocate the agreement of CCM services.  Patient understands that only one practitioner can furnish and be paid for CCM services during one calendar month.  Patient also understands that there may be co-payment or deductible fees in association with CCM services.  Patient will continue with at least monthly follow-up calls with the Ambulatory .    Opal VALENCIA  Ambulatory Case Management    4/29/2024, 11:39 EDT

## 2024-05-06 ENCOUNTER — PATIENT OUTREACH (OUTPATIENT)
Dept: CASE MANAGEMENT | Facility: OTHER | Age: 89
End: 2024-05-06
Payer: MEDICARE

## 2024-05-06 DIAGNOSIS — I87.2 CHRONIC VENOUS INSUFFICIENCY: ICD-10-CM

## 2024-05-06 DIAGNOSIS — M51.36 DDD (DEGENERATIVE DISC DISEASE), LUMBAR: Primary | ICD-10-CM

## 2024-05-14 ENCOUNTER — PATIENT OUTREACH (OUTPATIENT)
Dept: CASE MANAGEMENT | Facility: OTHER | Age: 89
End: 2024-05-14
Payer: MEDICARE

## 2024-05-14 DIAGNOSIS — I87.2 CHRONIC VENOUS INSUFFICIENCY: ICD-10-CM

## 2024-05-14 DIAGNOSIS — M51.36 DDD (DEGENERATIVE DISC DISEASE), LUMBAR: Primary | ICD-10-CM

## 2024-05-14 NOTE — OUTREACH NOTE
"AMBULATORY CASE MANAGEMENT NOTE    Names and Relationships of Patient/Support Persons: Caller: Noemi Forbes; Relationship: Self -     CCM Interim Update    Called and spoke to patient for CCM follow up. She states that she's doing \"ok\" but verbalizes concerns that she would rather be home. She does not like the facility. Patient continues to have a lot of pain. She states that she can get up and walk to the bathroom now with a walker. Encouraged patient to work hard on her therapies, explained to her that she's not being discharged yet since the therapists wanted to make sure she's safe enough to go home to her Independent living facility first. She verbalizes understanding.    When she gets discharged, the independent living where she stays have Transportation Services and discussed on utilizing that for her doctor's appointment. She is agreeable. Patient verbalizes that the nurse told her that her insurance would only be letting her stay in rehab 2 more days. They will try to apply for an appeal since the patient is not ready yet to go home. Will follow progress and assist as needed. Patient grateful for the call.     Education Documentation  No documentation found.        Opal VALENCIA  Ambulatory Case Management    5/14/2024, 14:29 EDT  "

## 2024-05-15 ENCOUNTER — TELEPHONE (OUTPATIENT)
Dept: CASE MANAGEMENT | Facility: OTHER | Age: 89
End: 2024-05-15
Payer: MEDICARE

## 2024-05-17 ENCOUNTER — TELEPHONE (OUTPATIENT)
Dept: FAMILY MEDICINE CLINIC | Facility: CLINIC | Age: 89
End: 2024-05-17
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
## 2024-05-22 ENCOUNTER — PATIENT OUTREACH (OUTPATIENT)
Dept: CASE MANAGEMENT | Facility: OTHER | Age: 89
End: 2024-05-22
Payer: MEDICARE

## 2024-05-22 ENCOUNTER — TELEPHONE (OUTPATIENT)
Dept: CASE MANAGEMENT | Facility: OTHER | Age: 89
End: 2024-05-22
Payer: MEDICARE

## 2024-05-22 DIAGNOSIS — M51.36 DDD (DEGENERATIVE DISC DISEASE), LUMBAR: Primary | ICD-10-CM

## 2024-05-22 DIAGNOSIS — I87.2 CHRONIC VENOUS INSUFFICIENCY: ICD-10-CM
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
## 2024-05-23 ENCOUNTER — PATIENT OUTREACH (OUTPATIENT)
Dept: CASE MANAGEMENT | Facility: OTHER | Age: 89
End: 2024-05-23
Payer: MEDICARE

## 2024-05-23 DIAGNOSIS — M51.36 DDD (DEGENERATIVE DISC DISEASE), LUMBAR: Primary | ICD-10-CM

## 2024-05-23 DIAGNOSIS — S30.1XXD CONTUSION OF ABDOMINAL WALL, SUBSEQUENT ENCOUNTER: ICD-10-CM

## 2024-05-23 DIAGNOSIS — S20.219D CONTUSION OF CHEST WALL, UNSPECIFIED LATERALITY, SUBSEQUENT ENCOUNTER: ICD-10-CM

## 2024-05-23 DIAGNOSIS — S06.0XAA CONCUSSION WITH UNKNOWN LOSS OF CONSCIOUSNESS STATUS, INITIAL ENCOUNTER: Primary | ICD-10-CM

## 2024-05-23 DIAGNOSIS — I87.2 CHRONIC VENOUS INSUFFICIENCY: ICD-10-CM
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
## 2024-05-24 NOTE — TELEPHONE ENCOUNTER
I called and spoke with the patient and let her know she needed to schedule a follow up per Dr Madrigal. She verbalized understanding and I offered to schedule but she said she needed to call back so she could look at her calendar. I let her know that was okay but that she needed to make sure she called back. She verbalized understanding of this.

## 2024-05-28 ENCOUNTER — TELEPHONE (OUTPATIENT)
Dept: CASE MANAGEMENT | Facility: OTHER | Age: 89
End: 2024-05-28
Payer: MEDICARE

## 2024-05-29 ENCOUNTER — TELEPHONE (OUTPATIENT)
Dept: CASE MANAGEMENT | Facility: OTHER | Age: 89
End: 2024-05-29
Payer: MEDICARE

## 2024-05-30 ENCOUNTER — PATIENT OUTREACH (OUTPATIENT)
Dept: CASE MANAGEMENT | Facility: OTHER | Age: 89
End: 2024-05-30
Payer: MEDICARE

## 2024-05-30 DIAGNOSIS — M51.36 DDD (DEGENERATIVE DISC DISEASE), LUMBAR: ICD-10-CM

## 2024-05-30 DIAGNOSIS — I87.2 CHRONIC VENOUS INSUFFICIENCY: Primary | ICD-10-CM
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
## 2024-05-31 ENCOUNTER — PATIENT OUTREACH (OUTPATIENT)
Dept: CASE MANAGEMENT | Facility: OTHER | Age: 89
End: 2024-05-31
Payer: MEDICARE

## 2024-05-31 DIAGNOSIS — I87.2 CHRONIC VENOUS INSUFFICIENCY: Primary | ICD-10-CM

## 2024-05-31 DIAGNOSIS — M51.36 DDD (DEGENERATIVE DISC DISEASE), LUMBAR: ICD-10-CM

## 2024-05-31 NOTE — OUTREACH NOTE
AMBULATORY CASE MANAGEMENT NOTE    Names and Relationships of Patient/Support Persons: Caller: [employer]; Relationship:  -     Care Coordination    Called and spoke to Minerva from [facility]. Following up if they received the referral faxed yesterday 5/30. First faxed referral for home health was not received. Direct contact number provided and they will look into it and call me back for update.     Care Coordination    Received call from [facility] and spoke to Lili. She states they did not receive referral orders faxed yesterday. Re faxed referral twice today and confirmed fax# [phone]. This would be 3rd attempt. Order, insurance copy and office visit note from 4/1/24 sent.    Care Coordination    Received call from [facility]. They received faxed referral. Because of staffing, skilled nursing will be able to see patient once a week for dressing changes and scheduled to be seen next week tuesday. Upon assessment, nursing will assess patient for need of PT or OT.     [facility] Interim Update    Called to inform patient of [facility] scheduled to see her next week Tuesday. No answer, LM.     Education Documentation  No documentation found.        Opal VALENCIA  Ambulatory Case Management    5/31/2024, 09:27 EDT

## 2024-05-31 NOTE — OUTREACH NOTE
[facility] End of Month Documentation    This Chronic Medical Management Care Plan for Noemi Forbes, 89 y.o. female, has been monitored and managed; reviewed and a new plan of care implemented for the month of May.  A cumulative time of 76  minutes was spent on this patient record this month, including phone call with patient; chart review, Disease management. Transition to home and conituity of care. Referral to home health and education..    Regarding the patient's problems: has Acute deep vein thrombosis (DVT) of distal vein of left lower extremity; Back pain, chronic; Arthritis; Chronic deep vein thrombosis (DVT) of distal vein of both lower extremities; Chronic urinary tract infection; Chronic venous insufficiency; Facet arthritis of lumbar region; Lymphedema; Aortic stenosis; Osteopenia; Spinal stenosis of lumbar region; Spondylolisthesis of lumbar region; Tobacco abuse; Venous stasis dermatitis of both lower extremities; Postlaminectomy syndrome; Cellulitis of left lower extremity; History of deep vein thrombosis (DVT) of lower extremity; Lymphedema; Acute kidney failure; Generalized osteoarthritis; DDD (degenerative disc disease), lumbar; Left hip pain; Impaired mobility; Encounter for power mobility device assessment; Injury of right rotator cuff; Shoulder pain, bilateral; Rotator cuff arthropathy of both shoulders; Hammer toe of right foot; Medicare annual wellness visit, subsequent; Chronic deep vein thrombosis (DVT); LBBB (left bundle branch block); Edema; Neuropathy; Prediabetes; Chronic back pain; Hospital discharge follow-up; Cellulitis of right leg; Localized swelling of both lower legs; Skin bulla; Bilateral lower leg cellulitis; DNR (do not resuscitate); and ARTURO (acute kidney injury) on their problem list., the following items were addressed: medical records; medications and any changes can be found within the plan section of the note.  A detailed listing of time spent for chronic care management is  tracked within each outreach encounter.  Current medications include:  has a current medication list which includes the following prescription(s): calcium carbonate, eliquis, eucerin original healing lotion, furosemide, rx alternatives general pain, latanoprost, potassium chloride, and vitamin d3. and the patient is reported to be patient is compliant with medication protocol,  Medications are reported to be effective.  Regarding these diagnoses, referrals were made to the following provider(s):  n/a.  All notes on chart for PCP to review.    The patient was monitored remotely for medications; activity level; mood & behavior; pain.    The patient's physical needs include:  help taking medications as prescribed; physical healthcare; medication education; DME supplies.     The patient's mental support needs include:  increased support    The patient's cognitive support needs include:  health care; medication; needs assistance with ADLs; increased support    The patient's psychosocial support needs include:  medication management or adherence; need for increased support    The patient's functional needs include: physical healthcare; DME; needs assistance for ADLs    The patient's environmental needs include:  not applicable, n/a    Care Plan overall comments:  No data recorded    Refer to previous outreach notes for more information on the areas listed above.    Monthly Billing Diagnoses  (I87.2) Chronic venous insufficiency    (M51.36) DDD (degenerative disc disease), lumbar    Medications   Medications have been reconciled    Care Plan progress this month:      Recently Modified Care Plans Updates made since 4/30/2024 12:00 AM      No recently modified care plans.            Instructions   Patient was provided an electronic copy of care plan  CCM services were explained and offered and patient has accepted these services.  Patient has given their written consent to receive CCM services and understands that this  includes the authorization of electronic communication of medical information with the other treating providers.  Patient understands that they may stop CCM services at any time and these changes will be effective at the end of the calendar month and will effectively revocate the agreement of CCM services.  Patient understands that only one practitioner can furnish and be paid for CCM services during one calendar month.  Patient also understands that there may be co-payment or deductible fees in association with CCM services.  Patient will continue with at least monthly follow-up calls with the Ambulatory .    Opal VALENCIA  Ambulatory Case Management    5/31/2024, 09:31 EDT

## 2024-06-03 ENCOUNTER — TELEPHONE (OUTPATIENT)
Dept: FAMILY MEDICINE CLINIC | Facility: CLINIC | Age: 89
End: 2024-06-03
Payer: MEDICARE
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
## 2024-06-03 NOTE — TELEPHONE ENCOUNTER
Eva from [facility] called.  They received a referral for home health dated 05/23 from Dr Madrigal and an office note dated 04/01 from Diane.  The referral for home health just has stuff listed with questions marks she said.  They need an order with specific diagnosis codes and the specific discipline that she needs (pt, ot, nursing etc).  Please redo order and refax to her.  Fax number is [phone].

## 2024-06-05 ENCOUNTER — TELEPHONE (OUTPATIENT)
Dept: CASE MANAGEMENT | Facility: OTHER | Age: 89
End: 2024-06-05
Payer: MEDICARE

## 2024-06-05 NOTE — TELEPHONE ENCOUNTER
Attempted to call Ju (daughter in law) and (on verbal release) for follow up call. LM and encouraged to reach out to me if further assistance is needed.

## 2024-06-13 ENCOUNTER — OFFICE VISIT (OUTPATIENT)
Dept: FAMILY MEDICINE CLINIC | Facility: CLINIC | Age: 89
End: 2024-06-13
Payer: MEDICARE

## 2024-06-13 VITALS
HEART RATE: 98 BPM | WEIGHT: 174.2 LBS | HEIGHT: 65 IN | TEMPERATURE: 98.2 F | BODY MASS INDEX: 29.02 KG/M2 | SYSTOLIC BLOOD PRESSURE: 132 MMHG | OXYGEN SATURATION: 96 % | DIASTOLIC BLOOD PRESSURE: 84 MMHG

## 2024-06-13 DIAGNOSIS — R91.1 LEFT LOWER LOBE PULMONARY NODULE: ICD-10-CM

## 2024-06-13 DIAGNOSIS — G89.29 CHRONIC LOW BACK PAIN WITHOUT SCIATICA, UNSPECIFIED BACK PAIN LATERALITY: Primary | ICD-10-CM

## 2024-06-13 DIAGNOSIS — M54.50 CHRONIC LOW BACK PAIN WITHOUT SCIATICA, UNSPECIFIED BACK PAIN LATERALITY: Primary | ICD-10-CM

## 2024-06-13 DIAGNOSIS — I87.2 VENOUS STASIS DERMATITIS OF BOTH LOWER EXTREMITIES: ICD-10-CM

## 2024-06-13 DIAGNOSIS — H61.21 RIGHT EAR IMPACTED CERUMEN: ICD-10-CM

## 2024-06-13 DIAGNOSIS — M54.2 NECK PAIN: ICD-10-CM

## 2024-06-13 DIAGNOSIS — G44.309 POST-CONCUSSION HEADACHE: ICD-10-CM

## 2024-06-13 PROBLEM — S06.0X0A CONCUSSION WITH NO LOSS OF CONSCIOUSNESS: Status: ACTIVE | Noted: 2024-05-02

## 2024-06-13 PROBLEM — R41.841 COGNITIVE COMMUNICATION DEFICIT: Status: ACTIVE | Noted: 2024-05-03

## 2024-06-13 PROBLEM — S20.219D: Status: RESOLVED | Noted: 2024-05-02 | Resolved: 2024-06-13
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
## 2024-06-13 PROBLEM — S20.219D: Status: ACTIVE | Noted: 2024-05-02

## 2024-06-13 PROBLEM — S30.1XXA CONTUSION OF ABDOMINAL WALL: Status: ACTIVE | Noted: 2024-05-02

## 2024-06-13 PROCEDURE — 1160F RVW MEDS BY RX/DR IN RCRD: CPT | Performed by: INTERNAL MEDICINE

## 2024-06-13 PROCEDURE — 1125F AMNT PAIN NOTED PAIN PRSNT: CPT | Performed by: INTERNAL MEDICINE

## 2024-06-13 PROCEDURE — 1159F MED LIST DOCD IN RCRD: CPT | Performed by: INTERNAL MEDICINE

## 2024-06-13 PROCEDURE — 69210 REMOVE IMPACTED EAR WAX UNI: CPT | Performed by: INTERNAL MEDICINE

## 2024-06-13 PROCEDURE — 99214 OFFICE O/P EST MOD 30 MIN: CPT | Performed by: INTERNAL MEDICINE
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
## 2024-06-14 ENCOUNTER — PATIENT OUTREACH (OUTPATIENT)
Dept: CASE MANAGEMENT | Facility: OTHER | Age: 89
End: 2024-06-14
Payer: MEDICARE

## 2024-06-14 DIAGNOSIS — I87.2 CHRONIC VENOUS INSUFFICIENCY: Primary | ICD-10-CM

## 2024-06-14 DIAGNOSIS — M51.36 DDD (DEGENERATIVE DISC DISEASE), LUMBAR: ICD-10-CM
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
## 2024-06-28 ENCOUNTER — PATIENT OUTREACH (OUTPATIENT)
Dept: CASE MANAGEMENT | Facility: OTHER | Age: 89
End: 2024-06-28
Payer: MEDICARE

## 2024-06-28 DIAGNOSIS — M51.36 DDD (DEGENERATIVE DISC DISEASE), LUMBAR: ICD-10-CM

## 2024-06-28 DIAGNOSIS — I87.2 CHRONIC VENOUS INSUFFICIENCY: Primary | ICD-10-CM
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
## 2024-07-03 ENCOUNTER — HOSPITAL ENCOUNTER (OUTPATIENT)
Dept: CT IMAGING | Facility: HOSPITAL | Age: 89
Discharge: HOME | End: 2024-07-03
Admitting: INTERNAL MEDICINE
Payer: MEDICARE

## 2024-07-03 ENCOUNTER — TELEPHONE (OUTPATIENT)
Dept: CASE MANAGEMENT | Facility: OTHER | Age: 89
End: 2024-07-03
Payer: MEDICARE

## 2024-07-03 DIAGNOSIS — R91.1 LEFT LOWER LOBE PULMONARY NODULE: ICD-10-CM

## 2024-07-03 PROCEDURE — 71250 CT THORAX DX C-: CPT

## 2024-07-05 ENCOUNTER — TELEPHONE (OUTPATIENT)
Dept: CASE MANAGEMENT | Facility: OTHER | Age: 89
End: 2024-07-05
Payer: MEDICARE

## 2024-07-09 DIAGNOSIS — R91.8 LUNG NODULES: Primary | ICD-10-CM

## 2024-07-09 DIAGNOSIS — I89.0 LYMPHEDEMA: ICD-10-CM

## 2024-07-10 RX ORDER — POTASSIUM CHLORIDE 750 MG/1
10 TABLET, EXTENDED RELEASE ORAL DAILY
Qty: 90 TABLET | Refills: 3 | Status: SHIPPED | OUTPATIENT
Start: 2024-07-10

## 2024-07-11 ENCOUNTER — OFFICE VISIT (OUTPATIENT)
Dept: OTHER | Facility: HOSPITAL | Age: 89
End: 2024-07-11
Payer: MEDICARE

## 2024-07-11 ENCOUNTER — TELEPHONE (OUTPATIENT)
Dept: CASE MANAGEMENT | Facility: OTHER | Age: 89
End: 2024-07-11
Payer: MEDICARE

## 2024-07-11 VITALS
BODY MASS INDEX: 19.18 KG/M2 | DIASTOLIC BLOOD PRESSURE: 79 MMHG | SYSTOLIC BLOOD PRESSURE: 147 MMHG | HEIGHT: 65 IN | OXYGEN SATURATION: 95 % | HEART RATE: 60 BPM | WEIGHT: 115.1 LBS

## 2024-07-11 DIAGNOSIS — R91.8 LUNG NODULES: Primary | ICD-10-CM

## 2024-07-19 ENCOUNTER — HOSPITAL ENCOUNTER (OUTPATIENT)
Dept: PET IMAGING | Facility: HOSPITAL | Age: 89
Discharge: HOME | End: 2024-07-19
Payer: MEDICARE

## 2024-07-19 DIAGNOSIS — R91.8 LUNG NODULES: ICD-10-CM

## 2024-07-19 PROCEDURE — A9552 F18 FDG: HCPCS | Performed by: SURGERY

## 2024-07-19 PROCEDURE — 78815 PET IMAGE W/CT SKULL-THIGH: CPT

## 2024-07-19 PROCEDURE — 0 FLUDEOXYGLUCOSE F18 SOLUTION: Performed by: SURGERY

## 2024-07-19 RX ADMIN — FLUDEOXYGLUCOSE F 18 1 DOSE: 200 INJECTION, SOLUTION INTRAVENOUS at 21:41

## 2024-07-24 LAB — GLUCOSE BLDC GLUCOMTR-MCNC: 96 MG/DL (ref 70–130)

## 2024-07-31 RX ORDER — FUROSEMIDE 20 MG/1
40 TABLET ORAL DAILY
Qty: 180 TABLET | Refills: 3 | OUTPATIENT
Start: 2024-07-31
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
## 2024-08-01 ENCOUNTER — TELEPHONE (OUTPATIENT)
Dept: SURGERY | Facility: CLINIC | Age: 89
End: 2024-08-01
Payer: MEDICARE

## 2024-08-01 NOTE — TELEPHONE ENCOUNTER
I left a message for pt to call regarding a referral from  Dr Leon to Dr Hernandez. My goal is to get the patient scheduled as soon as possible at our [place] location per Dr Hernandez request.    DB 8:00  8/1/2024

## 2024-08-12 ENCOUNTER — TELEPHONE (OUTPATIENT)
Dept: FAMILY MEDICINE CLINIC | Facility: CLINIC | Age: 89
End: 2024-08-12
Payer: MEDICARE

## 2024-08-12 ENCOUNTER — TELEPHONE (OUTPATIENT)
Dept: CASE MANAGEMENT | Facility: OTHER | Age: 89
End: 2024-08-12
Payer: MEDICARE

## 2024-08-12 DIAGNOSIS — H92.01 CHRONIC RIGHT EAR PAIN: Primary | ICD-10-CM

## 2024-08-12 DIAGNOSIS — G89.29 CHRONIC RIGHT EAR PAIN: Primary | ICD-10-CM
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
## 2024-08-15 ENCOUNTER — OFFICE VISIT (OUTPATIENT)
Dept: OTHER | Facility: HOSPITAL | Age: 89
End: 2024-08-15
Payer: MEDICARE

## 2024-08-15 ENCOUNTER — CONSULT (OUTPATIENT)
Dept: RADIATION ONCOLOGY | Facility: HOSPITAL | Age: 89
End: 2024-08-15
Payer: MEDICARE

## 2024-08-15 VITALS
SYSTOLIC BLOOD PRESSURE: 143 MMHG | TEMPERATURE: 97.8 F | DIASTOLIC BLOOD PRESSURE: 79 MMHG | RESPIRATION RATE: 18 BRPM | HEART RATE: 61 BPM | OXYGEN SATURATION: 95 %

## 2024-08-15 VITALS — OXYGEN SATURATION: 96 % | HEART RATE: 60 BPM | DIASTOLIC BLOOD PRESSURE: 89 MMHG | SYSTOLIC BLOOD PRESSURE: 148 MMHG

## 2024-08-15 DIAGNOSIS — R91.8 LUNG NODULES: Primary | ICD-10-CM

## 2024-08-15 PROCEDURE — G0463 HOSPITAL OUTPT CLINIC VISIT: HCPCS | Performed by: RADIOLOGY

## 2024-08-15 PROCEDURE — 77263 THER RADIOLOGY TX PLNG CPLX: CPT | Performed by: RADIOLOGY

## 2024-08-15 PROCEDURE — G0463 HOSPITAL OUTPT CLINIC VISIT: HCPCS

## 2024-08-15 NOTE — PROGRESS NOTES
THORACIC SURGERY CLINIC CONSULT NOTE    REASON FOR CONSULT: ***    REFERRING PROVIDER: Villa Madrigal MD    Subjective   HISTORY OF PRESENTING ILLNESS:   Noemi Forbes is a 89 y.o. female who has significant medical problems as mentioned in the medical chart.     History of Present Illness      Past Medical History:   Diagnosis Date    Arthritis     DVT of leg (deep venous thrombosis)     Low back pain     Osteopenia        Past Surgical History:   Procedure Laterality Date    BACK SURGERY      BRAIN SURGERY      CATARACT EXTRACTION      COLONOSCOPY      HYSTERECTOMY      ROTATOR CUFF REPAIR Right     SPINE SURGERY      THYROID SURGERY      TONSILLECTOMY         Family History   Problem Relation Age of Onset    Dementia Mother        Social History     Socioeconomic History    Marital status:    Tobacco Use    Smoking status: Every Day     Current packs/day: 0.50     Average packs/day: 0.5 packs/day for 74.6 years (37.3 ttl pk-yrs)     Types: Cigarettes     Start date: 1/1/1950     Passive exposure: Never    Smokeless tobacco: Never   Vaping Use    Vaping status: Never Used   Substance and Sexual Activity    Alcohol use: Yes     Alcohol/week: 6.0 standard drinks of alcohol     Types: 4 Glasses of wine, 2 Cans of beer per week     Comment: socially    Drug use: Never    Sexual activity: Not Currently         Current Outpatient Medications:     calcium carbonate (OS-DARIAN) 600 MG tablet, Take 1 tablet by mouth Daily., Disp: 90 tablet, Rfl: 3    Eliquis 5 MG tablet tablet, Take 1 tablet by mouth 2 (Two) Times a Day., Disp: 180 tablet, Rfl: 3    Emollient (Eucerin original healing lotion) lotion, Apply 1 Application topically to the appropriate area as directed Daily., Disp: 30 mL, Rfl: 0    furosemide (LASIX) 20 MG tablet, , Disp: , Rfl:     Ibuprofen 3 %, Gabapentin 10 %, Baclofen 2 %, lidocaine 4 %, Apply 1-2 g topically to the appropriate area as directed 3 (Three) to 4 (Four) times daily., Disp: 90 g,  Rfl: 5    latanoprost (XALATAN) 0.005 % ophthalmic solution, Administer 1 drop to both eyes Daily., Disp: , Rfl:     potassium chloride (KLOR-CON M10) 10 MEQ CR tablet, TAKE 1 TABLET BY MOUTH DAILY, Disp: 90 tablet, Rfl: 3    vitamin D3 125 MCG (5000 UT) capsule capsule, Take 1 capsule by mouth Daily., Disp: 90 capsule, Rfl: 3     No Known Allergies          Objective    OBJECTIVE:     VITAL SIGNS:  There were no vitals taken for this visit.    PHYSICAL EXAM:  Normal appearance.   Head is normocephalic.   Nose appears normal.   No obvious deformity of the mouth and throat.  Conjunctivae normal.   Heart rate and rhythm is normal.  Pulmonary effort is normal.   Moving all 4 extremities.  Extremities warm.  No focal deficit present.   He is alert and oriented to person, place, and time.     LAB RESULTS:  I have reviewed all the available laboratory results in the chart.    RESULTS REVIEW:  I have reviewed the patient's all relevant laboratory and imaging findings.     Results      ASSESSMENT & PLAN:  Noemi Forbes is a 89 y.o. female with significant medical conditions as mentioned above presented to my clinic.    Diagnosis: ***  Staging: ***    Assessment & Plan      I discussed the patients findings and my recommendations with the patient. The patient was given adequate time to ask questions and all questions were answered to patient satisfaction. Thank you for this consult and allowing us to participate in the care of your patient.      Kalyan Hernandez MD  Thoracic Surgeon  [facility]        Dictated utilizing Dragon dictation    I spent 60 minutes caring for Noemi on this date of service. This time includes time spent by me in the following activities:preparing for the visit, reviewing tests, obtaining and/or reviewing a separately obtained history, performing a medically appropriate examination and/or evaluation , counseling and educating the patient/family/caregiver, ordering  medications, tests, or procedures, referring and communicating with other health care professionals , documenting information in the medical record, independently interpreting results and communicating that information with the patient/family/caregiver, and care coordination and more than half the time was spent in direct face to face evaluation and decision making.     {JENN [employer] Provider Statement:[id number]}   [facility]        Dictated utilizing Dragon dictation    I spent 60 minutes caring for Noemi on this date of service. This time includes time spent by me in the following activities:preparing for the visit, reviewing tests, obtaining and/or reviewing a separately obtained history, performing a medically appropriate examination and/or evaluation , counseling and educating the patient/family/caregiver, ordering medications, tests, or procedures, referring and communicating with other health care professionals , documenting information in the medical record, independently interpreting results and communicating that information with the patient/family/caregiver, and care coordination and more than half the time was spent in direct face to face evaluation and decision making.     Patient or patient representative verbalized consent for the use of Ambient Listening during the visit with  Kalyan Hernandez MD for chart documentation. 9/15/2024  13:38 EDT
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
## 2024-08-19 ENCOUNTER — PATIENT OUTREACH (OUTPATIENT)
Dept: CASE MANAGEMENT | Facility: OTHER | Age: 89
End: 2024-08-19
Payer: MEDICARE

## 2024-08-19 DIAGNOSIS — I87.2 CHRONIC VENOUS INSUFFICIENCY: Primary | ICD-10-CM

## 2024-08-19 DIAGNOSIS — M51.36 DDD (DEGENERATIVE DISC DISEASE), LUMBAR: ICD-10-CM

## 2024-08-19 NOTE — OUTREACH NOTE
AMBULATORY CASE MANAGEMENT NOTE    Names and Relationships of Patient/Support Persons: Contact: Noemi Forbes; Relationship: Self -     CCM Interim Update    Called and spoke to patient for CCM update. Patient had been on monitoring in CCM for UTR. Patient states she was found to have Lung Cancer, now following Radiatio Oncology and will have have radiation treatment. She states she has really great doctors that are explaining everything to her. At this time, patient appropriate to continue monitoring in CCM to graduate. She voiced no further needs for assistance at this time from case management. Encouraged to keep my number and reach out should she need further assistance in the future. She verbalizes understanding.     Education Documentation  No documentation found.        Opal VALENCIA  Ambulatory Case Management    8/19/2024, 10:08 EDT

## 2024-08-20 ENCOUNTER — HOSPITAL ENCOUNTER (OUTPATIENT)
Dept: RADIATION ONCOLOGY | Facility: HOSPITAL | Age: 89
Setting detail: RADIATION/ONCOLOGY SERIES
End: 2024-08-20
Payer: MEDICARE

## 2024-08-20 ENCOUNTER — HOSPITAL ENCOUNTER (OUTPATIENT)
Dept: RADIATION ONCOLOGY | Facility: HOSPITAL | Age: 89
Discharge: HOME | End: 2024-08-20

## 2024-08-20 PROCEDURE — 77334 RADIATION TREATMENT AID(S): CPT | Performed by: RADIOLOGY

## 2024-08-21 ENCOUNTER — MEDICATION THERAPY MANAGEMENT (OUTPATIENT)
Dept: RADIATION ONCOLOGY | Facility: CLINIC | Age: 89
End: 2024-08-21
Payer: MEDICARE

## 2024-08-21 DIAGNOSIS — M19.011 ARTHRITIS OF BOTH SHOULDER REGIONS: ICD-10-CM

## 2024-08-21 DIAGNOSIS — R91.8 LUNG NODULES: Primary | ICD-10-CM

## 2024-08-21 DIAGNOSIS — M19.012 ARTHRITIS OF BOTH SHOULDER REGIONS: ICD-10-CM

## 2024-08-21 RX ORDER — HYDROCODONE BITARTRATE AND ACETAMINOPHEN 5; 325 MG/1; MG/1
1 TABLET ORAL
Qty: 5 TABLET | Refills: 0 | Status: SHIPPED | OUTPATIENT
Start: 2024-08-21

## 2024-08-27 PROCEDURE — 77301 RADIOTHERAPY DOSE PLAN IMRT: CPT | Performed by: RADIOLOGY

## 2024-08-27 PROCEDURE — 77293 RESPIRATOR MOTION MGMT SIMUL: CPT | Performed by: RADIOLOGY

## 2024-08-27 PROCEDURE — 77338 DESIGN MLC DEVICE FOR IMRT: CPT | Performed by: RADIOLOGY

## 2024-08-27 PROCEDURE — 77300 RADIATION THERAPY DOSE PLAN: CPT | Performed by: RADIOLOGY

## 2024-08-30 ENCOUNTER — HOSPITAL ENCOUNTER (OUTPATIENT)
Dept: RADIATION ONCOLOGY | Facility: HOSPITAL | Age: 89
Discharge: HOME | End: 2024-08-30

## 2024-09-03 ENCOUNTER — HOSPITAL ENCOUNTER (OUTPATIENT)
Dept: RADIATION ONCOLOGY | Facility: HOSPITAL | Age: 89
Setting detail: RADIATION/ONCOLOGY SERIES
End: 2024-09-03
Payer: MEDICARE

## 2024-09-03 ENCOUNTER — HOSPITAL ENCOUNTER (OUTPATIENT)
Dept: RADIATION ONCOLOGY | Facility: HOSPITAL | Age: 89
Discharge: HOME | End: 2024-09-03

## 2024-09-03 LAB
RAD ONC ARIA COURSE ID: NORMAL
RAD ONC ARIA COURSE INTENT: NORMAL
RAD ONC ARIA COURSE LAST TREATMENT DATE: NORMAL
RAD ONC ARIA COURSE START DATE: NORMAL
RAD ONC ARIA COURSE TREATMENT ELAPSED DAYS: 0
RAD ONC ARIA FIRST TREATMENT DATE: NORMAL
RAD ONC ARIA PLAN FRACTIONS TREATED TO DATE: 1
RAD ONC ARIA PLAN ID: NORMAL
RAD ONC ARIA PLAN PRESCRIBED DOSE PER FRACTION: 10 GY
RAD ONC ARIA PLAN PRIMARY REFERENCE POINT: NORMAL
RAD ONC ARIA PLAN TOTAL FRACTIONS PRESCRIBED: 5
RAD ONC ARIA PLAN TOTAL PRESCRIBED DOSE: 5000 CGY
RAD ONC ARIA REFERENCE POINT DOSAGE GIVEN TO DATE: 10 GY
RAD ONC ARIA REFERENCE POINT ID: NORMAL
RAD ONC ARIA REFERENCE POINT SESSION DOSAGE GIVEN: 10 GY

## 2024-09-03 PROCEDURE — 77435 SBRT MANAGEMENT: CPT | Performed by: INTERNAL MEDICINE

## 2024-09-03 PROCEDURE — 77373 STRTCTC BDY RAD THER TX DLVR: CPT | Performed by: INTERNAL MEDICINE

## 2024-09-04 ENCOUNTER — HOSPITAL ENCOUNTER (OUTPATIENT)
Dept: RADIATION ONCOLOGY | Facility: HOSPITAL | Age: 89
Discharge: HOME | End: 2024-09-04

## 2024-09-04 DIAGNOSIS — R91.8 LUNG NODULES: Primary | ICD-10-CM

## 2024-09-04 LAB

## 2024-09-04 PROCEDURE — 77373 STRTCTC BDY RAD THER TX DLVR: CPT | Performed by: STUDENT IN AN ORGANIZED HEALTH CARE EDUCATION/TRAINING PROGRAM

## 2024-09-04 RX ORDER — OXYCODONE HYDROCHLORIDE 10 MG/1
10 TABLET ORAL EVERY 4 HOURS PRN
Qty: 5 TABLET | Refills: 0 | Status: SHIPPED | OUTPATIENT
Start: 2024-09-04

## 2024-09-05 ENCOUNTER — HOSPITAL ENCOUNTER (OUTPATIENT)
Dept: RADIATION ONCOLOGY | Facility: HOSPITAL | Age: 89
Discharge: HOME | End: 2024-09-05

## 2024-09-05 LAB
RAD ONC ARIA COURSE ID: NORMAL
RAD ONC ARIA COURSE INTENT: NORMAL
RAD ONC ARIA COURSE LAST TREATMENT DATE: NORMAL
RAD ONC ARIA COURSE START DATE: NORMAL
RAD ONC ARIA COURSE TREATMENT ELAPSED DAYS: 2
RAD ONC ARIA FIRST TREATMENT DATE: NORMAL
RAD ONC ARIA PLAN FRACTIONS TREATED TO DATE: 3
RAD ONC ARIA PLAN ID: NORMAL
RAD ONC ARIA PLAN PRESCRIBED DOSE PER FRACTION: 10 GY
RAD ONC ARIA PLAN PRIMARY REFERENCE POINT: NORMAL
RAD ONC ARIA PLAN TOTAL FRACTIONS PRESCRIBED: 5
RAD ONC ARIA PLAN TOTAL PRESCRIBED DOSE: 5000 CGY
RAD ONC ARIA REFERENCE POINT DOSAGE GIVEN TO DATE: 30 GY
RAD ONC ARIA REFERENCE POINT ID: NORMAL
RAD ONC ARIA REFERENCE POINT SESSION DOSAGE GIVEN: 10 GY

## 2024-09-05 PROCEDURE — 77373 STRTCTC BDY RAD THER TX DLVR: CPT | Performed by: RADIOLOGY

## 2024-09-05 PROCEDURE — 77336 RADIATION PHYSICS CONSULT: CPT | Performed by: RADIOLOGY

## 2024-09-06 ENCOUNTER — HOSPITAL ENCOUNTER (OUTPATIENT)
Dept: RADIATION ONCOLOGY | Facility: HOSPITAL | Age: 89
Discharge: HOME | End: 2024-09-06

## 2024-09-06 LAB
RAD ONC ARIA COURSE ID: NORMAL
RAD ONC ARIA COURSE INTENT: NORMAL
RAD ONC ARIA COURSE LAST TREATMENT DATE: NORMAL
RAD ONC ARIA COURSE START DATE: NORMAL
RAD ONC ARIA COURSE TREATMENT ELAPSED DAYS: 3
RAD ONC ARIA FIRST TREATMENT DATE: NORMAL
RAD ONC ARIA PLAN FRACTIONS TREATED TO DATE: 4
RAD ONC ARIA PLAN ID: NORMAL
RAD ONC ARIA PLAN PRESCRIBED DOSE PER FRACTION: 10 GY
RAD ONC ARIA PLAN PRIMARY REFERENCE POINT: NORMAL
RAD ONC ARIA PLAN TOTAL FRACTIONS PRESCRIBED: 5
RAD ONC ARIA PLAN TOTAL PRESCRIBED DOSE: 5000 CGY
RAD ONC ARIA REFERENCE POINT DOSAGE GIVEN TO DATE: 40 GY
RAD ONC ARIA REFERENCE POINT ID: NORMAL
RAD ONC ARIA REFERENCE POINT SESSION DOSAGE GIVEN: 10 GY

## 2024-09-06 PROCEDURE — 77373 STRTCTC BDY RAD THER TX DLVR: CPT | Performed by: RADIOLOGY

## 2024-09-09 ENCOUNTER — HOSPITAL ENCOUNTER (OUTPATIENT)
Dept: RADIATION ONCOLOGY | Facility: HOSPITAL | Age: 89
Discharge: HOME | End: 2024-09-09
Payer: MEDICARE

## 2024-09-09 ENCOUNTER — RADIATION ONCOLOGY WEEKLY ASSESSMENT (OUTPATIENT)
Dept: RADIATION ONCOLOGY | Facility: CLINIC | Age: 89
End: 2024-09-09
Payer: MEDICARE

## 2024-09-09 VITALS — SYSTOLIC BLOOD PRESSURE: 135 MMHG | HEART RATE: 64 BPM | DIASTOLIC BLOOD PRESSURE: 87 MMHG | OXYGEN SATURATION: 94 %

## 2024-09-09 DIAGNOSIS — R91.8 LUNG NODULES: Primary | ICD-10-CM

## 2024-09-09 LAB
RAD ONC ARIA COURSE END DATE: NORMAL
RAD ONC ARIA COURSE ID: NORMAL
RAD ONC ARIA COURSE ID: NORMAL
RAD ONC ARIA COURSE INTENT: NORMAL
RAD ONC ARIA COURSE INTENT: NORMAL
RAD ONC ARIA COURSE LAST TREATMENT DATE: NORMAL
RAD ONC ARIA COURSE LAST TREATMENT DATE: NORMAL
RAD ONC ARIA COURSE START DATE: NORMAL
RAD ONC ARIA COURSE START DATE: NORMAL
RAD ONC ARIA COURSE TREATMENT ELAPSED DAYS: 6
RAD ONC ARIA COURSE TREATMENT ELAPSED DAYS: 6
RAD ONC ARIA FIRST TREATMENT DATE: NORMAL
RAD ONC ARIA FIRST TREATMENT DATE: NORMAL
RAD ONC ARIA PLAN FRACTIONS TREATED TO DATE: 5
RAD ONC ARIA PLAN FRACTIONS TREATED TO DATE: 5
RAD ONC ARIA PLAN ID: NORMAL
RAD ONC ARIA PLAN ID: NORMAL
RAD ONC ARIA PLAN NAME: NORMAL
RAD ONC ARIA PLAN PRESCRIBED DOSE PER FRACTION: 10 GY
RAD ONC ARIA PLAN PRESCRIBED DOSE PER FRACTION: 10 GY
RAD ONC ARIA PLAN PRIMARY REFERENCE POINT: NORMAL
RAD ONC ARIA PLAN PRIMARY REFERENCE POINT: NORMAL
RAD ONC ARIA PLAN TOTAL FRACTIONS PRESCRIBED: 5
RAD ONC ARIA PLAN TOTAL FRACTIONS PRESCRIBED: 5
RAD ONC ARIA PLAN TOTAL PRESCRIBED DOSE: 5000 CGY
RAD ONC ARIA PLAN TOTAL PRESCRIBED DOSE: 5000 CGY
RAD ONC ARIA REFERENCE POINT DOSAGE GIVEN TO DATE: 50 GY
RAD ONC ARIA REFERENCE POINT DOSAGE GIVEN TO DATE: 50 GY
RAD ONC ARIA REFERENCE POINT ID: NORMAL
RAD ONC ARIA REFERENCE POINT ID: NORMAL
RAD ONC ARIA REFERENCE POINT SESSION DOSAGE GIVEN: 10 GY

## 2024-09-09 PROCEDURE — 77373 STRTCTC BDY RAD THER TX DLVR: CPT | Performed by: RADIOLOGY

## 2024-09-09 NOTE — PROGRESS NOTES
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  Pain Score    09/09/24 1120   PainSc: 0-No pain   Patient doing well today. No pain when sitting, only with movement. Grade 4/6 MATA - very coarse. Lungs mostly clear.    Restricted in physically strenuous activity but ambulatory and able to carry out work of a light or sedentary nature, e.g., light house work, office work = 1    We examined the relevant areas: yes  Findings are within the expected range for this stage of treatment: yes  -------------------------------------------------------------------------------------------------------------------    ACTION ITEMS:  1.Patient tolerating treatment well and as expected for this stage in their treatment  2.Patient will be seen in 3 months with repeat noncontrast CT scan of the chest prior to visit  Estimated Completion Date: today    Neftali Reyes MD  Radiation Oncology

## 2024-09-10 ENCOUNTER — TELEPHONE (OUTPATIENT)
Dept: CASE MANAGEMENT | Facility: OTHER | Age: 89
End: 2024-09-10
Payer: MEDICARE

## 2024-09-10 NOTE — TELEPHONE ENCOUNTER
Day 60 of 90 Chart review. Patient following up with providers and ongoing radiation treatment. No needs. Appropriate to continue to monitor in CCM.

## 2024-09-12 RX ORDER — APIXABAN 5 MG/1
5 TABLET, FILM COATED ORAL 2 TIMES DAILY
Qty: 180 TABLET | Refills: 3 | Status: SHIPPED | OUTPATIENT
Start: 2024-09-12

## 2024-09-15 ENCOUNTER — TREATMENT (OUTPATIENT)
Dept: RADIATION ONCOLOGY | Facility: HOSPITAL | Age: 89
End: 2024-09-15
Payer: MEDICARE

## 2024-09-15 NOTE — PROGRESS NOTES
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  She is having no pain when sitting and only on movement.  She was noted to have a very coarse grade 4/6 systolic ejection murmur.  Patient's lungs are mostly clear.  Patient will be seen in 3 months with repeat noncontrast CT scan prior to visit.  She will call if she has any difficulties in the interim.    ECOG: Restricted in physically strenuous activity but ambulatory and able to carry out work of a light or sedentary nature, e.g., light house work, office work = 1    Pain Management Plan: None Indicated/PRN OTC    Follow-Up Plan: 3 months    Imaging Ordered for Follow-Up: Yes, describe: Noncontrast CT scan prior to visit        Neftali Reyes MD

## 2024-10-09 ENCOUNTER — TELEPHONE (OUTPATIENT)
Dept: CASE MANAGEMENT | Facility: OTHER | Age: 89
End: 2024-10-09
Payer: MEDICARE

## 2024-10-25 ENCOUNTER — TELEPHONE (OUTPATIENT)
Dept: RADIATION ONCOLOGY | Facility: HOSPITAL | Age: 89
End: 2024-10-25
Payer: MEDICARE

## 2024-10-25 NOTE — TELEPHONE ENCOUNTER
2nd attempt. LVM asking PT to call office back to reschedule appt on 12/17 at EP due being closed

## 2024-11-27 ENCOUNTER — HOSPITAL ENCOUNTER (OUTPATIENT)
Facility: HOSPITAL | Age: 89
Setting detail: OBSERVATION
Discharge: NURSING, REHAB OR LONG-TERM CARE | End: 2024-12-02
Attending: EMERGENCY MEDICINE | Admitting: INTERNAL MEDICINE
Payer: MEDICARE

## 2024-11-27 ENCOUNTER — APPOINTMENT (OUTPATIENT)
Dept: GENERAL RADIOLOGY | Facility: HOSPITAL | Age: 89
End: 2024-11-27
Payer: MEDICARE

## 2024-11-27 ENCOUNTER — APPOINTMENT (OUTPATIENT)
Dept: CT IMAGING | Facility: HOSPITAL | Age: 89
End: 2024-11-27
Payer: MEDICARE

## 2024-11-27 DIAGNOSIS — Z79.01 CHRONIC ANTICOAGULATION: ICD-10-CM

## 2024-11-27 DIAGNOSIS — S82.831A CLOSED FRACTURE OF DISTAL END OF RIGHT FIBULA, UNSPECIFIED FRACTURE MORPHOLOGY, INITIAL ENCOUNTER: Primary | ICD-10-CM

## 2024-11-27 PROBLEM — N18.31 STAGE 3A CHRONIC KIDNEY DISEASE: Status: ACTIVE | Noted: 2024-11-27
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
## 2024-11-27 LAB
ANION GAP SERPL CALCULATED.3IONS-SCNC: 7.7 MMOL/L (ref 5–15)
BASOPHILS # BLD AUTO: 0.02 10*3/MM3 (ref 0–0.2)
BASOPHILS NFR BLD AUTO: 0.3 % (ref 0–1.5)
BUN SERPL-MCNC: 14 MG/DL (ref 8–23)
BUN/CREAT SERPL: 13.5 (ref 7–25)
CALCIUM SPEC-SCNC: 9.1 MG/DL (ref 8.6–10.5)
CHLORIDE SERPL-SCNC: 109 MMOL/L (ref 98–107)
CO2 SERPL-SCNC: 26.3 MMOL/L (ref 22–29)
CREAT SERPL-MCNC: 1.04 MG/DL (ref 0.57–1)
DEPRECATED RDW RBC AUTO: 44.2 FL (ref 37–54)
EGFRCR SERPLBLD CKD-EPI 2021: 51.8 ML/MIN/1.73
EOSINOPHIL # BLD AUTO: 0.02 10*3/MM3 (ref 0–0.4)
EOSINOPHIL NFR BLD AUTO: 0.3 % (ref 0.3–6.2)
ERYTHROCYTE [DISTWIDTH] IN BLOOD BY AUTOMATED COUNT: 12.6 % (ref 12.3–15.4)
GLUCOSE SERPL-MCNC: 97 MG/DL (ref 65–99)
HCT VFR BLD AUTO: 44.9 % (ref 34–46.6)
HGB BLD-MCNC: 14.7 G/DL (ref 12–15.9)
IMM GRANULOCYTES # BLD AUTO: 0.02 10*3/MM3 (ref 0–0.05)
IMM GRANULOCYTES NFR BLD AUTO: 0.3 % (ref 0–0.5)
LYMPHOCYTES # BLD AUTO: 0.69 10*3/MM3 (ref 0.7–3.1)
LYMPHOCYTES NFR BLD AUTO: 9.1 % (ref 19.6–45.3)
MCH RBC QN AUTO: 31.9 PG (ref 26.6–33)
MCHC RBC AUTO-ENTMCNC: 32.7 G/DL (ref 31.5–35.7)
MCV RBC AUTO: 97.4 FL (ref 79–97)
MONOCYTES # BLD AUTO: 0.64 10*3/MM3 (ref 0.1–0.9)
MONOCYTES NFR BLD AUTO: 8.4 % (ref 5–12)
NEUTROPHILS NFR BLD AUTO: 6.23 10*3/MM3 (ref 1.7–7)
NEUTROPHILS NFR BLD AUTO: 81.6 % (ref 42.7–76)
NRBC BLD AUTO-RTO: 0 /100 WBC (ref 0–0.2)
PLATELET # BLD AUTO: 241 10*3/MM3 (ref 140–450)
PMV BLD AUTO: 10.4 FL (ref 6–12)
POTASSIUM SERPL-SCNC: 3.9 MMOL/L (ref 3.5–5.2)
RBC # BLD AUTO: 4.61 10*6/MM3 (ref 3.77–5.28)
SODIUM SERPL-SCNC: 143 MMOL/L (ref 136–145)
WBC NRBC COR # BLD AUTO: 7.62 10*3/MM3 (ref 3.4–10.8)

## 2024-11-27 PROCEDURE — G0378 HOSPITAL OBSERVATION PER HR: HCPCS

## 2024-11-27 PROCEDURE — 25010000002 HYDROMORPHONE PER 4 MG: Performed by: EMERGENCY MEDICINE

## 2024-11-27 PROCEDURE — 96376 TX/PRO/DX INJ SAME DRUG ADON: CPT

## 2024-11-27 PROCEDURE — 36415 COLL VENOUS BLD VENIPUNCTURE: CPT

## 2024-11-27 PROCEDURE — 99285 EMERGENCY DEPT VISIT HI MDM: CPT

## 2024-11-27 PROCEDURE — 80048 BASIC METABOLIC PNL TOTAL CA: CPT | Performed by: EMERGENCY MEDICINE

## 2024-11-27 PROCEDURE — 85025 COMPLETE CBC W/AUTO DIFF WBC: CPT | Performed by: EMERGENCY MEDICINE

## 2024-11-27 PROCEDURE — 73610 X-RAY EXAM OF ANKLE: CPT

## 2024-11-27 PROCEDURE — 96374 THER/PROPH/DIAG INJ IV PUSH: CPT

## 2024-11-27 PROCEDURE — 70450 CT HEAD/BRAIN W/O DYE: CPT

## 2024-11-27 RX ORDER — ACETAMINOPHEN 325 MG/1
650 TABLET ORAL EVERY 4 HOURS PRN
Status: DISCONTINUED | OUTPATIENT
Start: 2024-11-27 | End: 2024-11-27

## 2024-11-27 RX ORDER — HYDROCODONE BITARTRATE AND ACETAMINOPHEN 5; 325 MG/1; MG/1
1 TABLET ORAL EVERY 6 HOURS PRN
Status: DISCONTINUED | OUTPATIENT
Start: 2024-11-27 | End: 2024-11-28

## 2024-11-27 RX ORDER — ACETAMINOPHEN 500 MG
1000 TABLET ORAL ONCE
Status: COMPLETED | OUTPATIENT
Start: 2024-11-27 | End: 2024-11-27

## 2024-11-27 RX ORDER — BISACODYL 10 MG
10 SUPPOSITORY, RECTAL RECTAL DAILY PRN
Status: DISCONTINUED | OUTPATIENT
Start: 2024-11-27 | End: 2024-11-28

## 2024-11-27 RX ORDER — HYDROMORPHONE HYDROCHLORIDE 1 MG/ML
0.25 INJECTION, SOLUTION INTRAMUSCULAR; INTRAVENOUS; SUBCUTANEOUS EVERY 4 HOURS PRN
Status: DISCONTINUED | OUTPATIENT
Start: 2024-11-27 | End: 2024-11-30

## 2024-11-27 RX ORDER — AMOXICILLIN 250 MG
2 CAPSULE ORAL 2 TIMES DAILY PRN
Status: DISCONTINUED | OUTPATIENT
Start: 2024-11-27 | End: 2024-11-28

## 2024-11-27 RX ORDER — HYDROMORPHONE HYDROCHLORIDE 1 MG/ML
0.5 INJECTION, SOLUTION INTRAMUSCULAR; INTRAVENOUS; SUBCUTANEOUS ONCE
Status: COMPLETED | OUTPATIENT
Start: 2024-11-27 | End: 2024-11-27

## 2024-11-27 RX ORDER — HYDROMORPHONE HYDROCHLORIDE 1 MG/ML
0.25 INJECTION, SOLUTION INTRAMUSCULAR; INTRAVENOUS; SUBCUTANEOUS ONCE
Status: COMPLETED | OUTPATIENT
Start: 2024-11-27 | End: 2024-11-27

## 2024-11-27 RX ORDER — ONDANSETRON 4 MG/1
4 TABLET, ORALLY DISINTEGRATING ORAL EVERY 6 HOURS PRN
Status: DISCONTINUED | OUTPATIENT
Start: 2024-11-27 | End: 2024-12-02 | Stop reason: HOSPADM

## 2024-11-27 RX ORDER — LATANOPROST 50 UG/ML
1 SOLUTION/ DROPS OPHTHALMIC DAILY
Status: DISCONTINUED | OUTPATIENT
Start: 2024-11-28 | End: 2024-11-29

## 2024-11-27 RX ORDER — ACETAMINOPHEN 500 MG
1000 TABLET ORAL EVERY 8 HOURS
Status: DISCONTINUED | OUTPATIENT
Start: 2024-11-27 | End: 2024-12-02 | Stop reason: HOSPADM

## 2024-11-27 RX ORDER — CALCIUM CARBONATE 500(1250)
500 TABLET ORAL DAILY
Status: DISCONTINUED | OUTPATIENT
Start: 2024-11-28 | End: 2024-12-02 | Stop reason: HOSPADM

## 2024-11-27 RX ORDER — PHENOL 1.4 %
600 AEROSOL, SPRAY (ML) MUCOUS MEMBRANE DAILY
Qty: 90 TABLET | Refills: 3 | Status: SHIPPED | OUTPATIENT
Start: 2024-11-27

## 2024-11-27 RX ORDER — CHOLECALCIFEROL (VITAMIN D3) 25 MCG
5000 TABLET ORAL DAILY
Status: DISCONTINUED | OUTPATIENT
Start: 2024-11-28 | End: 2024-12-02 | Stop reason: HOSPADM

## 2024-11-27 RX ORDER — BISACODYL 5 MG/1
5 TABLET, DELAYED RELEASE ORAL DAILY PRN
Status: DISCONTINUED | OUTPATIENT
Start: 2024-11-27 | End: 2024-11-28

## 2024-11-27 RX ORDER — POLYETHYLENE GLYCOL 3350 17 G/17G
17 POWDER, FOR SOLUTION ORAL DAILY PRN
Status: DISCONTINUED | OUTPATIENT
Start: 2024-11-27 | End: 2024-11-28

## 2024-11-27 RX ORDER — ONDANSETRON 2 MG/ML
4 INJECTION INTRAMUSCULAR; INTRAVENOUS EVERY 6 HOURS PRN
Status: DISCONTINUED | OUTPATIENT
Start: 2024-11-27 | End: 2024-12-02 | Stop reason: HOSPADM

## 2024-11-27 RX ADMIN — ACETAMINOPHEN 1000 MG: 500 TABLET ORAL at 21:27

## 2024-11-27 RX ADMIN — HYDROMORPHONE HYDROCHLORIDE 0.25 MG: 1 INJECTION, SOLUTION INTRAMUSCULAR; INTRAVENOUS; SUBCUTANEOUS at 19:40

## 2024-11-27 RX ADMIN — HYDROCODONE BITARTRATE AND ACETAMINOPHEN 1 TABLET: 5; 325 TABLET ORAL at 23:36

## 2024-11-27 RX ADMIN — HYDROMORPHONE HYDROCHLORIDE 0.5 MG: 1 INJECTION, SOLUTION INTRAMUSCULAR; INTRAVENOUS; SUBCUTANEOUS at 20:22

## 2024-11-27 RX ADMIN — ACETAMINOPHEN 1000 MG: 500 TABLET ORAL at 23:36

## 2024-11-27 NOTE — TELEPHONE ENCOUNTER
Last visit  06/13/2024  Upcoming visit  N/a  Last refill   03/12/2024 03/20/2024  Protocols:  passed

## 2024-11-28 ENCOUNTER — APPOINTMENT (OUTPATIENT)
Dept: CARDIOLOGY | Facility: HOSPITAL | Age: 89
End: 2024-11-28
Payer: MEDICARE

## 2024-11-28 ENCOUNTER — APPOINTMENT (OUTPATIENT)
Dept: GENERAL RADIOLOGY | Facility: HOSPITAL | Age: 89
End: 2024-11-28
Payer: MEDICARE

## 2024-11-28 LAB
ANION GAP SERPL CALCULATED.3IONS-SCNC: 8.5 MMOL/L (ref 5–15)
AORTIC DIMENSIONLESS INDEX: 0.6 (DI)
ASCENDING AORTA: 3.1 CM
BH CV ECHO MEAS - ACS: 2.19 CM
BH CV ECHO MEAS - AO MAX PG: 36.9 MMHG
BH CV ECHO MEAS - AO MEAN PG: 24.3 MMHG
BH CV ECHO MEAS - AO ROOT DIAM: 3.2 CM
BH CV ECHO MEAS - AO V2 MAX: 303.7 CM/SEC
BH CV ECHO MEAS - AO V2 VTI: 74.2 CM
BH CV ECHO MEAS - AVA(I,D): 1.75 CM2
BH CV ECHO MEAS - EDV(CUBED): 39.3 ML
BH CV ECHO MEAS - EDV(MOD-SP2): 69 ML
BH CV ECHO MEAS - EDV(MOD-SP4): 109 ML
BH CV ECHO MEAS - EF(MOD-BP): 60.8 %
BH CV ECHO MEAS - EF(MOD-SP2): 56.5 %
BH CV ECHO MEAS - EF(MOD-SP4): 60.6 %
BH CV ECHO MEAS - ESV(CUBED): 12.4 ML
BH CV ECHO MEAS - ESV(MOD-SP2): 30 ML
BH CV ECHO MEAS - ESV(MOD-SP4): 43 ML
BH CV ECHO MEAS - FS: 32 %
BH CV ECHO MEAS - IVS/LVPW: 1.5 CM
BH CV ECHO MEAS - IVSD: 1.5 CM
BH CV ECHO MEAS - LA DIMENSION: 3.8 CM
BH CV ECHO MEAS - LAT PEAK E' VEL: 7.1 CM/SEC
BH CV ECHO MEAS - LV DIASTOLIC VOL/BSA (35-75): 63.4 CM2
BH CV ECHO MEAS - LV MASS(C)D: 138.9 GRAMS
BH CV ECHO MEAS - LV MAX PG: 11.9 MMHG
BH CV ECHO MEAS - LV MEAN PG: 6 MMHG
BH CV ECHO MEAS - LV SYSTOLIC VOL/BSA (12-30): 25 CM2
BH CV ECHO MEAS - LV V1 MAX: 172.4 CM/SEC
BH CV ECHO MEAS - LV V1 VTI: 41.6 CM
BH CV ECHO MEAS - LVIDD: 3.4 CM
BH CV ECHO MEAS - LVIDS: 2.31 CM
BH CV ECHO MEAS - LVOT AREA: 3.1 CM2
BH CV ECHO MEAS - LVOT DIAM: 1.99 CM
BH CV ECHO MEAS - LVPWD: 1 CM
BH CV ECHO MEAS - MED PEAK E' VEL: 5.2 CM/SEC
BH CV ECHO MEAS - MR MAX PG: 122.3 MMHG
BH CV ECHO MEAS - MR MAX VEL: 552.8 CM/SEC
BH CV ECHO MEAS - MV A MAX VEL: 164.6 CM/SEC
BH CV ECHO MEAS - MV DEC SLOPE: 244.6 CM/SEC2
BH CV ECHO MEAS - MV DEC TIME: 377 SEC
BH CV ECHO MEAS - MV E MAX VEL: 91.7 CM/SEC
BH CV ECHO MEAS - MV E/A: 0.56
BH CV ECHO MEAS - MV MAX PG: 14.1 MMHG
BH CV ECHO MEAS - MV MEAN PG: 4 MMHG
BH CV ECHO MEAS - MV P1/2T: 141.4 MSEC
BH CV ECHO MEAS - MV V2 VTI: 56.8 CM
BH CV ECHO MEAS - MVA(P1/2T): 1.56 CM2
BH CV ECHO MEAS - MVA(VTI): 2.29 CM2
BH CV ECHO MEAS - PA ACC TIME: 0.11 SEC
BH CV ECHO MEAS - PA V2 MAX: 174 CM/SEC
BH CV ECHO MEAS - QP/QS: 0.49
BH CV ECHO MEAS - RAP SYSTOLE: 3 MMHG
BH CV ECHO MEAS - RV MAX PG: 2.9 MMHG
BH CV ECHO MEAS - RV V1 MAX: 84.9 CM/SEC
BH CV ECHO MEAS - RV V1 VTI: 18.3 CM
BH CV ECHO MEAS - RVOT DIAM: 2.12 CM
BH CV ECHO MEAS - RVSP: 38.5 MMHG
BH CV ECHO MEAS - SV(LVOT): 129.9 ML
BH CV ECHO MEAS - SV(MOD-SP2): 39 ML
BH CV ECHO MEAS - SV(MOD-SP4): 66 ML
BH CV ECHO MEAS - SV(RVOT): 64.1 ML
BH CV ECHO MEAS - SVI(LVOT): 75.5 ML/M2
BH CV ECHO MEAS - SVI(MOD-SP2): 22.7 ML/M2
BH CV ECHO MEAS - SVI(MOD-SP4): 38.4 ML/M2
BH CV ECHO MEAS - TAPSE (>1.6): 2.25 CM
BH CV ECHO MEAS - TR MAX PG: 35.5 MMHG
BH CV ECHO MEAS - TR MAX VEL: 297.9 CM/SEC
BH CV ECHO MEASUREMENTS AVERAGE E/E' RATIO: 14.91
BH CV XLRA - RV BASE: 3.3 CM
BH CV XLRA - RV LENGTH: 5.3 CM
BH CV XLRA - RV MID: 2.6 CM
BH CV XLRA - TDI S': 13.9 CM/SEC
BUN SERPL-MCNC: 16 MG/DL (ref 8–23)
BUN/CREAT SERPL: 15.5 (ref 7–25)
CALCIUM SPEC-SCNC: 8.9 MG/DL (ref 8.6–10.5)
CHLORIDE SERPL-SCNC: 109 MMOL/L (ref 98–107)
CO2 SERPL-SCNC: 26.5 MMOL/L (ref 22–29)
CREAT SERPL-MCNC: 1.03 MG/DL (ref 0.57–1)
DEPRECATED RDW RBC AUTO: 43.7 FL (ref 37–54)
EGFRCR SERPLBLD CKD-EPI 2021: 52.4 ML/MIN/1.73
ERYTHROCYTE [DISTWIDTH] IN BLOOD BY AUTOMATED COUNT: 12.1 % (ref 12.3–15.4)
GLUCOSE SERPL-MCNC: 99 MG/DL (ref 65–99)
HCT VFR BLD AUTO: 41.6 % (ref 34–46.6)
HGB BLD-MCNC: 13.6 G/DL (ref 12–15.9)
LEFT ATRIUM VOLUME INDEX: 12.6 ML/M2
MCH RBC QN AUTO: 31.6 PG (ref 26.6–33)
MCHC RBC AUTO-ENTMCNC: 32.7 G/DL (ref 31.5–35.7)
MCV RBC AUTO: 96.5 FL (ref 79–97)
PLATELET # BLD AUTO: 232 10*3/MM3 (ref 140–450)
PMV BLD AUTO: 10.7 FL (ref 6–12)
POTASSIUM SERPL-SCNC: 4.3 MMOL/L (ref 3.5–5.2)
QT INTERVAL: 453 MS
QTC INTERVAL: 414 MS
RBC # BLD AUTO: 4.31 10*6/MM3 (ref 3.77–5.28)
SINUS: 3.1 CM
SODIUM SERPL-SCNC: 144 MMOL/L (ref 136–145)
STJ: 2.7 CM
WBC NRBC COR # BLD AUTO: 6.46 10*3/MM3 (ref 3.4–10.8)

## 2024-11-28 PROCEDURE — 93010 ELECTROCARDIOGRAM REPORT: CPT | Performed by: INTERNAL MEDICINE

## 2024-11-28 PROCEDURE — 97110 THERAPEUTIC EXERCISES: CPT

## 2024-11-28 PROCEDURE — 73590 X-RAY EXAM OF LOWER LEG: CPT

## 2024-11-28 PROCEDURE — 25510000001 PERFLUTREN 6.52 MG/ML SUSPENSION 2 ML VIAL: Performed by: INTERNAL MEDICINE

## 2024-11-28 PROCEDURE — G0378 HOSPITAL OBSERVATION PER HR: HCPCS

## 2024-11-28 PROCEDURE — 80048 BASIC METABOLIC PNL TOTAL CA: CPT | Performed by: INTERNAL MEDICINE

## 2024-11-28 PROCEDURE — 97162 PT EVAL MOD COMPLEX 30 MIN: CPT

## 2024-11-28 PROCEDURE — 93306 TTE W/DOPPLER COMPLETE: CPT

## 2024-11-28 PROCEDURE — 93005 ELECTROCARDIOGRAM TRACING: CPT | Performed by: INTERNAL MEDICINE

## 2024-11-28 PROCEDURE — 36415 COLL VENOUS BLD VENIPUNCTURE: CPT | Performed by: INTERNAL MEDICINE

## 2024-11-28 PROCEDURE — 85027 COMPLETE CBC AUTOMATED: CPT | Performed by: INTERNAL MEDICINE

## 2024-11-28 PROCEDURE — 93306 TTE W/DOPPLER COMPLETE: CPT | Performed by: INTERNAL MEDICINE

## 2024-11-28 RX ORDER — ACETAMINOPHEN 325 MG/1
650 TABLET ORAL EVERY 4 HOURS PRN
Status: DISCONTINUED | OUTPATIENT
Start: 2024-11-28 | End: 2024-11-29

## 2024-11-28 RX ORDER — AMOXICILLIN 250 MG
2 CAPSULE ORAL 2 TIMES DAILY
Status: DISCONTINUED | OUTPATIENT
Start: 2024-11-28 | End: 2024-12-02 | Stop reason: HOSPADM

## 2024-11-28 RX ORDER — BISACODYL 10 MG
10 SUPPOSITORY, RECTAL RECTAL DAILY PRN
Status: DISCONTINUED | OUTPATIENT
Start: 2024-11-28 | End: 2024-12-02 | Stop reason: HOSPADM

## 2024-11-28 RX ORDER — HYDROCODONE BITARTRATE AND ACETAMINOPHEN 5; 325 MG/1; MG/1
1 TABLET ORAL EVERY 4 HOURS PRN
Status: DISCONTINUED | OUTPATIENT
Start: 2024-11-28 | End: 2024-11-29

## 2024-11-28 RX ORDER — POLYETHYLENE GLYCOL 3350 17 G/17G
17 POWDER, FOR SOLUTION ORAL DAILY PRN
Status: DISCONTINUED | OUTPATIENT
Start: 2024-11-28 | End: 2024-12-02 | Stop reason: HOSPADM

## 2024-11-28 RX ORDER — BISACODYL 5 MG/1
5 TABLET, DELAYED RELEASE ORAL DAILY PRN
Status: DISCONTINUED | OUTPATIENT
Start: 2024-11-28 | End: 2024-12-02 | Stop reason: HOSPADM

## 2024-11-28 RX ADMIN — HYDROCODONE BITARTRATE AND ACETAMINOPHEN 1 TABLET: 5; 325 TABLET ORAL at 21:17

## 2024-11-28 RX ADMIN — HYDROCODONE BITARTRATE AND ACETAMINOPHEN 1 TABLET: 5; 325 TABLET ORAL at 05:27

## 2024-11-28 RX ADMIN — HYDROCODONE BITARTRATE AND ACETAMINOPHEN 1 TABLET: 5; 325 TABLET ORAL at 15:18

## 2024-11-28 RX ADMIN — ACETAMINOPHEN 1000 MG: 500 TABLET ORAL at 05:27

## 2024-11-28 RX ADMIN — APIXABAN 5 MG: 5 TABLET, FILM COATED ORAL at 21:17

## 2024-11-28 RX ADMIN — SENNOSIDES AND DOCUSATE SODIUM 2 TABLET: 50; 8.6 TABLET ORAL at 21:17

## 2024-11-28 RX ADMIN — Medication 5000 UNITS: at 08:59

## 2024-11-28 RX ADMIN — APIXABAN 5 MG: 5 TABLET, FILM COATED ORAL at 08:59

## 2024-11-28 RX ADMIN — PERFLUTREN 2 ML: 6.52 INJECTION, SUSPENSION INTRAVENOUS at 12:51

## 2024-11-28 RX ADMIN — Medication 500 MG: at 15:13
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
## 2024-11-28 NOTE — PLAN OF CARE
Problem: Adult Inpatient Plan of Care  Goal: Plan of Care Review  Outcome: Progressing     Problem: Adult Inpatient Plan of Care  Goal: Patient-Specific Goal (Individualized)  Outcome: Progressing     Problem: Adult Inpatient Plan of Care  Goal: Absence of Hospital-Acquired Illness or Injury  Outcome: Progressing  Intervention: Prevent and Manage VTE (Venous Thromboembolism) Risk  Recent Flowsheet Documentation  Taken 11/27/2024 2230 by Cinthia Almeida RN  VTE Prevention/Management:   bilateral   SCDs (sequential compression devices) on   Goal Outcome Evaluation: Pt new admit from ED this shift. Pt A&O, occasional confusion noted, easy to redirect. RLE is splinted. Norco has been effective in managing pain. Pt has been NPO since MN. Strict bedrest as ordered, PW in use. Routine ortho consult called in by this RN. POC ongoing.
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
## 2024-11-28 NOTE — PLAN OF CARE
Goal Outcome Evaluation:  Plan of Care Reviewed With: patient        Progress: improving  Outcome Evaluation: A&OX4, WBAT C CAM BOOT, WORKED C PT, VOIDING PER JESSA, ECHO THIS SHIFT, C/O PAIN LLE ALSO- XRAY PENDING, PAIN CONTROLLED C PO MEDS, HH DIET, VSS, NVI, DC PLANS TO REHAB.

## 2024-11-28 NOTE — H&P
Patient Name:  Noemi Forbes  YOB: 1935  MRN:  [id number]  Admit Date:  11/27/2024  Patient Care Team:  Villa Madrigal MD as PCP - General (Internal Medicine)  Neftali Reyes MD as Consulting Physician (Radiation Oncology)  Kalyan Hernandez MD as Surgeon (Thoracic Surgery)      Subjective   History Present Illness     Chief Complaint   Patient presents with   • Fall     History of Present Illness    Ms. Forbes is a 89-year-old female past medical history of arthritis, lower extremity DVT on anticoagulation, chronic kidney disease presents to [facility] ED after falling on her balcony at her assisted living facility sustaining a right distal fibula fracture.  Patient reports that she felt confused after the fall did not hit her head or lose consciousness.  She does report shooting pains to her right ankle and leg.  She denies headache, dizziness, shortness of breath, chest pain, abdominal pain, nausea, vomiting, diarrhea, or difficulty urinating.  She denied being dizzy prior to the fall.  Patient reports that she was recently treated with 5 treatments of radiation for potential lung cancer.    Review of Systems   Respiratory:  Negative for shortness of breath.    Cardiovascular:  Negative for chest pain.   Gastrointestinal:  Negative for abdominal pain, diarrhea, nausea and vomiting.   Genitourinary:  Negative for difficulty urinating.   Musculoskeletal:  Positive for arthralgias (Right ankle).   Skin:  Positive for wound (Skin tear left elbow).   Neurological:  Negative for dizziness and headaches.        Personal History     Past Medical History:   Diagnosis Date   • Arthritis    • DVT of leg (deep venous thrombosis)    • Low back pain    • Osteopenia      Past Surgical History:   Procedure Laterality Date   • BACK SURGERY     • BRAIN SURGERY     • CATARACT EXTRACTION     • COLONOSCOPY     • HYSTERECTOMY     • ROTATOR CUFF REPAIR Right    • SPINE SURGERY     • THYROID SURGERY      • TONSILLECTOMY       Family History   Problem Relation Age of Onset   • Dementia Mother      Social History     Tobacco Use   • Smoking status: Every Day     Current packs/day: 0.50     Average packs/day: 0.5 packs/day for 74.9 years (37.5 ttl pk-yrs)     Types: Cigarettes     Start date: 1/1/1950     Passive exposure: Never   • Smokeless tobacco: Never   Vaping Use   • Vaping status: Never Used   Substance Use Topics   • Alcohol use: Yes     Alcohol/week: 6.0 standard drinks of alcohol     Types: 4 Glasses of wine, 2 Cans of beer per week     Comment: socially   • Drug use: Never     No current facility-administered medications on file prior to encounter.     Current Outpatient Medications on File Prior to Encounter   Medication Sig Dispense Refill   • calcium carbonate (OS-DARIAN) 600 MG tablet Take 1 tablet by mouth Daily. 90 tablet 3   • Eliquis 5 MG tablet tablet TAKE 1 TABLET BY MOUTH TWICE  DAILY 180 tablet 3   • Emollient (Eucerin original healing lotion) lotion Apply 1 Application topically to the appropriate area as directed Daily. 30 mL 0   • furosemide (LASIX) 20 MG tablet  (Patient not taking: Reported on 7/11/2024)     • HYDROcodone-acetaminophen (NORCO) 5-325 MG per tablet Take 1 tablet by mouth Daily. Take 1 tablet 30 minutes prior to treatment 5 tablet 0   • latanoprost (XALATAN) 0.005 % ophthalmic solution Administer 1 drop to both eyes Daily.     • oxyCODONE (ROXICODONE) 10 MG tablet Take 1 tablet by mouth Every 4 (Four) Hours As Needed for Moderate Pain (cancer treatment related pain). 5 tablet 0   • potassium chloride (KLOR-CON M10) 10 MEQ CR tablet TAKE 1 TABLET BY MOUTH DAILY 90 tablet 3   • vitamin D3 125 MCG (5000 UT) capsule capsule Take 1 capsule by mouth Daily. 90 capsule 3   • [DISCONTINUED] calcium carbonate (OS-DARIAN) 600 MG tablet Take 1 tablet by mouth Daily. 90 tablet 3   • [DISCONTINUED] vitamin D3 125 MCG (5000 UT) capsule capsule Take 1 capsule by mouth Daily. 90 capsule 3     No  Known Allergies    Objective    Objective     Vital Signs  Temp:  [96.5 °F (35.8 °C)] 96.5 °F (35.8 °C)  Heart Rate:  [70-74] 70  Resp:  [18] 18  BP: (107-112)/(66-74) 107/74  SpO2:  [92 %-95 %] 92 %  on   ;   Device (Oxygen Therapy): room air  Body mass index is 30.18 kg/m².    Physical Exam  Vitals and nursing note reviewed.   Constitutional:       General: She is not in acute distress.     Appearance: She is not ill-appearing.   Cardiovascular:      Rate and Rhythm: Normal rate and regular rhythm.      Pulses: Normal pulses.      Heart sounds: Murmur heard.   Pulmonary:      Effort: Pulmonary effort is normal. No respiratory distress.      Breath sounds: Normal breath sounds.   Abdominal:      General: Bowel sounds are normal. There is no distension.      Palpations: Abdomen is soft.      Tenderness: There is no abdominal tenderness.   Musculoskeletal:         General: Tenderness and signs of injury (Splint intact right ankle) present.   Skin:     General: Skin is warm and dry.      Coloration: Skin is pale.   Neurological:      Mental Status: She is alert and oriented to person, place, and time.         Results Review:  I reviewed the patient's new clinical results.  I reviewed the patient's new imaging results.  I reviewed the patient's other test results.  Discussed with ED provider.    Lab Results (last 24 hours)       Procedure Component Value Units Date/Time    CBC & Differential [[id number]]  (Abnormal) Collected: 11/27/24 2107    Specimen: Blood from Arm, Right Updated: 11/27/24 2121    Narrative:      The following orders were created for panel order CBC & Differential.  Procedure                               Abnormality         Status                     ---------                               -----------         ------                     CBC Auto Differential[[id number]]        Abnormal            Final result                 Please view results for these tests on the individual orders.    Basic  Metabolic Panel [[id number]]  (Abnormal) Collected: 11/27/24 2107    Specimen: Blood from Arm, Right Updated: 11/27/24 2150     Glucose 97 mg/dL      BUN 14 mg/dL      Creatinine 1.04 mg/dL      Sodium 143 mmol/L      Potassium 3.9 mmol/L      Comment: Slight hemolysis detected by analyzer. Result may be falsely elevated.        Chloride 109 mmol/L      CO2 26.3 mmol/L      Calcium 9.1 mg/dL      BUN/Creatinine Ratio 13.5     Anion Gap 7.7 mmol/L      eGFR 51.8 mL/min/1.73     Narrative:      GFR Normal >60  Chronic Kidney Disease <60  Kidney Failure <15    The GFR formula is only valid for adults with stable renal function between ages 18 and 70.    CBC Auto Differential [[id number]]  (Abnormal) Collected: 11/27/24 2107    Specimen: Blood from Arm, Right Updated: 11/27/24 2121     WBC 7.62 10*3/mm3      RBC 4.61 10*6/mm3      Hemoglobin 14.7 g/dL      Hematocrit 44.9 %      MCV 97.4 fL      MCH 31.9 pg      MCHC 32.7 g/dL      RDW 12.6 %      RDW-SD 44.2 fl      MPV 10.4 fL      Platelets 241 10*3/mm3      Neutrophil % 81.6 %      Lymphocyte % 9.1 %      Monocyte % 8.4 %      Eosinophil % 0.3 %      Basophil % 0.3 %      Immature Grans % 0.3 %      Neutrophils, Absolute 6.23 10*3/mm3      Lymphocytes, Absolute 0.69 10*3/mm3      Monocytes, Absolute 0.64 10*3/mm3      Eosinophils, Absolute 0.02 10*3/mm3      Basophils, Absolute 0.02 10*3/mm3      Immature Grans, Absolute 0.02 10*3/mm3      nRBC 0.0 /100 WBC             Imaging Results (Last 24 Hours)       Procedure Component Value Units Date/Time    CT Head Without Contrast [[id number]] Collected: 11/27/24 2009     Updated: 11/27/24 2018    Narrative:      CT HEAD WO CONTRAST-     INDICATIONS: Trauma, head injury     TECHNIQUE: Radiation dose reduction techniques were utilized, including  automated exposure control and exposure modulation based on body size.  Noncontrast head CT     COMPARISON: None available     FINDINGS:     Right parietal craniectomy changes and  surgical plate are noted.     No acute intracranial hemorrhage, midline shift or mass effect. No acute  territorial infarct is identified. Encephalomalacia is evident at the  right temporal tip.     Several foci of gas are seen in the cavernous sinus, for example axial  image 56, presumably intravascular in location, follow-up can  characterize resolution.     Arterial calcifications are seen at the base of the brain.        Ventricles, cisterns, cerebral sulci are unremarkable for patient age.     The visualized paranasal sinuses, orbits, mastoid air cells are  unremarkable.                   Impression:         No acute intracranial hemorrhage or hydrocephalus. Several foci of gas  are seen in the cavernous sinus, presumably intravascular in location,  follow-up can characterize resolution. If there is further clinical  concern, MRI could be considered for further evaluation.     This report was finalized on 11/27/2024 8:15 PM by Dr. Dany Dorantes M.D on Workstation: [id number]       XR Ankle 3+ View Right [[id number]] Collected: 11/27/24 1959     Updated: 11/27/24 2007    Narrative:      XR ANKLE 3+ VW RIGHT-     INDICATIONS: Trauma.     TECHNIQUE: 4 VIEWS OF THE RIGHT ANKLE     COMPARISON: None available     FINDINGS:     An obliquely oriented fracture at the distal fibular shaft shows 1-2 mm  cortical step-off. An avulsion fracture fragment of indeterminate origin  measures 8 mm x 1-2 mm, and projects between the medial malleolus and  the talus, could represent a talar avulsion fracture fragment. The  medial talotibial interval is increased, suggesting ligamentous injury.  Linear lucency projecting at the medial malleolus raises suspicion for  possible nondisplaced fracture of the medial malleolus. On the lateral  view, variation in cortical contour at the posterior malleolus raises  suspicion for nondisplaced posterior malleolus fracture. Mild calcaneal  spurring is present.       Impression:         Distal  fibula fracture, possible talar avulsion fracture, questionable  nondisplaced posterior and medial malleoli fractures, as described  above. If indicated, cross-sectional imaging could be obtained for more  definitive evaluation.           This report was finalized on 11/27/2024 8:04 PM by Dr. Dany Dorantes M.D on Workstation: [id number]                 No orders to display     Assessment/Plan   Assessment & Plan   Active Hospital Problems    Diagnosis  POA   • **Fracture of distal end of right fibula [S82.831A]  Yes   • Fall [W19.XXXA]  Yes   • Stage 3a chronic kidney disease [N18.31]  Yes   • Chronic deep vein thrombosis (DVT) [I82.509]  Yes      Resolved Hospital Problems   No resolved problems to display.       Ms. Forbes is a 89-year-old female past medical history of arthritis, lower extremity DVT on anticoagulation, chronic kidney disease presents to [facility] ED after falling on her balcony at her assisted living facility sustaining a right distal fibula fracture.      Distal right fibula fracture  Fall  -Right ankle x-ray-distal fibula fracture, possible talar avulsion fracture, questionable nondisplaced posterior and medial malleoli fractures  -CT head-no acute intracranial hemorrhage or hydrocephalus  -Right ankle splinted in the ED  -Received Dilaudid and Tylenol in the ED  -Supportive care for pain control  -Consult Ortho  -N.p.o. after midnight pending orthopedic consult  -Continuous pulse oximetry  -Supplemental oxygen as needed    Chronic kidney disease   -Creatinine 1.04, previous creatinine 1.52 from 4/1/2024  -GFR 51.8, previous GFR 45.7 from 3/19/2024  -Strict I&O  -Daily weights  -Recheck BMP in the morning       History of lower extremity DVT  -Will hold Eliquis pending orthopedic consult      Discussed CODE STATUS with patient.  Patient stated that she would like no CPR or intubation.    SCDs for DVT prophylaxis.  Limited code (no CPR, no intubation).  Discussed with  patient.      TERRELL Miller  [facility]  11/27/24  22:31 EST

## 2024-11-28 NOTE — CONSULTS
Orthopaedic Surgery  Consult Note  Dr. ANNIE Cintron” Max MICHAEL  [phone]    HPI:  Patient is a 89 y.o. Not  or  female who presents with right ankle pain after a fall.  She lives at an assisted living facility.  X-rays in the emergency room demonstrated a mildly displaced distal fibular fracture with questionable posterior malleolus fracture.  She was placed into a splint and admitted.  Orthopedics was consulted.    MEDICAL HISTORY  Past Medical History:   Diagnosis Date    Arthritis     DVT of leg (deep venous thrombosis)     Low back pain     Osteopenia      Past Surgical History:   Procedure Laterality Date    BACK SURGERY      BRAIN SURGERY      CATARACT EXTRACTION      COLONOSCOPY      HYSTERECTOMY      ROTATOR CUFF REPAIR Right     SPINE SURGERY      THYROID SURGERY      TONSILLECTOMY       Prior to Admission medications    Medication Sig Start Date End Date Taking? Authorizing Provider   calcium carbonate (OS-DARIAN) 600 MG tablet Take 1 tablet by mouth Daily. 11/27/24  Yes Villa Madrigal MD   Eliquis 5 MG tablet tablet TAKE 1 TABLET BY MOUTH TWICE  DAILY 9/12/24  Yes Villa Madrigal MD   Emollient (Eucerin original healing lotion) lotion Apply 1 Application topically to the appropriate area as directed Daily. 3/20/24  Yes Rajesh Llanos DO   HYDROcodone-acetaminophen (NORCO) 5-325 MG per tablet Take 1 tablet by mouth Daily. Take 1 tablet 30 minutes prior to treatment 8/21/24  Yes Neftali Reyes MD   latanoprost (XALATAN) 0.005 % ophthalmic solution Administer 1 drop to both eyes Daily. 12/10/21  Yes Russel Gudino MD   potassium chloride (KLOR-CON M10) 10 MEQ CR tablet TAKE 1 TABLET BY MOUTH DAILY 7/10/24  Yes Villa Madrigal MD   vitamin D3 125 MCG (5000 UT) capsule capsule Take 1 capsule by mouth Daily. 11/27/24  Yes Villa Madrigal MD   furosemide (LASIX) 20 MG tablet  3/12/24   ProviderRussel MD   oxyCODONE (ROXICODONE) 10 MG tablet Take 1 tablet by mouth Every 4  "(Four) Hours As Needed for Moderate Pain (cancer treatment related pain). 9/4/24   Owen Bates MD     No Known Allergies  Most Recent Immunizations   Administered Date(s) Administered    COVID-19 (MODERNA) BIVALENT 12+YRS 10/27/2022    COVID-19 (PFIZER) 12YRS+ (COMIRNATY) 11/08/2023    COVID-19 (PFIZER) Purple Cap Monovalent 10/27/2021    Covid-19 (Pfizer) Gray Cap Monovalent 05/25/2022    Fluad Quad 65+ 10/27/2021    Fluzone High-Dose 65+YRS 09/24/2020    Fluzone High-Dose 65+yrs 10/11/2023    Influenza TIV (IM) 10/05/2011    Pneumococcal Conjugate 13-Valent (PCV13) 11/24/2015    Pneumococcal Conjugate 20-Valent (PCV20) 06/16/2022    Shingrix 02/12/2020    Tdap 05/07/2020     Social History     Tobacco Use    Smoking status: Every Day     Current packs/day: 0.50     Average packs/day: 0.5 packs/day for 74.9 years (37.5 ttl pk-yrs)     Types: Cigarettes     Start date: 1/1/1950     Passive exposure: Never    Smokeless tobacco: Never   Substance Use Topics    Alcohol use: Yes     Alcohol/week: 6.0 standard drinks of alcohol     Types: 4 Glasses of wine, 2 Cans of beer per week     Comment: socially      Social History     Substance and Sexual Activity   Drug Use Never       VITALS: /70 (BP Location: Left arm, Patient Position: Lying)   Pulse 63   Temp 97.3 °F (36.3 °C) (Oral)   Resp 16   Ht 157.5 cm (62\")   Wt 74.8 kg (165 lb)   SpO2 93%   BMI 30.18 kg/m²  Body mass index is 30.18 kg/m².    PHYSICAL EXAM:   CONSTITUTIONAL: No acute distress  LUNGS: Equal chest rise, no shortness of air  CARDIOVASCULAR: palpable peripheral pulses  SKIN: no skin lesions in the area examined  LYMPH: no lymphadenopathy in the area examined  Musculoskeletal: Right lower extremity is splinted.  She is able to wiggle her toes.  She has sensation intact to the toes.  Brisk capillary refill is intact    RADIOLOGY REVIEW:   CT Head Without Contrast    Result Date: 11/27/2024   No acute intracranial hemorrhage or hydrocephalus. " Several foci of gas are seen in the cavernous sinus, presumably intravascular in location, follow-up can characterize resolution. If there is further clinical concern, MRI could be considered for further evaluation.  This report was finalized on 11/27/2024 8:15 PM by Dr. Dany Dorantes M.D on Workstation: [email or web address]      XR Ankle 3+ View Right    Result Date: 11/27/2024   Distal fibula fracture, possible talar avulsion fracture, questionable nondisplaced posterior and medial malleoli fractures, as described above. If indicated, cross-sectional imaging could be obtained for more definitive evaluation.    This report was finalized on 11/27/2024 8:04 PM by Dr. Dany Dorantes M.D on Workstation: [email or web address]       LABS:   Results for the past 24 hours:   Recent Results (from the past 24 hours)   Basic Metabolic Panel    Collection Time: 11/27/24  9:07 PM    Specimen: Arm, Right; Blood   Result Value Ref Range    Glucose 97 65 - 99 mg/dL    BUN 14 8 - 23 mg/dL    Creatinine 1.04 (H) 0.57 - 1.00 mg/dL    Sodium 143 136 - 145 mmol/L    Potassium 3.9 3.5 - 5.2 mmol/L    Chloride 109 (H) 98 - 107 mmol/L    CO2 26.3 22.0 - 29.0 mmol/L    Calcium 9.1 8.6 - 10.5 mg/dL    BUN/Creatinine Ratio 13.5 7.0 - 25.0    Anion Gap 7.7 5.0 - 15.0 mmol/L    eGFR 51.8 (L) >60.0 mL/min/1.73   CBC Auto Differential    Collection Time: 11/27/24  9:07 PM    Specimen: Arm, Right; Blood   Result Value Ref Range    WBC 7.62 3.40 - 10.80 10*3/mm3    RBC 4.61 3.77 - 5.28 10*6/mm3    Hemoglobin 14.7 12.0 - 15.9 g/dL    Hematocrit 44.9 34.0 - 46.6 %    MCV 97.4 (H) 79.0 - 97.0 fL    MCH 31.9 26.6 - 33.0 pg    MCHC 32.7 31.5 - 35.7 g/dL    RDW 12.6 12.3 - 15.4 %    RDW-SD 44.2 37.0 - 54.0 fl    MPV 10.4 6.0 - 12.0 fL    Platelets 241 140 - 450 10*3/mm3    Neutrophil % 81.6 (H) 42.7 - 76.0 %    Lymphocyte % 9.1 (L) 19.6 - 45.3 %    Monocyte % 8.4 5.0 - 12.0 %    Eosinophil % 0.3 0.3 - 6.2 %    Basophil % 0.3 0.0 - 1.5 %    Immature Grans % 0.3 0.0 - 0.5  %    Neutrophils, Absolute 6.23 1.70 - 7.00 10*3/mm3    Lymphocytes, Absolute 0.69 (L) 0.70 - 3.10 10*3/mm3    Monocytes, Absolute 0.64 0.10 - 0.90 10*3/mm3    Eosinophils, Absolute 0.02 0.00 - 0.40 10*3/mm3    Basophils, Absolute 0.02 0.00 - 0.20 10*3/mm3    Immature Grans, Absolute 0.02 0.00 - 0.05 10*3/mm3    nRBC 0.0 0.0 - 0.2 /100 WBC   Basic Metabolic Panel    Collection Time: 11/28/24  2:08 AM    Specimen: Blood   Result Value Ref Range    Glucose 99 65 - 99 mg/dL    BUN 16 8 - 23 mg/dL    Creatinine 1.03 (H) 0.57 - 1.00 mg/dL    Sodium 144 136 - 145 mmol/L    Potassium 4.3 3.5 - 5.2 mmol/L    Chloride 109 (H) 98 - 107 mmol/L    CO2 26.5 22.0 - 29.0 mmol/L    Calcium 8.9 8.6 - 10.5 mg/dL    BUN/Creatinine Ratio 15.5 7.0 - 25.0    Anion Gap 8.5 5.0 - 15.0 mmol/L    eGFR 52.4 (L) >60.0 mL/min/1.73   CBC (No Diff)    Collection Time: 11/28/24  2:08 AM    Specimen: Blood   Result Value Ref Range    WBC 6.46 3.40 - 10.80 10*3/mm3    RBC 4.31 3.77 - 5.28 10*6/mm3    Hemoglobin 13.6 12.0 - 15.9 g/dL    Hematocrit 41.6 34.0 - 46.6 %    MCV 96.5 79.0 - 97.0 fL    MCH 31.6 26.6 - 33.0 pg    MCHC 32.7 31.5 - 35.7 g/dL    RDW 12.1 (L) 12.3 - 15.4 %    RDW-SD 43.7 37.0 - 54.0 fl    MPV 10.7 6.0 - 12.0 fL    Platelets 232 140 - 450 10*3/mm3   ECG 12 Lead Pre-Op / Pre-Procedure    Collection Time: 11/28/24  7:11 AM   Result Value Ref Range    QT Interval 453 ms    QTC Interval 414 ms       IMPRESSION:  Patient is a 89 y.o. Not  or  female with right ankle fracture    PLAN:   Admited to: Juan Vicente MD  In a younger more active patient this may be an operative ankle fracture, but in this elderly and frail 89-year-old patient I recommend conservative treatment.  The splint may be discontinued and she may be placed into a cam walking boot.  She may be weight-bear as tolerated with the use of a walker.  From my standpoint she may be discharged back to to her facility if she can safely mobilize or may need  "more acute assistance until the fracture heals.  She may follow-up in the office in 6 weeks for repeat x-ray.    R \"Michael\" Max MICHAEL MD  Orthopaedic Surgery  [facility]  [phone] - [place] [facility]  [phone] - [place] Office            "

## 2024-11-28 NOTE — ED NOTES
Patient to ED via EMS from assisted living. Patient had a fall while on her balcony, unsure of how long she was outside, she believes 45 minutes. Patient takes blood thinners but does not believe she hit her head or loss consciousness. Patient does not remember falling. Patient c/o R lower leg pain.

## 2024-11-28 NOTE — ED NOTES
"Nursing report ED to floor  Noemi Forbes  89 y.o.  female    HPI :  HPI  Stated Reason for Visit: unwitnessed fall  History Obtained From: patient, EMS    Chief Complaint  Chief Complaint   Patient presents with    Fall       Admitting doctor:   Juan Vicente MD    Admitting diagnosis:   The primary encounter diagnosis was Closed fracture of distal end of right fibula, unspecified fracture morphology, initial encounter. A diagnosis of Chronic anticoagulation was also pertinent to this visit.    Code status:   Current Code Status       Date Active Code Status Order ID Comments User Context       11/27/2024 2117 CPR (Attempt to Resuscitate) [id number]  Sherine Joshi, [facility]        Question Answer    Code Status (Patient has no pulse and is not breathing) CPR (Attempt to Resuscitate)    Medical Interventions (Patient has pulse or is breathing) Full                    Allergies:   Patient has no known allergies.    Isolation:   No active isolations    Intake and Output  No intake or output data in the 24 hours ending 11/27/24 2136    Weight:       11/27/24 1925   Weight: 74.8 kg (165 lb)       Most recent vitals:   Vitals:    11/27/24 1908 11/27/24 1910 11/27/24 1925 11/27/24 1931   BP: 112/66   107/74   Pulse: 74   70   Resp: 18      Temp:  96.5 °F (35.8 °C)     TempSrc:  Tympanic     SpO2: 95%   92%   Weight:   74.8 kg (165 lb)    Height:   157.5 cm (62\")        Active LDAs/IV Access:   Lines, Drains & Airways       Active LDAs       Name Placement date Placement time Site Days    Peripheral IV 11/27/24 1940 Left Antecubital 11/27/24 1940  Antecubital  less than 1                    Labs (abnormal labs have a star):   Labs Reviewed   CBC WITH AUTO DIFFERENTIAL - Abnormal; Notable for the following components:       Result Value    MCV 97.4 (*)     Neutrophil % 81.6 (*)     Lymphocyte % 9.1 (*)     Lymphocytes, Absolute 0.69 (*)     All other components within normal limits   BASIC METABOLIC PANEL   CBC " AND DIFFERENTIAL    Narrative:     The following orders were created for panel order CBC & Differential.  Procedure                               Abnormality         Status                     ---------                               -----------         ------                     CBC Auto Differential[[id number]]        Abnormal            Final result                 Please view results for these tests on the individual orders.       EKG:   No orders to display       Meds given in ED:   Medications   sennosides-docusate (PERICOLACE) 8.6-50 MG per tablet 2 tablet (has no administration in time range)     And   polyethylene glycol (MIRALAX) packet 17 g (has no administration in time range)     And   bisacodyl (DULCOLAX) EC tablet 5 mg (has no administration in time range)     And   bisacodyl (DULCOLAX) suppository 10 mg (has no administration in time range)   ondansetron ODT (ZOFRAN-ODT) disintegrating tablet 4 mg (has no administration in time range)     Or   ondansetron (ZOFRAN) injection 4 mg (has no administration in time range)   acetaminophen (TYLENOL) tablet 1,000 mg (has no administration in time range)   HYDROmorphone (DILAUDID) injection 0.25 mg (0.25 mg Intravenous Given 11/27/24 1940)   HYDROmorphone (DILAUDID) injection 0.5 mg (0.5 mg Intravenous Given 11/27/24 2022)   acetaminophen (TYLENOL) tablet 1,000 mg (1,000 mg Oral Given 11/27/24 2127)       Imaging results:  CT Head Without Contrast    Result Date: 11/27/2024   No acute intracranial hemorrhage or hydrocephalus. Several foci of gas are seen in the cavernous sinus, presumably intravascular in location, follow-up can characterize resolution. If there is further clinical concern, MRI could be considered for further evaluation.  This report was finalized on 11/27/2024 8:15 PM by Dr. Dany Dorantes M.D on Workstation: [id number]      XR Ankle 3+ View Right    Result Date: 11/27/2024   Distal fibula fracture, possible talar avulsion fracture,  questionable nondisplaced posterior and medial malleoli fractures, as described above. If indicated, cross-sectional imaging could be obtained for more definitive evaluation.    This report was finalized on 11/27/2024 8:04 PM by Dr. Dany Dorantes M.D on Workstation: [id number]       Ambulatory status:   - assist x2 (Fracture)    Social issues:   Social History     Socioeconomic History    Marital status:    Tobacco Use    Smoking status: Every Day     Current packs/day: 0.50     Average packs/day: 0.5 packs/day for 74.9 years (37.5 ttl pk-yrs)     Types: Cigarettes     Start date: 1/1/1950     Passive exposure: Never    Smokeless tobacco: Never   Vaping Use    Vaping status: Never Used   Substance and Sexual Activity    Alcohol use: Yes     Alcohol/week: 6.0 standard drinks of alcohol     Types: 4 Glasses of wine, 2 Cans of beer per week     Comment: socially    Drug use: Never    Sexual activity: Not Currently       Peripheral Neurovascular  Peripheral Neurovascular (Adult)  Peripheral Neurovascular WDL: .WDL except, neurovascular assessment lower, pulse assessment  Pulse Assessment: dorsalis pedis  RLE Neurovascular Assessment  Temperature RLE: (S) warm (warm, red right ankle)  Color RLE: (S) red  Sensation RLE: (S) tenderness present    Neuro Cognitive  Neuro Cognitive (Adult)  Cognitive/Neuro/Behavioral WDL: orientation, WDL  Orientation: oriented x 4  Additional Documentation: Thompson Coma Scale (Group), Pupils (Group)  Pupils  Pupil PERRLA: yes  Thompson Coma Scale  Best Eye Response: 4-->(E4) spontaneous  Best Motor Response: 6-->(M6) obeys commands  Best Verbal Response: 5-->(V5) oriented  Nereyda Coma Scale Score: 15    Learning  Learning Assessment  Learning Readiness and Ability: no barriers identified  Education Provided  Person Taught: patient    Respiratory  Respiratory  Airway WDL: WDL  Respiratory WDL  Respiratory WDL: WDL    Abdominal Pain  Abdominal Pain CPG Interventions  Coping  Interventions: care explained to patient/family prior to performing  Safety Interventions  Safety Precautions/Falls Reduction: assistive device/personal items within reach, fall reduction program maintained    Pain Assessments  Pain (Adult)  (0-10) Pain Rating: Rest: 4  (0-10) Pain Rating: Activity: 9  Pain Location: extremity  Pain Side/Orientation: right, lower    NIH Stroke Scale       David Nielsen RN  11/27/24 21:36 EST

## 2024-11-28 NOTE — PROGRESS NOTES
Name: Noemi Forbes ADMIT: 2024   : 1935  PCP: Villa Madrigal MD    MRN: [id number] LOS: 0 days   AGE/SEX: 89 y.o. female  ROOM: [facility]     Subjective   Subjective     No events overnight. Ortho is planning to manage the fracture conservatively. Her echocardiogram was performed and I've reviewed it.  She is now complaining of left lower extremity pain. She goes back and forth with me on whether or not she passed out during her fall. She actually states to me that she passed out, but then says she isn't sure but she doesn't remember the fall and was confused afterwards.        Objective   Objective   Vital Signs  Temp:  [96.5 °F (35.8 °C)-97.7 °F (36.5 °C)] 97.4 °F (36.3 °C)  Heart Rate:  [54-74] 58  Resp:  [16-18] 16  BP: (101-133)/(59-74) 108/59  SpO2:  [91 %-96 %] 91 %  on   ;   Device (Oxygen Therapy): room air  Body mass index is 30.18 kg/m².  Physical Exam  Constitutional:       General: She is not in acute distress.     Appearance: She is not toxic-appearing.   Cardiovascular:      Rate and Rhythm: Normal rate and regular rhythm.      Heart sounds: Murmur heard.   Pulmonary:      Effort: Pulmonary effort is normal.      Breath sounds: Normal breath sounds.   Abdominal:      General: Bowel sounds are normal.      Palpations: Abdomen is soft.   Musculoskeletal:         General: Signs of injury present.      Right lower leg: No edema.      Left lower leg: No edema.      Comments: Right ankle bandaged    Neurological:      Mental Status: She is alert.   Psychiatric:         Mood and Affect: Mood normal.         Behavior: Behavior normal.         Results Review     I reviewed the patient's new clinical results.  Results from last 7 days   Lab Units 24  0208 24   WBC 10*3/mm3 6.46 7.62   HEMOGLOBIN g/dL 13.6 14.7   PLATELETS 10*3/mm3 232 241     Results from last 7 days   Lab Units 24  0208 24   SODIUM mmol/L 144 143   POTASSIUM mmol/L 4.3 3.9   CHLORIDE  "mmol/L 109* 109*   CO2 mmol/L 26.5 26.3   BUN mg/dL 16 14   CREATININE mg/dL 1.03* 1.04*   GLUCOSE mg/dL 99 97   Estimated Creatinine Clearance: 35.8 mL/min (A) (by C-G formula based on SCr of 1.03 mg/dL (H)).    Results from last 7 days   Lab Units 11/28/24  0208 11/27/24  2107   CALCIUM mg/dL 8.9 9.1       COVID19   Date Value Ref Range Status   06/18/2022 Not Detected Not Detected - Ref. Range Final     No results found for: \"HGBA1C\", \"POCGLU\"    Adult Transthoracic Echo Complete W/ Cont if Necessary Per Protocol  •  Left ventricular ejection fraction appears to be 51 - 55%.  •  The left ventricular cavity is small in size.  •  Left ventricular wall thickness is consistent with moderate septal   asymmetric hypertrophy.  •  The following left ventricular wall segments are hypokinetic: apical   anterior, mid anterolateral and mid inferolateral. The following left   ventricular wall segments are akinetic: apical lateral.  •  Left ventricular diastolic function is consistent with (grade II w/high   LAP) pseudonormalization.  •  : The aortic valve is abnormal in structure. There is calcification of   the aortic valve. The aortic valve was poorly visualized but appears   trileaflet. No aortic valve regurgitation is present. Moderate aortic   valve stenosis is present. Aortic valve area is 1.75 cm2. Peak velocity of   the flow distal to the aortic valve is 303.7 cm/s. Aortic valve maximum   pressure gradient is 36.9 mmHg. Aortic valve mean pressure gradient is   24.3 mmHg. Aortic valve dimensionless index is 0.60 .  •  Estimated right ventricular systolic pressure from tricuspid   regurgitation is mildly elevated (35-45 mmHg). Calculated right   ventricular systolic pressure from tricuspid regurgitation is 39 mmHg.  •  Mild pulmonary hypertension is present.  •  There is a trivial pericardial effusion.    Scheduled Medications  acetaminophen, 1,000 mg, Oral, Q8H  apixaban, 5 mg, Oral, BID  calcium carbonate (oyster " shell), 500 mg, Oral, Daily  cholecalciferol, 5,000 Units, Oral, Daily  latanoprost, 1 drop, Both Eyes, Daily    Infusions   Diet  Diet: Regular/House, Cardiac; Healthy Heart (2-3 Na+); Fluid Consistency: Thin (IDDSI 0)       Assessment/Plan     Active Hospital Problems    Diagnosis  POA   • **Fracture of distal end of right fibula [S82.831A]  Yes   • Fall [W19.XXXA]  Yes   • Stage 3a chronic kidney disease [N18.31]  Yes   • Chronic deep vein thrombosis (DVT) [I82.509]  Yes      Resolved Hospital Problems   No resolved problems to display.       89 y.o. female admitted with Fracture of distal end of right fibula.    Fall resulting in a right ankle fracture-orthopedic surgery plans to manage this non-operatively with a cam walking boot. She is WBAT with use of a walker. Physical therapy is recommending SNF. Pain control, schedule a bowel regimen  Left lower extremity pain-new today. Will check an xray.  WMA on echocardiogram-obtained for her significant murmur. Only moderate aortic stenosis, but there appear to be some wall motion abnormalities. She oscillates on whether or not she passed out or if this is a mechanical fall. I will ask cardiology to evaluate.   CKD 3a-creatinine is stable and maybe a bit better than baseline  Suspected stage 1 non-small call lung cancer-underwent xrt in September   Chronic lower extremity lymphedema-not much edema at the moment  History of DVT-eliquis  Eliquis (home med) for DVT prophylaxis.  Limited code (no CPR, no intubation).  Discussed with patient and nursing staff.  Anticipate discharge to SNU facility once arrangements have been made.      Duane Choe MD  [facility]  11/28/24  17:05 EST

## 2024-11-28 NOTE — PLAN OF CARE
Goal Outcome Evaluation:  Plan of Care Reviewed With: patient        Progress: improving  Outcome Evaluation: Pt is 88 yo admitted after fall in her assisted living home. She sustained R ankle distal fib, talar avulsion, and med malleoli fx. She is WBAT in walking boot. This was donned today. Pt with inc ankle pain with bed mobility but this did reduce when boot was donned. She was able to reach EOB with min A and stood with min A x 2 with walking boot and RW. She took 4 step to HOB and was able to tolerate WBing in boot well. She states at her baseline she does not ambulate long distances and is in w/c for transport to dining room. She spends most of her time in chair or bed. She would like to go to her son's home at d/c but there are 3 STEVENSON there. Recommend [facility] for short stay prior to d/c to son's home to allow further strengthening and ensure safety. SHe will benefit from continued inpatient PT services to address mobility deficits during inpatient stay.    Anticipated Discharge Disposition (PT): skilled nursing facility

## 2024-11-29 PROCEDURE — 99204 OFFICE O/P NEW MOD 45 MIN: CPT | Performed by: STUDENT IN AN ORGANIZED HEALTH CARE EDUCATION/TRAINING PROGRAM

## 2024-11-29 PROCEDURE — G0378 HOSPITAL OBSERVATION PER HR: HCPCS

## 2024-11-29 PROCEDURE — 97530 THERAPEUTIC ACTIVITIES: CPT

## 2024-11-29 RX ORDER — LATANOPROST 50 UG/ML
1 SOLUTION/ DROPS OPHTHALMIC NIGHTLY
Status: DISCONTINUED | OUTPATIENT
Start: 2024-11-29 | End: 2024-12-02 | Stop reason: HOSPADM

## 2024-11-29 RX ORDER — HYDROCODONE BITARTRATE AND ACETAMINOPHEN 5; 325 MG/1; MG/1
2 TABLET ORAL EVERY 4 HOURS PRN
Status: DISCONTINUED | OUTPATIENT
Start: 2024-11-29 | End: 2024-12-01

## 2024-11-29 RX ADMIN — HYDROCODONE BITARTRATE AND ACETAMINOPHEN 2 TABLET: 5; 325 TABLET ORAL at 14:29

## 2024-11-29 RX ADMIN — SENNOSIDES AND DOCUSATE SODIUM 2 TABLET: 50; 8.6 TABLET ORAL at 09:16

## 2024-11-29 RX ADMIN — Medication 5000 UNITS: at 09:16

## 2024-11-29 RX ADMIN — APIXABAN 5 MG: 5 TABLET, FILM COATED ORAL at 21:05

## 2024-11-29 RX ADMIN — Medication 500 MG: at 09:16

## 2024-11-29 RX ADMIN — ACETAMINOPHEN 1000 MG: 500 TABLET ORAL at 21:09

## 2024-11-29 RX ADMIN — LATANOPROST 1 DROP: 50 SOLUTION OPHTHALMIC at 21:05

## 2024-11-29 RX ADMIN — SENNOSIDES AND DOCUSATE SODIUM 2 TABLET: 50; 8.6 TABLET ORAL at 21:05

## 2024-11-29 RX ADMIN — HYDROCODONE BITARTRATE AND ACETAMINOPHEN 2 TABLET: 5; 325 TABLET ORAL at 18:37

## 2024-11-29 RX ADMIN — APIXABAN 5 MG: 5 TABLET, FILM COATED ORAL at 09:16

## 2024-11-29 RX ADMIN — HYDROCODONE BITARTRATE AND ACETAMINOPHEN 1 TABLET: 5; 325 TABLET ORAL at 05:29

## 2024-11-29 NOTE — CASE MANAGEMENT/SOCIAL WORK
Post-Acute Authorization Submission      Post Acute Pre-Cert Documentation  Request Submitted by Facility - Type:: Hospital  Post-Acute Authorization Type Submitted:: SNF  Date Post Acute Pre-Cert Inititated per Facility: 11/29/24  Date Post Acute Pre-Cert Completed: 11/29/24  Accepting Facility: [facility] Discharge Date Requested: 11/30/24  All Clinicals Submitted?: Yes  Had Accepting Facility at Time of Submission: Yes  Response Received from Insurance?: Approval  Response Communicated to:: , Accepting Facility Liaison, Accepting Facility Auth Department  Authorization Number:: [id number]  Post Acute Pre-Cert Initiated Comment: VALID TO ADMIT [facility]PTO 12/4/24.              Tyler Ames, PCT

## 2024-11-29 NOTE — PLAN OF CARE
Goal Outcome Evaluation:  Plan of Care Reviewed With: patient        Progress: no change  Outcome Evaluation: Pt seen for PT this PM - CAM boot donned at EOB. attempted to stand multiple times with mod A - achieved crouched stance and then resisted further assist and returned to sitting. reports too much pain to stand. mod A for seated scooting to reposition. min A for bed mobility. She continues to benefit from skilled PT to address mobility deficits. Recommend SNU as pt from [facility].    Anticipated Discharge Disposition (PT): skilled nursing facility

## 2024-11-29 NOTE — PROGRESS NOTES
Name: Noemi Forbes ADMIT: 2024   : 1935  PCP: Villa Madrigal MD    MRN: [id number] LOS: 0 days   AGE/SEX: 89 y.o. female  ROOM: [facility]     Subjective   Subjective     No events overnight. She reports continued pain. Doesn't really think that the pain medication is doing enough. Xray was negative yesterday for contralateral fracture       Objective   Objective   Vital Signs  Temp:  [97.4 °F (36.3 °C)-98.6 °F (37 °C)] 98.2 °F (36.8 °C)  Heart Rate:  [71-82] 77  Resp:  [16-18] 16  BP: (113-138)/(65-86) 122/65  SpO2:  [92 %-95 %] 94 %  on  Flow (L/min) (Oxygen Therapy):  [2] 2;   Device (Oxygen Therapy): room air  Body mass index is 30.18 kg/m².  Physical Exam  Constitutional:       General: She is not in acute distress.     Appearance: She is not toxic-appearing.   Cardiovascular:      Rate and Rhythm: Normal rate and regular rhythm.      Heart sounds: Murmur heard.   Pulmonary:      Effort: Pulmonary effort is normal.      Breath sounds: Normal breath sounds.   Abdominal:      General: Bowel sounds are normal.      Palpations: Abdomen is soft.   Musculoskeletal:         General: Signs of injury present.      Right lower leg: No edema.      Left lower leg: No edema.      Comments: Right ankle bandaged    Neurological:      Mental Status: She is alert.   Psychiatric:         Mood and Affect: Mood normal.         Behavior: Behavior normal.         Results Review     I reviewed the patient's new clinical results.  Results from last 7 days   Lab Units 24  02024   WBC 10*3/mm3 6.46 7.62   HEMOGLOBIN g/dL 13.6 14.7   PLATELETS 10*3/mm3 232 241     Results from last 7 days   Lab Units 24   SODIUM mmol/L 144 143   POTASSIUM mmol/L 4.3 3.9   CHLORIDE mmol/L 109* 109*   CO2 mmol/L 26.5 26.3   BUN mg/dL 16 14   CREATININE mg/dL 1.03* 1.04*   GLUCOSE mg/dL 99 97   Estimated Creatinine Clearance: 35.8 mL/min (A) (by C-G formula based on SCr of 1.03 mg/dL (H)).   "  Results from last 7 days   Lab Units 11/28/24  0208 11/27/24  2107   CALCIUM mg/dL 8.9 9.1       COVID19   Date Value Ref Range Status   06/18/2022 Not Detected Not Detected - Ref. Range Final     No results found for: \"HGBA1C\", \"POCGLU\"    XR Tibia Fibula 2 View Left  LEFT TIB-FIB     HISTORY: Fall, pain.     FINDINGS: AP and lateral views of the left tibia and fibula show no  evidence of fracture.     This report was finalized on 11/28/2024 6:53 PM by Dr. Dagoberto Byers M.D on Workstation: [id number]     Adult Transthoracic Echo Complete W/ Cont if Necessary Per Protocol    Left ventricular ejection fraction appears to be 51 - 55%.    The left ventricular cavity is small in size.    Left ventricular wall thickness is consistent with moderate septal   asymmetric hypertrophy.    The following left ventricular wall segments are hypokinetic: apical   anterior, mid anterolateral and mid inferolateral. The following left   ventricular wall segments are akinetic: apical lateral.    Left ventricular diastolic function is consistent with (grade II w/high   LAP) pseudonormalization.    : The aortic valve is abnormal in structure. There is calcification of   the aortic valve. The aortic valve was poorly visualized but appears   trileaflet. No aortic valve regurgitation is present. Moderate aortic   valve stenosis is present. Aortic valve area is 1.75 cm2. Peak velocity of   the flow distal to the aortic valve is 303.7 cm/s. Aortic valve maximum   pressure gradient is 36.9 mmHg. Aortic valve mean pressure gradient is   24.3 mmHg. Aortic valve dimensionless index is 0.60 .    Estimated right ventricular systolic pressure from tricuspid   regurgitation is mildly elevated (35-45 mmHg). Calculated right   ventricular systolic pressure from tricuspid regurgitation is 39 mmHg.    Mild pulmonary hypertension is present.    There is a trivial pericardial effusion.    Scheduled Medications  acetaminophen, 1,000 mg, Oral, " Q8H  apixaban, 5 mg, Oral, BID  calcium carbonate (oyster shell), 500 mg, Oral, Daily  cholecalciferol, 5,000 Units, Oral, Daily  latanoprost, 1 drop, Both Eyes, Nightly  senna-docusate sodium, 2 tablet, Oral, BID    Infusions   Diet  Diet: Regular/House, Cardiac; Healthy Heart (2-3 Na+); Fluid Consistency: Thin (IDDSI 0)       Assessment/Plan     Active Hospital Problems    Diagnosis  POA    **Fracture of distal end of right fibula [S82.831A]  Yes    Fall [W19.XXXA]  Yes    Stage 3a chronic kidney disease [N18.31]  Yes    Chronic deep vein thrombosis (DVT) [I82.509]  Yes      Resolved Hospital Problems   No resolved problems to display.       89 y.o. female admitted with Fracture of distal end of right fibula.    Fall resulting in a right ankle fracture-orthopedic surgery plans to manage this non-operatively with a cam walking boot. She is WBAT with use of a walker. Physical therapy is recommending SNF. Increase her norco to 10 mg q4h prn. Continue current dose of dilaudid. Continue scheduled bowel regimen.    Left lower extremity pain-xray was negative. No swelling and is already on eliquis.  WMA on echocardiogram-I appreciate cardiology's evaluation.  CKD 3a-creatinine is stable and maybe a bit better than baseline  Suspected stage 1 non-small call lung cancer-underwent xrt in September   Chronic lower extremity lymphedema-not much edema at the moment  History of DVT-eliquis  Eliquis (home med) for DVT prophylaxis.  Limited code (no CPR, no intubation).  Discussed with patient and nursing staff.  Anticipate discharge to SNU facility once arrangements have been made.      Duane Choe MD  [facility]  11/29/24  16:40 EST
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
## 2024-11-29 NOTE — CONSULTS
Patient Name: Noemi Forbes  :1935  89 y.o.    Date of Admission: 2024  Date of Consultation:  24  Encounter Provider: Duarte Salinas MD  Place of Service: [facility] CARDIOLOGY  Referring Provider: Juan Vicente MD  Patient Care Team:  Villa Madrigal MD as PCP - General (Internal Medicine)  Neftali Reyes MD as Consulting Physician (Radiation Oncology)  Kalyan Hernandez MD as Surgeon (Thoracic Surgery)      Chief complaint: New wall motion abnormalities on echocardiogram    History of Present Illness:    Noemi Forbes is an 89-year-old female who previously followed with Dr. Zimmerman at [facility].  She has a past medical history significant for DVT(on Eliquis), chronic kidney disease, and mild aortic stenosis.    She presented to the emergency department on 2024 with complaints of a fall.  She reports that she did not remember hitting her head or losing consciousness.  She does take Eliquis 5 mg twice daily.  Her main complaint was right ankle pain.  She was found to have a closed fracture of the distal end of her right fibula.  On arrival to the emergency department her blood pressure is 112/66 with a heart rate of 74.  She was afebrile with an SpO2 of 95% on room air.    Her workup was notable for creatinine 1.04, chloride 109, normal CBC.  CT head showed no acute intracranial hemorrhage or hydrocephalus.  EKG was sinus with no acute ST-T changes.  She had an echocardiogram done on 2024 that revealed EF of 51 to 55%, small LV, moderate LV septal asymmetric hypertrophy, grade 2 diastolic dysfunction, mild pulmonary hypertension, and new wall motion abnormalities.    Cardiology has been asked to see this patient for new wall motion abnormalities on echocardiogram.    She voices some ankle pain this morning    ECHO 2024    Left ventricular ejection fraction appears to be 51 - 55%.    The left ventricular cavity is small in size.     Left ventricular wall thickness is consistent with moderate septal asymmetric hypertrophy.    The following left ventricular wall segments are hypokinetic: apical anterior, mid anterolateral and mid inferolateral. The following left ventricular wall segments are akinetic: apical lateral.    Left ventricular diastolic function is consistent with (grade II w/high LAP) pseudonormalization.    : The aortic valve is abnormal in structure. There is calcification of the aortic valve. The aortic valve was poorly visualized but appears trileaflet. No aortic valve regurgitation is present. Moderate aortic valve stenosis is present. Aortic valve area is 1.75 cm2. Peak velocity of the flow distal to the aortic valve is 303.7 cm/s. Aortic valve maximum pressure gradient is 36.9 mmHg. Aortic valve mean pressure gradient is 24.3 mmHg. Aortic valve dimensionless index is 0.60 .    Estimated right ventricular systolic pressure from tricuspid regurgitation is mildly elevated (35-45 mmHg). Calculated right ventricular systolic pressure from tricuspid regurgitation is 39 mmHg.    Mild pulmonary hypertension is present.    There is a trivial pericardial effusion.       Past Medical History:   Diagnosis Date    Arthritis     DVT of leg (deep venous thrombosis)     Low back pain     Osteopenia        Past Surgical History:   Procedure Laterality Date    BACK SURGERY      BRAIN SURGERY      CATARACT EXTRACTION      COLONOSCOPY      HYSTERECTOMY      ROTATOR CUFF REPAIR Right     SPINE SURGERY      THYROID SURGERY      TONSILLECTOMY           Prior to Admission medications    Medication Sig Start Date End Date Taking? Authorizing Provider   calcium carbonate (OS-DARIAN) 600 MG tablet Take 1 tablet by mouth Daily. 11/27/24  Yes Villa Madrigal MD   Eliquis 5 MG tablet tablet TAKE 1 TABLET BY MOUTH TWICE  DAILY 9/12/24  Yes Villa Madrigal MD   Emollient (Eucerin original healing lotion) lotion Apply 1 Application topically to the  appropriate area as directed Daily. 3/20/24  Yes Rajesh Llanos,    HYDROcodone-acetaminophen (NORCO) 5-325 MG per tablet Take 1 tablet by mouth Daily. Take 1 tablet 30 minutes prior to treatment 8/21/24  Yes Neftali Reyes MD   latanoprost (XALATAN) 0.005 % ophthalmic solution Administer 1 drop to both eyes Daily. 12/10/21  Yes Russel Gudino MD   potassium chloride (KLOR-CON M10) 10 MEQ CR tablet TAKE 1 TABLET BY MOUTH DAILY 7/10/24  Yes Villa Madrigal MD   vitamin D3 125 MCG (5000 UT) capsule capsule Take 1 capsule by mouth Daily. 11/27/24  Yes Villa Madrigal MD   furosemide (LASIX) 20 MG tablet  3/12/24   ProviderRussel MD   oxyCODONE (ROXICODONE) 10 MG tablet Take 1 tablet by mouth Every 4 (Four) Hours As Needed for Moderate Pain (cancer treatment related pain). 9/4/24   Owen Bates MD       No Known Allergies    Social History     Socioeconomic History    Marital status:    Tobacco Use    Smoking status: Every Day     Current packs/day: 0.50     Average packs/day: 0.5 packs/day for 74.9 years (37.5 ttl pk-yrs)     Types: Cigarettes     Start date: 1/1/1950     Passive exposure: Never    Smokeless tobacco: Never   Vaping Use    Vaping status: Never Used    Passive vaping exposure: Yes   Substance and Sexual Activity    Alcohol use: Yes     Alcohol/week: 6.0 standard drinks of alcohol     Types: 4 Glasses of wine, 2 Cans of beer per week     Comment: socially    Drug use: Never    Sexual activity: Not Currently       Family History   Problem Relation Age of Onset    Dementia Mother        REVIEW OF SYSTEMS:   All systems reviewed.  Pertinent positives identified in HPI.  All other systems are negative.      Objective:     Vitals:    11/28/24 1204 11/28/24 1810 11/28/24 2153 11/29/24 0520   BP: 108/59 116/70 120/77 138/86   BP Location:  Left arm Left arm Left arm   Patient Position:  Lying Lying Lying   Pulse: 58 76 82 71   Resp:  18 16 16   Temp:  98.1 °F (36.7 °C) 98.6 °F (37 °C)  "97.4 °F (36.3 °C)   TempSrc:  Oral Oral Oral   SpO2:  94% 93% 92%   Weight: 74.8 kg (165 lb)      Height: 157.5 cm (62\")        Body mass index is 30.18 kg/m².    Physical Exam:  Gen: Well nourished; Alert  Skin: no visible rashes and no abnormalities with palpation  Eyes: no evidence of visual defects and normal sclera  Ears: no abnormalities.  Mouth: normal teeth and appropriately moist mucous membranes  Chest: clear to auscultation. There is normal respiratory effort and expansion.  CV: examination showed a regular rhythm. S1 and S2 were normal.   Abd: no visible distension.   Neurologic:Cranial nerves appear intact; Sensation normal; no localizing signs      Lab Review:     Results from last 7 days   Lab Units 11/28/24  0208   SODIUM mmol/L 144   POTASSIUM mmol/L 4.3   CHLORIDE mmol/L 109*   CO2 mmol/L 26.5   BUN mg/dL 16   CREATININE mg/dL 1.03*   CALCIUM mg/dL 8.9   GLUCOSE mg/dL 99         Results from last 7 days   Lab Units 11/28/24  0208   WBC 10*3/mm3 6.46   HEMOGLOBIN g/dL 13.6   HEMATOCRIT % 41.6   PLATELETS 10*3/mm3 232                                   I personally viewed and interpreted the patient's EKG/Telemetry data.      Current Facility-Administered Medications:     acetaminophen (TYLENOL) tablet 1,000 mg, 1,000 mg, Oral, Q8H, Juan Vicente MD, 1,000 mg at 11/28/24 0527    acetaminophen (TYLENOL) tablet 650 mg, 650 mg, Oral, Q4H PRN, Juan Vicente MD    apixaban (ELIQUIS) tablet 5 mg, 5 mg, Oral, BID, Duane Choe MD, 5 mg at 11/29/24 0916    sennosides-docusate (PERICOLACE) 8.6-50 MG per tablet 2 tablet, 2 tablet, Oral, BID, 2 tablet at 11/29/24 0916 **AND** polyethylene glycol (MIRALAX) packet 17 g, 17 g, Oral, Daily PRN **AND** bisacodyl (DULCOLAX) EC tablet 5 mg, 5 mg, Oral, Daily PRN **AND** bisacodyl (DULCOLAX) suppository 10 mg, 10 mg, Rectal, Daily PRN, Duane Choe MD    calcium carbonate (oyster shell) tablet 500 mg, 500 mg, Oral, Daily, Sherine Joshi, APRN, 500 mg " at 11/29/24 0916    cholecalciferol (VITAMIN D3) tablet 5,000 Units, 5,000 Units, Oral, Daily, Sherine Joshi APRN, 5,000 Units at 11/29/24 0916    HYDROcodone-acetaminophen (NORCO) 5-325 MG per tablet 1 tablet, 1 tablet, Oral, Q4H PRN, Juan Vicente MD, 1 tablet at 11/29/24 0529    HYDROmorphone (DILAUDID) injection 0.25 mg, 0.25 mg, Intravenous, Q4H PRN, Juan Vicente MD    latanoprost (XALATAN) 0.005 % ophthalmic solution 1 drop, 1 drop, Both Eyes, Nightly, Duane Choe MD    ondansetron ODT (ZOFRAN-ODT) disintegrating tablet 4 mg, 4 mg, Oral, Q6H PRN **OR** ondansetron (ZOFRAN) injection 4 mg, 4 mg, Intravenous, Q6H PRN, Juan Vicente MD    Assessment and Plan:       Active Hospital Problems    Diagnosis  POA    **Fracture of distal end of right fibula [S82.831A]  Yes    Fall [W19.XXXA]  Yes    Stage 3a chronic kidney disease [N18.31]  Yes    Chronic deep vein thrombosis (DVT) [I82.509]  Yes      Resolved Hospital Problems   No resolved problems to display.     Assessment: 89-year-old female with past medical history of DVT on anticoagulation who presented on 11/27/2024 after falling off of her balcony sustaining fibula fracture.    # Concern for abnormal echocardiogram.  - Echocardiogram from 11/28/2024 was notable for normal EF, grade 2 diastolic dysfunction.  Moderate septal asymmetric hypertrophy (although appears to be a technically difficult study, however in looking the images, there does not appear to be any clinically significant LVOT obstruction)  - No clear evidence of congestive heart failure signs/symptoms at this time.    # Moderate aortic stenosis.  - No evidence of symptoms from this at this time.    # Mild pulmonary hypertension    # History of DVT.  - Recommend resuming Eliquis when stable from surgical standpoint.        Duarte Salinas MD  11/29/24  09:50 EST

## 2024-11-29 NOTE — DISCHARGE PLACEMENT REQUEST
"Noemi Calero (89 y.o. Female)       Date of Birth   1935    Social Security Number       Address   [address] [place] [address] [address] [place] [address]    Home Phone   [phone]    MRN   [id number]       Tenriism   Holiness    Marital Status                               Admission Date   11/27/24    Admission Type   Emergency    Admitting Provider   Juan Vicente MD    Attending Provider   Duane Choe MD    Department, Room/Bed   [address], P877/1       Discharge Date       Discharge Disposition       Discharge Destination                                 Attending Provider: Duane Choe MD    Allergies: No Known Allergies    Isolation: None   Infection: None   Code Status: No CPR    Ht: 157.5 cm (62\")   Wt: 74.8 kg (165 lb)    Admission Cmt: None   Principal Problem: Fracture of distal end of right fibula [S82.831A]                   Active Insurance as of 11/27/2024       Primary Coverage       Payor Plan Insurance Group Employer/Plan Group    [facility] MEDICARE REPLACEMENT [facility] MED [facility] GROUP [id number]       Payor Plan Address Payor Plan Phone Number Payor Plan Fax Number Effective Dates    [address] [address]   1/1/2024 - None Entered    [facility] [id number]         Subscriber Name Subscriber Birth Date Member ID       NOEMI CALERO 1935 [id number]                     Emergency Contacts        (Rel.) Home Phone Work Phone Mobile Phone    Paul Calero (Son) [phone] -- [phone]    martin(inLaw)quinn (Daughter) [phone] -- --                "

## 2024-11-29 NOTE — DISCHARGE PLACEMENT REQUEST
"Noemi Calero (89 y.o. Female)       Date of Birth   1935    Social Security Number       Address   [address] [place] [address] [address] [place] [address]    Home Phone   [phone]    MRN   [id number]       Adventism   Yarsani    Marital Status                               Admission Date   11/27/24    Admission Type   Emergency    Admitting Provider   Juan Vicente MD    Attending Provider   Duane Choe MD    Department, Room/Bed   [address], P877/1       Discharge Date       Discharge Disposition       Discharge Destination                                 Attending Provider: Duane Choe MD    Allergies: No Known Allergies    Isolation: None   Infection: None   Code Status: No CPR    Ht: 157.5 cm (62\")   Wt: 74.8 kg (165 lb)    Admission Cmt: None   Principal Problem: Fracture of distal end of right fibula [S82.831A]                   Active Insurance as of 11/27/2024       Primary Coverage       Payor Plan Insurance Group Employer/Plan Group    [facility] MEDICARE REPLACEMENT [facility] MED [facility] GROUP [id number]       Payor Plan Address Payor Plan Phone Number Payor Plan Fax Number Effective Dates    [address] [address]   1/1/2024 - None Entered    [facility] [id number]         Subscriber Name Subscriber Birth Date Member ID       NOEMI CALERO 1935 [id number]                     Emergency Contacts        (Rel.) Home Phone Work Phone Mobile Phone    Paul Calero (Son) [phone] -- [phone]    martin(inLaw)quinn (Daughter) [phone] -- --                "

## 2024-11-30 ENCOUNTER — APPOINTMENT (OUTPATIENT)
Dept: CARDIOLOGY | Facility: HOSPITAL | Age: 89
End: 2024-11-30
Payer: MEDICARE

## 2024-11-30 LAB
ALBUMIN SERPL-MCNC: 2.8 G/DL (ref 3.5–5.2)
ALBUMIN/GLOB SERPL: 0.9 G/DL
ALP SERPL-CCNC: 92 U/L (ref 39–117)
ALT SERPL W P-5'-P-CCNC: 9 U/L (ref 1–33)
ANION GAP SERPL CALCULATED.3IONS-SCNC: 8 MMOL/L (ref 5–15)
AST SERPL-CCNC: 16 U/L (ref 1–32)
BASOPHILS # BLD AUTO: 0.02 10*3/MM3 (ref 0–0.2)
BASOPHILS NFR BLD AUTO: 0.3 % (ref 0–1.5)
BH CV LOWER VASCULAR LEFT COMMON FEMORAL AUGMENT: NORMAL
BH CV LOWER VASCULAR LEFT COMMON FEMORAL COMPETENT: NORMAL
BH CV LOWER VASCULAR LEFT COMMON FEMORAL COMPRESS: NORMAL
BH CV LOWER VASCULAR LEFT COMMON FEMORAL PHASIC: NORMAL
BH CV LOWER VASCULAR LEFT COMMON FEMORAL SPONT: NORMAL
BH CV LOWER VASCULAR RIGHT COMMON FEMORAL AUGMENT: NORMAL
BH CV LOWER VASCULAR RIGHT COMMON FEMORAL COMPETENT: NORMAL
BH CV LOWER VASCULAR RIGHT COMMON FEMORAL COMPRESS: NORMAL
BH CV LOWER VASCULAR RIGHT COMMON FEMORAL PHASIC: NORMAL
BH CV LOWER VASCULAR RIGHT COMMON FEMORAL SPONT: NORMAL
BH CV LOWER VASCULAR RIGHT DISTAL FEMORAL COMPRESS: NORMAL
BH CV LOWER VASCULAR RIGHT GASTRONEMIUS COMPRESS: NORMAL
BH CV LOWER VASCULAR RIGHT GREATER SAPH AK COMPRESS: NORMAL
BH CV LOWER VASCULAR RIGHT GREATER SAPH BK COMPRESS: NORMAL
BH CV LOWER VASCULAR RIGHT LESSER SAPH COMPRESS: NORMAL
BH CV LOWER VASCULAR RIGHT MID FEMORAL AUGMENT: NORMAL
BH CV LOWER VASCULAR RIGHT MID FEMORAL COMPETENT: NORMAL
BH CV LOWER VASCULAR RIGHT MID FEMORAL COMPRESS: NORMAL
BH CV LOWER VASCULAR RIGHT MID FEMORAL PHASIC: NORMAL
BH CV LOWER VASCULAR RIGHT MID FEMORAL SPONT: NORMAL
BH CV LOWER VASCULAR RIGHT PERONEAL COMPRESS: NORMAL
BH CV LOWER VASCULAR RIGHT POPLITEAL AUGMENT: NORMAL
BH CV LOWER VASCULAR RIGHT POPLITEAL COMPETENT: NORMAL
BH CV LOWER VASCULAR RIGHT POPLITEAL COMPRESS: NORMAL
BH CV LOWER VASCULAR RIGHT POPLITEAL PHASIC: NORMAL
BH CV LOWER VASCULAR RIGHT POPLITEAL SPONT: NORMAL
BH CV LOWER VASCULAR RIGHT POSTERIOR TIBIAL COMPRESS: NORMAL
BH CV LOWER VASCULAR RIGHT PROFUNDA FEMORAL COMPRESS: NORMAL
BH CV LOWER VASCULAR RIGHT PROXIMAL FEMORAL COMPRESS: NORMAL
BH CV LOWER VASCULAR RIGHT SAPHENOFEMORAL JUNCTION COMPRESS: NORMAL
BILIRUB SERPL-MCNC: 0.6 MG/DL (ref 0–1.2)
BUN SERPL-MCNC: 21 MG/DL (ref 8–23)
BUN/CREAT SERPL: 19.3 (ref 7–25)
CALCIUM SPEC-SCNC: 8.7 MG/DL (ref 8.6–10.5)
CHLORIDE SERPL-SCNC: 108 MMOL/L (ref 98–107)
CO2 SERPL-SCNC: 25 MMOL/L (ref 22–29)
CREAT SERPL-MCNC: 1.09 MG/DL (ref 0.57–1)
DEPRECATED RDW RBC AUTO: 42.7 FL (ref 37–54)
EGFRCR SERPLBLD CKD-EPI 2021: 49 ML/MIN/1.73
EOSINOPHIL # BLD AUTO: 0.14 10*3/MM3 (ref 0–0.4)
EOSINOPHIL NFR BLD AUTO: 2.4 % (ref 0.3–6.2)
ERYTHROCYTE [DISTWIDTH] IN BLOOD BY AUTOMATED COUNT: 12 % (ref 12.3–15.4)
GLOBULIN UR ELPH-MCNC: 3.1 GM/DL
GLUCOSE SERPL-MCNC: 138 MG/DL (ref 65–99)
HCT VFR BLD AUTO: 39.6 % (ref 34–46.6)
HGB BLD-MCNC: 12.8 G/DL (ref 12–15.9)
IMM GRANULOCYTES # BLD AUTO: 0.02 10*3/MM3 (ref 0–0.05)
IMM GRANULOCYTES NFR BLD AUTO: 0.3 % (ref 0–0.5)
LYMPHOCYTES # BLD AUTO: 0.57 10*3/MM3 (ref 0.7–3.1)
LYMPHOCYTES NFR BLD AUTO: 9.7 % (ref 19.6–45.3)
MCH RBC QN AUTO: 31.1 PG (ref 26.6–33)
MCHC RBC AUTO-ENTMCNC: 32.3 G/DL (ref 31.5–35.7)
MCV RBC AUTO: 96.4 FL (ref 79–97)
MONOCYTES # BLD AUTO: 0.44 10*3/MM3 (ref 0.1–0.9)
MONOCYTES NFR BLD AUTO: 7.5 % (ref 5–12)
NEUTROPHILS NFR BLD AUTO: 4.71 10*3/MM3 (ref 1.7–7)
NEUTROPHILS NFR BLD AUTO: 79.8 % (ref 42.7–76)
NRBC BLD AUTO-RTO: 0 /100 WBC (ref 0–0.2)
PLATELET # BLD AUTO: 192 10*3/MM3 (ref 140–450)
PMV BLD AUTO: 11.1 FL (ref 6–12)
POTASSIUM SERPL-SCNC: 3.8 MMOL/L (ref 3.5–5.2)
PROT SERPL-MCNC: 5.9 G/DL (ref 6–8.5)
RBC # BLD AUTO: 4.11 10*6/MM3 (ref 3.77–5.28)
SODIUM SERPL-SCNC: 141 MMOL/L (ref 136–145)
WBC NRBC COR # BLD AUTO: 5.9 10*3/MM3 (ref 3.4–10.8)

## 2024-11-30 PROCEDURE — G0378 HOSPITAL OBSERVATION PER HR: HCPCS

## 2024-11-30 PROCEDURE — 99214 OFFICE O/P EST MOD 30 MIN: CPT | Performed by: NURSE PRACTITIONER

## 2024-11-30 PROCEDURE — 93971 EXTREMITY STUDY: CPT

## 2024-11-30 PROCEDURE — 80053 COMPREHEN METABOLIC PANEL: CPT | Performed by: STUDENT IN AN ORGANIZED HEALTH CARE EDUCATION/TRAINING PROGRAM

## 2024-11-30 PROCEDURE — 93971 EXTREMITY STUDY: CPT | Performed by: STUDENT IN AN ORGANIZED HEALTH CARE EDUCATION/TRAINING PROGRAM

## 2024-11-30 PROCEDURE — 85025 COMPLETE CBC W/AUTO DIFF WBC: CPT | Performed by: STUDENT IN AN ORGANIZED HEALTH CARE EDUCATION/TRAINING PROGRAM

## 2024-11-30 RX ORDER — HYDROMORPHONE HYDROCHLORIDE 1 MG/ML
0.5 INJECTION, SOLUTION INTRAMUSCULAR; INTRAVENOUS; SUBCUTANEOUS EVERY 4 HOURS PRN
Status: DISCONTINUED | OUTPATIENT
Start: 2024-11-30 | End: 2024-12-02 | Stop reason: HOSPADM

## 2024-11-30 RX ADMIN — LATANOPROST 1 DROP: 50 SOLUTION OPHTHALMIC at 21:24

## 2024-11-30 RX ADMIN — SENNOSIDES AND DOCUSATE SODIUM 2 TABLET: 50; 8.6 TABLET ORAL at 08:14

## 2024-11-30 RX ADMIN — ACETAMINOPHEN 1000 MG: 500 TABLET ORAL at 13:22

## 2024-11-30 RX ADMIN — APIXABAN 5 MG: 5 TABLET, FILM COATED ORAL at 21:24

## 2024-11-30 RX ADMIN — Medication 5000 UNITS: at 08:15

## 2024-11-30 RX ADMIN — HYDROCODONE BITARTRATE AND ACETAMINOPHEN 2 TABLET: 5; 325 TABLET ORAL at 21:24

## 2024-11-30 RX ADMIN — APIXABAN 5 MG: 5 TABLET, FILM COATED ORAL at 08:14

## 2024-11-30 RX ADMIN — Medication 500 MG: at 08:14

## 2024-11-30 NOTE — PROGRESS NOTES
Name: Noemi Forbes ADMIT: 2024   : 1935  PCP: Villa Madrigal MD    MRN: [id number] LOS: 0 days   AGE/SEX: 89 y.o. female  ROOM: [facility]     Subjective   Subjective     She's still having a lot of pain despite the increase in her opiates. We took off her gauze today and her right ankle up to her mid shin is red, warm, tender, and somewhat tense. Her tibial pulse is dopplerable. No evidence of cellulitis.        Objective   Objective   Vital Signs  Temp:  [97.7 °F (36.5 °C)-98.4 °F (36.9 °C)] 98.4 °F (36.9 °C)  Heart Rate:  [54-77] 54  Resp:  [16] 16  BP: (109-122)/(64-71) 109/71  SpO2:  [90 %-95 %] 93 %  on  Flow (L/min) (Oxygen Therapy):  [2] 2;   Device (Oxygen Therapy): nasal cannula  Body mass index is 30.18 kg/m².  Physical Exam  Constitutional:       General: She is not in acute distress.     Appearance: She is not toxic-appearing.   Cardiovascular:      Rate and Rhythm: Normal rate and regular rhythm.      Heart sounds: Murmur heard.   Pulmonary:      Effort: Pulmonary effort is normal.      Breath sounds: Normal breath sounds.   Abdominal:      General: Bowel sounds are normal.      Palpations: Abdomen is soft.   Musculoskeletal:         General: Tenderness and signs of injury present.      Right lower leg: Edema present.      Left lower leg: No edema.      Comments: Right ankle and calf swollen, tender, somewhat tense, tibial pulse dopplerable    Neurological:      Mental Status: She is alert.   Psychiatric:         Mood and Affect: Mood normal.         Behavior: Behavior normal.         Results Review     I reviewed the patient's new clinical results.  Results from last 7 days   Lab Units 24  0208 24   WBC 10*3/mm3 6.46 7.62   HEMOGLOBIN g/dL 13.6 14.7   PLATELETS 10*3/mm3 232 241     Results from last 7 days   Lab Units 24  0208 24   SODIUM mmol/L 144 143   POTASSIUM mmol/L 4.3 3.9   CHLORIDE mmol/L 109* 109*   CO2 mmol/L 26.5 26.3   BUN mg/dL 16  "14   CREATININE mg/dL 1.03* 1.04*   GLUCOSE mg/dL 99 97   Estimated Creatinine Clearance: 35.8 mL/min (A) (by C-G formula based on SCr of 1.03 mg/dL (H)).    Results from last 7 days   Lab Units 11/28/24  0208 11/27/24  2107   CALCIUM mg/dL 8.9 9.1       COVID19   Date Value Ref Range Status   06/18/2022 Not Detected Not Detected - Ref. Range Final     No results found for: \"HGBA1C\", \"POCGLU\"    XR Tibia Fibula 2 View Left  LEFT TIB-FIB     HISTORY: Fall, pain.     FINDINGS: AP and lateral views of the left tibia and fibula show no  evidence of fracture.     This report was finalized on 11/28/2024 6:53 PM by Dr. Dagoberto Byers M.D on Workstation: [id number]     Adult Transthoracic Echo Complete W/ Cont if Necessary Per Protocol    Left ventricular ejection fraction appears to be 51 - 55%.    The left ventricular cavity is small in size.    Left ventricular wall thickness is consistent with moderate septal   asymmetric hypertrophy.    The following left ventricular wall segments are hypokinetic: apical   anterior, mid anterolateral and mid inferolateral. The following left   ventricular wall segments are akinetic: apical lateral.    Left ventricular diastolic function is consistent with (grade II w/high   LAP) pseudonormalization.    : The aortic valve is abnormal in structure. There is calcification of   the aortic valve. The aortic valve was poorly visualized but appears   trileaflet. No aortic valve regurgitation is present. Moderate aortic   valve stenosis is present. Aortic valve area is 1.75 cm2. Peak velocity of   the flow distal to the aortic valve is 303.7 cm/s. Aortic valve maximum   pressure gradient is 36.9 mmHg. Aortic valve mean pressure gradient is   24.3 mmHg. Aortic valve dimensionless index is 0.60 .    Estimated right ventricular systolic pressure from tricuspid   regurgitation is mildly elevated (35-45 mmHg). Calculated right   ventricular systolic pressure from tricuspid regurgitation is 39 " mmHg.    Mild pulmonary hypertension is present.    There is a trivial pericardial effusion.    Scheduled Medications  acetaminophen, 1,000 mg, Oral, Q8H  apixaban, 5 mg, Oral, BID  calcium carbonate (oyster shell), 500 mg, Oral, Daily  cholecalciferol, 5,000 Units, Oral, Daily  latanoprost, 1 drop, Both Eyes, Nightly  senna-docusate sodium, 2 tablet, Oral, BID    Infusions   Diet  Diet: Regular/House, Cardiac; Healthy Heart (2-3 Na+); Fluid Consistency: Thin (IDDSI 0)       Assessment/Plan     Active Hospital Problems    Diagnosis  POA    **Fracture of distal end of right fibula [S82.831A]  Yes    Fall [W19.XXXA]  Yes    Stage 3a chronic kidney disease [N18.31]  Yes    Chronic deep vein thrombosis (DVT) [I82.509]  Yes      Resolved Hospital Problems   No resolved problems to display.       89 y.o. female admitted with Fracture of distal end of right fibula.    Fall resulting in a right ankle fracture  Orthopedic surgery plans to manage this non-operatively with a cam walking boot.   She is WBAT with use of a walker.   Physical therapy is recommending SNF.  Despite increasing her pain medications, she's still having a lot of pain and her leg is swollen, somewhat tense, very tender, and erythematic. No raised cellulitis. Tibial pulse is palpable. She is already on eliquis for a history of DVTs. I will check blood work and a duplex of her right lower extremity. Will ask Dr. Santana to come back and take a look.  Continue pain control, bowel regimen.   Left lower extremity pain-xray was negative. No swelling and is already on eliquis.  WMA on echocardiogram-I appreciate cardiology's evaluation.  CKD 3a-creatinine is stable and maybe a bit better than baseline on last check  Suspected stage 1 non-small call lung cancer-underwent xrt in September   Chronic lower extremity lymphedema-no longer on diuretics as an outpatient   History of DVT-eliquis  Eliquis (home med) for DVT prophylaxis.  Limited code (no CPR, no  intubation).  Discussed with patient and nursing staff.  Anticipate discharge to SNU facility timing yet to be determined.      Duane Choe MD  [facility]  11/30/24  09:09 EST            1 pair

## 2024-11-30 NOTE — PROGRESS NOTES
[facility]  Cardiology Progress Note    Patient Identification:  Name: Noemi Forbes  Age: 89 y.o.  Sex: female  :  1935  MRN: [id number]                 Follow Up / Chief Complaint: Follow-up for new wall motion abnormalities and echocardiogram    Interval History: Resting in bed.  No chest pain or shortness of breath.         Objective:    Past Medical History:  Past Medical History:   Diagnosis Date    Arthritis     DVT of leg (deep venous thrombosis)     Low back pain     Osteopenia      Past Surgical History:  Past Surgical History:   Procedure Laterality Date    BACK SURGERY      BRAIN SURGERY      CATARACT EXTRACTION      COLONOSCOPY      HYSTERECTOMY      ROTATOR CUFF REPAIR Right     SPINE SURGERY      THYROID SURGERY      TONSILLECTOMY          Social History:   Social History     Tobacco Use    Smoking status: Every Day     Current packs/day: 0.50     Average packs/day: 0.5 packs/day for 74.9 years (37.5 ttl pk-yrs)     Types: Cigarettes     Start date: 1950     Passive exposure: Never    Smokeless tobacco: Never   Substance Use Topics    Alcohol use: Yes     Alcohol/week: 6.0 standard drinks of alcohol     Types: 4 Glasses of wine, 2 Cans of beer per week     Comment: socially      Family History:  Family History   Problem Relation Age of Onset    Dementia Mother           Allergies:  No Known Allergies  Scheduled Meds:  acetaminophen, 1,000 mg, Q8H  apixaban, 5 mg, BID  calcium carbonate (oyster shell), 500 mg, Daily  cholecalciferol, 5,000 Units, Daily  latanoprost, 1 drop, Nightly  senna-docusate sodium, 2 tablet, BID            INTAKE AND OUTPUT:    Intake/Output Summary (Last 24 hours) at 2024 1249  Last data filed at 2024 0500  Gross per 24 hour   Intake 540 ml   Output 400 ml   Net 140 ml       Review of Systems:   GI: No nausea or vomiting  Cardiac: No chest pain  Pulmonary: No shortness of breath    Constitutional:  Temp:  [97.7 °F (36.5 °C)-98.4 °F  (36.9 °C)] 97.7 °F (36.5 °C)  Heart Rate:  [54-77] 71  Resp:  [16] 16  BP: (109-122)/(57-71) 118/57    Physical Exam:  General:  Appears in no acute distress, resting in bed  Eyes: eom normal no conjunctival drainage  HEENT:  No JVD. Thyroid not visibly enlarged. No mucosal pallor or cyanosis  Respiratory: Respirations regular and unlabored at rest. BBS with good air entry in all fields. No crackles, rubs or wheezes auscultated  Cardiovascular: S1S2 Regular rate and rhythm. No murmur, rub or gallop. No carotid bruits. DP/PT pulses     . No pretibial pitting edema  Gastrointestinal: Abdomen soft, flat, non tender. Bowel sounds present. No hepatosplenomegaly. No ascites  Skin:   Skin warm and dry to touch. No rashes    Neuro: AAO x3 CN II-XII grossly intact  Psych: Mood and affect normal, pleasant and cooperative          I reviewed the patient's new clinical results, and personally reviewed and interpreted the patient's ECG and telemetry data from the last 24 hours        Cardiographics       ECG:     Echocardiogram:   Interpretation Summary         Left ventricular ejection fraction appears to be 51 - 55%.    The left ventricular cavity is small in size.    Left ventricular wall thickness is consistent with moderate septal asymmetric hypertrophy.    The following left ventricular wall segments are hypokinetic: apical anterior, mid anterolateral and mid inferolateral. The following left ventricular wall segments are akinetic: apical lateral.    Left ventricular diastolic function is consistent with (grade II w/high LAP) pseudonormalization.    : The aortic valve is abnormal in structure. There is calcification of the aortic valve. The aortic valve was poorly visualized but appears trileaflet. No aortic valve regurgitation is present. Moderate aortic valve stenosis is present. Aortic valve area is 1.75 cm2. Peak velocity of the flow distal to the aortic valve is 303.7 cm/s. Aortic valve maximum pressure gradient is  "36.9 mmHg. Aortic valve mean pressure gradient is 24.3 mmHg. Aortic valve dimensionless index is 0.60 .    Estimated right ventricular systolic pressure from tricuspid regurgitation is mildly elevated (35-45 mmHg). Calculated right ventricular systolic pressure from tricuspid regurgitation is 39 mmHg.    Mild pulmonary hypertension is present.    There is a trivial pericardial effusion.     Lab Review           Results from last 7 days   Lab Units 11/30/24  1011   SODIUM mmol/L 141   POTASSIUM mmol/L 3.8   BUN mg/dL 21   CREATININE mg/dL 1.09*   CALCIUM mg/dL 8.7     @[id number]@  Results from last 7 days   Lab Units 11/30/24  1011 11/28/24  0208 11/27/24  2107   WBC 10*3/mm3 5.90 6.46 7.62   HEMOGLOBIN g/dL 12.8 13.6 14.7   HEMATOCRIT % 39.6 41.6 44.9   PLATELETS 10*3/mm3 192 232 241             Assessment plan:  1.  Fracture of distal end of right fibula  2.  Fall  3.  CKD stage III  4.  Chronic DVT    Plan:  1. Concern for abnormal echo  a.  Echocardiogram on 11/2/2024 with normal EF.  Grade 2 diastolic dysfunction.  There does not appear to be any clinical significant LVOT obstruction.  b.  No evidence of heart failure.  No signs or symptoms at this time  3.  Moderate aortic stenosis.    a. No symptoms at this time.  4. Mild pulmonary hypertension: Stable  5.  History of DVT: eliquis resumed.      We will sign off at this time. Please call with any concerns. I will have the office of [facility] call to set up an appointment.      11/30/2024  TERRELL Jeffers/Transcription:   \"Dictated utilizing Dragon dictation\".     "

## 2024-11-30 NOTE — PLAN OF CARE
Problem: Adult Inpatient Plan of Care  Goal: Plan of Care Review  Outcome: Progressing  Goal: Patient-Specific Goal (Individualized)  Outcome: Progressing  Goal: Absence of Hospital-Acquired Illness or Injury  Outcome: Progressing  Intervention: Identify and Manage Fall Risk  Recent Flowsheet Documentation  Taken 11/30/2024 1000 by Kim Simmons RN  Safety Promotion/Fall Prevention:   fall prevention program maintained   assistive device/personal items within reach   clutter free environment maintained   safety round/check completed   room organization consistent   nonskid shoes/slippers when out of bed  Taken 11/30/2024 0800 by Kim Simmons RN  Safety Promotion/Fall Prevention:   fall prevention program maintained   assistive device/personal items within reach   clutter free environment maintained   safety round/check completed   room organization consistent   nonskid shoes/slippers when out of bed  Intervention: Prevent Skin Injury  Recent Flowsheet Documentation  Taken 11/30/2024 1000 by Kim Simmons RN  Body Position: sitting up in bed  Taken 11/30/2024 0800 by Kim Simmons RN  Body Position: (boosted up in bed)   weight shifting   sitting up in bed  Intervention: Prevent and Manage VTE (Venous Thromboembolism) Risk  Recent Flowsheet Documentation  Taken 11/30/2024 0800 by Kim Simmons RN  VTE Prevention/Management:   bilateral   SCDs (sequential compression devices) off  Goal: Optimal Comfort and Wellbeing  Outcome: Progressing  Goal: Readiness for Transition of Care  Outcome: Progressing     Problem: Violence Risk or Actual  Goal: Anger and Impulse Control  Outcome: Progressing  Intervention: Minimize Safety Risk  Recent Flowsheet Documentation  Taken 11/30/2024 1000 by Kim Simmons RN  Enhanced Safety Measures: bed alarm set  Taken 11/30/2024 0800 by Kim Simmons RN  Enhanced Safety Measures: bed alarm set     Problem: Skin Injury Risk Increased  Goal: Skin Health and Integrity  Outcome:  Progressing  Intervention: Optimize Skin Protection  Recent Flowsheet Documentation  Taken 11/30/2024 0800 by Kim Simmons, RN  Head of Bed (HOB) Positioning: HOB at 60-90 degrees     Problem: Fall Injury Risk  Goal: Absence of Fall and Fall-Related Injury  Outcome: Progressing  Intervention: Promote Injury-Free Environment  Recent Flowsheet Documentation  Taken 11/30/2024 1000 by Kim Simmons, RN  Safety Promotion/Fall Prevention:   fall prevention program maintained   assistive device/personal items within reach   clutter free environment maintained   safety round/check completed   room organization consistent   nonskid shoes/slippers when out of bed  Taken 11/30/2024 0800 by Kim Simmons, RN  Safety Promotion/Fall Prevention:   fall prevention program maintained   assistive device/personal items within reach   clutter free environment maintained   safety round/check completed   room organization consistent   nonskid shoes/slippers when out of bed   Goal Outcome Evaluation:

## 2024-11-30 NOTE — NURSING NOTE
Took off remaining gauze to right leg to check skin and skin is bright red and very hot to touch. Dr. Choe notified. Pt also having lots of pain with any movement to the right leg.    Mepilex to B/L elbows for protection. Vert small skin tear/scabbing to left elbow. Heels floated off pillow. Dopplar was obtain for pulse. Was able to hear a tibial pulse and dopplar read 67. Pedal pulse read 140 but could not hear anything.     Ortho called said to just elevate and apply ice to that lower right leg and they will see later today or tomorrow. Keeping leg elevated and applied ice.

## 2024-11-30 NOTE — PLAN OF CARE
Goal Outcome Evaluation:  Plan of Care Reviewed With: patient        Progress: no change    Drowsy this evening. A/Ox3. No s/s distress. On conservative treatment for RLE. W/ cam boot to the RLE. WBAT w/ walker. On scheduled Tylenol 1000 q8H. On Eliquis BID. SLIV. Assist x2 w/ walker. Voiding per PW. Last BM on 11/27. Encouraged to do IS. Plan to d/c [facility], pending. Needs attended.

## 2024-11-30 NOTE — PLAN OF CARE
Goal Outcome Evaluation:  Plan of Care Reviewed With: patient        Progress: no change  Outcome Evaluation: Patient stable during shift. VSS and voiding function is intact. Pain managed with prn norco. Patient able to transfer with walker and x2 max assist. Patient turned Q2. Plan for d/c to snf, CCP following.

## 2024-12-01 PROCEDURE — G0378 HOSPITAL OBSERVATION PER HR: HCPCS

## 2024-12-01 PROCEDURE — 99213 OFFICE O/P EST LOW 20 MIN: CPT | Performed by: ORTHOPAEDIC SURGERY

## 2024-12-01 RX ORDER — OXYCODONE AND ACETAMINOPHEN 10; 325 MG/1; MG/1
1 TABLET ORAL EVERY 4 HOURS PRN
Status: DISCONTINUED | OUTPATIENT
Start: 2024-12-01 | End: 2024-12-02 | Stop reason: HOSPADM

## 2024-12-01 RX ORDER — CEPHALEXIN 500 MG/1
500 CAPSULE ORAL EVERY 6 HOURS SCHEDULED
Status: DISCONTINUED | OUTPATIENT
Start: 2024-12-01 | End: 2024-12-02 | Stop reason: HOSPADM

## 2024-12-01 RX ADMIN — SENNOSIDES AND DOCUSATE SODIUM 2 TABLET: 50; 8.6 TABLET ORAL at 08:26

## 2024-12-01 RX ADMIN — POLYETHYLENE GLYCOL 3350 17 G: 17 POWDER, FOR SOLUTION ORAL at 18:24

## 2024-12-01 RX ADMIN — ACETAMINOPHEN 1000 MG: 500 TABLET ORAL at 23:11

## 2024-12-01 RX ADMIN — APIXABAN 5 MG: 5 TABLET, FILM COATED ORAL at 08:26

## 2024-12-01 RX ADMIN — Medication 500 MG: at 08:26

## 2024-12-01 RX ADMIN — ACETAMINOPHEN 1000 MG: 500 TABLET ORAL at 14:04

## 2024-12-01 RX ADMIN — APIXABAN 5 MG: 5 TABLET, FILM COATED ORAL at 20:15

## 2024-12-01 RX ADMIN — CEPHALEXIN 500 MG: 500 CAPSULE ORAL at 14:04

## 2024-12-01 RX ADMIN — CEPHALEXIN 500 MG: 500 CAPSULE ORAL at 18:24

## 2024-12-01 RX ADMIN — Medication 5000 UNITS: at 08:26

## 2024-12-01 RX ADMIN — LATANOPROST 1 DROP: 50 SOLUTION OPHTHALMIC at 20:15

## 2024-12-01 RX ADMIN — OXYCODONE HYDROCHLORIDE AND ACETAMINOPHEN 1 TABLET: 10; 325 TABLET ORAL at 20:15

## 2024-12-01 RX ADMIN — CEPHALEXIN 500 MG: 500 CAPSULE ORAL at 23:11

## 2024-12-01 NOTE — PROGRESS NOTES
Name: Noemi Forbes ADMIT: 2024   : 1935  PCP: Villa Madrigal MD    MRN: [id number] LOS: 0 days   AGE/SEX: 89 y.o. female  ROOM: [facility]     Subjective   Subjective     Pain is a little better controlled today if she's not moving. Discussed with Dr. Godinez.        Objective   Objective   Vital Signs  Temp:  [97.4 °F (36.3 °C)-97.9 °F (36.6 °C)] 97.4 °F (36.3 °C)  Heart Rate:  [64-75] 66  Resp:  [16] 16  BP: (111-142)/(57-84) 123/76  SpO2:  [92 %-95 %] 94 %  on  Flow (L/min) (Oxygen Therapy):  [2] 2;   Device (Oxygen Therapy): nasal cannula  Body mass index is 30.18 kg/m².  Physical Exam  Constitutional:       General: She is not in acute distress.     Appearance: She is not toxic-appearing.   Cardiovascular:      Rate and Rhythm: Normal rate and regular rhythm.      Heart sounds: Murmur heard.   Pulmonary:      Effort: Pulmonary effort is normal.      Breath sounds: Normal breath sounds.   Abdominal:      General: Bowel sounds are normal.      Palpations: Abdomen is soft.   Musculoskeletal:         General: Tenderness and signs of injury present.      Right lower leg: Edema present.      Left lower leg: No edema.      Comments: Right ankle and calf swollen, tender, somewhat tense, warmer than contralateral leg   Neurological:      Mental Status: She is alert.   Psychiatric:         Mood and Affect: Mood normal.         Behavior: Behavior normal.         Results Review     I reviewed the patient's new clinical results.  Results from last 7 days   Lab Units 24  1011 24  0208 24  210   WBC 10*3/mm3 5.90 6.46 7.62   HEMOGLOBIN g/dL 12.8 13.6 14.7   PLATELETS 10*3/mm3 192 232 241     Results from last 7 days   Lab Units 24  1011 24  0208 24  2107   SODIUM mmol/L 141 144 143   POTASSIUM mmol/L 3.8 4.3 3.9   CHLORIDE mmol/L 108* 109* 109*   CO2 mmol/L 25.0 26.5 26.3   BUN mg/dL 21 16 14   CREATININE mg/dL 1.09* 1.03* 1.04*   GLUCOSE mg/dL 138* 99 97   Estimated  "Creatinine Clearance: 33.8 mL/min (A) (by C-G formula based on SCr of 1.09 mg/dL (H)).  Results from last 7 days   Lab Units 11/30/24  1011   ALBUMIN g/dL 2.8*   BILIRUBIN mg/dL 0.6   ALK PHOS U/L 92   AST (SGOT) U/L 16   ALT (SGPT) U/L 9     Results from last 7 days   Lab Units 11/30/24  1011 11/28/24  0208 11/27/24  2107   CALCIUM mg/dL 8.7 8.9 9.1   ALBUMIN g/dL 2.8*  --   --        COVID19   Date Value Ref Range Status   06/18/2022 Not Detected Not Detected - Ref. Range Final     No results found for: \"HGBA1C\", \"POCGLU\"    Duplex Venous Lower Extremity - Right CAR    Normal right lower extremity venous duplex scan.    Scheduled Medications  acetaminophen, 1,000 mg, Oral, Q8H  apixaban, 5 mg, Oral, BID  calcium carbonate (oyster shell), 500 mg, Oral, Daily  cephalexin, 500 mg, Oral, Q6H  cholecalciferol, 5,000 Units, Oral, Daily  latanoprost, 1 drop, Both Eyes, Nightly  senna-docusate sodium, 2 tablet, Oral, BID    Infusions   Diet  Diet: Regular/House, Cardiac; Healthy Heart (2-3 Na+); Fluid Consistency: Thin (IDDSI 0)       Assessment/Plan     Active Hospital Problems    Diagnosis  POA    **Fracture of distal end of right fibula [S82.831A]  Yes    Fall [W19.XXXA]  Yes    Stage 3a chronic kidney disease [N18.31]  Yes    Chronic deep vein thrombosis (DVT) [I82.509]  Yes      Resolved Hospital Problems   No resolved problems to display.       89 y.o. female admitted with Fracture of distal end of right fibula.    Fall resulting in a right ankle fracture  Orthopedic surgery plans to manage this non-operatively with a cam walking boot.   She is WBAT with use of a walker.   Physical therapy is recommending SNF.  Pain is a little bit better on current regimen, but she's not really able to move without severe pain. Doesn't look like she's using much of the percocet.    Possible right lower extremity cellulitis-I'm not confident in this diagnosis. A lot of skin changes are chronic, though there is definite increased " swelling, erythema, and warmth in comparison to the contralateral leg, this could be from the fracture itself. There is no raised rash/erysipelas or drainage. no leukocytosis or fever. Duplex was negative. Regardless, I think a short course of keflex is reasonable. Will start keflex 500 mg q6h for 5 days.  Left lower extremity pain-xray was negative. No swelling and is already on eliquis.  WMA on echocardiogram-I appreciate cardiology's evaluation.  CKD 3a-creatinine is stable and maybe a bit better than baseline on last check  Suspected stage 1 non-small call lung cancer-underwent xrt in September   Chronic lower extremity lymphedema-no longer on diuretics as an outpatient   History of DVT-eliquis  Eliquis (home med) for DVT prophylaxis.  Limited code (no CPR, no intubation).  Discussed with patient and nursing staff.  Anticipate discharge to SNU facility in 1-2 days.      Duane Choe MD  [facility]  12/01/24  08:59 EST

## 2024-12-01 NOTE — CONSULTS
Orthopedic Consult      Patient: Noemi Forbes    Date of Admission: 11/27/2024  7:11 PM    YOB: 1935    Medical Record Number: [id number]    Consulting Physician: Duane Choe MD    Chief Complaints: Right ankle pain    History of Present Illness: 89 y.o. female admitted to [facility] to services of Duane Choe MD with right ankle fracture.  Dr. Santana had already seen evaluate the patient they discussed nonoperative treatment.  Orthopedics was asked to reevaluate due to redness pain and swelling of the ankle.  I saw and examined the patient this morning.  Patient states she does have a history of cellulitis and just some discoloration of her lower leg.  She states her ankle is just painful with movement.  She does not in a boot or splint at this time when seen.  She states she has not been up with PT to mobilize yet.    Allergies: No Known Allergies    Home Medications:    Current Facility-Administered Medications:     acetaminophen (TYLENOL) tablet 1,000 mg, 1,000 mg, Oral, Q8H, Juan Vicente MD, 1,000 mg at 11/30/24 1322    apixaban (ELIQUIS) tablet 5 mg, 5 mg, Oral, BID, Duane Choe MD, 5 mg at 12/01/24 0826    sennosides-docusate (PERICOLACE) 8.6-50 MG per tablet 2 tablet, 2 tablet, Oral, BID, 2 tablet at 12/01/24 0826 **AND** polyethylene glycol (MIRALAX) packet 17 g, 17 g, Oral, Daily PRN **AND** bisacodyl (DULCOLAX) EC tablet 5 mg, 5 mg, Oral, Daily PRN **AND** bisacodyl (DULCOLAX) suppository 10 mg, 10 mg, Rectal, Daily PRN, Duane Choe MD    calcium carbonate (oyster shell) tablet 500 mg, 500 mg, Oral, Daily, Sherine Joshi APRN, 500 mg at 12/01/24 0826    cephalexin (KEFLEX) capsule 500 mg, 500 mg, Oral, Q6H, Duane Choe MD    cholecalciferol (VITAMIN D3) tablet 5,000 Units, 5,000 Units, Oral, Daily, Sherine Joshi, APRN, 5,000 Units at 12/01/24 0826    HYDROmorphone (DILAUDID) injection 0.5 mg, 0.5 mg, Intravenous, Q4H PRN, Duane Choe MD     latanoprost (XALATAN) 0.005 % ophthalmic solution 1 drop, 1 drop, Both Eyes, Nightly, Duane Choe MD, 1 drop at 11/30/24 2124    ondansetron ODT (ZOFRAN-ODT) disintegrating tablet 4 mg, 4 mg, Oral, Q6H PRN **OR** ondansetron (ZOFRAN) injection 4 mg, 4 mg, Intravenous, Q6H PRN, Juan Vicente MD    oxyCODONE-acetaminophen (PERCOCET)  MG per tablet 1 tablet, 1 tablet, Oral, Q4H PRN, Duane Choe MD    Current Medications:  Scheduled Meds:acetaminophen, 1,000 mg, Oral, Q8H  apixaban, 5 mg, Oral, BID  calcium carbonate (oyster shell), 500 mg, Oral, Daily  cephalexin, 500 mg, Oral, Q6H  cholecalciferol, 5,000 Units, Oral, Daily  latanoprost, 1 drop, Both Eyes, Nightly  senna-docusate sodium, 2 tablet, Oral, BID      Continuous Infusions:   PRN Meds:.  senna-docusate sodium **AND** polyethylene glycol **AND** bisacodyl **AND** bisacodyl    HYDROmorphone    ondansetron ODT **OR** ondansetron    oxyCODONE-acetaminophen    Past Medical History:   Diagnosis Date    Arthritis     DVT of leg (deep venous thrombosis)     Low back pain     Osteopenia        Past Surgical History:   Procedure Laterality Date    BACK SURGERY      BRAIN SURGERY      CATARACT EXTRACTION      COLONOSCOPY      HYSTERECTOMY      ROTATOR CUFF REPAIR Right     SPINE SURGERY      THYROID SURGERY      TONSILLECTOMY         Social History     Occupational History    Not on file   Tobacco Use    Smoking status: Every Day     Current packs/day: 0.50     Average packs/day: 0.5 packs/day for 74.9 years (37.5 ttl pk-yrs)     Types: Cigarettes     Start date: 1/1/1950     Passive exposure: Never    Smokeless tobacco: Never   Vaping Use    Vaping status: Never Used    Passive vaping exposure: Yes   Substance and Sexual Activity    Alcohol use: Yes     Alcohol/week: 6.0 standard drinks of alcohol     Types: 4 Glasses of wine, 2 Cans of beer per week     Comment: socially    Drug use: Never    Sexual activity: Not Currently      Social History      Social History Narrative    Not on file       Family History   Problem Relation Age of Onset    Dementia Mother        Review of Systems:     Constitutional:  Denies fever, shaking or chills         All other pertinent positives and negatives as noted above in HPI.    Physical Exam: 89 y.o. female    Vitals:    11/30/24 1925 11/30/24 2206 12/01/24 0500 12/01/24 0914   BP: 111/62 142/84 123/76 120/60   BP Location: Left arm Left arm Left arm Left arm   Patient Position: Lying Lying Lying Lying   Pulse: 73 64 66 75   Resp: 16 16 16 16   Temp: 97.9 °F (36.6 °C) 97.5 °F (36.4 °C) 97.4 °F (36.3 °C) 97.6 °F (36.4 °C)   TempSrc: Oral Oral Oral Oral   SpO2: 92% 93% 94% 91%   Weight:       Height:         General:  Awake, alert. No acute distress.          Extremities: Right lower extremity: There is some discoloration of the skin small area of brighter coloration could possibly represent some underlying cellulitis however it is not overly apparent.  Her compartments are soft compressible.  Motor and sensory appear to be at baseline given recent history of fracture.  All other extremities atraumatic without gross abnormality.     Diagnostic Tests:    No results displayed because visit has over 200 results.        Lab Results (last 24 hours)       Procedure Component Value Units Date/Time    Comprehensive Metabolic Panel [[id number]]  (Abnormal) Collected: 11/30/24 1011    Specimen: Blood Updated: 11/30/24 1135     Glucose 138 mg/dL      BUN 21 mg/dL      Creatinine 1.09 mg/dL      Sodium 141 mmol/L      Potassium 3.8 mmol/L      Chloride 108 mmol/L      CO2 25.0 mmol/L      Calcium 8.7 mg/dL      Total Protein 5.9 g/dL      Albumin 2.8 g/dL      ALT (SGPT) 9 U/L      AST (SGOT) 16 U/L      Alkaline Phosphatase 92 U/L      Total Bilirubin 0.6 mg/dL      Globulin 3.1 gm/dL      A/G Ratio 0.9 g/dL      BUN/Creatinine Ratio 19.3     Anion Gap 8.0 mmol/L      eGFR 49.0 mL/min/1.73     Narrative:      GFR Normal >60  Chronic  Kidney Disease <60  Kidney Failure <15    The GFR formula is only valid for adults with stable renal function between ages 18 and 70.    CBC & Differential [[id number]]  (Abnormal) Collected: 11/30/24 1011    Specimen: Blood Updated: 11/30/24 1116    Narrative:      The following orders were created for panel order CBC & Differential.  Procedure                               Abnormality         Status                     ---------                               -----------         ------                     CBC Auto Differential[[id number]]        Abnormal            Final result                 Please view results for these tests on the individual orders.    CBC Auto Differential [[id number]]  (Abnormal) Collected: 11/30/24 1011    Specimen: Blood Updated: 11/30/24 1116     WBC 5.90 10*3/mm3      RBC 4.11 10*6/mm3      Hemoglobin 12.8 g/dL      Hematocrit 39.6 %      MCV 96.4 fL      MCH 31.1 pg      MCHC 32.3 g/dL      RDW 12.0 %      RDW-SD 42.7 fl      MPV 11.1 fL      Platelets 192 10*3/mm3      Neutrophil % 79.8 %      Lymphocyte % 9.7 %      Monocyte % 7.5 %      Eosinophil % 2.4 %      Basophil % 0.3 %      Immature Grans % 0.3 %      Neutrophils, Absolute 4.71 10*3/mm3      Lymphocytes, Absolute 0.57 10*3/mm3      Monocytes, Absolute 0.44 10*3/mm3      Eosinophils, Absolute 0.14 10*3/mm3      Basophils, Absolute 0.02 10*3/mm3      Immature Grans, Absolute 0.02 10*3/mm3      nRBC 0.0 /100 WBC             Imaging: Previous films reviewed      Doppler negative for DVT.    Assessment: Right ankle fracture, possible early cellulitis    Plan: I discussed the findings with the patient and also discussed with medicine as well as Dr. Santana.  At the be reasonable to try a short course of antibiotics over next 24 hours to see how she does given her history.  It was recommended that she be placed in her boot if can tolerate for stability purposes even while at rest.  This information was relayed to nursing they will make  those efforts.  Also will see about mobilizing with PT.  Recommend continue to ice and elevate the extremity.  Please call any other questions or concerns thank you    Date: 12/1/2024    Be Godinez MD    CC: Duane Choe MD

## 2024-12-01 NOTE — CASE MANAGEMENT/SOCIAL WORK
Continued Stay Note  [facility]     Patient Name: Noemi Forbes  MRN: [id number]  Today's Date: 12/1/2024    Admit Date: 11/27/2024    Plan: Precert approved for SNF at [facility], waiting to confirm bed availability   Discharge Plan       Row Name 12/01/24 0756       Plan    Plan Precert approved for SNF at [facility], waiting to confirm bed availability    Plan Comments Per [facility] website, pt did get precert for SNF. Email to [facility] to confirm pt will have a bed available if medically stable to DC today. Waiting for reply. CCP to follow.........JW                   Discharge Codes    No documentation.                 Expected Discharge Date and Time       Expected Discharge Date Expected Discharge Time    Dec 2, 2024               Ellie Ram RN

## 2024-12-01 NOTE — PLAN OF CARE
Problem: Adult Inpatient Plan of Care  Goal: Plan of Care Review  Outcome: Progressing  Goal: Patient-Specific Goal (Individualized)  Outcome: Progressing  Goal: Absence of Hospital-Acquired Illness or Injury  Outcome: Progressing  Goal: Optimal Comfort and Wellbeing  Outcome: Progressing  Goal: Readiness for Transition of Care  Outcome: Progressing     Problem: Violence Risk or Actual  Goal: Anger and Impulse Control  Outcome: Progressing     Problem: Skin Injury Risk Increased  Goal: Skin Health and Integrity  Outcome: Progressing     Problem: Fall Injury Risk  Goal: Absence of Fall and Fall-Related Injury  Outcome: Progressing   Goal Outcome Evaluation:

## 2024-12-01 NOTE — NURSING NOTE
Dr. Heck came by to see pt. Says right leg looking like cellulitis and medicine has been asked to treat with antibiotics. Leg looks slightly less red today than yesterday.     Boot on even in bed for support and ice applied to top of right leg. Tylenol given as needed.     1600 right leg looking a lot better. Not as hot as was and not as bright red. Still in bed, elevated, mild ice applied.

## 2024-12-02 VITALS
WEIGHT: 165 LBS | DIASTOLIC BLOOD PRESSURE: 64 MMHG | BODY MASS INDEX: 30.36 KG/M2 | HEART RATE: 77 BPM | SYSTOLIC BLOOD PRESSURE: 106 MMHG | RESPIRATION RATE: 16 BRPM | HEIGHT: 62 IN | OXYGEN SATURATION: 92 % | TEMPERATURE: 97.7 F

## 2024-12-02 PROCEDURE — G0378 HOSPITAL OBSERVATION PER HR: HCPCS

## 2024-12-02 RX ORDER — AMOXICILLIN 250 MG
2 CAPSULE ORAL 2 TIMES DAILY
Start: 2024-12-02

## 2024-12-02 RX ORDER — OXYCODONE AND ACETAMINOPHEN 10; 325 MG/1; MG/1
1 TABLET ORAL EVERY 4 HOURS PRN
Qty: 18 TABLET | Refills: 0 | Status: SHIPPED | OUTPATIENT
Start: 2024-12-02

## 2024-12-02 RX ORDER — CEPHALEXIN 500 MG/1
500 CAPSULE ORAL EVERY 6 HOURS SCHEDULED
Start: 2024-12-02 | End: 2024-12-06

## 2024-12-02 RX ADMIN — Medication 500 MG: at 07:52

## 2024-12-02 RX ADMIN — CEPHALEXIN 500 MG: 500 CAPSULE ORAL at 06:23

## 2024-12-02 RX ADMIN — OXYCODONE HYDROCHLORIDE AND ACETAMINOPHEN 1 TABLET: 10; 325 TABLET ORAL at 07:52

## 2024-12-02 RX ADMIN — ACETAMINOPHEN 1000 MG: 500 TABLET ORAL at 06:23

## 2024-12-02 RX ADMIN — Medication 5000 UNITS: at 07:52

## 2024-12-02 RX ADMIN — APIXABAN 5 MG: 5 TABLET, FILM COATED ORAL at 07:52

## 2024-12-02 NOTE — CASE MANAGEMENT/SOCIAL WORK
Continued Stay Note  [facility]     Patient Name: Noemi Forbes  MRN: [id number]  Today's Date: 12/2/2024    Admit Date: 11/27/2024    [facility]: [facility] at 11AM today   Discharge Plan       Row Name 12/02/24 0949       Plan    Plan [facility] at 11AM today    Patient/Family in Agreement with Plan yes    Plan Comments CCP confirmed with [facility] that the patient's bed is available today. Pre-cert has been approved and stretcher transport has been arranged with [facility] at 11AM today. The patient, patient's son Paul, patient's nurse, and [facility] all aware of patient's discharge plans and transportation arrangements for today. CD, CSW.                   Discharge Codes    No documentation.                 Expected Discharge Date and Time       Expected Discharge Date Expected Discharge Time    Dec 2, 2024

## 2024-12-02 NOTE — CASE MANAGEMENT/SOCIAL WORK
"Physicians Statement of Medical Necessity for  Ambulance Transportation    GENERAL INFORMATION     Name: Noemi Forbes  YOB: 1935  Medicare #: [id number]   Transport Date: 12/2/2024   (Valid for round trips this date, or for scheduled repetitive trips for 60 days from the date signed below.)  Origin: [facility]   Destination: [facility]   Is the Patient's stay covered under Medicare Part A (PPS/DRG?)Yes  Closest appropriate facility? Yes  If this a hosp-hosp transfer? No  Is this a hospice patient? No    MEDICAL NECESSITY QUESTIONAIRE    Ambulance Transportation is medically necessary only if other means of transportation are contraindicated or would be potentially harmful to the patient.  To meet this requirement, the patient must be either \"bed confined\" or suffer from a condition such that transport by means other than an ambulance is contraindicated by the patient's condition.  The following questions must be answered by the healthcare professional signing below for this form to be valid:     1) Describe the MEDICAL CONDITION (physical and/or mental) of this patient AT THE TIME OF AMBULANCE TRANSPORT that requires the patient to be transported in an ambulance, and why transport by other means is contraindicated by the patient's condition: patient is non-ambulatory   Past Medical History:   Diagnosis Date    Arthritis     DVT of leg (deep venous thrombosis)     Low back pain     Osteopenia       Past Surgical History:   Procedure Laterality Date    BACK SURGERY      BRAIN SURGERY      CATARACT EXTRACTION      COLONOSCOPY      HYSTERECTOMY      ROTATOR CUFF REPAIR Right     SPINE SURGERY      THYROID SURGERY      TONSILLECTOMY        2) Is this patient \"bed confined\" as defined below?Yes   To be \"bed confined\" the patient must satisfy all three of the following criteria:  (1) unable to get up from bed without assistance; AND (2) unable to ambulate;  AND (3) unable to sit in a chair " or wheelchair.  3) Can this patient safely be transported by car or wheelchair van (I.e., may safely sit during transport, without an attendant or monitoring?)No   4. In addition to completing questions 1-3 above, please check any of the following conditions that apply*:          *Note: supporting documentation for any boxes checked must be maintained in the patient's medical records Other; patient is non-ambulatory       SIGNATURE OF PHYSICIAN OR OTHER AUTHORIZED HEALTHCARE PROFESSIONAL    I certify that the above information is true and correct based on my evaluation of this patient, and represent that the patient requires transport by ambulance and that other forms of transport are contraindicated.  I understand that this information will be used by the Centers for Medicare and Medicaid Services (CMS) to support the determiniation of medical necessity for ambulance services, and I represent that I have personal knowledge of the patient's condition at the time of transport.       If this box is checked, I also certify that the patient is physically or mentally incapable of signing the ambulance service's claim form and that the institution with which I am affiliated has furnished care, services or assistance to the patient.  My signature below is made on behalf of the patient pursuant to 42 .36(b)(4). In accordance with 42 .37, the specific reason(s) that the patient is physically or mentally incapable of signing the claim for is as follows:     Signature of Physician or Healthcare Professional  Date/Time:   12/2/2024  09:38 EST       (For Scheduled repetitive transport, this form is not valid for transports performed more than 60 days after this date).                                                                                                                                            --------------------------------------------------------------------------------------------  Printed Name and  Credentials of Physician or Authorized Healthcare Professional     *Form must be signed by patient's attending physician for scheduled, repetitive transports,.  For non-repetitive ambulance transports, if unable to obtain the signature of the attending physician, any of the following may sign (please select below):     Physician  Clinical Nurse Specialist  Registered Nurse     Physician Assistant  Discharge Planner  Licensed Practical Nurse     Nurse Practitioner  X

## 2024-12-02 NOTE — PLAN OF CARE
Goal Outcome Evaluation:  Plan of Care Reviewed With: patient        Progress: no change    A/Ox3. O2 at HS. Pain controlled w/ PRN Norco. On scheduled Tylenol. Pt continues on Keflex PO q6H. On Eliquis BID. Voids per PW. Last BM on 12/1. Encouraged to do IS. Needs attended. Plan to d/c to [facility], precert approved, awaits bed availability.

## 2024-12-02 NOTE — NURSING NOTE
Patient discharging today via EMS at 1100. Attempted to call report, left call back number with Susan at [facility].    1029- report given to CLARE Castellano.

## 2024-12-02 NOTE — DISCHARGE SUMMARY
Date of Admission: 11/27/2024  Date of Discharge:  12/2/2024  Primary Care Physician: Villa Madrigal MD     Discharge Diagnosis:  Active Hospital Problems    Diagnosis  POA    **Fracture of distal end of right fibula [S82.831A]  Yes    Fall [W19.XXXA]  Yes    Stage 3a chronic kidney disease [N18.31]  Yes    Chronic deep vein thrombosis (DVT) [I82.509]  Yes      Resolved Hospital Problems   No resolved problems to display.       DETAILS OF HOSPITAL STAY     Pertinent Test Results and Procedures Performed    X-ray of the right ankle:  Distal fibula fracture, possible talar avulsion fracture, questionable   nondisplaced posterior and medial malleoli fractures, as described   above. If indicated, cross-sectional imaging could be obtained for more   definitive evaluation.       Right lower extremity Doppler negative for DVT    Hospital Course  This is a pleasant 89-year-old female who sustained a right ankle fracture after mechanical fall.  Please see H&P for full details.  Orthopedic surgery evaluated and have recommended nonoperative management and follow-up in 6 weeks.  They recommend she wear a cam walking boot and can weight-bear as tolerated with assistance from a walker.  She has chronic erythematous changes of her bilateral lower extremities.  There was at least a low level of concern for possible cellulitis involving the right distal lower extremity and she has been placed on a 5-day course of empiric Keflex.  Patient is showing no other signs or symptoms of infectious process.  She states her lower extremities look unchanged compared to their appearance for the past couple of years.  She has been approved for rehab and will transfer there in stable condition.  She will remain on her chronic outpatient dosing of anticoagulation for history of DVT.    Physical Exam at Discharge:  General: No acute distress, AAOx3  HEENT: EOMI, PERRL  Cardiovascular: +s1 and s2, RRR  Lungs: No rhonchi or wheezing  Abdomen:  soft, nontender    Consults:   Consults       Date and Time Order Name Status Description    11/30/2024  9:16 AM Inpatient Orthopedic Surgery Consult Completed     11/28/2024  4:04 PM Inpatient Cardiology Consult Completed     11/27/2024  9:17 PM Inpatient Orthopedic Surgery Consult Completed     11/27/2024  8:11 PM [facility] (on-call MD unless specified) Details                Condition on Discharge: Stable, improved    Discharge Disposition  Skilled Nursing Facility (DC - External)    Discharge Medications     Discharge Medications        New Medications        Instructions Start Date   cephalexin 500 MG capsule  Commonly known as: KEFLEX   500 mg, Oral, Every 6 Hours Scheduled      oxyCODONE-acetaminophen  MG per tablet  Commonly known as: PERCOCET   1 tablet, Oral, Every 4 Hours PRN      sennosides-docusate 8.6-50 MG per tablet  Commonly known as: PERICOLACE   2 tablets, Oral, 2 Times Daily             Continue These Medications        Instructions Start Date   calcium carbonate 600 MG tablet  Commonly known as: OS-DARIAN   600 mg, Oral, Daily      Eliquis 5 MG tablet tablet  Generic drug: apixaban   5 mg, Oral, 2 Times Daily      Eucerin original healing lotion lotion   1 Application, Topical, Daily      latanoprost 0.005 % ophthalmic solution  Commonly known as: XALATAN   1 drop, Daily      vitamin D3 125 MCG (5000 UT) capsule capsule   5,000 Units, Oral, Daily             Stop These Medications      furosemide 20 MG tablet  Commonly known as: LASIX     HYDROcodone-acetaminophen 5-325 MG per tablet  Commonly known as: NORCO     oxyCODONE 10 MG tablet  Commonly known as: ROXICODONE     potassium chloride 10 MEQ CR tablet  Commonly known as: KLOR-CON M10              Discharge Diet:   Diet Instructions       Diet: Regular/House Diet, Cardiac Diets; Healthy Heart (2-3 Na+); Regular (IDDSI 7); Thin (IDDSI 0)      Discharge Diet:  Regular/House Diet  Cardiac Diets       Cardiac Diet: Healthy Heart (2-3 Na+)     Texture: Regular (IDDSI 7)    Fluid Consistency: Thin (IDDSI 0)            Activity at Discharge:   Activity Instructions       Activity as Tolerated      Other Activity Instructions      Activity Instructions: weight-bear as tolerated with the use of a walker    Up WIth Assist              Follow-up Appointments  Future Appointments   Date Time Provider Department Center   12/9/2024  1:00 PM [facility] 1  [facility]   12/17/2024  1:00 PM Neftali Reyes MD [facility] None     Additional Instructions for the Follow-ups that You Need to Schedule       Discharge Follow-up with PCP   As directed       Currently Documented PCP:    Villa Madrigal MD    PCP Phone Number:    [phone]     Follow Up Details: 1 week        Discharge Follow-up with Specialty: Orthopedic surgery in 6 weeks (Dr Santana)   As directed      Specialty: Orthopedic surgery in 6 weeks (Dr Santana)                  I have examined and discussed discharge planning with the patient today.    Amish Seals MD  12/02/24  08:51 EST    Time: Discharge greater than 30 min

## 2024-12-03 NOTE — CASE MANAGEMENT/SOCIAL WORK
Case Management Discharge Note      Final Note: skilled bed at [place]. E[facility]K         Selected Continued Care - Discharged on 12/2/2024 Admission date: 11/27/2024 - Discharge disposition: Skilled Nursing Facility (DC - External)      Destination Coordination complete.      Service Provider Services Address Phone Fax Patient Preferred    [facility] [address] [place] [address] [phone] --              Durable Medical Equipment    No services have been selected for the patient.                Dialysis/Infusion    No services have been selected for the patient.                Home Medical Care    No services have been selected for the patient.                Therapy    No services have been selected for the patient.                Community Resources    No services have been selected for the patient.                Community & DME    No services have been selected for the patient.                         Final Discharge Disposition Code: 03 - skilled nursing facility (SNF)

## 2024-12-10 ENCOUNTER — TELEPHONE (OUTPATIENT)
Dept: RADIATION ONCOLOGY | Facility: HOSPITAL | Age: 89
End: 2024-12-10
Payer: MEDICARE

## 2024-12-10 NOTE — TELEPHONE ENCOUNTER
PT's daughter in law called to reschedule Pt's follow up appt with  on 12/17 due to PT being in a faciltity with a broken leg. Noticed PT did not show up for her needed scan on 12/9 so we needed to reschedule anyways. Daughter in law stated she would be there for at least 3-4 more weeks. New CT is on 1/14 and follow up is 1/21 at EP with . Emailing the appt reminders to her daughter in law who is on her PHI

## 2025-01-07 ENCOUNTER — APPOINTMENT (OUTPATIENT)
Dept: CT IMAGING | Facility: HOSPITAL | Age: 89
End: 2025-01-07
Payer: MEDICARE

## 2025-01-07 ENCOUNTER — APPOINTMENT (OUTPATIENT)
Dept: GENERAL RADIOLOGY | Facility: HOSPITAL | Age: 89
End: 2025-01-07
Payer: MEDICARE

## 2025-01-07 ENCOUNTER — HOSPITAL ENCOUNTER (EMERGENCY)
Facility: HOSPITAL | Age: 89
Discharge: HOME | End: 2025-01-07
Attending: STUDENT IN AN ORGANIZED HEALTH CARE EDUCATION/TRAINING PROGRAM | Admitting: STUDENT IN AN ORGANIZED HEALTH CARE EDUCATION/TRAINING PROGRAM
Payer: MEDICARE

## 2025-01-07 VITALS
WEIGHT: 160 LBS | HEART RATE: 72 BPM | DIASTOLIC BLOOD PRESSURE: 76 MMHG | HEIGHT: 62 IN | OXYGEN SATURATION: 98 % | BODY MASS INDEX: 29.44 KG/M2 | RESPIRATION RATE: 16 BRPM | TEMPERATURE: 98.1 F | SYSTOLIC BLOOD PRESSURE: 166 MMHG

## 2025-01-07 DIAGNOSIS — S80.01XA CONTUSION OF RIGHT KNEE, INITIAL ENCOUNTER: ICD-10-CM
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
## 2025-01-07 DIAGNOSIS — S09.90XA CLOSED HEAD INJURY, INITIAL ENCOUNTER: Primary | ICD-10-CM

## 2025-01-07 PROCEDURE — 73562 X-RAY EXAM OF KNEE 3: CPT

## 2025-01-07 PROCEDURE — 70450 CT HEAD/BRAIN W/O DYE: CPT

## 2025-01-07 PROCEDURE — 72125 CT NECK SPINE W/O DYE: CPT

## 2025-01-07 PROCEDURE — 99284 EMERGENCY DEPT VISIT MOD MDM: CPT
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
## 2025-01-07 NOTE — ED NOTES
Pt to ED from [facility] assisted living via [facility] ems. EMS called for fall. Pt was transferring from wheelchair to dinner chair and fell. Pt denies loc. Pt on eliquis. Pt right knee pain, left forehead abrasion. Pt has previous injury to right foot. Pt in walking boot on right side, facility reports pt right foot has been turned outward and has had to drag boot.

## 2025-01-08 ENCOUNTER — TELEPHONE (OUTPATIENT)
Dept: RADIATION ONCOLOGY | Facility: CLINIC | Age: 89
End: 2025-01-08
Payer: MEDICARE

## 2025-01-08 NOTE — TELEPHONE ENCOUNTER
GEOVANNIM on daughter-in laws phone asking her to call office back to reschedule PT's follow up visit with  on 1/21 at EP due it still being closed.

## 2025-01-13 ENCOUNTER — TELEPHONE (OUTPATIENT)
Dept: RADIATION ONCOLOGY | Facility: HOSPITAL | Age: 89
End: 2025-01-13
Payer: MEDICARE

## 2025-01-13 NOTE — TELEPHONE ENCOUNTER
PT called regarding message about  no longer being with [facility] and had questions about her follow up with him on 1/22. Rescheduled PT to see  on 1/23 at 1142

## 2025-01-14 ENCOUNTER — HOSPITAL ENCOUNTER (OUTPATIENT)
Dept: CT IMAGING | Facility: HOSPITAL | Age: 89
Discharge: HOME | End: 2025-01-14
Admitting: RADIOLOGY
Payer: MEDICARE

## 2025-01-14 DIAGNOSIS — R91.8 LUNG NODULES: ICD-10-CM

## 2025-01-14 PROCEDURE — 71250 CT THORAX DX C-: CPT

## 2025-01-22 ENCOUNTER — TELEPHONE (OUTPATIENT)
Dept: RADIATION ONCOLOGY | Facility: HOSPITAL | Age: 89
End: 2025-01-22
Payer: MEDICARE

## 2025-01-22 NOTE — TELEPHONE ENCOUNTER
Spoke with Daughter in law and confirmed PT's appt with [facility] on 1/23 at [facility]

## 2025-01-23 ENCOUNTER — OFFICE VISIT (OUTPATIENT)
Dept: RADIATION ONCOLOGY | Facility: HOSPITAL | Age: 89
End: 2025-01-23
Payer: MEDICARE

## 2025-01-23 VITALS — HEART RATE: 67 BPM | DIASTOLIC BLOOD PRESSURE: 77 MMHG | OXYGEN SATURATION: 95 % | SYSTOLIC BLOOD PRESSURE: 135 MMHG

## 2025-01-23 DIAGNOSIS — R91.1 LEFT LOWER LOBE PULMONARY NODULE: Primary | ICD-10-CM

## 2025-01-23 PROCEDURE — G0463 HOSPITAL OUTPT CLINIC VISIT: HCPCS | Performed by: RADIOLOGY

## 2025-01-23 NOTE — PROGRESS NOTES
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  She returns today for follow up now 4 months out from completion of her stereotactic radiation therapy to the right lung.  She completed a dose of 50Gy in 5 fractions on 9/9/24.  She has been doing well since she completed radiation.     She had a CT chest on 1/14/25 which showed the right lower lobe pulmonary nodule along the superior margin of an air cyst/bleb measures 7 mm which has decreased in size from 12 mmon the previous exam. No new nodules.    She is doing well.  She denies any increased soa or cough.     Review of Systems   Constitutional:  Positive for fatigue.   Respiratory: Negative.     Psychiatric/Behavioral: Negative.         Past Medical History:   Diagnosis Date    Arthritis     DVT of leg (deep venous thrombosis)     Low back pain     Osteopenia          Past Surgical History:   Procedure Laterality Date    BACK SURGERY      BRAIN SURGERY      CATARACT EXTRACTION      COLONOSCOPY      HYSTERECTOMY      ROTATOR CUFF REPAIR Right     SPINE SURGERY      THYROID SURGERY      TONSILLECTOMY           Social History     Socioeconomic History    Marital status:    Tobacco Use    Smoking status: Every Day     Current packs/day: 0.50     Average packs/day: 0.5 packs/day for 75.1 years (37.5 ttl pk-yrs)     Types: Cigarettes     Start date: 1/1/1949     Passive exposure: Never    Smokeless tobacco: Never   Vaping Use    Vaping status: Never Used    Passive vaping exposure: Yes   Substance and Sexual Activity    Alcohol use: Yes     Alcohol/week: 6.0 standard drinks of alcohol     Types: 4 Glasses of wine, 2 Cans of beer per week     Comment: socially    Drug use: Never    Sexual activity: Not Currently         Family History   Problem Relation Age of Onset    Dementia Mother           Objective    Physical Exam  Constitutional:       Appearance: Normal  appearance.   Pulmonary:      Effort: Pulmonary effort is normal. No respiratory distress.      Breath sounds: Normal breath sounds. No wheezing.   Neurological:      Mental Status: She is alert.         Current Outpatient Medications on File Prior to Visit   Medication Sig Dispense Refill    calcium carbonate (OS-DARIAN) 600 MG tablet Take 1 tablet by mouth Daily. 90 tablet 3    Eliquis 5 MG tablet tablet TAKE 1 TABLET BY MOUTH TWICE  DAILY 180 tablet 3    Emollient (Eucerin original healing lotion) lotion Apply 1 Application topically to the appropriate area as directed Daily. 30 mL 0    latanoprost (XALATAN) 0.005 % ophthalmic solution Administer 1 drop to both eyes Daily.      oxyCODONE-acetaminophen (PERCOCET)  MG per tablet Take 1 tablet by mouth Every 4 (Four) Hours As Needed for Moderate Pain. 18 tablet 0    sennosides-docusate (PERICOLACE) 8.6-50 MG per tablet Take 2 tablets by mouth 2 (Two) Times a Day.      vitamin D3 125 MCG (5000 UT) capsule capsule Take 1 capsule by mouth Daily. 90 capsule 3     No current facility-administered medications on file prior to visit.       ALLERGIES:  No Known Allergies    There were no vitals taken for this visit.          No data to display                  Assessment & Plan     89 year old female with cT1N0 resumed right lung cancer now 4 months out from stereotactic radiation therapy.  I have personally reviewed her most recent CT chest as well as her radiation treatment plan which shows regression of the treated lesion.  I will order a CT chest in 64 months and see her back one week later.     I personally spent greater than 35 minutes today assessing, managing, discussing and documenting my visit with the patient. That time includes review of records, imaging and pathology reports, obtaining my own history, performing a medically appropriate evaluation, counseling and educating the patient, discussing goals, logistics, alternatives and risks of my recommendations,  surveillance options and potential outcomes. It also includes the time documenting the clinical information in the EMR and communicating my recommendations to the other involved physicians.              Thank you very much for allowing me to participate in the care of this very pleasant patient.    Sincerely,      Nona Klein MD

## 2025-01-28 ENCOUNTER — OFFICE VISIT (OUTPATIENT)
Dept: FAMILY MEDICINE CLINIC | Facility: CLINIC | Age: 89
End: 2025-01-28
Payer: MEDICARE

## 2025-01-28 VITALS
SYSTOLIC BLOOD PRESSURE: 140 MMHG | HEART RATE: 73 BPM | DIASTOLIC BLOOD PRESSURE: 90 MMHG | OXYGEN SATURATION: 96 % | TEMPERATURE: 97.1 F

## 2025-01-28 DIAGNOSIS — M25.511 ACUTE PAIN OF RIGHT SHOULDER: ICD-10-CM

## 2025-01-28 DIAGNOSIS — R32 URINARY INCONTINENCE, UNSPECIFIED TYPE: ICD-10-CM

## 2025-01-28 DIAGNOSIS — L03.115 CELLULITIS OF RIGHT LOWER EXTREMITY: Primary | ICD-10-CM

## 2025-01-28 DIAGNOSIS — S99.921D INJURY OF RIGHT FOOT, SUBSEQUENT ENCOUNTER: ICD-10-CM

## 2025-01-28 LAB
BILIRUB BLD-MCNC: NEGATIVE MG/DL
CLARITY, POC: ABNORMAL
COLOR UR: ABNORMAL
EXPIRATION DATE: ABNORMAL
GLUCOSE UR STRIP-MCNC: NEGATIVE MG/DL
KETONES UR QL: NEGATIVE
LEUKOCYTE EST, POC: NEGATIVE
Lab: ABNORMAL
NITRITE UR-MCNC: POSITIVE MG/ML
PH UR: 6 [PH] (ref 5–8)
PROT UR STRIP-MCNC: ABNORMAL MG/DL
RBC # UR STRIP: ABNORMAL /UL
SP GR UR: 1.02 (ref 1–1.03)
UROBILINOGEN UR QL: NORMAL

## 2025-01-28 PROCEDURE — 1159F MED LIST DOCD IN RCRD: CPT | Performed by: NURSE PRACTITIONER

## 2025-01-28 PROCEDURE — 1160F RVW MEDS BY RX/DR IN RCRD: CPT | Performed by: NURSE PRACTITIONER

## 2025-01-28 PROCEDURE — 1125F AMNT PAIN NOTED PAIN PRSNT: CPT | Performed by: NURSE PRACTITIONER

## 2025-01-28 PROCEDURE — 81003 URINALYSIS AUTO W/O SCOPE: CPT | Performed by: NURSE PRACTITIONER

## 2025-01-28 PROCEDURE — 99214 OFFICE O/P EST MOD 30 MIN: CPT | Performed by: NURSE PRACTITIONER

## 2025-01-28 RX ORDER — CEPHALEXIN 500 MG/1
500 CAPSULE ORAL 3 TIMES DAILY
Qty: 30 CAPSULE | Refills: 0 | Status: SHIPPED | OUTPATIENT
Start: 2025-01-28

## 2025-01-28 NOTE — PROGRESS NOTES
"Chief Complaint  Leg Pain (Pain right leg with swelling)    Subjective        Noemi Forbes presents to [facility]  History of Present Illness  Patient states she is here to discuss right leg swelling. She broke her foot in November and has been wearing a walking boot. She was not a candidate for surgery, even though patient reports it was broke in three places. She states about one week ago she noticed swelling. She is taking her eliquis as prescribed. She admits to getting frequent cellulitis. She did injure it this morning as well. She had seen Dr. Santana for the foot.     She is having some increased right shoulder pain and trouble moving for a few months. She has had an issue with the shoulder in the past with rotator cuff repair approx 10 years ago. She would like to see a specialist for further treatment, specifically an injection in her joint for pain.     She is concerned for chronic urinary incontinence. She is wearing briefs due to this. She is not feeling the urge to go so she is using the restroom on herself without knowing. She feels this problem is worsening. Denies dysuria, fever.   Objective   Vital Signs:  /90 (BP Location: Left arm, Patient Position: Sitting, Cuff Size: Adult)   Pulse 73   Temp 97.1 °F (36.2 °C) (Temporal)   SpO2 96%   Estimated body mass index is 29.26 kg/m² as calculated from the following:    Height as of 1/7/25: 157.5 cm (62\").    Weight as of 1/7/25: 72.6 kg (160 lb).            Physical Exam  Constitutional:       Appearance: Normal appearance. She is overweight.   HENT:      Head: Normocephalic.      Nose: Nose normal.   Eyes:      Extraocular Movements: Extraocular movements intact.      Pupils: Pupils are equal, round, and reactive to light.   Cardiovascular:      Rate and Rhythm: Normal rate and regular rhythm.      Heart sounds: Murmur heard.   Pulmonary:      Effort: Pulmonary effort is normal.      Breath sounds: Normal breath " sounds.   Musculoskeletal:         General: Normal range of motion.      Cervical back: Normal range of motion.      Right lower le+ Pitting Edema present.      Left lower leg: No edema.   Feet:      Right foot:      Skin integrity: Blister present.      Toenail Condition: Right toenails are abnormally thick. Fungal disease present.  Skin:     General: Skin is warm and dry.             Comments: Bilateral lower leg redness and discoloration   Neurological:      General: No focal deficit present.      Mental Status: She is alert and oriented to person, place, and time.   Psychiatric:         Mood and Affect: Mood normal.        Result Review :                Assessment and Plan   Diagnoses and all orders for this visit:    1. Cellulitis of right lower extremity (Primary)  -     cephalexin (Keflex) 500 MG capsule; Take 1 capsule by mouth 3 (Three) Times a Day.  Dispense: 30 capsule; Refill: 0    2. Injury of right foot, subsequent encounter  -     Ambulatory Referral to Podiatry; Future    3. Acute pain of right shoulder  -     Ambulatory Referral to Orthopedic Surgery; Future    4. Urinary incontinence, unspecified type  -     POCT urinalysis dipstick, automated  -     Ambulatory Referral to Urology; Future  -     Urine Culture - Urine, Urine, Clean Catch; Future    Send urine for culture; treat with keflex for now along with possible cellulitis. If culture indicates, will add a different antibiotic for urinary bacteria.          Follow Up   Return if symptoms worsen or fail to improve.  Patient was given instructions and counseling regarding her condition or for health maintenance advice. Please see specific information pulled into the AVS if appropriate.

## 2025-01-29 ENCOUNTER — TELEPHONE (OUTPATIENT)
Dept: FAMILY MEDICINE CLINIC | Facility: CLINIC | Age: 89
End: 2025-01-29
Payer: MEDICARE

## 2025-01-29 NOTE — TELEPHONE ENCOUNTER
LMTCB    **HUB/FO** MAY RELAY MESSAGE BELOW        ----- Message from Lindadarell Olmstead sent at 1/28/2025 11:04 AM EST -----    Let patient know her urine showed some concerns so I am sending for culture to see if any bacteria grow. For now start the antibiotic for the foot and we can change or add another medication if needed for the urine. Thanks.

## 2025-02-03 NOTE — TELEPHONE ENCOUNTER
Patient stated she received a call on Saturday and is returning the call about her results. I relayed Linda's message from 1/29. Does the patient need to come back in and leave another urine sample for the culture? Patient would like a call at [phone].

## 2025-02-03 NOTE — TELEPHONE ENCOUNTER
Patient stated she received a call on Saturday and is returning the call about her results. I relayed Linda's message from 1/29. Does the patient need to come back in and leave another urine sample for the culture? Patient would like a call at [phone].       [facility]    **HUB/FO** MAY RELAY MESSAGE BELOW    PATIENT DOES NOT NEED TO COME INTO OFFICE TO LEAVE ANOTHER URINE SAMPLE, URINE CULTURE WAS SENT OUT THE DAY PATIENT WAS HERE - RESULTS ARE PENDING.

## 2025-02-04 ENCOUNTER — TELEPHONE (OUTPATIENT)
Dept: FAMILY MEDICINE CLINIC | Facility: CLINIC | Age: 89
End: 2025-02-04

## 2025-02-04 DIAGNOSIS — F17.210 CIGARETTE NICOTINE DEPENDENCE WITHOUT COMPLICATION: Primary | ICD-10-CM

## 2025-02-04 LAB
BACTERIA UR CULT: ABNORMAL
BACTERIA UR CULT: ABNORMAL
OTHER ANTIBIOTIC SUSC ISLT: ABNORMAL

## 2025-02-04 RX ORDER — NICOTINE 21 MG/24HR
1 PATCH, TRANSDERMAL 24 HOURS TRANSDERMAL EVERY 24 HOURS
Qty: 30 PATCH | Refills: 0 | Status: SHIPPED | OUTPATIENT
Start: 2025-02-04

## 2025-02-04 NOTE — TELEPHONE ENCOUNTER
Caller: martin(Elizabeth)quinn    Relationship: Emergency Contact    Best call back number: [phone]    What medication are you requesting: NICOTINE PATCHES    What are your current symptoms: STATED SHE CAN NOT SMOKE AT THE ASSISTED LIVING    How long have you been experiencing symptoms:     Have you had these symptoms before:    [] Yes  [x] No    Have you been treated for these symptoms before:   [] Yes  [x] No    If a prescription is needed, what is your preferred pharmacy and phone number: [facility] [id number] - [facility] [id number] [address]  AT [facility] - [phone] [facility] [phone] FX     Additional notes:

## 2025-02-05 NOTE — TELEPHONE ENCOUNTER
Informed patient and she said she didn't want them, I informed her to call pharmacy to cancel order.

## 2025-02-11 ENCOUNTER — OFFICE VISIT (OUTPATIENT)
Dept: ORTHOPEDIC SURGERY | Facility: CLINIC | Age: 89
End: 2025-02-11
Payer: MEDICARE

## 2025-02-11 VITALS — BODY MASS INDEX: 29.26 KG/M2 | TEMPERATURE: 97.3 F | HEIGHT: 62 IN

## 2025-02-11 DIAGNOSIS — M25.511 CHRONIC RIGHT SHOULDER PAIN: ICD-10-CM

## 2025-02-11 DIAGNOSIS — G89.29 CHRONIC RIGHT SHOULDER PAIN: ICD-10-CM

## 2025-02-11 DIAGNOSIS — M12.811 ROTATOR CUFF ARTHROPATHY OF RIGHT SHOULDER: Primary | ICD-10-CM

## 2025-02-11 RX ADMIN — METHYLPREDNISOLONE ACETATE 80 MG: 80 INJECTION, SUSPENSION INTRA-ARTICULAR; INTRALESIONAL; INTRAMUSCULAR; SOFT TISSUE at 14:46

## 2025-02-11 RX ADMIN — LIDOCAINE HYDROCHLORIDE 2 ML: 10 INJECTION, SOLUTION EPIDURAL; INFILTRATION; INTRACAUDAL; PERINEURAL at 14:46

## 2025-02-11 NOTE — PROGRESS NOTES
[facility]              New Problem      Patient Name: Noemi Forbes  : 1935  Primary Care Physician: Villa Madrigal MD        Chief Complaint:  Right shoulder pain    HPI:   Noemi Forbes is a 89 y.o. year old who presents today for evaluation. I last saw her nearly 2 years ago for both of her shoulders. She has a history of a prior RTC surgery on the right. She reports her pain has been persistent and is really bothering her- especially the right one. Her motion has become worse as well. She is now in an AL facility. She is seeing Dr. Santana about a R ankle fracture and requested to see one of our doctors for further follow up care. She is wearing a short CAM boot.  History of Present Illness      Past Medical/Surgical, Social and Family History:  I have reviewed and/or updated pertinent history as noted in the medical record including:  Past Medical History:   Diagnosis Date    Arthritis     DVT of leg (deep venous thrombosis)     Low back pain     Osteopenia      Past Surgical History:   Procedure Laterality Date    BACK SURGERY      BRAIN SURGERY      CATARACT EXTRACTION      COLONOSCOPY      HYSTERECTOMY      ROTATOR CUFF REPAIR Right     SPINE SURGERY      THYROID SURGERY      TONSILLECTOMY       Social History     Occupational History    Not on file   Tobacco Use    Smoking status: Every Day     Current packs/day: 0.50     Average packs/day: 0.5 packs/day for 75.1 years (37.6 ttl pk-yrs)     Types: Cigarettes     Start date: 1950     Passive exposure: Never    Smokeless tobacco: Never   Vaping Use    Vaping status: Never Used    Passive vaping exposure: Yes   Substance and Sexual Activity    Alcohol use: Yes     Alcohol/week: 6.0 standard drinks of alcohol     Types: 4 Glasses of wine, 2 Cans of beer per week     Comment: socially    Drug use: Never    Sexual activity: Not Currently          Allergies: No Known Allergies    Medications:   Home Medications:  Current Outpatient  Medications on File Prior to Visit   Medication Sig    calcium carbonate (OS-DARIAN) 600 MG tablet Take 1 tablet by mouth Daily.    Eliquis 5 MG tablet tablet TAKE 1 TABLET BY MOUTH TWICE  DAILY    Emollient (Eucerin original healing lotion) lotion Apply 1 Application topically to the appropriate area as directed Daily.    latanoprost (XALATAN) 0.005 % ophthalmic solution Administer 1 drop to both eyes Daily.    nicotine (NICODERM CQ) 14 MG/24HR patch Place 1 patch on the skin as directed by provider Daily.    sennosides-docusate (PERICOLACE) 8.6-50 MG per tablet Take 2 tablets by mouth 2 (Two) Times a Day.    vitamin D3 125 MCG (5000 UT) capsule capsule Take 1 capsule by mouth Daily.    cephalexin (Keflex) 500 MG capsule Take 1 capsule by mouth 3 (Three) Times a Day. (Patient not taking: Reported on 2/11/2025)     No current facility-administered medications on file prior to visit.         ROS:  ROS negative except as listed in the HPI.    Physical Exam:   89 y.o. female  Body mass index is 29.26 kg/m².,    Vitals:    02/11/25 1429   Temp: 97.3 °F (36.3 °C)     General: Alert, cooperative, appears well and in no observable distress. Appears stated age and BMI as listed above.  HEENT: Normocephalic, atraumatic on external visual inspection.  CV: No significant peripheral edema.  Respiratory: Normal respiratory effort.  Skin: Warm & well perfused; appropriate skin turgor.  Psych: Appropriate mood & affect.  Neuro: Gross sensation and motor intact in affected extremity/extremities.  Vascular: Peripheral pulses palpable in affected extremity/extremities.   Physical Exam      MSK Exam:  Physical exam of the R shoulder is markedly limited. She has no redness, no warmth, no significant swelling or effusion. ROM is limited in all planes and strength is 3/5 globally with isometric testing of the rotator cuff. Physical exam of the distal extremity otherwise unremarkable.     Radiology:    Results      The following X-rays were  ordered/reviewed today to evaluate the patient's symptoms: Shoulder: AP, Scapular Y and Axillary Lateral of right shoulder(s) show severe degenerative changes associated with chronic rotator cuff arthropathy. High riding humeral head. Progression noted when compared with prior films from 2 years prior.    Procedure:   See Procedure Note: The potential risks and benefits of performing a diagnostic and therapeutic injection were discussed with the patient prior to procedure. Risks include, but are not limited to infection, swelling, transient increase in pain, bleeding, bruising. Patient was advised that injections are a diagnostic and therapeutic tool meaning they may not alleviate symptoms at all, or may only provide partial or temporary relief. Injection precautions and aftercare discussed. and Patient is currently on anticoagulation and/or a blood thinning agent which poses an increased risk of more significant bleeding or bruising following an injection. While joint injections are generally well tolerated even on AC, the patient was counseled on this increased risk and advised to monitor for signs of bleeding with proper follow up instructions. Injection precautions and aftercare discussed.      [facility] [place]abs: N/A    Assessment & Plan:      ICD-10-CM ICD-9-CM   1. Rotator cuff arthropathy of right shoulder  M12.811 716.81   2. Chronic right shoulder pain  M25.511 719.41    G89.29 338.29     No orders of the defined types were placed in this encounter.    Orders Placed This Encounter   Procedures    Large Joint Arthrocentesis: R subacromial bursa    XR Shoulder 2+ View Right     Patient has long standing rotator cuff pathology and is not interested in anything surgical. She would like to try another injection today for pain relief. I am happy to do that and we can consider repeating this in 3 months if she gets good relief with the injection.     I will help her get an appointment with Dr. Dixon for her  ankle fracture. She should continue with her current treatment recommendations in the interim. I was able to review those in her record and discussed that with her today. (WBAT in boot with 4 week follow up)  Assessment & Plan      Continue with activity modifications as needed/discussed.  Continue ICE and/or HEAT PRN.  Recommend to continue activity as tolerated, focus on stretching and strengthening of the joint.    Focus on posture and body mechanics to improve/prevent symptoms.   Patient encouraged to call with questions or concerns prior to follow up.  Will discuss with attending as needed.  Consider additional referrals, work up and/or advanced imaging as indicated or if patient fails to respond to conservative care.    Return in about 3 months (around 5/11/2025) for Injection with TERRELL Ibrahim.         TERRELL Noel    Dictation software was used to complete a portion or all of this note.    Large Joint Arthrocentesis: R subacromial bursa  Date/Time: 2/11/2025 2:46 PM  Consent given by: patient  Site marked: site marked  Timeout: Immediately prior to procedure a time out was called to verify the correct patient, procedure, equipment, support staff and site/side marked as required   Supporting Documentation  Indications: pain   Procedure Details  Location: shoulder - R subacromial bursa  Preparation: Patient was prepped and draped in the usual sterile fashion  Needle gauge: 21G.  Approach: posterior  Medications administered: 80 mg methylPREDNISolone acetate 80 MG/ML; 2 mL lidocaine PF 1% 1 %  Patient tolerance: patient tolerated the procedure well with no immediate complications

## 2025-02-13 RX ORDER — METHYLPREDNISOLONE ACETATE 80 MG/ML
80 INJECTION, SUSPENSION INTRA-ARTICULAR; INTRALESIONAL; INTRAMUSCULAR; SOFT TISSUE
Status: COMPLETED | OUTPATIENT
Start: 2025-02-11 | End: 2025-02-11

## 2025-02-13 RX ORDER — LIDOCAINE HYDROCHLORIDE 10 MG/ML
2 INJECTION, SOLUTION EPIDURAL; INFILTRATION; INTRACAUDAL; PERINEURAL
Status: COMPLETED | OUTPATIENT
Start: 2025-02-11 | End: 2025-02-11

## 2025-02-20 ENCOUNTER — HOSPITAL ENCOUNTER (OUTPATIENT)
Facility: HOSPITAL | Age: 89
Setting detail: OBSERVATION
Discharge: NURSING, REHAB OR LONG-TERM CARE | End: 2025-02-24
Attending: EMERGENCY MEDICINE | Admitting: STUDENT IN AN ORGANIZED HEALTH CARE EDUCATION/TRAINING PROGRAM
Payer: MEDICARE

## 2025-02-20 ENCOUNTER — APPOINTMENT (OUTPATIENT)
Dept: GENERAL RADIOLOGY | Facility: HOSPITAL | Age: 89
End: 2025-02-20
Payer: MEDICARE

## 2025-02-20 ENCOUNTER — APPOINTMENT (OUTPATIENT)
Dept: CT IMAGING | Facility: HOSPITAL | Age: 89
End: 2025-02-20
Payer: MEDICARE

## 2025-02-20 DIAGNOSIS — R26.2 UNABLE TO WALK: ICD-10-CM

## 2025-02-20 DIAGNOSIS — M25.461 BILATERAL KNEE EFFUSIONS: ICD-10-CM

## 2025-02-20 DIAGNOSIS — M25.562 ACUTE PAIN OF BOTH KNEES: Primary | ICD-10-CM

## 2025-02-20 DIAGNOSIS — M25.561 ACUTE PAIN OF BOTH KNEES: Primary | ICD-10-CM

## 2025-02-20 DIAGNOSIS — R79.89 ELEVATED SERUM CREATININE: ICD-10-CM

## 2025-02-20 DIAGNOSIS — M25.462 BILATERAL KNEE EFFUSIONS: ICD-10-CM

## 2025-02-20 LAB
ALBUMIN SERPL-MCNC: 3.9 G/DL (ref 3.5–5.2)
ALBUMIN/GLOB SERPL: 1.1 G/DL
ALP SERPL-CCNC: 113 U/L (ref 39–117)
ALT SERPL W P-5'-P-CCNC: 8 U/L (ref 1–33)
ANION GAP SERPL CALCULATED.3IONS-SCNC: 8.7 MMOL/L (ref 5–15)
AST SERPL-CCNC: 16 U/L (ref 1–32)
BASOPHILS # BLD AUTO: 0.02 10*3/MM3 (ref 0–0.2)
BASOPHILS NFR BLD AUTO: 0.3 % (ref 0–1.5)
BILIRUB SERPL-MCNC: 1.1 MG/DL (ref 0–1.2)
BUN SERPL-MCNC: 24 MG/DL (ref 8–23)
BUN/CREAT SERPL: 20 (ref 7–25)
CALCIUM SPEC-SCNC: 9.2 MG/DL (ref 8.6–10.5)
CHLORIDE SERPL-SCNC: 109 MMOL/L (ref 98–107)
CO2 SERPL-SCNC: 27.3 MMOL/L (ref 22–29)
CREAT SERPL-MCNC: 1.2 MG/DL (ref 0.57–1)
DEPRECATED RDW RBC AUTO: 42.8 FL (ref 37–54)
EGFRCR SERPLBLD CKD-EPI 2021: 43.6 ML/MIN/1.73
EOSINOPHIL # BLD AUTO: 0.01 10*3/MM3 (ref 0–0.4)
EOSINOPHIL NFR BLD AUTO: 0.1 % (ref 0.3–6.2)
ERYTHROCYTE [DISTWIDTH] IN BLOOD BY AUTOMATED COUNT: 12.3 % (ref 12.3–15.4)
GLOBULIN UR ELPH-MCNC: 3.5 GM/DL
GLUCOSE SERPL-MCNC: 75 MG/DL (ref 65–99)
HCT VFR BLD AUTO: 44.8 % (ref 34–46.6)
HGB BLD-MCNC: 13.8 G/DL (ref 12–15.9)
IMM GRANULOCYTES # BLD AUTO: 0.03 10*3/MM3 (ref 0–0.05)
IMM GRANULOCYTES NFR BLD AUTO: 0.4 % (ref 0–0.5)
LYMPHOCYTES # BLD AUTO: 0.91 10*3/MM3 (ref 0.7–3.1)
LYMPHOCYTES NFR BLD AUTO: 13 % (ref 19.6–45.3)
MCH RBC QN AUTO: 29.2 PG (ref 26.6–33)
MCHC RBC AUTO-ENTMCNC: 30.8 G/DL (ref 31.5–35.7)
MCV RBC AUTO: 94.9 FL (ref 79–97)
MONOCYTES # BLD AUTO: 0.94 10*3/MM3 (ref 0.1–0.9)
MONOCYTES NFR BLD AUTO: 13.4 % (ref 5–12)
NEUTROPHILS NFR BLD AUTO: 5.08 10*3/MM3 (ref 1.7–7)
NEUTROPHILS NFR BLD AUTO: 72.8 % (ref 42.7–76)
NRBC BLD AUTO-RTO: 0 /100 WBC (ref 0–0.2)
PLATELET # BLD AUTO: 245 10*3/MM3 (ref 140–450)
PMV BLD AUTO: 10.3 FL (ref 6–12)
POTASSIUM SERPL-SCNC: 4.2 MMOL/L (ref 3.5–5.2)
PROT SERPL-MCNC: 7.4 G/DL (ref 6–8.5)
RBC # BLD AUTO: 4.72 10*6/MM3 (ref 3.77–5.28)
SODIUM SERPL-SCNC: 145 MMOL/L (ref 136–145)
WBC NRBC COR # BLD AUTO: 6.99 10*3/MM3 (ref 3.4–10.8)

## 2025-02-20 PROCEDURE — 80053 COMPREHEN METABOLIC PANEL: CPT | Performed by: EMERGENCY MEDICINE

## 2025-02-20 PROCEDURE — 85025 COMPLETE CBC W/AUTO DIFF WBC: CPT | Performed by: EMERGENCY MEDICINE

## 2025-02-20 PROCEDURE — 73700 CT LOWER EXTREMITY W/O DYE: CPT

## 2025-02-20 PROCEDURE — G0378 HOSPITAL OBSERVATION PER HR: HCPCS

## 2025-02-20 PROCEDURE — 99285 EMERGENCY DEPT VISIT HI MDM: CPT

## 2025-02-20 PROCEDURE — 73610 X-RAY EXAM OF ANKLE: CPT

## 2025-02-20 PROCEDURE — 73560 X-RAY EXAM OF KNEE 1 OR 2: CPT

## 2025-02-20 RX ORDER — FAMOTIDINE 20 MG/1
20 TABLET, FILM COATED ORAL 2 TIMES DAILY PRN
Status: DISCONTINUED | OUTPATIENT
Start: 2025-02-20 | End: 2025-02-24 | Stop reason: HOSPADM

## 2025-02-20 RX ORDER — POLYETHYLENE GLYCOL 3350 17 G/17G
17 POWDER, FOR SOLUTION ORAL DAILY PRN
Status: DISCONTINUED | OUTPATIENT
Start: 2025-02-20 | End: 2025-02-24 | Stop reason: HOSPADM

## 2025-02-20 RX ORDER — SODIUM CHLORIDE 0.9 % (FLUSH) 0.9 %
10 SYRINGE (ML) INJECTION AS NEEDED
Status: DISCONTINUED | OUTPATIENT
Start: 2025-02-20 | End: 2025-02-24 | Stop reason: HOSPADM

## 2025-02-20 RX ORDER — BISACODYL 10 MG
10 SUPPOSITORY, RECTAL RECTAL DAILY PRN
Status: DISCONTINUED | OUTPATIENT
Start: 2025-02-20 | End: 2025-02-24 | Stop reason: HOSPADM

## 2025-02-20 RX ORDER — SODIUM CHLORIDE 9 MG/ML
40 INJECTION, SOLUTION INTRAVENOUS AS NEEDED
Status: DISCONTINUED | OUTPATIENT
Start: 2025-02-20 | End: 2025-02-24 | Stop reason: HOSPADM

## 2025-02-20 RX ORDER — ACETAMINOPHEN 325 MG/1
650 TABLET ORAL EVERY 4 HOURS PRN
Status: DISCONTINUED | OUTPATIENT
Start: 2025-02-20 | End: 2025-02-24 | Stop reason: HOSPADM

## 2025-02-20 RX ORDER — HYDROCODONE BITARTRATE AND ACETAMINOPHEN 5; 325 MG/1; MG/1
1 TABLET ORAL ONCE
Status: COMPLETED | OUTPATIENT
Start: 2025-02-20 | End: 2025-02-20

## 2025-02-20 RX ORDER — BISACODYL 5 MG/1
5 TABLET, DELAYED RELEASE ORAL DAILY PRN
Status: DISCONTINUED | OUTPATIENT
Start: 2025-02-20 | End: 2025-02-24 | Stop reason: HOSPADM

## 2025-02-20 RX ORDER — ONDANSETRON 2 MG/ML
4 INJECTION INTRAMUSCULAR; INTRAVENOUS EVERY 6 HOURS PRN
Status: DISCONTINUED | OUTPATIENT
Start: 2025-02-20 | End: 2025-02-24 | Stop reason: HOSPADM

## 2025-02-20 RX ORDER — ACETAMINOPHEN 650 MG/1
650 SUPPOSITORY RECTAL EVERY 4 HOURS PRN
Status: DISCONTINUED | OUTPATIENT
Start: 2025-02-20 | End: 2025-02-24 | Stop reason: HOSPADM

## 2025-02-20 RX ORDER — ACETAMINOPHEN 160 MG/5ML
650 SOLUTION ORAL EVERY 4 HOURS PRN
Status: DISCONTINUED | OUTPATIENT
Start: 2025-02-20 | End: 2025-02-24 | Stop reason: HOSPADM

## 2025-02-20 RX ORDER — AMOXICILLIN 250 MG
2 CAPSULE ORAL 2 TIMES DAILY PRN
Status: DISCONTINUED | OUTPATIENT
Start: 2025-02-20 | End: 2025-02-24 | Stop reason: HOSPADM

## 2025-02-20 RX ORDER — SODIUM CHLORIDE 0.9 % (FLUSH) 0.9 %
10 SYRINGE (ML) INJECTION EVERY 12 HOURS SCHEDULED
Status: DISCONTINUED | OUTPATIENT
Start: 2025-02-20 | End: 2025-02-24 | Stop reason: HOSPADM

## 2025-02-20 RX ADMIN — DICLOFENAC SODIUM 4 G: 10 GEL TOPICAL at 19:30

## 2025-02-20 RX ADMIN — HYDROCODONE BITARTRATE AND ACETAMINOPHEN 1 TABLET: 5; 325 TABLET ORAL at 18:49

## 2025-02-20 RX ADMIN — Medication 10 ML: at 23:07
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
## 2025-02-20 NOTE — ED NOTES
Ems arrives via EMS for complaints of a fall last night after states she fell onto her right leg/ knee. EMS states no obvious deformity to knee. Pt unable to ambulate d/t pain.

## 2025-02-21 LAB
ALBUMIN SERPL-MCNC: 3.2 G/DL (ref 3.5–5.2)
ALBUMIN/GLOB SERPL: 0.9 G/DL
ALP SERPL-CCNC: 96 U/L (ref 39–117)
ALT SERPL W P-5'-P-CCNC: 8 U/L (ref 1–33)
ANION GAP SERPL CALCULATED.3IONS-SCNC: 7.4 MMOL/L (ref 5–15)
AST SERPL-CCNC: 14 U/L (ref 1–32)
BILIRUB SERPL-MCNC: 1.4 MG/DL (ref 0–1.2)
BUN SERPL-MCNC: 21 MG/DL (ref 8–23)
BUN/CREAT SERPL: 20.2 (ref 7–25)
CALCIUM SPEC-SCNC: 9 MG/DL (ref 8.6–10.5)
CHLORIDE SERPL-SCNC: 109 MMOL/L (ref 98–107)
CO2 SERPL-SCNC: 26.6 MMOL/L (ref 22–29)
CREAT SERPL-MCNC: 1.04 MG/DL (ref 0.57–1)
DEPRECATED RDW RBC AUTO: 41.5 FL (ref 37–54)
EGFRCR SERPLBLD CKD-EPI 2021: 51.8 ML/MIN/1.73
ERYTHROCYTE [DISTWIDTH] IN BLOOD BY AUTOMATED COUNT: 12 % (ref 12.3–15.4)
GLOBULIN UR ELPH-MCNC: 3.4 GM/DL
GLUCOSE SERPL-MCNC: 89 MG/DL (ref 65–99)
HCT VFR BLD AUTO: 40 % (ref 34–46.6)
HGB BLD-MCNC: 12.7 G/DL (ref 12–15.9)
MCH RBC QN AUTO: 30.2 PG (ref 26.6–33)
MCHC RBC AUTO-ENTMCNC: 31.8 G/DL (ref 31.5–35.7)
MCV RBC AUTO: 95.2 FL (ref 79–97)
PLATELET # BLD AUTO: 217 10*3/MM3 (ref 140–450)
PMV BLD AUTO: 10.2 FL (ref 6–12)
POTASSIUM SERPL-SCNC: 4.3 MMOL/L (ref 3.5–5.2)
PROT SERPL-MCNC: 6.6 G/DL (ref 6–8.5)
RBC # BLD AUTO: 4.2 10*6/MM3 (ref 3.77–5.28)
SODIUM SERPL-SCNC: 143 MMOL/L (ref 136–145)
WBC NRBC COR # BLD AUTO: 5.4 10*3/MM3 (ref 3.4–10.8)

## 2025-02-21 PROCEDURE — G0378 HOSPITAL OBSERVATION PER HR: HCPCS

## 2025-02-21 PROCEDURE — 97530 THERAPEUTIC ACTIVITIES: CPT

## 2025-02-21 PROCEDURE — 97166 OT EVAL MOD COMPLEX 45 MIN: CPT

## 2025-02-21 PROCEDURE — 97162 PT EVAL MOD COMPLEX 30 MIN: CPT

## 2025-02-21 PROCEDURE — 80053 COMPREHEN METABOLIC PANEL: CPT | Performed by: NURSE PRACTITIONER

## 2025-02-21 PROCEDURE — 97110 THERAPEUTIC EXERCISES: CPT

## 2025-02-21 PROCEDURE — 85027 COMPLETE CBC AUTOMATED: CPT | Performed by: NURSE PRACTITIONER

## 2025-02-21 RX ORDER — CHOLECALCIFEROL (VITAMIN D3) 25 MCG
5000 TABLET ORAL DAILY
Status: DISCONTINUED | OUTPATIENT
Start: 2025-02-21 | End: 2025-02-24 | Stop reason: HOSPADM

## 2025-02-21 RX ORDER — AMOXICILLIN 250 MG
2 CAPSULE ORAL 2 TIMES DAILY
Status: DISCONTINUED | OUTPATIENT
Start: 2025-02-21 | End: 2025-02-24 | Stop reason: HOSPADM

## 2025-02-21 RX ORDER — OXYCODONE HYDROCHLORIDE 5 MG/1
5 TABLET ORAL EVERY 6 HOURS PRN
Status: DISCONTINUED | OUTPATIENT
Start: 2025-02-21 | End: 2025-02-24 | Stop reason: HOSPADM

## 2025-02-21 RX ORDER — ACETAMINOPHEN 500 MG
1000 TABLET ORAL EVERY 8 HOURS
Status: DISPENSED | OUTPATIENT
Start: 2025-02-21 | End: 2025-02-24

## 2025-02-21 RX ORDER — CALCIUM CARBONATE 500(1250)
500 TABLET ORAL DAILY
Status: DISCONTINUED | OUTPATIENT
Start: 2025-02-21 | End: 2025-02-24 | Stop reason: HOSPADM

## 2025-02-21 RX ADMIN — Medication 5 MG: at 20:56

## 2025-02-21 RX ADMIN — Medication 10 ML: at 09:13

## 2025-02-21 RX ADMIN — SENNOSIDES AND DOCUSATE SODIUM 2 TABLET: 50; 8.6 TABLET ORAL at 20:56

## 2025-02-21 RX ADMIN — ACETAMINOPHEN 1000 MG: 500 TABLET, FILM COATED ORAL at 20:56

## 2025-02-21 RX ADMIN — Medication 500 MG: at 20:56

## 2025-02-21 RX ADMIN — Medication 10 ML: at 20:57

## 2025-02-21 RX ADMIN — ACETAMINOPHEN 1000 MG: 500 TABLET, FILM COATED ORAL at 06:05

## 2025-02-21 RX ADMIN — Medication 5000 UNITS: at 09:12

## 2025-02-21 NOTE — PLAN OF CARE
Goal Outcome Evaluation:  Plan of Care Reviewed With: patient           Outcome Evaluation: Patient was admitted from ED last night, with complaints of right leg/knee pain secondary to fall from [facility]. Pt is unable to ambulate d/t pain. She is Alert & oriented, sometimes forgetful. Tolerating room air. VSS. on cardiac healthy heart diet. No obvious fracture seen on XR and CT of lower extremity Right. For IP ortho sx consult for further eval and mgt.
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
## 2025-02-21 NOTE — CONSULTS
Orthopaedic Consultation      Patient: Noemi Forbes    Date of Admission: 2/20/2025  5:55 PM    YOB: 1935    Medical Record Number: [id number]    Attending Physician:  Amish Seals*    Consulting Physician:  Kim Dee PA-C        Chief Complaints: Right knee pain    History of Present Illness: 89 y.o. female admitted to [facility] with right knee pain. I was consulted for further evaluation and treatment. She reports having fallen onto her right knee at home last night. She was unable to bear weight and was brought to the emergency department. She denies hitting her head or any loss of consciousness. She reports she is feeling a bit better today but is still unable to bear weight. She denies any previous injury to the right knee. She is currently on Eliquis.    Allergies: No Known Allergies    Medications:   Home Medications:  Medications Prior to Admission   Medication Sig Dispense Refill Last Dose/Taking    calcium carbonate (OS-DARIAN) 600 MG tablet Take 1 tablet by mouth Daily. 90 tablet 3     cephalexin (Keflex) 500 MG capsule Take 1 capsule by mouth 3 (Three) Times a Day. (Patient not taking: Reported on 2/11/2025) 30 capsule 0     Eliquis 5 MG tablet tablet TAKE 1 TABLET BY MOUTH TWICE  DAILY 180 tablet 3     Emollient (Eucerin original healing lotion) lotion Apply 1 Application topically to the appropriate area as directed Daily. 30 mL 0     latanoprost (XALATAN) 0.005 % ophthalmic solution Administer 1 drop to both eyes Daily.       nicotine (NICODERM CQ) 14 MG/24HR patch Place 1 patch on the skin as directed by provider Daily. 30 patch 0     sennosides-docusate (PERICOLACE) 8.6-50 MG per tablet Take 2 tablets by mouth 2 (Two) Times a Day.       vitamin D3 125 MCG (5000 UT) capsule capsule Take 1 capsule by mouth Daily. 90 capsule 3        Current Medications:  Scheduled Meds:acetaminophen, 1,000 mg, Oral, Q8H  calcium carbonate (oyster shell), 500 mg, Oral,  Daily  cholecalciferol, 5,000 Units, Oral, Daily  sennosides-docusate, 2 tablet, Oral, BID  sodium chloride, 10 mL, Intravenous, Q12H      Continuous Infusions:   PRN Meds:.  acetaminophen **OR** acetaminophen **OR** acetaminophen    senna-docusate sodium **AND** polyethylene glycol **AND** bisacodyl **AND** bisacodyl    famotidine    melatonin    ondansetron    oxyCODONE    sodium chloride    sodium chloride    Past Medical History:   Diagnosis Date    Arthritis     DVT of leg (deep venous thrombosis)     Low back pain     Osteopenia      Past Surgical History:   Procedure Laterality Date    BACK SURGERY      BRAIN SURGERY      CATARACT EXTRACTION      COLONOSCOPY      HYSTERECTOMY      ROTATOR CUFF REPAIR Right     SPINE SURGERY      THYROID SURGERY      TONSILLECTOMY       Social History     Occupational History    Not on file   Tobacco Use    Smoking status: Every Day     Current packs/day: 0.50     Average packs/day: 0.5 packs/day for 75.1 years (37.6 ttl pk-yrs)     Types: Cigarettes     Start date: 1/1/1950     Passive exposure: Never    Smokeless tobacco: Never   Vaping Use    Vaping status: Never Used    Passive vaping exposure: Yes   Substance and Sexual Activity    Alcohol use: Yes     Alcohol/week: 6.0 standard drinks of alcohol     Types: 4 Glasses of wine, 2 Cans of beer per week     Comment: socially    Drug use: Never    Sexual activity: Not Currently      Social History     Social History Narrative    Not on file     Family History   Problem Relation Age of Onset    Dementia Mother          Review of Systems:   No other pertinent positives or negatives other than what is mentioned in the HPI and below.  Constitutional: Negative for fatigue, fever, or weight loss  HEENT: No active headache.  Pulmonary: Patient denies SOA.  Cardiovascular: Patient denies any chest pain.  Gastrointestinal:  Patient denies active vomiting or diarrhea.  Musculoskeletal: Positive for right knee pain.  Neurological:  Patient denies active dizziness or loss of consciousness.  Skin: Patient denies any active bleeding.    Vital signs in last 24 hours:  Temp:  [98 °F (36.7 °C)-98.1 °F (36.7 °C)] 98 °F (36.7 °C)  Heart Rate:  [66-86] 71  Resp:  [16-18] 16  BP: ()/(41-78) 122/78  Vitals:    02/20/25 2151 02/20/25 2154 02/20/25 2231 02/21/25 0547   BP: 107/64  107/65 122/78   BP Location:   Left arm Left arm   Patient Position:   Lying Lying   Pulse: 68 82 68 71   Resp:   16 16   Temp:   98 °F (36.7 °C) 98 °F (36.7 °C)   TempSrc:   Oral Oral   SpO2:  91% 92% 92%          Physical Exam: 89 y.o. female         General Appearance:  Alert, cooperative, in no acute distress    HEENT:    Atraumatic, Pupils are equal   Neck:   Cervical spine midline, no appreciable JVD   Lungs:     Breathing non-labored and chest rise symmetric    Heart:   Abdomen:     Rectal:       Extremities:   Pulses  Neurovascular:   Skin:   Musculoskeletal:      Pulse regular    Soft, Non-tender or distended    Deferred        No clubbing, cyanosis, or edema    Intact    Cranial nerves 2 - 12 grossly intact, sensation intact    No skin lesions    Examination of the right knee reveals intact skin with no erythema, bruising, or signs of infection. Mild swelling of the anterior knee. Mild tenderness to palpation along the medial and lateral aspect of the knee. Knee range of motion limited due to pain. Ankle and foot range of motion normal. Neurovascularly intact distally.     Diagnostic Tests:    Results from last 7 days   Lab Units 02/21/25  0534   WBC 10*3/mm3 5.40   HEMOGLOBIN g/dL 12.7   HEMATOCRIT % 40.0   PLATELETS 10*3/mm3 217     Results from last 7 days   Lab Units 02/21/25  0534   SODIUM mmol/L 143   POTASSIUM mmol/L 4.3   CHLORIDE mmol/L 109*   CO2 mmol/L 26.6   BUN mg/dL 21   CREATININE mg/dL 1.04*   GLUCOSE mg/dL 89   CALCIUM mg/dL 9.0             Assessment:    Bilateral knee pain    Back pain, chronic    Chronic venous insufficiency    Chronic deep  vein thrombosis (DVT)    DNR (do not resuscitate)    Stage 3a chronic kidney disease        Plan:    Xray and CT scan of the right knee show no acute abnormalities.  Will place patient in a knee immobilizer brace to the right knee. Would like patient to wear when ambulating. Can take off when lying in bed.  Continue physical therapy working on mobility and range of motion.  WBAT on right lower extremity.  Continue with Tylenol as needed for pain.  Patient advised to follow up in the outpatient office with Dr. Kumar in 2 weeks for reevaluation.  Will sign off at this time.     The patient voiced understanding of the risks, benefits, and alternative forms of treatment that were discussed and the patient consents to proceed with the above treatment plan.     Date: 2/21/2025  Kim Dee PA-C

## 2025-02-21 NOTE — DISCHARGE PLACEMENT REQUEST
"Noemi Calero (89 y.o. Female)       Date of Birth   1935    Social Security Number       Address   [address] [place] [address] [address] [address]    Home Phone   [phone]    MRN   [id number]       Mandaen   [employer]    Marital Status                               Admission Date   2/20/25    Admission Type   Emergency    Admitting Provider   Viridiana De La Torre MD    Attending Provider   Amish Seals MD    Department, Room/Bed   [address], P796/1       Discharge Date       Discharge Disposition       Discharge Destination                                 Attending Provider: Amish Seals MD    Allergies: No Known Allergies    Isolation: None   Infection: None   Code Status: No CPR    Ht: 157.5 cm (62\")   Wt: --    Admission Cmt: None   Principal Problem: Bilateral knee pain [M25.561,M25.562]                   Active Insurance as of 2/20/2025       Primary Coverage       Payor Plan Insurance Group Employer/Plan Group    [facility] MEDICARE REPLACEMENT [facility] MED [facility] GROUP [id number]       Payor Plan Address Payor Plan Phone Number Payor Plan Fax Number Effective Dates    [address] [address]   1/1/2024 - None Entered    [facility] [id number]         Subscriber Name Subscriber Birth Date Member ID       NOEMI CALERO 1935 [id number]                     Emergency Contacts        (Rel.) Home Phone Work Phone Mobile Phone    Paul Calero (Son) [phone] -- [phone]    martin(inLaw)quinn (Daughter) [phone] -- --            "

## 2025-02-21 NOTE — PROGRESS NOTES
Continued Stay Note  [facility]     Patient Name: Noemi Forbes  MRN: [id number]  Today's Date: 2/21/2025    Admit Date: 2/20/2025    Plan: nolan Jaimes pending   Discharge Plan       Row Name 02/21/25 1626       Plan    Plan nolan Jaimes pending    Patient/Family in Agreement with Plan yes    Plan Comments Spoke with pt, verified patient lives at [facility] in assisted living. Patient has been to [facility] in the past and son Paul [phone] states they would like to d/c there to get stronger before returning to [facility]. Email sent to Post Acute Team to start precert. Per Ir[facility] patient is approved pending precert. Plan will be to d/c to SNF short term rehab when precert approved. May need to arrange WC van pending progress with PT. Pharmacy updated. Packet will be in [facility] office.                   Discharge Codes    No documentation.                       Rosmery Beck RN

## 2025-02-21 NOTE — PLAN OF CARE
Goal Outcome Evaluation:  Plan of Care Reviewed With: patient        Progress: improving  Outcome Evaluation: Pt is an 89 y/o female admitted to [facility] on 2/20 s/p fall at [facility] resulting in R knee pain. Xray and CT (-) for fx. PMH includes but not limited to arthritis, DVT, LBP, osteopenia, CKD stage 3. Per pt, she recently transitioning from [facility] to [facility], self propels W/C, and requires assistance with ADLs. On this date, pt AxOx4 and agreeable to participate in PT eval revealing deficits with balance, ROM, strength, pain management, and activity tolerance indicating need for skilled PT services in this setting. She completed bed mobility requiring mod-maxA, STS using 2WW requiring maxA, cuing, and elevated height of bed. and ambulated up to 4 ft using 2WW requiring CGA-Nilay. PT recommending SNU upon D/C from hospital to address remaining deficits and decrease risk of falls and caregiver/staff burden upon returning to [facility].    Anticipated Discharge Disposition (PT): skilled nursing facility

## 2025-02-21 NOTE — CASE MANAGEMENT/SOCIAL WORK
Post-Acute Authorization Submission      Post Acute Pre-Cert Documentation  Request Submitted by Facility - Type:: Hospital  Post-Acute Authorization Type Submitted:: SNF  Date Post Acute Pre-Cert Inititated per Facility: 02/21/25  Accepting Facility: [facility] Discharge Date Requested: 02/22/25  All Clinicals Submitted?: Yes  Had Accepting Facility at Time of Submission: Yes  Response Communicated to:: , Accepting Facility Liaison  Authorization Number:: PENDING [id number]              CARLOS Wolfe

## 2025-02-21 NOTE — H&P
Patient Name:  Noemi Forbes  YOB: 1935  MRN:  [id number]  Admit Date:  2/20/2025  Patient Care Team:  Villa Madrigal MD as PCP - General (Internal Medicine)  Kalyan Hernandez MD as Surgeon (Thoracic Surgery)  Nona Klein MD as Consulting Physician (Radiation Oncology)      Subjective   History Present Illness     Chief Complaint   Patient presents with   • Knee Injury     History of Present Illness  Ms. Forbes is a 89 year old female with history of chronic DVT, chronic venous insufficiency, CKD stage III, chronic back pain who presents to the emergency room she does not feel after a fall complaining of difficulty ambulating.  Patient states that she was ambulating outside yesterday when she fell, she is unsure what happened but denies any loss of consciousness.  She was actually outside for about 30 minutes, initially she did not have any specific complaints, but today she states she is having difficulty ambulating and pain in bilateral knees.  She currently lives at assisted living facility.  She states about 4 months ago she fractured her ankle and just got out of the walking boot a few weeks ago.  In the emergency room her creatinine was 1.2 which is slightly above baseline, BUN 24,  other lab work is unremarkable.  X-ray of bilateral knee shows no acute fractures or erosions, no dislocation.  There is sclerotic and lucent changes in the distal femoral shaft right more than left, could be example of old avascular necrosis or presence of enchondromas.  Moderate right and mild left knee effusions are noted.  CT of her right lower extremity showed no acute fracture or subluxation of the distal femur.  The patella appears to be intact.  There is a sclerotic lesion identified within the distal right femur this may reflect a bone infarct.  Enchondroma would be another consideration.    Review of Systems   Constitutional:  Negative for appetite change and fever.   HENT:  Negative for  nosebleeds and trouble swallowing.    Eyes:  Negative for photophobia, redness and visual disturbance.   Respiratory:  Negative for cough, chest tightness, shortness of breath and wheezing.    Cardiovascular:  Negative for chest pain, palpitations and leg swelling.   Gastrointestinal:  Negative for abdominal distention, abdominal pain, nausea and vomiting.   Endocrine: Negative.    Genitourinary: Negative.    Musculoskeletal:  Positive for gait problem. Negative for joint swelling.        Bilateral knee pain after fall yesterday   Skin: Negative.    Neurological:  Negative for dizziness, seizures, speech difficulty, light-headedness and headaches.   Hematological: Negative.    Psychiatric/Behavioral:  Negative for behavioral problems and confusion.         Personal History     Past Medical History:   Diagnosis Date   • Arthritis    • DVT of leg (deep venous thrombosis)    • Low back pain    • Osteopenia      Past Surgical History:   Procedure Laterality Date   • BACK SURGERY     • BRAIN SURGERY     • CATARACT EXTRACTION     • COLONOSCOPY     • HYSTERECTOMY     • ROTATOR CUFF REPAIR Right    • SPINE SURGERY     • THYROID SURGERY     • TONSILLECTOMY       Family History   Problem Relation Age of Onset   • Dementia Mother      Social History     Tobacco Use   • Smoking status: Every Day     Current packs/day: 0.50     Average packs/day: 0.5 packs/day for 75.1 years (37.6 ttl pk-yrs)     Types: Cigarettes     Start date: 1/1/1950     Passive exposure: Never   • Smokeless tobacco: Never   Vaping Use   • Vaping status: Never Used   • Passive vaping exposure: Yes   Substance Use Topics   • Alcohol use: Yes     Alcohol/week: 6.0 standard drinks of alcohol     Types: 4 Glasses of wine, 2 Cans of beer per week     Comment: socially   • Drug use: Never     No current facility-administered medications on file prior to encounter.     Current Outpatient Medications on File Prior to Encounter   Medication Sig Dispense Refill   •  calcium carbonate (OS-DARIAN) 600 MG tablet Take 1 tablet by mouth Daily. 90 tablet 3   • cephalexin (Keflex) 500 MG capsule Take 1 capsule by mouth 3 (Three) Times a Day. (Patient not taking: Reported on 2/11/2025) 30 capsule 0   • Eliquis 5 MG tablet tablet TAKE 1 TABLET BY MOUTH TWICE  DAILY 180 tablet 3   • Emollient (Eucerin original healing lotion) lotion Apply 1 Application topically to the appropriate area as directed Daily. 30 mL 0   • latanoprost (XALATAN) 0.005 % ophthalmic solution Administer 1 drop to both eyes Daily.     • nicotine (NICODERM CQ) 14 MG/24HR patch Place 1 patch on the skin as directed by provider Daily. 30 patch 0   • sennosides-docusate (PERICOLACE) 8.6-50 MG per tablet Take 2 tablets by mouth 2 (Two) Times a Day.     • vitamin D3 125 MCG (5000 UT) capsule capsule Take 1 capsule by mouth Daily. 90 capsule 3     No Known Allergies    Objective    Objective     Vital Signs  Temp:  [98 °F (36.7 °C)-98.1 °F (36.7 °C)] 98 °F (36.7 °C)  Heart Rate:  [66-86] 68  Resp:  [16-18] 16  BP: ()/(41-75) 107/65  SpO2:  [91 %-94 %] 92 %  on   ;   Device (Oxygen Therapy): room air  There is no height or weight on file to calculate BMI.    Physical Exam  Vitals and nursing note reviewed.   Constitutional:       General: She is not in acute distress.     Appearance: She is well-developed.   HENT:      Head: Normocephalic.   Neck:      Vascular: No JVD.   Cardiovascular:      Rate and Rhythm: Normal rate and regular rhythm.      Heart sounds: Normal heart sounds.   Pulmonary:      Effort: Pulmonary effort is normal.      Breath sounds: Normal breath sounds.      Comments: Lung sounds clear  Abdominal:      General: There is no distension.      Palpations: Abdomen is soft.      Tenderness: There is no abdominal tenderness.   Musculoskeletal:         General: Normal range of motion.      Cervical back: Normal range of motion.      Right lower leg: No edema.      Left lower leg: No edema.      Comments: No  point tenderness or pain with flexion or extension,  the patient reports unable to walk due to pain.   Skin:     General: Skin is warm and dry.      Capillary Refill: Capillary refill takes less than 2 seconds.   Neurological:      Mental Status: She is alert and oriented to person, place, and time.   Psychiatric:         Attention and Perception: Attention normal.         Mood and Affect: Mood normal.         Behavior: Behavior normal.         Cognition and Memory: Cognition normal.         Results Review:  I reviewed the patient's new clinical results.  I reviewed the patient's new imaging results and agree with the interpretation.  I reviewed the patient's other test results and agree with the interpretation  I personally viewed and interpreted the patient's EKG/Telemetry data  Discussed with ED provider.    Lab Results (last 24 hours)       Procedure Component Value Units Date/Time    CBC & Differential [[id number]]  (Abnormal) Collected: 02/20/25 1845    Specimen: Blood from Arm, Right Updated: 02/20/25 1905    Narrative:      The following orders were created for panel order CBC & Differential.  Procedure                               Abnormality         Status                     ---------                               -----------         ------                     CBC Auto Differential[[id number]]        Abnormal            Final result                 Please view results for these tests on the individual orders.    Comprehensive Metabolic Panel [[id number]]  (Abnormal) Collected: 02/20/25 1845    Specimen: Blood from Arm, Right Updated: 02/20/25 1925     Glucose 75 mg/dL      BUN 24 mg/dL      Creatinine 1.20 mg/dL      Sodium 145 mmol/L      Potassium 4.2 mmol/L      Chloride 109 mmol/L      CO2 27.3 mmol/L      Calcium 9.2 mg/dL      Total Protein 7.4 g/dL      Albumin 3.9 g/dL      ALT (SGPT) 8 U/L      AST (SGOT) 16 U/L      Alkaline Phosphatase 113 U/L      Total Bilirubin 1.1 mg/dL      Globulin 3.5  gm/dL      A/G Ratio 1.1 g/dL      BUN/Creatinine Ratio 20.0     Anion Gap 8.7 mmol/L      eGFR 43.6 mL/min/1.73     Narrative:      GFR Categories in Chronic Kidney Disease (CKD)      GFR Category          GFR (mL/min/1.73)    Interpretation  G1                     90 or greater         Normal or high (1)  G2                      60-89                Mild decrease (1)  G3a                   45-59                Mild to moderate decrease  G3b                   30-44                Moderate to severe decrease  G4                    15-29                Severe decrease  G5                    14 or less           Kidney failure          (1)In the absence of evidence of kidney disease, neither GFR category G1 or G2 fulfill the criteria for CKD.    eGFR calculation 2021 CKD-EPI creatinine equation, which does not include race as a factor    CBC Auto Differential [[id number]]  (Abnormal) Collected: 02/20/25 1845    Specimen: Blood from Arm, Right Updated: 02/20/25 1905     WBC 6.99 10*3/mm3      RBC 4.72 10*6/mm3      Hemoglobin 13.8 g/dL      Hematocrit 44.8 %      MCV 94.9 fL      MCH 29.2 pg      MCHC 30.8 g/dL      RDW 12.3 %      RDW-SD 42.8 fl      MPV 10.3 fL      Platelets 245 10*3/mm3      Neutrophil % 72.8 %      Lymphocyte % 13.0 %      Monocyte % 13.4 %      Eosinophil % 0.1 %      Basophil % 0.3 %      Immature Grans % 0.4 %      Neutrophils, Absolute 5.08 10*3/mm3      Lymphocytes, Absolute 0.91 10*3/mm3      Monocytes, Absolute 0.94 10*3/mm3      Eosinophils, Absolute 0.01 10*3/mm3      Basophils, Absolute 0.02 10*3/mm3      Immature Grans, Absolute 0.03 10*3/mm3      nRBC 0.0 /100 WBC             Imaging Results (Last 24 Hours)       Procedure Component Value Units Date/Time    CT Lower Extremity Right Without Contrast [[id number]] Collected: 02/20/25 2204     Updated: 02/20/25 2213    Narrative:      CT OF THE RIGHT LOWER EXTREMITY     HISTORY: Right knee pain with effusion. Patient is unable to ambulate.      COMPARISON: None available.     TECHNIQUE: Axial CT imaging was obtained through the right lower  extremity. Coronal and sagittal reformatted images were obtained.     FINDINGS:  Unfortunately, images of the proximal tibia and fibula are degraded by  motion artifact. No acute fracture or subluxation of the distal femur is  seen. The patella also appears to be intact. There is a sclerotic lesion  identified within the distal right femur. This may reflect a bone  infarct. Enchondroma would be another consideration. Suprapatellar  effusion is again seen. No obvious acute fracture or subluxation of the  proximal tibia or fibula is seen. There are atherosclerotic  calcifications.       Impression:      No obvious acute fracture or subluxation is identified. If symptoms  persist, consider further assessment with MRI.     Radiation dose reduction techniques were utilized, including automated  exposure control and exposure modulation based on body size.        This report was finalized on 2/20/2025 10:10 PM by Dr. Sangita Saeed M.D on Workstation: [id number]       XR Ankle 3+ View Right [[id number]] Collected: 02/20/25 2004     Updated: 02/20/25 2009    Narrative:      XR ANKLE 3+ VW RIGHT-     INDICATIONS: Trauma     TECHNIQUE: 3 views of the right ankle     COMPARISON: None available     FINDINGS:     An obliquely oriented fracture at the distal fibular metadiaphysis is  partially healed (although fracture lucency is still visible), and shows  5 mm cortical step-off. A 6 mm ossific focus adjacent to the medial  malleolus appears corticated, may be sequela of old injury. No acute  appearing fracture is identified. Mild calcaneal spurring is present.  Ankle mortise appears preserved. Soft tissue swelling is seen around the  ankle. Follow-up/further evaluation can be obtained as indications  persist.       Impression:         As described.           This report was finalized on 2/20/2025 8:06 PM by Dr. Dany HILL  JOYCELYN Dorantes on Workstation: [id number]       XR Knee 1 or 2 View Bilateral [[id number]] Collected: 02/20/25 1954     Updated: 02/20/25 2001    Narrative:      XR KNEE 1 OR 2 VW BILATERAL-     INDICATIONS: Trauma     TECHNIQUE: 5 views including the bilateral knees     COMPARISON: 1/7/2025     FINDINGS:     No acute fracture, erosion, or dislocation is identified. Sclerotic and  lucent changes in the distal femoral shaft, right more than left, could  for example be sequela of old avascular necrosis or presence of  enchondromas. Moderate right and mild left knee effusions are noted.  Mild bilateral medial tibiofemoral joint space narrowing. Arterial  calcifications are conspicuous bilaterally. Follow-up/further evaluation  can be obtained as indications persist.       Impression:         As described.           This report was finalized on 2/20/2025 7:58 PM by Dr. Dany Dorantes M.D on Workstation: [id number]               Results for orders placed during the hospital encounter of 11/27/24    Adult Transthoracic Echo Complete W/ Cont if Necessary Per Protocol    Interpretation Summary  •  Left ventricular ejection fraction appears to be 51 - 55%.  •  The left ventricular cavity is small in size.  •  Left ventricular wall thickness is consistent with moderate septal asymmetric hypertrophy.  •  The following left ventricular wall segments are hypokinetic: apical anterior, mid anterolateral and mid inferolateral. The following left ventricular wall segments are akinetic: apical lateral.  •  Left ventricular diastolic function is consistent with (grade II w/high LAP) pseudonormalization.  •  : The aortic valve is abnormal in structure. There is calcification of the aortic valve. The aortic valve was poorly visualized but appears trileaflet. No aortic valve regurgitation is present. Moderate aortic valve stenosis is present. Aortic valve area is 1.75 cm2. Peak velocity of the flow distal to the aortic valve is 303.7 cm/s.  Aortic valve maximum pressure gradient is 36.9 mmHg. Aortic valve mean pressure gradient is 24.3 mmHg. Aortic valve dimensionless index is 0.60 .  •  Estimated right ventricular systolic pressure from tricuspid regurgitation is mildly elevated (35-45 mmHg). Calculated right ventricular systolic pressure from tricuspid regurgitation is 39 mmHg.  •  Mild pulmonary hypertension is present.  •  There is a trivial pericardial effusion.      No orders to display        Assessment/Plan     Active Hospital Problems    Diagnosis  POA   • **Bilateral knee pain [M25.561, M25.562]  Yes   • Stage 3a chronic kidney disease [N18.31]  Yes   • DNR (do not resuscitate) [Z66]  Yes   • Chronic deep vein thrombosis (DVT) [I82.509]  Yes   • Back pain, chronic [M54.9, G89.29]  Yes   • Chronic venous insufficiency [I87.2]  Yes     Ms. Forbes is a 89 year old female with history of chronic DVT, chronic venous insufficiency, CKD stage III, chronic back pain who presents to the emergency room she does not feel after a fall complaining of difficulty ambulating.    Bilateral knee pain/fall  -PT to eval and treat  -Consult orthopedic surgery, bilateral knee effusions on CT  -Up with assist  -Neurovascular checks every 4 hours  -Control overnight    Chronic DVT  -Patient on Eliquis, will continue when med rec is completed verified, patient is unsure what she is taking    CKD stage III  -Chronic conditions stable  -Continue to monitor BMP      I discussed the patient's findings and my recommendations with patient.    VTE Prophylaxis - Eliquis (home med).  Code Status - Limited code (no CPR, no intubation).       TERRELL Grimm  [facility]  02/20/25  22:46 EST

## 2025-02-21 NOTE — ED NOTES
Nursing report ED to floor  Noemi Forbes  89 y.o.  female    HPI :  HPI  Stated Reason for Visit: right knee after slipping and falling onto right leg/knee. Pt denies hitting head/LOC. EMS states no obvious defomrity to knee.  History Obtained From: patient, EMS    Chief Complaint  Chief Complaint   Patient presents with    Knee Injury       Admitting doctor:   Viridiana De La Torre MD    Admitting diagnosis:   The primary encounter diagnosis was Acute pain of both knees. Diagnoses of Bilateral knee effusions, Unable to walk, and Elevated serum creatinine were also pertinent to this visit.    Code status:   Current Code Status       Date Active Code Status Order ID Comments User Context       Prior            Allergies:   Patient has no known allergies.    Isolation:   No active isolations    Intake and Output  No intake or output data in the 24 hours ending 02/20/25 2109    Weight:   There were no vitals filed for this visit.    Most recent vitals:   Vitals:    02/20/25 2033 02/20/25 2055 02/20/25 2056 02/20/25 2057   BP:  92/70  112/73   Pulse: 73 73 72 73   Resp:       Temp:       TempSrc:       SpO2: 92%  92% 92%       Active LDAs/IV Access:   Lines, Drains & Airways       Active LDAs       Name Placement date Placement time Site Days    Peripheral IV 02/20/25 1846 Anterior;Right Forearm 02/20/25  1846  Forearm  less than 1    External Urinary Catheter 02/20/25 1905  --  less than 1                    Labs (abnormal labs have a star):   Labs Reviewed   COMPREHENSIVE METABOLIC PANEL - Abnormal; Notable for the following components:       Result Value    BUN 24 (*)     Creatinine 1.20 (*)     Chloride 109 (*)     eGFR 43.6 (*)     All other components within normal limits    Narrative:     GFR Categories in Chronic Kidney Disease (CKD)      GFR Category          GFR (mL/min/1.73)    Interpretation  G1                     90 or greater         Normal or high (1)  G2                      60-89                Mild  decrease (1)  G3a                   45-59                Mild to moderate decrease  G3b                   30-44                Moderate to severe decrease  G4                    15-29                Severe decrease  G5                    14 or less           Kidney failure          (1)In the absence of evidence of kidney disease, neither GFR category G1 or G2 fulfill the criteria for CKD.    eGFR calculation 2021 CKD-EPI creatinine equation, which does not include race as a factor   CBC WITH AUTO DIFFERENTIAL - Abnormal; Notable for the following components:    MCHC 30.8 (*)     Lymphocyte % 13.0 (*)     Monocyte % 13.4 (*)     Eosinophil % 0.1 (*)     Monocytes, Absolute 0.94 (*)     All other components within normal limits   URINALYSIS W/ MICROSCOPIC IF INDICATED (NO CULTURE)   CBC AND DIFFERENTIAL    Narrative:     The following orders were created for panel order CBC & Differential.  Procedure                               Abnormality         Status                     ---------                               -----------         ------                     CBC Auto Differential[[id number]]        Abnormal            Final result                 Please view results for these tests on the individual orders.       EKG:   No orders to display       Meds given in ED:   Medications   HYDROcodone-acetaminophen (NORCO) 5-325 MG per tablet 1 tablet (1 tablet Oral Given 2/20/25 1849)   Diclofenac Sodium (VOLTAREN) 1 % gel 4 g (4 g Topical Given 2/20/25 1930)       Imaging results:  XR Ankle 3+ View Right    Result Date: 2/20/2025   As described.    This report was finalized on 2/20/2025 8:06 PM by Dr. Dany Dorantes M.D on Workstation: [id number]      XR Knee 1 or 2 View Bilateral    Result Date: 2/20/2025   As described.    This report was finalized on 2/20/2025 7:58 PM by Dr. Dany Dorantes M.D on Workstation: [id number]       Ambulatory status:   - bedrest    Social issues:   Social History     Socioeconomic History     Marital status:    Tobacco Use    Smoking status: Every Day     Current packs/day: 0.50     Average packs/day: 0.5 packs/day for 75.1 years (37.6 ttl pk-yrs)     Types: Cigarettes     Start date: 1/1/1950     Passive exposure: Never    Smokeless tobacco: Never   Vaping Use    Vaping status: Never Used    Passive vaping exposure: Yes   Substance and Sexual Activity    Alcohol use: Yes     Alcohol/week: 6.0 standard drinks of alcohol     Types: 4 Glasses of wine, 2 Cans of beer per week     Comment: socially    Drug use: Never    Sexual activity: Not Currently       Peripheral Neurovascular  Peripheral Neurovascular (Adult)  Peripheral Neurovascular WDL: WDL    Neuro Cognitive  Neuro Cognitive (Adult)  Cognitive/Neuro/Behavioral WDL: WDL, all  Level of Consciousness: Alert  Arousal Level: opens eyes spontaneously  Speech: clear, spontaneous  Mood/Behavior: calm, cooperative    Learning  Learning Assessment  Learning Readiness and Ability: no barriers identified    Respiratory  Respiratory WDL  Respiratory WDL: WDL    Abdominal Pain       Pain Assessments  Pain (Adult)  (0-10) Pain Rating: Rest: 7  Pain Location: knee  Pain Side/Orientation: right  Pain Management Interventions: pain medication given, quiet environment facilitated  Response to Pain Interventions: interventions effective per patient    NIH Stroke Scale       Chel Rey RN  02/20/25 21:09 EST

## 2025-02-21 NOTE — ED PROVIDER NOTES
ED Course as of 02/20/25 2102   Thu Feb 20, 2025 2001 I reviewed bilateral knee x-rays in PACS, no fracture per my read. [JG]   2002 X-ray imaging shows no obvious fracture but patient does have significant effusion with right knee, severe pain, unable ambulate.  Obtaining CT imaging to evaluate for occult fracture,  patient currently living at assisted living, unable ambulate, not safe for discharge, consulting hospitalist for admission. [JG]   2102 I spoke with Bere with [facility].  Discussed patient presentation and ED findings.  She agrees admit patient to a [facility] observation bed to the service of Dr. De La Torre. [MP]      ED Course User Index  [JG] Boogie Patel MD  [MP] Brina Bach PA-C Pleiss, Melanie M, PA-C  02/20/25 2102

## 2025-02-21 NOTE — PLAN OF CARE
Goal Outcome Evaluation:  Plan of Care Reviewed With: patient        Progress: no change  Outcome Evaluation: Pt is an 88 y/o female admitted following a fall at [facility] resulting in R knee pain. Xray and CT (-) for fx. She is seen this date for OT KYA braxton&Eric3, reports she recently transitioned from ILF to longterm, self propels W/C, and requires assistance with ADLs. She presents this date below her baseline with decreased balance, UE strength, impaired activity tolerance and overall (I) and safety with ADLs and related transfers. Ortho reports KI to be donned and WBAT to RLE at this time.  She is UIC on arrival, requires mod/max A for STS from chair, min A for short distance over to bedside, increased unsteadiness noted as pt neared EOB, forward flexed onto rwx and consistent cues required for posture. She is below her baseline and at risk for continued falls at this time, requires max/dep for LBD task. She will continue to benefit from skilled OT to address deficits and maximize (I) prior to d/c. rec SNF at this time    Anticipated Discharge Disposition (OT): skilled nursing facility

## 2025-02-21 NOTE — PROGRESS NOTES
LOS: 0 days     Name: Noemi Forbes  Age: 89 y.o.  Sex: female  :  1935  MRN: [id number]         Primary Care Physician: Villa Madrigal MD    Subjective   Subjective  Complaints of pain in her right knee.  Says that she cannot stand and walk because of the pain.    Objective   Vital Signs  Temp:  [98 °F (36.7 °C)-98.1 °F (36.7 °C)] 98 °F (36.7 °C)  Heart Rate:  [66-86] 71  Resp:  [16-18] 16  BP: ()/(41-78) 122/78  There is no height or weight on file to calculate BMI.    Objective:  General Appearance:  Comfortable and in no acute distress.    Vital signs: (most recent): Blood pressure 122/78, pulse 71, temperature 98 °F (36.7 °C), temperature source Oral, resp. rate 16, SpO2 92%.    Lungs:  Normal effort and normal respiratory rate.  She is not in respiratory distress.  There are decreased breath sounds.    Heart: Normal rate.  Regular rhythm.    Abdomen: Abdomen is soft.  Bowel sounds are normal.   There is no abdominal tenderness.     Extremities: There is effusion.  There is no dependent edema or local swelling.  (Right knee effusion)  Neurological: Patient is alert and oriented to person, place and time.    Skin:  Warm and dry.                Results Review:       I reviewed the patient's new clinical results.    Results from last 7 days   Lab Units 25  0534 25  1845   WBC 10*3/mm3 5.40 6.99   HEMOGLOBIN g/dL 12.7 13.8   PLATELETS 10*3/mm3 217 245     Results from last 7 days   Lab Units 25  0534 25  1845   SODIUM mmol/L 143 145   POTASSIUM mmol/L 4.3 4.2   CHLORIDE mmol/L 109* 109*   CO2 mmol/L 26.6 27.3   BUN mg/dL 21 24*   CREATININE mg/dL 1.04* 1.20*   CALCIUM mg/dL 9.0 9.2   GLUCOSE mg/dL 89 75                 Scheduled Meds:   acetaminophen, 1,000 mg, Oral, Q8H  calcium carbonate (oyster shell), 500 mg, Oral, Daily  cholecalciferol, 5,000 Units, Oral, Daily  sennosides-docusate, 2 tablet, Oral, BID  sodium chloride, 10 mL, Intravenous, Q12H      PRN Meds:      acetaminophen **OR** acetaminophen **OR** acetaminophen    senna-docusate sodium **AND** polyethylene glycol **AND** bisacodyl **AND** bisacodyl    famotidine    melatonin    ondansetron    oxyCODONE    sodium chloride    sodium chloride  Continuous Infusions:       Assessment & Plan   Active Hospital Problems    Diagnosis  POA    **Bilateral knee pain [M25.561, M25.562]  Yes    Stage 3a chronic kidney disease [N18.31]  Yes    DNR (do not resuscitate) [Z66]  Yes    Chronic deep vein thrombosis (DVT) [I82.509]  Yes    Back pain, chronic [M54.9, G89.29]  Yes    Chronic venous insufficiency [I87.2]  Yes      Resolved Hospital Problems   No resolved problems to display.       Assessment & Plan    -Orthopedic surgery evaluated this morning and recommend bilateral knee immobilizers when ambulating.  No intervention required.  -Patient has oxycodone ordered but has not yet taken a dose.  -We will see how she does with therapy services today to determine discharge planning  -She otherwise looks medically stable        Expected discharge date/ time has not been documented.     Amish Seals MD  [facility]  02/21/25  08:54 EST

## 2025-02-21 NOTE — PLAN OF CARE
Goal Outcome Evaluation:            VSS. Aox3. Max assist. Voiding per purewick. Knee immobolizer in place. DNR. Hx of DVT. Treatment plan pending.

## 2025-02-22 PROCEDURE — 97530 THERAPEUTIC ACTIVITIES: CPT

## 2025-02-22 PROCEDURE — G0378 HOSPITAL OBSERVATION PER HR: HCPCS

## 2025-02-22 RX ORDER — POTASSIUM CHLORIDE 750 MG/1
10 CAPSULE, EXTENDED RELEASE ORAL DAILY
COMMUNITY

## 2025-02-22 RX ADMIN — SENNOSIDES AND DOCUSATE SODIUM 2 TABLET: 50; 8.6 TABLET ORAL at 08:57

## 2025-02-22 RX ADMIN — ACETAMINOPHEN 1000 MG: 500 TABLET, FILM COATED ORAL at 04:25

## 2025-02-22 RX ADMIN — ACETAMINOPHEN 1000 MG: 500 TABLET, FILM COATED ORAL at 21:15

## 2025-02-22 RX ADMIN — APIXABAN 5 MG: 5 TABLET, FILM COATED ORAL at 13:27

## 2025-02-22 RX ADMIN — Medication 5000 UNITS: at 08:57

## 2025-02-22 RX ADMIN — Medication 10 ML: at 21:00

## 2025-02-22 RX ADMIN — Medication 500 MG: at 08:57

## 2025-02-22 RX ADMIN — ACETAMINOPHEN 1000 MG: 500 TABLET, FILM COATED ORAL at 13:26

## 2025-02-22 RX ADMIN — Medication 10 ML: at 08:57

## 2025-02-22 RX ADMIN — APIXABAN 5 MG: 5 TABLET, FILM COATED ORAL at 21:15

## 2025-02-22 NOTE — PROGRESS NOTES
" LOS: 0 days     Name: Noemi Forbes  Age: 89 y.o.  Sex: female  :  1935  MRN: [id number]         Primary Care Physician: Villa Madrigal MD    Subjective   Subjective  No new complaints or acute overnight events    Objective   Vital Signs  Temp:  [97.3 °F (36.3 °C)-98.1 °F (36.7 °C)] 97.3 °F (36.3 °C)  Heart Rate:  [67-88] 67  Resp:  [16-18] 18  BP: ()/(52-78) 96/59  Body mass index is 29.26 kg/m².    Objective:  General Appearance:  Comfortable and in no acute distress.    Vital signs: (most recent): Blood pressure 96/59, pulse 67, temperature 97.3 °F (36.3 °C), temperature source Oral, resp. rate 18, height 157.5 cm (62.01\"), weight 72.6 kg (160 lb), SpO2 91%.    Lungs:  Normal effort and normal respiratory rate.  She is not in respiratory distress.  There are decreased breath sounds.    Heart: Normal rate.  Regular rhythm.    Abdomen: Abdomen is soft.  Bowel sounds are normal.   There is no abdominal tenderness.     Extremities: There is no dependent edema or local swelling.    Neurological: Patient is alert and oriented to person, place and time.    Skin:  Warm and dry.                Results Review:       I reviewed the patient's new clinical results.    Results from last 7 days   Lab Units 25  0534 25  1845   WBC 10*3/mm3 5.40 6.99   HEMOGLOBIN g/dL 12.7 13.8   PLATELETS 10*3/mm3 217 245     Results from last 7 days   Lab Units 25  0534 25  1845   SODIUM mmol/L 143 145   POTASSIUM mmol/L 4.3 4.2   CHLORIDE mmol/L 109* 109*   CO2 mmol/L 26.6 27.3   BUN mg/dL 21 24*   CREATININE mg/dL 1.04* 1.20*   CALCIUM mg/dL 9.0 9.2   GLUCOSE mg/dL 89 75                 Scheduled Meds:   acetaminophen, 1,000 mg, Oral, Q8H  calcium carbonate (oyster shell), 500 mg, Oral, Daily  cholecalciferol, 5,000 Units, Oral, Daily  sennosides-docusate, 2 tablet, Oral, BID  sodium chloride, 10 mL, Intravenous, Q12H      PRN Meds:     acetaminophen **OR** acetaminophen **OR** acetaminophen    " senna-docusate sodium **AND** polyethylene glycol **AND** bisacodyl **AND** bisacodyl    famotidine    melatonin    ondansetron    oxyCODONE    sodium chloride    sodium chloride  Continuous Infusions:       Assessment & Plan   Active Hospital Problems    Diagnosis  POA    **Bilateral knee pain [M25.561, M25.562]  Yes    Stage 3a chronic kidney disease [N18.31]  Yes    DNR (do not resuscitate) [Z66]  Yes    Chronic deep vein thrombosis (DVT) [I82.509]  Yes    Back pain, chronic [M54.9, G89.29]  Yes    Chronic venous insufficiency [I87.2]  Yes      Resolved Hospital Problems   No resolved problems to display.       Assessment & Plan    -Orthopedic surgery evaluated and recommend bilateral knee immobilizers when ambulating.  No intervention required.  -Patient has oxycodone ordered but has not yet taken a dose.  -Therapy services recommend rehab placement.  Awaiting insurance and she can be discharged once this is approved  -She otherwise looks medically stable      Expected discharge date/ time has not been documented.     Amish Seals MD  [facility]  02/22/25  09:47 EST

## 2025-02-22 NOTE — PLAN OF CARE
Goal Outcome Evaluation:      Patient was admitted with complaints of right leg/ knee pain following a fall sustained at an assisted living facility. Patient is alert and oriented.  Max assist with transfers. No surgical intervention required. WBAT. Knee immobilizer worn. Voids per stella. Roxicodone, and tylenol were ordered for pain management. Worked with physical therapy during this shift. Up to chair. Plan is to discharge to a rehab facility. No signs of distress noted.  Will continue to monitor and update accordingly,

## 2025-02-22 NOTE — PLAN OF CARE
Problem: Adult Inpatient Plan of Care  Goal: Plan of Care Review  Outcome: Progressing   Goal Outcome Evaluation: AAOX4, forgetful at times. Max assist. Voiding per PW/brief. Knee immobilizer in place to RLE. Sheree effective in managing pain. Discharge plan pending. Resting comfortably in bed, call light within reach, all needs currently met. POC ongoing.
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
## 2025-02-22 NOTE — PLAN OF CARE
Goal Outcome Evaluation:  Plan of Care Reviewed With: patient        Progress: improving  Outcome Evaluation: Pt continuing to progress with skilled PT. KI in place with activity and able to ambualte 5' to recliner with forward flexed posture and dec step length with use of Rwx. Most difficutly noted with STS from EOB, requiring mod/max and 2 attempts to stand. Pt would benefit from continued skilled PT to address stength and mobility deficits.    Anticipated Discharge Disposition (PT): skilled nursing facility

## 2025-02-22 NOTE — NURSING NOTE
RN CALLED [facility] AND VERIFIED THE FOLLOWING MEDS:  ELIQUIS 5MG BID  CALCIUM CARBONATE 600MG DAILY  LATANOPROST 0.005% 1 DROP BOTH EYES DAILY  POTASSIUM 10MEQ DAILY

## 2025-02-23 LAB
ALBUMIN SERPL-MCNC: 3.2 G/DL (ref 3.5–5.2)
ALBUMIN/GLOB SERPL: 0.8 G/DL
ALP SERPL-CCNC: 108 U/L (ref 39–117)
ALT SERPL W P-5'-P-CCNC: 15 U/L (ref 1–33)
ANION GAP SERPL CALCULATED.3IONS-SCNC: 10 MMOL/L (ref 5–15)
AST SERPL-CCNC: 18 U/L (ref 1–32)
BILIRUB SERPL-MCNC: 0.8 MG/DL (ref 0–1.2)
BUN SERPL-MCNC: 19 MG/DL (ref 8–23)
BUN/CREAT SERPL: 18.8 (ref 7–25)
CALCIUM SPEC-SCNC: 9.3 MG/DL (ref 8.6–10.5)
CHLORIDE SERPL-SCNC: 104 MMOL/L (ref 98–107)
CO2 SERPL-SCNC: 25 MMOL/L (ref 22–29)
CREAT SERPL-MCNC: 1.01 MG/DL (ref 0.57–1)
EGFRCR SERPLBLD CKD-EPI 2021: 53.7 ML/MIN/1.73
GLOBULIN UR ELPH-MCNC: 3.9 GM/DL
GLUCOSE SERPL-MCNC: 140 MG/DL (ref 65–99)
POTASSIUM SERPL-SCNC: 3.8 MMOL/L (ref 3.5–5.2)
PROT SERPL-MCNC: 7.1 G/DL (ref 6–8.5)
SODIUM SERPL-SCNC: 139 MMOL/L (ref 136–145)

## 2025-02-23 PROCEDURE — G0378 HOSPITAL OBSERVATION PER HR: HCPCS

## 2025-02-23 PROCEDURE — 80053 COMPREHEN METABOLIC PANEL: CPT | Performed by: INTERNAL MEDICINE

## 2025-02-23 RX ADMIN — Medication 10 ML: at 21:30

## 2025-02-23 RX ADMIN — Medication 5000 UNITS: at 08:43

## 2025-02-23 RX ADMIN — Medication 500 MG: at 08:44

## 2025-02-23 RX ADMIN — ACETAMINOPHEN 1000 MG: 500 TABLET, FILM COATED ORAL at 12:30

## 2025-02-23 RX ADMIN — APIXABAN 5 MG: 5 TABLET, FILM COATED ORAL at 21:29

## 2025-02-23 RX ADMIN — ACETAMINOPHEN 1000 MG: 500 TABLET, FILM COATED ORAL at 21:29

## 2025-02-23 RX ADMIN — ACETAMINOPHEN 1000 MG: 500 TABLET, FILM COATED ORAL at 06:24

## 2025-02-23 RX ADMIN — Medication 10 ML: at 08:52

## 2025-02-23 RX ADMIN — APIXABAN 5 MG: 5 TABLET, FILM COATED ORAL at 08:44

## 2025-02-23 NOTE — PROGRESS NOTES
" LOS: 0 days     Name: Noemi Forbes  Age: 89 y.o.  Sex: female  :  1935  MRN: [id number]         Primary Care Physician: Villa Madrigal MD    Subjective   Subjective  No new complaints or acute overnight events    Objective   Vital Signs  Temp:  [97.4 °F (36.3 °C)-98.1 °F (36.7 °C)] 97.4 °F (36.3 °C)  Heart Rate:  [67-92] 68  Resp:  [16-18] 18  BP: (114-158)/(73-90) 141/75  Body mass index is 29.26 kg/m².    Objective:  General Appearance:  Comfortable and in no acute distress.    Vital signs: (most recent): Blood pressure 141/75, pulse 68, temperature 97.4 °F (36.3 °C), temperature source Oral, resp. rate 18, height 157.5 cm (62.01\"), weight 72.6 kg (160 lb), SpO2 90%.    Lungs:  Normal effort and normal respiratory rate.    Heart: Normal rate.  Regular rhythm.    Abdomen: Abdomen is soft.  Bowel sounds are normal.   There is no abdominal tenderness.     Extremities: There is no dependent edema or local swelling.  (Right knee is in immobilizer)  Neurological: Patient is alert and oriented to person, place and time.    Skin:  Warm and dry.                Results Review:       I reviewed the patient's new clinical results.    Results from last 7 days   Lab Units 25  0534 25  1845   WBC 10*3/mm3 5.40 6.99   HEMOGLOBIN g/dL 12.7 13.8   PLATELETS 10*3/mm3 217 245     Results from last 7 days   Lab Units 25  0820 25  0534 25  1845   SODIUM mmol/L 139 143 145   POTASSIUM mmol/L 3.8 4.3 4.2   CHLORIDE mmol/L 104 109* 109*   CO2 mmol/L 25.0 26.6 27.3   BUN mg/dL 19 21 24*   CREATININE mg/dL 1.01* 1.04* 1.20*   CALCIUM mg/dL 9.3 9.0 9.2   GLUCOSE mg/dL 140* 89 75                 Scheduled Meds:   acetaminophen, 1,000 mg, Oral, Q8H  apixaban, 5 mg, Oral, Q12H  calcium carbonate (oyster shell), 500 mg, Oral, Daily  cholecalciferol, 5,000 Units, Oral, Daily  sennosides-docusate, 2 tablet, Oral, BID  sodium chloride, 10 mL, Intravenous, Q12H      PRN Meds:     acetaminophen **OR** " acetaminophen **OR** acetaminophen    senna-docusate sodium **AND** polyethylene glycol **AND** bisacodyl **AND** bisacodyl    famotidine    melatonin    ondansetron    oxyCODONE    sodium chloride    sodium chloride  Continuous Infusions:       Assessment & Plan   Active Hospital Problems    Diagnosis  POA    **Bilateral knee pain [M25.561, M25.562]  Yes    Stage 3a chronic kidney disease [N18.31]  Yes    DNR (do not resuscitate) [Z66]  Yes    Chronic deep vein thrombosis (DVT) [I82.509]  Yes    Back pain, chronic [M54.9, G89.29]  Yes    Chronic venous insufficiency [I87.2]  Yes      Resolved Hospital Problems   No resolved problems to display.       Assessment & Plan    -Orthopedic surgery evaluated and recommend bilateral knee immobilizers when ambulating.  No intervention required.  -Patient has oxycodone ordered as needed.  On scheduled Tylenol.  -Therapy services recommend rehab placement.  Awaiting insurance and she can be discharged once this is approved  -She otherwise looks medically stable      Expected discharge date/ time has not been documented.     Amish Seals MD  [facility]  02/23/25  09:37 EST

## 2025-02-23 NOTE — CASE MANAGEMENT/SOCIAL WORK
Continued Stay Note  [facility]     Patient Name: Noemi Forbes  MRN: [id number]  Today's Date: 2/23/2025    Admit Date: 2/20/2025    Plan: Pre-cert obtained- [facility] bed available   Discharge Plan       Row Name 02/23/25 1812       Plan    Plan Pre-cert obtained- [facility] bed available    Plan Comments CCP notified that precert obtained and pt has bed available. RN updated      Row Name 02/23/25 1444       Plan    Plan Pre-cert obtained for [facility] for patient to admit through 2/26/25. E-mail to [facility] with update, await word on bed availability............Elizabeth SPARKS    Plan Comments Per [employer] portal pre-cert approved for [facility] (cert # [id number]). Pre-cert approved through 2/26/25. E-mail to [facility] with update and request for when bed will be available............Elizabeth SPARKS                   Discharge Codes    No documentation.                       Kim Hooper, RN

## 2025-02-23 NOTE — PLAN OF CARE
Goal Outcome Evaluation:      Patient was admitted with complaints of right leg/ knee pain following a fall sustained at an assisted living facility. Patient is alert and oriented.  Max assist with transfers. No surgical intervention required. WBAT. Knee immobilizer worn. Voids per stella. Roxicodone, and tylenol were ordered for pain management.  Plan is to discharge to a rehab facility. Precert approved. Patient to discharge to [facility]. No signs of distress noted.  Will continue to monitor and update accordingly,

## 2025-02-23 NOTE — PROGRESS NOTES
Continued Stay Note  [facility]     Patient Name: Noemi Forbes  MRN: [id number]  Today's Date: 2/23/2025    Admit Date: 2/20/2025    Plan: Pre-cert obtained for [facility] for patient to admit through 2/26/25. E-mail to [facility] with update, await word on bed availability............Elizabeth SPARKS   Discharge Plan       Row Name 02/23/25 1544       Plan    Plan Pre-cert obtained for [place], good for patient to admit through 2/26/25. E-mail to [facility] with update, await word on bed availability............Elizabeth RN    Plan Comments Per [employer] portal pre-cert approved for [facility] (cert # [id number]). Pre-cert approved through 2/26/25. E-mail to [facility] with update and request for when bed will be available............Elizabeth RN                   Discharge Codes    No documentation.                       Jennifer Nava, RN

## 2025-02-23 NOTE — PLAN OF CARE
Goal Outcome Evaluation:              Outcome Evaluation: Pt pleasant and cooperative with care. Knee immobilizer in place.Encouraged to take pain med but continues to deny pain. Takes scheduled Tylenol. Stayed in mostly in bed tonight, voids per purewick.

## 2025-02-24 VITALS
RESPIRATION RATE: 18 BRPM | WEIGHT: 160 LBS | HEIGHT: 62 IN | SYSTOLIC BLOOD PRESSURE: 134 MMHG | HEART RATE: 71 BPM | TEMPERATURE: 97.5 F | OXYGEN SATURATION: 93 % | BODY MASS INDEX: 29.44 KG/M2 | DIASTOLIC BLOOD PRESSURE: 68 MMHG

## 2025-02-24 PROCEDURE — G0378 HOSPITAL OBSERVATION PER HR: HCPCS

## 2025-02-24 RX ORDER — ACETAMINOPHEN 325 MG/1
650 TABLET ORAL EVERY 4 HOURS PRN
Start: 2025-02-24

## 2025-02-24 RX ORDER — OXYCODONE HYDROCHLORIDE 5 MG/1
5 TABLET ORAL EVERY 6 HOURS PRN
Qty: 12 TABLET | Refills: 0 | Status: SHIPPED | OUTPATIENT
Start: 2025-02-24

## 2025-02-24 RX ADMIN — OXYCODONE HYDROCHLORIDE 5 MG: 5 TABLET ORAL at 03:21

## 2025-02-24 RX ADMIN — APIXABAN 5 MG: 5 TABLET, FILM COATED ORAL at 08:17

## 2025-02-24 RX ADMIN — Medication 10 ML: at 08:19

## 2025-02-24 RX ADMIN — SENNOSIDES AND DOCUSATE SODIUM 2 TABLET: 50; 8.6 TABLET ORAL at 08:17

## 2025-02-24 RX ADMIN — Medication 500 MG: at 08:17

## 2025-02-24 RX ADMIN — Medication 5000 UNITS: at 08:17

## 2025-02-24 RX ADMIN — OXYCODONE HYDROCHLORIDE 5 MG: 5 TABLET ORAL at 09:28

## 2025-02-24 RX ADMIN — ACETAMINOPHEN 650 MG: 325 TABLET, FILM COATED ORAL at 05:37

## 2025-02-24 NOTE — PLAN OF CARE
Goal Outcome Evaluation:  Plan of Care Reviewed With: patient         Outcome Evaluation: Patient remains stable during shift, very pleasant, and calm, not in distress. Alert and oriented. on room air. cardiac healthy heart diet. With minimal complaints of pain on right leg/ knee following a fall from [facility]. Knee immobilizer on. WBAT. Max assist with transfers. Voids per tobias. Pain is well managed by scheduled Tylenol tab and PRN Roxicodone tab. She takes Eliquis tab BID.  Plan is to discharge to a rehab facility. Precert obtained for [facility], bed available.

## 2025-02-24 NOTE — DISCHARGE SUMMARY
Date of Admission: 2/20/2025  Date of Discharge:  2/24/2025  Primary Care Physician: Villa Madrigal MD     Discharge Diagnosis:  Active Hospital Problems    Diagnosis  POA    **Bilateral knee pain [M25.561, M25.562]  Yes    Stage 3a chronic kidney disease [N18.31]  Yes    DNR (do not resuscitate) [Z66]  Yes    Chronic deep vein thrombosis (DVT) [I82.509]  Yes    Back pain, chronic [M54.9, G89.29]  Yes    Chronic venous insufficiency [I87.2]  Yes      Resolved Hospital Problems   No resolved problems to display.       DETAILS OF HOSPITAL STAY     Pertinent Test Results and Procedures Performed    CT scan of the right lower extremity:  No obvious acute fracture or subluxation is identified. If symptoms   persist, consider further assessment with MRI.     Hospital Course  This is an 89-year-old female who presented to the emergency room after suffering a mechanical fall resulting in bilateral knee pain, right greater than left.  She was found to have a right knee effusion.  CT scan did not show any acute fracture or other abnormality.  She was evaluated by orthopedic surgery who recommended realization of knee immobilizer while standing.  They did not feel that an MRI would  and thusly have decided to hold off on this.  She is to use the immobilizer when up out of bed and may remove while in bed.  Weightbearing as tolerated.  She should follow-up in the [facility] clinic in 2 weeks.  She has been approved for rehab and will discharge there today.    Physical Exam at Discharge:  General: No acute distress, AAOx3  HEENT: EOMI, PERRL  Cardiovascular: +s1 and s2, RRR  Lungs: No rhonchi or wheezing  Abdomen: soft, nontender    Consults:   Consults       Date and Time Order Name Status Description    2/20/2025 11:47 PM Inpatient Orthopedic Surgery Consult Completed     2/20/2025  8:04 PM [facility] (on-call MD unless specified) Details                Condition on Discharge: Stable    Discharge  Disposition  Skilled Nursing Facility (DC - External)    Discharge Medications     Discharge Medications        New Medications        Instructions Start Date   acetaminophen 325 MG tablet  Commonly known as: TYLENOL   650 mg, Oral, Every 4 Hours PRN      oxyCODONE 5 MG immediate release tablet  Commonly known as: ROXICODONE   5 mg, Oral, Every 6 Hours PRN             Continue These Medications        Instructions Start Date   calcium carbonate 600 MG tablet  Commonly known as: OS-DARIAN   600 mg, Oral, Daily      Eliquis 5 MG tablet tablet  Generic drug: apixaban   5 mg, Oral, 2 Times Daily      Eucerin original healing lotion lotion   1 Application, Topical, Daily      latanoprost 0.005 % ophthalmic solution  Commonly known as: XALATAN   1 drop, Daily      nicotine 14 MG/24HR patch  Commonly known as: NICODERM CQ   1 patch, Transdermal, Every 24 Hours      potassium chloride 10 MEQ CR capsule  Commonly known as: MICRO-K   10 mEq, Daily      sennosides-docusate 8.6-50 MG per tablet  Commonly known as: PERICOLACE   2 tablets, Oral, 2 Times Daily      vitamin D3 125 MCG (5000 UT) capsule capsule   5,000 Units, Oral, Daily             Stop These Medications      cephalexin 500 MG capsule  Commonly known as: Keflex              Discharge Diet:   Diet Instructions       Diet: Regular/House Diet, Cardiac Diets; Healthy Heart (2-3 Na+); Regular (IDDSI 7); Thin (IDDSI 0)      Discharge Diet:  Regular/House Diet  Cardiac Diets       Cardiac Diet: Healthy Heart (2-3 Na+)    Texture: Regular (IDDSI 7)    Fluid Consistency: Thin (IDDSI 0)            Activity at Discharge:   Activity Instructions       Activity as Tolerated              Follow-up Appointments  Future Appointments   Date Time Provider Department Center   5/13/2025  2:00 PM Vamshi Cabrales APRN [facility] [address] [facility]   7/28/2025  9:45 AM Nona Klein MD [facility] None     Additional Instructions for the Follow-ups that You Need to Schedule       Discharge  Follow-up with PCP   As directed       Currently Documented PCP:    Villa Madrigal MD    PCP Phone Number:    [phone]     Follow Up Details: after rehab              Follow-up in [facility] clinic in 2 weeks        I have examined and discussed discharge planning with the patient today.    Amish Seals MD  02/24/25  09:29 EST    Time: Discharge greater than 30 min

## 2025-02-24 NOTE — PLAN OF CARE
Goal Outcome Evaluation:  Plan of Care Reviewed With: patient        Progress: improving  Outcome Evaluation: Patient discharging today via wheelchair van at 1300. Report given to CLARE Castellano.

## 2025-02-24 NOTE — CASE MANAGEMENT/SOCIAL WORK
Post-Acute Authorization Submission      Post Acute Pre-Cert Documentation  Request Submitted by Facility - Type:: Hospital  Post-Acute Authorization Type Submitted:: SNF  Date Post Acute Pre-Cert Inititated per Facility: 02/21/25  Date Post Acute Pre-Cert Completed: 02/24/25  Accepting Facility: [facility] Discharge Date Requested: 02/22/25  All Clinicals Submitted?: Yes  Had Accepting Facility at Time of Submission: Yes  Response Received from Insurance?: Approval  Response Communicated to:: , Accepting Facility Liaison, Accepting Facility Auth Department  Authorization Number:: APPROVED [id number]  Post Acute Pre-Cert Initiated Comment: VALID TO ADMIT [facility] 2/27/25.              Tyler Ames, PCT

## 2025-02-24 NOTE — CASE MANAGEMENT/SOCIAL WORK
Case Management Discharge Note      Final Note: [facility] today via dana arrington         Selected Continued Care - Discharged on 2/24/2025 Admission date: 2/20/2025 - Discharge disposition: Skilled Nursing Facility (DC - External)      Destination Coordination complete.      Service Provider Services Address Phone Fax Patient Preferred    [facility] [address] [address] [phone] --              Durable Medical Equipment    No services have been selected for the patient.                Dialysis/Infusion    No services have been selected for the patient.                Home Medical Care    No services have been selected for the patient.                Therapy    No services have been selected for the patient.                Community Resources    No services have been selected for the patient.                Community & DME    No services have been selected for the patient.                    Selected Continued Care - Prior Encounters Includes continued care and service providers with selected services from prior encounters from 11/22/2024 to 2/24/2025      Discharged on 12/2/2024 Admission date: 11/27/2024 - Discharge disposition: Skilled Nursing Facility (DC - External)      Destination       Service Provider Services Address Phone Fax Patient Preferred    [facility] [address] [address] [phone] --                               Final Discharge Disposition Code: 03 - skilled nursing facility (SNF)

## 2025-02-24 NOTE — CASE MANAGEMENT/SOCIAL WORK
Continued Stay Note  [facility]     Patient Name: Noemi Forbes  MRN: [id number]  Today's Date: 2/24/2025    Admit Date: 2/20/2025    Plan: [facility] today via [employer] [facility] van at 1pm   Discharge Plan       Row Name 02/24/25 0828       Plan    Plan [facility] today via [employer] [facility] van at 1pm    Plan Comments [employer] wheelchair van has been scheduled for today 2/24 at 1pm to [facility] (reservation # [id number]). [facility] notified RN, patient, facility, family, and MD of transport time. DIANNE, BLESSING                   Discharge Codes    No documentation.                 Expected Discharge Date and Time       Expected Discharge Date Expected Discharge Time    Feb 24, 2025               BLESSING Bethea

## 2025-03-13 ENCOUNTER — TELEPHONE (OUTPATIENT)
Dept: ORTHOPEDIC SURGERY | Facility: CLINIC | Age: 89
End: 2025-03-13

## 2025-03-13 NOTE — TELEPHONE ENCOUNTER
Please advise   [phone] Ju ( verbal confirmed)     I do NOT see any notes where pt was seen for knee pain in our office. Only kaleb shoulder     ED to [facility] visit 02/20/2025 - fall

## 2025-03-13 NOTE — TELEPHONE ENCOUNTER
Caller: quinn salazar (inLaw)    Relationship to patient: Emergency Contact    Best call back number: [phone]*    Patient is needing: PT IS CALLING TO SEE IF IT IS OKAY TO TAKE THE LEG BRACE OFF OF THE RIGHT KNEE.. PT WAS SEEN AT THE ER ON 02/20/25 AND WAS GIVEN A BRACE.. PT IS AT A REHAB FACILITY CURRENTLY AND THEY WERE ASKING.. PLEASE ADVISE..

## 2025-03-13 NOTE — TELEPHONE ENCOUNTER
Kim Dee PA-C  consulted in Hospital from [facility]. Patient was to see Dr. Kumar in follow up. They should contact that office for recommendations.

## 2025-05-08 ENCOUNTER — HOSPITAL ENCOUNTER (EMERGENCY)
Facility: HOSPITAL | Age: 89
Discharge: HOME | End: 2025-05-08
Attending: EMERGENCY MEDICINE
Payer: MEDICARE

## 2025-05-08 ENCOUNTER — APPOINTMENT (OUTPATIENT)
Dept: CT IMAGING | Facility: HOSPITAL | Age: 89
End: 2025-05-08
Payer: MEDICARE

## 2025-05-08 ENCOUNTER — APPOINTMENT (OUTPATIENT)
Dept: GENERAL RADIOLOGY | Facility: HOSPITAL | Age: 89
End: 2025-05-08
Payer: MEDICARE

## 2025-05-08 VITALS
HEART RATE: 63 BPM | RESPIRATION RATE: 18 BRPM | DIASTOLIC BLOOD PRESSURE: 89 MMHG | TEMPERATURE: 97.8 F | SYSTOLIC BLOOD PRESSURE: 159 MMHG | OXYGEN SATURATION: 94 %

## 2025-05-08 DIAGNOSIS — S80.01XA CONTUSION OF RIGHT KNEE, INITIAL ENCOUNTER: ICD-10-CM

## 2025-05-08 DIAGNOSIS — S02.2XXA CLOSED FRACTURE OF NASAL BONE, INITIAL ENCOUNTER: ICD-10-CM

## 2025-05-08 DIAGNOSIS — Z79.01 CHRONIC ANTICOAGULATION: ICD-10-CM
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
## 2025-05-08 DIAGNOSIS — S00.81XA ABRASION, FACE W/O INFECTION: ICD-10-CM

## 2025-05-08 DIAGNOSIS — R04.0 EPISTAXIS DUE TO TRAUMA: ICD-10-CM

## 2025-05-08 DIAGNOSIS — S09.90XA INJURY OF HEAD, INITIAL ENCOUNTER: ICD-10-CM
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
## 2025-05-08 PROCEDURE — 25010000002 LIDOCAINE 1% - EPINEPHRINE 1:100000 1 %-1:100000 SOLUTION: Performed by: EMERGENCY MEDICINE

## 2025-05-08 PROCEDURE — 70450 CT HEAD/BRAIN W/O DYE: CPT

## 2025-05-08 PROCEDURE — 72125 CT NECK SPINE W/O DYE: CPT

## 2025-05-08 PROCEDURE — 70486 CT MAXILLOFACIAL W/O DYE: CPT

## 2025-05-08 PROCEDURE — 73560 X-RAY EXAM OF KNEE 1 OR 2: CPT

## 2025-05-08 PROCEDURE — 99284 EMERGENCY DEPT VISIT MOD MDM: CPT

## 2025-05-08 RX ORDER — HYDROCODONE BITARTRATE AND ACETAMINOPHEN 5; 325 MG/1; MG/1
1 TABLET ORAL ONCE
Refills: 0 | Status: COMPLETED | OUTPATIENT
Start: 2025-05-08 | End: 2025-05-08

## 2025-05-08 RX ORDER — TRANEXAMIC ACID 100 MG/ML
500 INJECTION, SOLUTION INTRAVENOUS ONCE
Status: COMPLETED | OUTPATIENT
Start: 2025-05-08 | End: 2025-05-08

## 2025-05-08 RX ORDER — LIDOCAINE HYDROCHLORIDE AND EPINEPHRINE 10; 10 MG/ML; UG/ML
10 INJECTION, SOLUTION INFILTRATION; PERINEURAL ONCE
Status: COMPLETED | OUTPATIENT
Start: 2025-05-08 | End: 2025-05-08

## 2025-05-08 RX ADMIN — LIDOCAINE HYDROCHLORIDE,EPINEPHRINE BITARTRATE 10 ML: 10; .01 INJECTION, SOLUTION INFILTRATION; PERINEURAL at 17:52

## 2025-05-08 RX ADMIN — PHENYLEPHRINE HYDROCHLORIDE 2 SPRAY: 0.5 SPRAY NASAL at 17:52

## 2025-05-08 RX ADMIN — TRANEXAMIC ACID 500 MG: 100 INJECTION INTRAVENOUS at 17:52

## 2025-05-08 RX ADMIN — HYDROCODONE BITARTRATE AND ACETAMINOPHEN 1 TABLET: 5; 325 TABLET ORAL at 17:52
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
## 2025-05-08 NOTE — DISCHARGE INSTRUCTIONS
Please do not blow your nose for the next 48 hours.  You may use topical Neeraj-Synephrine nasal spray up to 3 times per 24 hours as needed for small amounts of nosebleeding.    Return to the emergency department with worsening symptoms, uncontrolled pain, inability to tolerate oral liquids, fever greater than 105°F not controlled by Tylenol and ibuprofen or as needed with emergent concerns.

## 2025-05-13 ENCOUNTER — OFFICE VISIT (OUTPATIENT)
Dept: ORTHOPEDIC SURGERY | Facility: CLINIC | Age: 89
End: 2025-05-13
Payer: MEDICARE

## 2025-05-13 VITALS — TEMPERATURE: 98.2 F | HEIGHT: 62 IN | WEIGHT: 160 LBS | BODY MASS INDEX: 29.44 KG/M2

## 2025-05-13 DIAGNOSIS — M25.511 CHRONIC RIGHT SHOULDER PAIN: ICD-10-CM

## 2025-05-13 DIAGNOSIS — G89.29 CHRONIC RIGHT SHOULDER PAIN: ICD-10-CM

## 2025-05-13 DIAGNOSIS — M12.811 ROTATOR CUFF ARTHROPATHY OF RIGHT SHOULDER: Primary | ICD-10-CM

## 2025-05-13 RX ADMIN — METHYLPREDNISOLONE ACETATE 80 MG: 40 INJECTION, SUSPENSION INTRA-ARTICULAR; INTRALESIONAL; INTRAMUSCULAR; SOFT TISSUE at 14:00

## 2025-05-13 RX ADMIN — LIDOCAINE HYDROCHLORIDE 2 ML: 10 INJECTION, SOLUTION EPIDURAL; INFILTRATION; INTRACAUDAL; PERINEURAL at 14:00

## 2025-05-13 NOTE — PROGRESS NOTES
[facility]              Follow Up      Patient Name: Noemi Forbes  : 1935  Primary Care Physician: Provider, No Known        Chief Complaint:  Right shoulder pain    HPI:   Noemi Forbes is a 89 y.o. year old who presents today for evaluation. I last saw her in February. We did an injection for severe rotator cuff arthropathy. She has pretty limited use of the arm and significant pain. She is not certain the injection offered her much relief but she is here and would like to try it again. She asked what her options are but she is not interested in surgical management. She takes tylenol for her pain and is on a blood thinner. She does not get great relief with tylenol.  History of Present Illness         Past Medical/Surgical, Social and Family History:  I have reviewed and/or updated pertinent history as noted in the medical record including:  Past Medical History:   Diagnosis Date    Arthritis     DVT of leg (deep venous thrombosis)     Low back pain     Osteopenia      Past Surgical History:   Procedure Laterality Date    BACK SURGERY      BRAIN SURGERY      CATARACT EXTRACTION      COLONOSCOPY      HYSTERECTOMY      ROTATOR CUFF REPAIR Right     SPINE SURGERY      THYROID SURGERY      TONSILLECTOMY       Social History     Occupational History    Not on file   Tobacco Use    Smoking status: Every Day     Current packs/day: 0.50     Average packs/day: 0.5 packs/day for 75.4 years (37.7 ttl pk-yrs)     Types: Cigarettes     Start date: 1950     Passive exposure: Never    Smokeless tobacco: Never   Vaping Use    Vaping status: Never Used    Passive vaping exposure: Yes   Substance and Sexual Activity    Alcohol use: Yes     Alcohol/week: 6.0 standard drinks of alcohol     Types: 4 Glasses of wine, 2 Cans of beer per week     Comment: socially    Drug use: Never    Sexual activity: Not Currently          Allergies: No Known Allergies    Medications:   Home Medications:  Current Outpatient  Medications on File Prior to Visit   Medication Sig    acetaminophen (TYLENOL) 325 MG tablet Take 2 tablets by mouth Every 4 (Four) Hours As Needed for Mild Pain.    calcium carbonate (OS-DARIAN) 600 MG tablet Take 1 tablet by mouth Daily.    Eliquis 5 MG tablet tablet TAKE 1 TABLET BY MOUTH TWICE  DAILY    Emollient (Eucerin original healing lotion) lotion Apply 1 Application topically to the appropriate area as directed Daily.    latanoprost (XALATAN) 0.005 % ophthalmic solution Administer 1 drop to both eyes Daily.    nicotine (NICODERM CQ) 14 MG/24HR patch Place 1 patch on the skin as directed by provider Daily.    potassium chloride (MICRO-K) 10 MEQ CR capsule Take 1 capsule by mouth Daily.    sennosides-docusate (PERICOLACE) 8.6-50 MG per tablet Take 2 tablets by mouth 2 (Two) Times a Day.    vitamin D3 125 MCG (5000 UT) capsule capsule Take 1 capsule by mouth Daily.    oxyCODONE (ROXICODONE) 5 MG immediate release tablet Take 1 tablet by mouth Every 6 (Six) Hours As Needed for Moderate Pain. (Patient not taking: Reported on 5/13/2025)     No current facility-administered medications on file prior to visit.         ROS:  ROS negative except as listed in the HPI.    Physical Exam:   89 y.o. female  Body mass index is 29.26 kg/m²., 72.6 kg (160 lb)  Vitals:    05/13/25 1337   Temp: 98.2 °F (36.8 °C)     General: Alert, cooperative, appears well and in no observable distress. Appears stated age and BMI as listed above.  HEENT: Normocephalic, atraumatic on external visual inspection.  CV: No significant peripheral edema.  Respiratory: Normal respiratory effort.  Skin: Warm & well perfused; appropriate skin turgor.  Psych: Appropriate mood & affect.  Neuro: Gross sensation and motor intact in affected extremity/extremities.  Vascular: Peripheral pulses palpable in affected extremity/extremities.   Physical Exam      Radiology:    No new images were needed at the visit today.   Results      Procedure:   See Procedure  Note: The potential risks and benefits of performing a diagnostic and therapeutic injection were discussed with the patient prior to procedure. Risks include, but are not limited to infection, swelling, transient increase in pain, bleeding, bruising. Patient was advised that injections are a diagnostic and therapeutic tool meaning they may not alleviate symptoms at all, or may only provide partial or temporary relief. Injection precautions and aftercare discussed. and Patient is currently on anticoagulation and/or a blood thinning agent which poses an increased risk of more significant bleeding or bruising following an injection. While joint injections are generally well tolerated even on AC, the patient was counseled on this increased risk and advised to monitor for signs of bleeding with proper follow up instructions. Injection precautions and aftercare discussed.      [place]. Data/Labs: N/A    Assessment & Plan:        ICD-10-CM ICD-9-CM   1. Rotator cuff arthropathy of right shoulder  M12.811 716.81   2. Chronic right shoulder pain  M25.511 719.41    G89.29 338.29     No orders of the defined types were placed in this encounter.    Orders Placed This Encounter   Procedures    Large Joint Arthrocentesis: R subacromial bursa     Unfortunately I do not think there are a lot of options outside of surgical management and she is not a great candidate nor interested in major surgery. She would like to try one more injection which I think is reasonable. If it is not helpful we could consider pain management referral for interventional procedures that might help alleviate symptoms if she is willing. Otherwise, I think medical management of her pain is probably the best bet and would defer that to PCP.  Assessment & Plan      Continue with activity modifications as needed/discussed.  Continue ICE and/or HEAT PRN.  Recommend to continue activity as tolerated, focus on stretching and strengthening of the joint.    Focus on  posture and body mechanics to improve/prevent symptoms.   Patient encouraged to call with questions or concerns prior to follow up.  Will discuss with attending as needed.  Consider additional referrals, work up and/or advanced imaging as indicated or if patient fails to respond to conservative care.    Return in about 3 months (around 8/13/2025) for Injection with TERRELL Ibrahim.      TERRELL Noel    Dictation software was used to complete a portion or all of this note.    Large Joint Arthrocentesis: R subacromial bursa  Date/Time: 5/13/2025 2:00 PM  Consent given by: patient  Site marked: site marked  Timeout: Immediately prior to procedure a time out was called to verify the correct patient, procedure, equipment, support staff and site/side marked as required   Supporting Documentation  Indications: pain   Procedure Details  Location: shoulder - R subacromial bursa  Preparation: Patient was prepped and draped in the usual sterile fashion  Needle gauge: 21G.  Approach: posterior  Medications administered: 2 mL lidocaine PF 1% 1 %; 80 mg methylPREDNISolone acetate 40 MG/ML  Patient tolerance: patient tolerated the procedure well with no immediate complications

## 2025-05-15 RX ORDER — METHYLPREDNISOLONE ACETATE 40 MG/ML
80 INJECTION, SUSPENSION INTRA-ARTICULAR; INTRALESIONAL; INTRAMUSCULAR; SOFT TISSUE
Status: COMPLETED | OUTPATIENT
Start: 2025-05-13 | End: 2025-05-13

## 2025-05-15 RX ORDER — LIDOCAINE HYDROCHLORIDE 10 MG/ML
2 INJECTION, SOLUTION EPIDURAL; INFILTRATION; INTRACAUDAL; PERINEURAL
Status: COMPLETED | OUTPATIENT
Start: 2025-05-13 | End: 2025-05-13

## 2025-06-02 RX ORDER — POTASSIUM CHLORIDE 750 MG/1
10 CAPSULE, EXTENDED RELEASE ORAL DAILY
Qty: 90 CAPSULE | Refills: 0 | Status: SHIPPED | OUTPATIENT
Start: 2025-06-02

## 2025-07-11 ENCOUNTER — HOSPITAL ENCOUNTER (EMERGENCY)
Facility: HOSPITAL | Age: 89
Discharge: HOME | End: 2025-07-11
Attending: EMERGENCY MEDICINE
Payer: MEDICARE

## 2025-07-11 ENCOUNTER — APPOINTMENT (OUTPATIENT)
Dept: CT IMAGING | Facility: HOSPITAL | Age: 89
End: 2025-07-11
Payer: MEDICARE

## 2025-07-11 ENCOUNTER — APPOINTMENT (OUTPATIENT)
Dept: GENERAL RADIOLOGY | Facility: HOSPITAL | Age: 89
End: 2025-07-11
Payer: MEDICARE

## 2025-07-11 VITALS
BODY MASS INDEX: 29.44 KG/M2 | RESPIRATION RATE: 16 BRPM | SYSTOLIC BLOOD PRESSURE: 128 MMHG | HEART RATE: 94 BPM | DIASTOLIC BLOOD PRESSURE: 84 MMHG | OXYGEN SATURATION: 96 % | HEIGHT: 62 IN | WEIGHT: 160 LBS | TEMPERATURE: 98 F

## 2025-07-11 DIAGNOSIS — S02.2XXB OPEN FRACTURE OF NASAL BONE, INITIAL ENCOUNTER: Primary | ICD-10-CM

## 2025-07-11 PROCEDURE — 70450 CT HEAD/BRAIN W/O DYE: CPT

## 2025-07-11 PROCEDURE — 73562 X-RAY EXAM OF KNEE 3: CPT

## 2025-07-11 PROCEDURE — 99284 EMERGENCY DEPT VISIT MOD MDM: CPT

## 2025-07-11 PROCEDURE — 70486 CT MAXILLOFACIAL W/O DYE: CPT

## 2025-07-11 RX ORDER — HYDROCODONE BITARTRATE AND ACETAMINOPHEN 5; 325 MG/1; MG/1
1 TABLET ORAL ONCE AS NEEDED
Refills: 0 | Status: DISCONTINUED | OUTPATIENT
Start: 2025-07-11 | End: 2025-07-12 | Stop reason: HOSPADM

## 2025-07-11 RX ORDER — HYDROCODONE BITARTRATE AND ACETAMINOPHEN 5; 325 MG/1; MG/1
1 TABLET ORAL EVERY 6 HOURS PRN
Qty: 12 TABLET | Refills: 0 | Status: SHIPPED | OUTPATIENT
Start: 2025-07-11

## 2025-07-11 RX ADMIN — HYDROCODONE BITARTRATE AND ACETAMINOPHEN 1 TABLET: 5; 325 TABLET ORAL at 20:54

## 2025-07-11 RX ADMIN — PHENYLEPHRINE HYDROCHLORIDE 2 SPRAY: 0.5 SPRAY NASAL at 19:55

## 2025-07-11 NOTE — FSED PROVIDER NOTE
Subjective   History of Present Illness  She remembers in detail how she fell she is sitting in a wheelchair motorized that she lost control of the controls in which she hit the wrong thing and it went forward instead of stopped and before she could stop it she fell out of the chair.  She hit the left side of her face she did have her glasses on there was no loss of consciousness.  Patient is on blood thinner.  She has nasal bleeding and pain and swelling to her nose and left cheek she also has a hematoma abrasion on her forehead.  There is no ear involvement no neck involvement no tenderness in those areas.  There was no loss of consciousness.      Review of Systems   Skin:  Positive for wound.   All other systems reviewed and are negative.      Past Medical History:   Diagnosis Date    Arthritis     DVT of leg (deep venous thrombosis)     Low back pain     Osteopenia        No Known Allergies    Past Surgical History:   Procedure Laterality Date    BACK SURGERY      BRAIN SURGERY      CATARACT EXTRACTION      COLONOSCOPY      HYSTERECTOMY      ROTATOR CUFF REPAIR Right     SPINE SURGERY      THYROID SURGERY      TONSILLECTOMY         Family History   Problem Relation Age of Onset    Dementia Mother        Social History     Socioeconomic History    Marital status:    Tobacco Use    Smoking status: Every Day     Current packs/day: 0.50     Average packs/day: 0.5 packs/day for 75.5 years (37.8 ttl pk-yrs)     Types: Cigarettes     Start date: 1/1/1949     Passive exposure: Never    Smokeless tobacco: Never   Vaping Use    Vaping status: Never Used    Passive vaping exposure: Yes   Substance and Sexual Activity    Alcohol use: Yes     Alcohol/week: 6.0 standard drinks of alcohol     Types: 4 Glasses of wine, 2 Cans of beer per week     Comment: socially    Drug use: Never    Sexual activity: Not Currently           Objective   Physical Exam  Vitals and nursing note reviewed.   Constitutional:       General:  She is not in acute distress.     Appearance: Normal appearance. She is not ill-appearing, toxic-appearing or diaphoretic.   HENT:      Head: Normocephalic.      Comments: Contusion abrasion swelling to the glabella of the nose the nasal bridge to the left maxilla abrasion to the left forehead.     Nose:      Comments: Swelling epistaxis no posterior epistaxis with nasal clamp.     Mouth/Throat:      Mouth: Mucous membranes are moist.   Eyes:      Extraocular Movements: Extraocular movements intact.      Pupils: Pupils are equal, round, and reactive to light.   Cardiovascular:      Rate and Rhythm: Normal rate and regular rhythm.      Pulses: Normal pulses.      Heart sounds: Normal heart sounds.   Pulmonary:      Effort: Pulmonary effort is normal. No respiratory distress.      Breath sounds: Normal breath sounds. No stridor. No wheezing.   Abdominal:      General: Bowel sounds are normal.      Palpations: Abdomen is soft.   Musculoskeletal:         General: Tenderness present. No swelling. Normal range of motion.      Cervical back: Normal range of motion and neck supple.      Right lower leg: Edema present.      Left lower leg: Edema present.      Comments: Region and tenderness to patellar area of right knee no swelling dependent edema both lower extremities.  Both knees are full range of motion.   Skin:     General: Skin is warm.      Capillary Refill: Capillary refill takes less than 2 seconds.      Findings: Lesion present.      Comments: Abrasions to left forehead left face left nares and nasal bridge right knee but no bloody abrasion   Neurological:      Mental Status: She is alert and oriented to person, place, and time.   Psychiatric:         Mood and Affect: Mood normal.         Procedures           ED Course                                           Medical Decision Making  Patient had to have a Rhino Rocket placed I placed this in because I failed to stop the bleeding out of her right nare a several  times.  And attempts with the Neeraj-Synephrine.  I then went to the Rhino Rocket which then did stop the bleeding I observed her for 45 minutes gave her Norco and she is doing much better.  However follow-up on Monday with [facility].  She will have to have that Rhino Rocket removed.    Amount and/or Complexity of Data Reviewed  Radiology: ordered.     Details: CT results of the face demonstrated a septal fracture that is new and nasal bone fractures bilaterally that are probably old.  The patient has no other fractures to her face CT shows no acute intracranial pathology and her right knee does not show any fracture or dislocation of her right knee.    Risk  OTC drugs.  Prescription drug management.        Final diagnoses:   Open fracture of nasal bone, initial encounter       ED Disposition  ED Disposition       ED Disposition   Discharge    Condition   Stable    Comment   --               Villa Madrigal MD  [address] [facility] [address]  [facility] [address]  [phone]    In 3 days      [facility]   [address]  [facility] [address]  [phone]  In 3 days  To remove Rhino Rocket or nasal packing         Medication List        New Prescriptions      HYDROcodone-acetaminophen 5-325 MG per tablet  Commonly known as: NORCO  Take 1 tablet by mouth Every 6 (Six) Hours As Needed for Severe Pain.               Where to Get Your Medications        These medications were sent to [id number] - [place], [place] - [address] AT [facility] - [phone]  - [phone]   [address], [place] [id number]      Phone: [phone]   HYDROcodone-acetaminophen 5-325 MG per tablet

## 2025-07-12 NOTE — DISCHARGE INSTRUCTIONS
You must call and follow-up with [facility] Jordan to have the nasal packing removed and evaluation of your nasal fracture.  Ice for the next 48 hours on your nose.  Norco for severe pain Tylenol plain for mild to moderate pain

## 2025-07-17 ENCOUNTER — TELEPHONE (OUTPATIENT)
Dept: RADIATION ONCOLOGY | Facility: HOSPITAL | Age: 89
End: 2025-07-17
Payer: MEDICARE

## 2025-07-17 NOTE — TELEPHONE ENCOUNTER
Patient called saying she cancelled her CT and wanted to cancel her follow up with [facility] on 7/28 and did not want to reschedule at this time.

## 2025-07-23 RX ORDER — POTASSIUM CHLORIDE 750 MG/1
10 CAPSULE, EXTENDED RELEASE ORAL DAILY
Qty: 90 CAPSULE | Refills: 1 | Status: SHIPPED | OUTPATIENT
Start: 2025-07-23

## 2025-07-23 RX ORDER — APIXABAN 5 MG/1
5 TABLET, FILM COATED ORAL 2 TIMES DAILY
Qty: 180 TABLET | Refills: 3 | Status: SHIPPED | OUTPATIENT
Start: 2025-07-23

## 2025-08-21 ENCOUNTER — OFFICE VISIT (OUTPATIENT)
Dept: ORTHOPEDIC SURGERY | Facility: CLINIC | Age: 89
End: 2025-08-21
Payer: MEDICARE

## 2025-08-21 VITALS — WEIGHT: 160 LBS | HEIGHT: 62 IN | BODY MASS INDEX: 29.44 KG/M2 | TEMPERATURE: 98.3 F

## 2025-08-21 DIAGNOSIS — M12.811 ROTATOR CUFF ARTHROPATHY OF RIGHT SHOULDER: Primary | ICD-10-CM

## 2025-08-21 DIAGNOSIS — M25.511 CHRONIC RIGHT SHOULDER PAIN: ICD-10-CM

## 2025-08-21 DIAGNOSIS — G89.29 CHRONIC RIGHT SHOULDER PAIN: ICD-10-CM

## 2025-08-21 RX ORDER — LOPERAMIDE HYDROCHLORIDE 2 MG/1
CAPSULE ORAL
COMMUNITY

## 2025-08-21 RX ORDER — FUROSEMIDE 20 MG/1
TABLET ORAL
COMMUNITY

## 2025-08-21 RX ORDER — POTASSIUM CHLORIDE 750 MG/1
TABLET, EXTENDED RELEASE ORAL
COMMUNITY

## 2025-08-21 RX ORDER — METHYLPREDNISOLONE ACETATE 40 MG/ML
80 INJECTION, SUSPENSION INTRA-ARTICULAR; INTRALESIONAL; INTRAMUSCULAR; SOFT TISSUE
Status: COMPLETED | OUTPATIENT
Start: 2025-08-21 | End: 2025-08-21

## 2025-08-21 RX ORDER — SULFAMETHOXAZOLE AND TRIMETHOPRIM 800; 160 MG/1; MG/1
TABLET ORAL
COMMUNITY

## 2025-08-21 RX ORDER — LIDOCAINE HYDROCHLORIDE 10 MG/ML
2 INJECTION, SOLUTION EPIDURAL; INFILTRATION; INTRACAUDAL; PERINEURAL
Status: COMPLETED | OUTPATIENT
Start: 2025-08-21 | End: 2025-08-21

## 2025-08-21 RX ADMIN — METHYLPREDNISOLONE ACETATE 80 MG: 40 INJECTION, SUSPENSION INTRA-ARTICULAR; INTRALESIONAL; INTRAMUSCULAR; SOFT TISSUE at 14:09

## 2025-08-21 RX ADMIN — LIDOCAINE HYDROCHLORIDE 2 ML: 10 INJECTION, SOLUTION EPIDURAL; INFILTRATION; INTRACAUDAL; PERINEURAL at 14:09
